# Patient Record
Sex: FEMALE | Race: WHITE | Employment: OTHER | URBAN - METROPOLITAN AREA
[De-identification: names, ages, dates, MRNs, and addresses within clinical notes are randomized per-mention and may not be internally consistent; named-entity substitution may affect disease eponyms.]

---

## 2017-11-07 PROBLEM — C56.9 OVARIAN CANCER (HCC): Status: ACTIVE | Noted: 2017-11-07

## 2017-11-07 PROBLEM — C54.1 ADENOCARCINOMA OF ENDOMETRIUM (HCC): Status: ACTIVE | Noted: 2017-11-07

## 2018-03-26 LAB
CHOLESTEROL, TOTAL: 197 MG/DL
CHOLESTEROL/HDL RATIO: 2.4
HDLC SERPL-MCNC: 83 MG/DL (ref 35–70)
LDL CHOLESTEROL CALCULATED: 93 MG/DL (ref 0–160)
TRIGL SERPL-MCNC: 117 MG/DL
VLDLC SERPL CALC-MCNC: ABNORMAL MG/DL

## 2018-07-09 ENCOUNTER — TELEPHONE (OUTPATIENT)
Dept: PULMONOLOGY | Age: 73
End: 2018-07-09

## 2018-07-09 ENCOUNTER — OFFICE VISIT (OUTPATIENT)
Dept: PULMONOLOGY | Age: 73
End: 2018-07-09
Payer: COMMERCIAL

## 2018-07-09 VITALS
WEIGHT: 163 LBS | RESPIRATION RATE: 12 BRPM | HEIGHT: 64 IN | DIASTOLIC BLOOD PRESSURE: 63 MMHG | SYSTOLIC BLOOD PRESSURE: 126 MMHG | OXYGEN SATURATION: 97 % | BODY MASS INDEX: 27.83 KG/M2 | HEART RATE: 81 BPM

## 2018-07-09 DIAGNOSIS — C80.1 CANCER (HCC): Primary | ICD-10-CM

## 2018-07-09 DIAGNOSIS — C50.412 MALIGNANT NEOPLASM OF UPPER-OUTER QUADRANT OF LEFT BREAST IN FEMALE, ESTROGEN RECEPTOR POSITIVE (HCC): Primary | ICD-10-CM

## 2018-07-09 DIAGNOSIS — C54.1 ADENOCARCINOMA OF ENDOMETRIUM (HCC): ICD-10-CM

## 2018-07-09 DIAGNOSIS — Z17.0 MALIGNANT NEOPLASM OF UPPER-OUTER QUADRANT OF LEFT BREAST IN FEMALE, ESTROGEN RECEPTOR POSITIVE (HCC): Primary | ICD-10-CM

## 2018-07-09 LAB
DLCO %PRED: NORMAL
DLCO/VA %PRED: NORMAL
DLCO: NORMAL ML/MMHG SEC
FEF 25-75% PRE PRED: 1.79
FEF 25-75%-PRE: 1.22
FEV1 %PRED-PRE: 88
FEV1/FVC PRED: 78
FEV1/FVC: 72 %
FEV1: 1.49 LITERS
FEV3 PRE: 2.37
FVC %PRED-PRE: 95
FVC: 2.62 LITERS
MEP: NORMAL
MIP: NORMAL
PEF %PRED-PRE: 5.44
PEF-PRE: 6.43 L/SEC
TLC %PRED: NORMAL
TLC: NORMAL LITERS

## 2018-07-09 PROCEDURE — 99205 OFFICE O/P NEW HI 60 MIN: CPT | Performed by: INTERNAL MEDICINE

## 2018-07-09 PROCEDURE — 94010 BREATHING CAPACITY TEST: CPT | Performed by: INTERNAL MEDICINE

## 2018-07-09 PROCEDURE — 99203 OFFICE O/P NEW LOW 30 MIN: CPT | Performed by: INTERNAL MEDICINE

## 2018-07-09 RX ORDER — GUAIFENESIN/EPHEDRINE HCL 200-12.5MG
200 TABLET ORAL 2 TIMES DAILY
COMMUNITY
End: 2018-11-29

## 2018-07-09 RX ORDER — MELATONIN 10 MG
20 CAPSULE ORAL NIGHTLY
COMMUNITY
End: 2019-09-04

## 2018-07-09 RX ORDER — FAMOTIDINE 20 MG/1
1 TABLET, FILM COATED ORAL 2 TIMES DAILY
COMMUNITY
Start: 2018-06-26 | End: 2018-11-29

## 2018-07-09 RX ORDER — ACETAMINOPHEN 325 MG/1
650 TABLET ORAL EVERY 4 HOURS PRN
COMMUNITY
End: 2019-07-09

## 2018-07-09 ASSESSMENT — PULMONARY FUNCTION TESTS
FEV1_PERCENT_PREDICTED_PRE: 88
FVC_PERCENT_PREDICTED_PRE: 95
FEV1: 1.49
FVC: 2.62
FEV1/FVC: 72
FEV1/FVC_PREDICTED: 78

## 2018-07-09 NOTE — PROGRESS NOTES
New pt to see Dr. Ita Cassidy for 2nd opinion on recent diagnosis with Lung mets? . Pt currently seen at [facility] for Endometrial Cancer/Hx Breast Cancer. Follow up in 4 weeks; appt given. Possible Bronch/Ebus with Biopsy in future. Pt to discuss. Given Dr. Walls Sole contact info #.

## 2018-07-09 NOTE — TELEPHONE ENCOUNTER
Pt called into office and request referral to Dr. Jyoti Payne as discussed with Dr. Paula Patel on today's visit. Referral sent.

## 2018-07-09 NOTE — PROGRESS NOTES
Department of Internal Medicine  Division of Pulmonary, Critical Care & Sleep Medicine  Pulmonary [facility]                                             Pulmonary Clinic Consult     I had the pleasure of seeing  Jase Gonzalez in the [facility] regarding their lung mets       Chief Complaint   Patient presents with    New Patient    Cough     dry    Cancer     hx Endometrial/ Breast Ca    Allergies     enviromental       HISTORY OF PRESENT ILLNESS:    Jase Gonzalez is a 71y.o. year old  Who start smoking at age  25 And increase gradually  1 pack for 10 years , quit smoking 15 years ago     The Patient comes in with SOB that has been going on for the alst 3-4 years   Associated with cough ,She  states that it get worse with exercise or walking long distance and he can walk 1 block  And go 1-2 flight of stairs before get short winded    She  has cough ,dry   She denies any hemoptysis       Has history of lung disease include    Patient has history of uterus cancer that was treated by hysterectomy and also followed by chemotherapy, she also had diagnoses of breast cancer that was treated by lumpectomy and followed by Dr. Che Bradley    The patient during follow-up CAT scan has couple CT scan of the chest that shows a lung nodules with possible metastases and she was treated with chemo, follow-up CAT scan shows slight increase in the size of the nodules and for that the patient was seeking medical advice as her oncologist in the [place] was requesting biopsy    The patient also followed with local oncologist that she has not seen 4 months and she is also following by Dr. Che Bradley as well for her breast cancer    The patient current CT scan shows bilateral lung nodules    As mentioned above patient has been having trouble breathing dry cough, she denies any hemoptysis, she denies any weight loss, she denies any night sweats    She has to stop her chemotherapy as it was not tolerated      ALLERGIES:    Allergies   Allergen Reactions    Asa [Aspirin] Hives    Prilosec [Omeprazole] Hives    Protonix [Pantoprazole Sodium]     Erythromycin Rash    Keflet [Cephalexin] Rash    Levaquin [Levofloxacin In D5w] Rash    Pcn [Penicillins] Rash    Tetracyclines & Related Rash       PAST MEDICAL HISTORY:       Diagnosis Date    Cancer [facility])     endometrial cancer    Diverticulitis     GERD (gastroesophageal reflux disease)     Gout     Macular degeneration     Osteoarthritis        MEDICATIONS:   Current Outpatient Prescriptions   Medication Sig Dispense Refill    loratadine (CLARITIN) 10 MG tablet Take 10 mg by mouth      cyclobenzaprine (FLEXERIL) 5 MG tablet Take 10 mg by mouth      docusate sodium (COLACE) 100 MG capsule Take 1 capsule by mouth daily as needed for Constipation 20 capsule 0    acetaminophen (TYLENOL) 325 MG tablet Take 650 mg by mouth      famotidine (PEPCID) 20 MG tablet Take 1 tablet by mouth daily      Coenzyme Q10 200 MG CAPS Take 1 capsule by mouth daily      Cyanocobalamin (VITAMIN B 12 PO) Take 5,000 mg by mouth daily sublingual      Omega-3 Fatty Acids (OMEGA-3 FISH OIL PO) Take 1,300 mg by mouth daily      Cholecalciferol (VITAMIN D3) 43790 units CAPS Take by mouth once a week      melatonin 3 MG TABS tablet Take 3 mg by mouth daily as needed      Multiple Vitamins-Minerals (PRESERVISION AREDS PO) Take 1 tablet by mouth daily       Garlic 4348 MG CAPS Take by mouth daily      Multiple Vitamins-Minerals (MULTIVITAMIN ADULTS 50+ PO) Take 2 tablets by mouth daily        No current facility-administered medications for this visit.         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 No elevated JVP. Trachea was midline. No carotid bruits were auscultated. Respiratory: Scattered rhonchi other than that the clear to auscultation bilaterally other than that clear to auscultation bilaterally   Cardiovascular: Regular without murmur, clicks, gallops or rubs. There is no left or right ventricular heave. Pulses:  Carotid, radial and femoral pulses were equally bilaterally. Abdomen: Slightly rounded and soft without organomegaly. No rebound, rigidity or guarding was appreciated. Lymphatic: No lymphadenopathy. Musculoskeletal: No joint deformity    Extremities:No edema ,no cyanosis   Skin:  Warm and dry. Good color, turgor and pigmentation. No lesions or scars. Neurological/Psychiatric: The patient's general behavior, level of consciousness, thought content and emotional status is normal.  Cranial nerves II-XII are intact. DATA: Spirometry done in the office today demonstrates an FVC of 2.6 to liters which is 95 % of predicted with an FEV1 of  1.88 liters which is 88 % of predicted. FEV1/FVC ratio is 72 %. Maximum voluntary ventilation is normal at 69 liters per minute or 82 % of predicted. Flow volume loop shows no signs of intrathoracic or extrathoracic obstruction.   Impressions:     IMPRESSION:    1-Uterus cancer/breast cancer with lung nodules  2-Remote history of smoking   3- Exposure to dust/mold    PLAN:      -Patient is 71 very pleasant unfortunate female with history of uterus/breast cancer, most likely recurrence of her uterus cancer as she had biopsy of a mass found in her pelvis and seems that her uterus cancer has spread despite previous treatment, she underwent chemotherapy for possible lung metastases which was decided in [facility]    The patient came for opinion about lung metastases and I explained to her in details that stop all the way to know exactly what its is biopsy which we can do for navigational bronchoscopy and also at the same time we can look into the lymph node which I can see in station for and station 7 and biopsy them if all the oncology feel that biopsy them will add to the diagnoses    The patient was provided with follow-up after she check with oncology as she is not sure which oncologist she will go to and then she will call me back and let us know if she is interested in biopsy  -Her PFTs shows no evidence of obstruction so I doubt that inhalers will add any benefit  -I will see the patient in 4 weeks after she see oncology and then decide if we need to do biopsy      Flu and Pneumovax as per primary     Thank you for allowing me to participate in [facility]. I will keep following with you ,should you have any concerns ,please contact me at [phone]     Sincerely,        Sandy Madrigal MD  Pulmonary & Critical Care Medicine     NOTE: This report was transcribed using voice recognition software. Every effort was made to ensure accuracy; however, inadvertent computerized transcription errors may be present.

## 2018-07-12 ASSESSMENT — ENCOUNTER SYMPTOMS
BLOOD IN STOOL: 0
BACK PAIN: 0
COUGH: 0
ABDOMINAL PAIN: 0
NAUSEA: 0
COLOR CHANGE: 0
ROS SKIN COMMENTS: DENIES BREAST SKIN CHANGES, ARM SWELLING, OR PALPABLE AXILLARY OR SUPRACLAVICULAR ADENOPATHY.
VOMITING: 0
SHORTNESS OF BREATH: 0
ABDOMINAL DISTENTION: 0

## 2018-07-12 NOTE — PROGRESS NOTES
pleomorphic lobular carcinoma. Estrogen Receptors (ER):  Positive (80%,), Progesterone Receptors (IL):  Positive (80%) Her-2/parvin (c-erb B-2):  Negative/1+.     Patient denies prior breast biopsy. She underwent a left needle localized breast lumpectomy, blue dye injection, left axillary sentinel lymph node excision on December 14, 2016. Pathological evaluation demonstrated:  A.  Left breast, 3:00, lumpectomy: Invasive pleomorphic lobular carcinoma. Lobular carcinoma in situ. Focal atypical ductal hyperplasia, apocrine metaplasia and sclerosing adenosis. Margins negative for invasive or in situ carcinoma. Tumor Size: 2.5cm. Margins: Negative; tumor extends to 0.2 cm from the closest anterior  B.  Left breast, anterior margin, excision: Focal atypical ductal hyperplasia. Margins negative for carcinoma. C.  Mahomet lymph nodes, left axilla, biopsy: Four lymph nodes, negative for metastatic carcinoma. Comment: Immunostains for pankeratin show no immunohistochemical evidence of metastatic carcinoma of all four lymph nodes.  Slide C5 also contains small fragments of benign lymphoid tissue. Pathologic Staging  Primary tumor- pT2  Regional lymph nodes-pN0  Distant metastases- pMx    Ancillary Studies (previous biopsy specimen [facility]-):   ER+ (80%); IL+ (80%); HER-2-negative     -Oncotype DX recurrence score: 16.  -Adjuvant radiation therapy completed March 28, 2017.  -Endocrine therapy: Arimidex started March 30, 2017. She did not tolerate due to severe depression;  -Arimidex discontinued.   -Started on Femara after approximately 2 weeks, but did not tolerate Femara. Chose observation.    -She has a history of stage IA papillary serous endometrial carcinoma, FIGO grade 3, and LVSI, tumor size 4.5 cm.   On 7/20/2016 she underwent diagnostic laparoscopic, total laparoscopic hysterectomy, bilateral salpingo-oophorectomy, pelvic and periaortic lymph adenectomy, omentectomy per Dr. Madeleine Gaines @ [facility].      -On

## 2018-07-23 ENCOUNTER — OFFICE VISIT (OUTPATIENT)
Dept: BREAST CENTER | Age: 73
End: 2018-07-23
Payer: COMMERCIAL

## 2018-07-23 VITALS
RESPIRATION RATE: 16 BRPM | OXYGEN SATURATION: 95 % | TEMPERATURE: 98 F | BODY MASS INDEX: 27.25 KG/M2 | HEIGHT: 64 IN | DIASTOLIC BLOOD PRESSURE: 70 MMHG | HEART RATE: 68 BPM | WEIGHT: 159.6 LBS | SYSTOLIC BLOOD PRESSURE: 130 MMHG

## 2018-07-23 DIAGNOSIS — Z12.39 SCREENING FOR BREAST CANCER: Primary | ICD-10-CM

## 2018-07-23 DIAGNOSIS — C50.412 MALIGNANT NEOPLASM OF UPPER-OUTER QUADRANT OF LEFT FEMALE BREAST, UNSPECIFIED ESTROGEN RECEPTOR STATUS (HCC): ICD-10-CM

## 2018-07-23 PROCEDURE — 99213 OFFICE O/P EST LOW 20 MIN: CPT | Performed by: SURGERY

## 2018-07-23 PROCEDURE — 99214 OFFICE O/P EST MOD 30 MIN: CPT | Performed by: NURSE PRACTITIONER

## 2018-07-24 DIAGNOSIS — Z12.31 VISIT FOR SCREENING MAMMOGRAM: Primary | ICD-10-CM

## 2018-07-31 NOTE — PROGRESS NOTES
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 Allergies: Allergies   Allergen Reactions    Asa [Aspirin] Hives    Prilosec [Omeprazole] Hives    Protonix [Pantoprazole Sodium] Other (See Comments)     Other reaction(s): Other: See Comments  DEPRESSION    Erythromycin Rash    Keflet [Cephalexin] Rash    Levaquin [Levofloxacin In D5w] Rash    Pcn [Penicillins] Rash    Polyethylene Glycol Rash     Bloating,gas    Tetracycline Rash    Tetracyclines & Related Rash     Physical Exam:  /69 (Site: Right Arm, Position: Sitting, Cuff Size: Medium Adult)   Pulse 69   Temp 96.8 °F (36 °C) (Temporal)   Resp 20   Ht 5' 3.5\" (1.613 m)   Wt 162 lb 6.4 oz (73.7 kg)   BMI 28.32 kg/m²   GENERAL: Alert, oriented x 3, not in acute distress. HEENT: PERRLA; EOMI. Oropharynx clear. NECK: Supple. Without lymphadenopathy. LUNGS: Good air entry bilaterally. No wheezing, crackles or ronchi. CARDIOVASCULAR: Regular rate. No murmurs, rubs or gallops. ABDOMEN: Soft. Non-tender, non-distended. Positive bowel sounds. EXTREMITIES: Without clubbing, cyanosis, or edema. NEUROLOGIC: No focal deficits. 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  -Adjuvant radiation therapy completed March 28, 2017.  -Endocrine therapy: Arimidex started March 30, 2017. She did not tolerate due to severe depression;  -Arimidex discontinued.   -Started on Femara after approximately 2 weeks, but did not tolerate Femara. Chose observation. Bilateral screening mammogram on 09/26/2017 negative for malignancy  Clinically, there is no evidence of recurrence.      IA papillary serous endometrial adenocarcinoma, FIGO grade 3, - LVSI, tumor size 4.5 cm; BRCA/Myrisk testing Negative  7/20/16-Exam under anesthesia, diagnostic laparoscopy, total laparoscopic hysterectomy, bilateral salpingo-oophorectomy, pelvic and periaortic lymphadenectomy, omentectomy. HDR VAGINAL BRACHYTHERAPY-9/29/16, 10/6/16, 10/13/16    On 9/21/2017 anterior right psoas muscle fine-needle aspirate: Positive for malignant cells, metastatic high-grade carcinoma compatible with patient's known endometrial serous carcinoma.     She completed 2 cycles of chemotherapy with Taxol Carboplatin and Avastin on 10/17 and 11/17. She had poor tolerance to chemotherapy, therefore she declined additional chemotherapy and opted for Natural remedies instead     on 06/12/2018: 15 (<39UmL)      Re-staging scans on 06/15/2018:  CT of the abdomen and pelvis: 2.2 x 1.3 cm soft tissue nodule along the superior aspect of the urinary bladder suspicious for metastases, decreased in size since 1/23/18; no evidence of new abnormalities since prior visit; diverticulosis without evidence of diverticulitis. CT Chest:  Multiple bilateral lung nodules suspicious for metastases, increased in size and number since 1/23/2018. She continues to decline systemic chemotherapy. Immunohistochemistry for PDL1 (17D0): Positive in approximately 5% of the neoplastic cells. Case discussed yesterday with Dr. Kobe Chavarria. We can try the immunotherapy option (Keytruda 200 mg [id number]) given her PD-L1 positivity and refusal of systemic chemotherapy. Authorization will be obtained. RT 08/08/2018 for Cycle # 1 Keytruda     Thank you for allowing us to participate in the care of Mrs. Maritza Rivera.     Soni Park MD   [id number]  Board Certified Medical Oncologist

## 2018-08-01 ENCOUNTER — TELEPHONE (OUTPATIENT)
Dept: INFUSION THERAPY | Age: 73
End: 2018-08-01

## 2018-08-01 ENCOUNTER — TELEPHONE (OUTPATIENT)
Dept: ONCOLOGY | Age: 73
End: 2018-08-01

## 2018-08-01 ENCOUNTER — OFFICE VISIT (OUTPATIENT)
Dept: ONCOLOGY | Age: 73
End: 2018-08-01
Payer: COMMERCIAL

## 2018-08-01 VITALS
TEMPERATURE: 96.8 F | DIASTOLIC BLOOD PRESSURE: 69 MMHG | SYSTOLIC BLOOD PRESSURE: 135 MMHG | BODY MASS INDEX: 27.72 KG/M2 | RESPIRATION RATE: 20 BRPM | HEIGHT: 64 IN | WEIGHT: 162.4 LBS | HEART RATE: 69 BPM

## 2018-08-01 DIAGNOSIS — Z85.3 PERSONAL HISTORY OF BREAST CANCER: Primary | ICD-10-CM

## 2018-08-01 PROCEDURE — 99214 OFFICE O/P EST MOD 30 MIN: CPT

## 2018-08-01 PROCEDURE — 99204 OFFICE O/P NEW MOD 45 MIN: CPT | Performed by: INTERNAL MEDICINE

## 2018-08-01 NOTE — PROGRESS NOTES
Spoke with patient about Immunotherapy Tiffanie Marion). We went over the protocol to be used, what to expect as far as side effects, and when to call with problems. This was intended to reinforce the education done by Dr. Curly Daley. Patient was provided medication handout to the patient to take home and told patient to call if there are any questions once they had a chance to absorb the information. Patient had the opportunity to ask questions with all questions being answered to their satisfaction. The patient expresses understanding and acceptance of instructions.     Electronically signed by Bakari Gleason, [address] [place] [address] on 8/1/2018 at 11:42 AM

## 2018-08-02 ENCOUNTER — TELEPHONE (OUTPATIENT)
Dept: RADIATION ONCOLOGY | Age: 73
End: 2018-08-02

## 2018-08-03 ENCOUNTER — TELEPHONE (OUTPATIENT)
Dept: ONCOLOGY | Age: 73
End: 2018-08-03

## 2018-08-03 DIAGNOSIS — Z85.3 PERSONAL HISTORY OF BREAST CANCER: Primary | ICD-10-CM

## 2018-08-08 ENCOUNTER — TELEPHONE (OUTPATIENT)
Dept: PHARMACY | Age: 73
End: 2018-08-08

## 2018-08-08 NOTE — TELEPHONE ENCOUNTER
Received referral from Indira Maza, patient navigator, for co-insurance assistance. Unfortunately at this time there are no open programs.

## 2018-08-09 ENCOUNTER — TELEPHONE (OUTPATIENT)
Dept: PULMONOLOGY | Age: 73
End: 2018-08-09

## 2018-08-09 ENCOUNTER — OFFICE VISIT (OUTPATIENT)
Dept: PULMONOLOGY | Age: 73
End: 2018-08-09
Payer: COMMERCIAL

## 2018-08-09 VITALS
SYSTOLIC BLOOD PRESSURE: 139 MMHG | WEIGHT: 163.9 LBS | TEMPERATURE: 97.8 F | RESPIRATION RATE: 18 BRPM | DIASTOLIC BLOOD PRESSURE: 64 MMHG | HEART RATE: 77 BPM | OXYGEN SATURATION: 98 % | BODY MASS INDEX: 27.98 KG/M2 | HEIGHT: 64 IN

## 2018-08-09 DIAGNOSIS — R05.9 COUGH: ICD-10-CM

## 2018-08-09 DIAGNOSIS — R91.1 LUNG NODULE: Primary | ICD-10-CM

## 2018-08-09 PROCEDURE — 99214 OFFICE O/P EST MOD 30 MIN: CPT | Performed by: INTERNAL MEDICINE

## 2018-08-09 PROCEDURE — 99213 OFFICE O/P EST LOW 20 MIN: CPT | Performed by: INTERNAL MEDICINE

## 2018-08-09 NOTE — PROGRESS NOTES
this visit. 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 There has been no weight changes. No fevers, fatigue or weakness. Head: Patient denies any history of trauma, convulsive disorder or syncope. Skin:  Patient denies history of changes in pigmentation, eruptions or pruritus. No easy bruising or bleeding. EENT: no nasal congestion   Cardiovascular ,No chest pain ,No edema ,  Respiration:SOB:+  ,ENRIQUEZ :++  Gastrointestinal:No GI bleed ,no melena  ,no hematemesis    Musculoskeletal: no joint pain ,no swelling  Neurological:no , syncope. Denies twitching, convulsions, loss of consciousness or memory. Endocrine:  . No history of goiter, exophthalmos or dryness of skin. The patient has no history of diabetes. Hematopoietic:  No history of bleeding disorders or easy bruising. Rheumatic:  No connective tissue disease history or polyarthritis/inflammatory joint disease.       PHYSICAL EXAMINATION:  Vitals:    18 1011   BP: 139/64   Pulse: 77   Resp: 18   Temp: 97.8 °F (36.6 °C)   SpO2: 98%     Constitutional: This is a well developed, well nourished 71y.o. year old female who is alert, oriented, cooperative and in no apparent distress. Head was normocephalic and atraumatic. EENT: Mallampati class :  Extraocular muscles intact. External canals are patent and no discharge was appreciated. Septum was midline,   mucosa was without erythema, exudates or cobblestoning. No thrush was noted. Neck: Supple without thyromegaly. No elevated JVP. Trachea was midline. No carotid bruits were auscultated. Respiratory: Scattered rhonchi other than that the clear to auscultation bilaterally other than that clear to auscultation bilaterally   Cardiovascular: Regular without murmur, clicks, gallops or rubs. There is no left or right ventricular heave. Pulses:  Carotid, radial and femoral pulses were equally bilaterally. Abdomen: Slightly rounded and soft without organomegaly. No rebound, rigidity or guarding was appreciated. Lymphatic: No lymphadenopathy. Musculoskeletal: No joint deformity    Extremities:No edema ,no cyanosis   Skin:  Warm and dry. Good color, turgor and pigmentation. No lesions or scars. Neurological/Psychiatric: The patient's general behavior, level of consciousness, thought content and emotional status is normal.  Cranial nerves II-XII are intact. DATA: Spirometry done in the office today demonstrates an FVC of 2.6 to liters which is 95 % of predicted with an FEV1 of  1.88 liters which is 88 % of predicted. FEV1/FVC ratio is 72 %. Maximum voluntary ventilation is normal at 69 liters per minute or 82 % of predicted. Flow volume loop shows no signs of intrathoracic or extrathoracic obstruction.   Impressions:     IMPRESSION:    1-Uterus cancer/breast cancer with lung nodules  2-Remote history of smoking   3- Exposure to dust/mold    PLAN:      -Patient is 71 very pleasant unfortunate female with history of uterus/breast cancer, most likely recurrence of her uterus cancer as she had biopsy of a mass found in her pelvis and seems that her

## 2018-08-10 ENCOUNTER — TELEPHONE (OUTPATIENT)
Dept: PULMONOLOGY | Age: 73
End: 2018-08-10

## 2018-08-10 NOTE — TELEPHONE ENCOUNTER
Call to pt to inform of Bronch scheduled for [email or web address]. Pt states she had just received call today from scheduling so she is aware of plan for procedure. Advised to call with any questions.

## 2018-08-14 NOTE — PROGRESS NOTES
may call the pre-op area if you have concerns about your blood sugar [phone]. [] Use your inhalers the morning of surgery. Bring your emergency inhaler with you day of surgery. [] Follow physician instructions regarding any blood thinners you may be taking. WHAT TO EXPECT:  [x] The day of surgery you will be greeted and  checked in by the [employer] [facility] [employer] .  A nurse will greet you in accordance to the time you are needed in the pre-op area to prepare you for surgery. Please do not be discouraged if you are not greeted in the order you arrive as there are many variables that are involved in patient preparation. Your patience is greatly appreciated as you wait for your nurse. Please bring in items such as: books, magazines, newspapers, electronics, or any other items  to occupy your time in the waiting area. [x]  Delays may occur with surgery and staff will make a sincere effort to keep you informed of delays. If any delays occur with your procedure, we apologize ahead of time for your inconvenience as we recognize the value of your time.

## 2018-08-15 ENCOUNTER — HOSPITAL ENCOUNTER (OUTPATIENT)
Dept: CT IMAGING | Age: 73
Discharge: HOME | End: 2018-08-17
Payer: COMMERCIAL

## 2018-08-15 ENCOUNTER — HOSPITAL ENCOUNTER (OUTPATIENT)
Dept: INFUSION THERAPY | Age: 73
Discharge: HOME | End: 2018-08-15

## 2018-08-15 DIAGNOSIS — R91.1 LUNG NODULE: ICD-10-CM

## 2018-08-15 PROCEDURE — 71250 CT THORAX DX C-: CPT

## 2018-08-17 ENCOUNTER — ANESTHESIA EVENT (OUTPATIENT)
Dept: ENDOSCOPY | Age: 73
End: 2018-08-17
Payer: COMMERCIAL

## 2018-08-17 ENCOUNTER — APPOINTMENT (OUTPATIENT)
Dept: GENERAL RADIOLOGY | Age: 73
End: 2018-08-17
Attending: INTERNAL MEDICINE
Payer: COMMERCIAL

## 2018-08-17 ENCOUNTER — ANESTHESIA (OUTPATIENT)
Dept: ENDOSCOPY | Age: 73
End: 2018-08-17
Payer: COMMERCIAL

## 2018-08-17 ENCOUNTER — HOSPITAL ENCOUNTER (OUTPATIENT)
Age: 73
Setting detail: OUTPATIENT SURGERY
Discharge: HOME | End: 2018-08-17
Attending: INTERNAL MEDICINE | Admitting: INTERNAL MEDICINE
Payer: COMMERCIAL

## 2018-08-17 VITALS
HEIGHT: 64 IN | RESPIRATION RATE: 17 BRPM | TEMPERATURE: 97 F | OXYGEN SATURATION: 94 % | WEIGHT: 163 LBS | SYSTOLIC BLOOD PRESSURE: 129 MMHG | HEART RATE: 63 BPM | BODY MASS INDEX: 27.83 KG/M2 | DIASTOLIC BLOOD PRESSURE: 83 MMHG

## 2018-08-17 VITALS — OXYGEN SATURATION: 88 % | DIASTOLIC BLOOD PRESSURE: 74 MMHG | SYSTOLIC BLOOD PRESSURE: 147 MMHG

## 2018-08-17 DIAGNOSIS — Z01.812 PRE-OPERATIVE LABORATORY EXAMINATION: Primary | ICD-10-CM

## 2018-08-17 LAB
HCT VFR BLD CALC: 39.1 % (ref 34–48)
HEMOGLOBIN: 13.1 G/DL (ref 11.5–15.5)
MCH RBC QN AUTO: 27.8 PG (ref 26–35)
MCHC RBC AUTO-ENTMCNC: 33.5 % (ref 32–34.5)
MCV RBC AUTO: 83 FL (ref 80–99.9)
PDW BLD-RTO: 16.3 FL (ref 11.5–15)
PLATELET # BLD: 187 E9/L (ref 130–450)
PMV BLD AUTO: 9.1 FL (ref 7–12)
RBC # BLD: 4.71 E12/L (ref 3.5–5.5)
WBC # BLD: 6.8 E9/L (ref 4.5–11.5)

## 2018-08-17 PROCEDURE — 7100000010 HC PHASE II RECOVERY - FIRST 15 MIN: Performed by: INTERNAL MEDICINE

## 2018-08-17 PROCEDURE — 2500000003 HC RX 250 WO HCPCS: Performed by: NURSE ANESTHETIST, CERTIFIED REGISTERED

## 2018-08-17 PROCEDURE — 87305 ASPERGILLUS AG IA: CPT

## 2018-08-17 PROCEDURE — 2720000010 HC SURG SUPPLY STERILE: Performed by: INTERNAL MEDICINE

## 2018-08-17 PROCEDURE — 3609011200 HC BRONCHOSCOPY W/TRANSBRONCL NDL ASPIR BX EA ADDL LOBE: Performed by: INTERNAL MEDICINE

## 2018-08-17 PROCEDURE — 3700000000 HC ANESTHESIA ATTENDED CARE: Performed by: INTERNAL MEDICINE

## 2018-08-17 PROCEDURE — 3609011100 HC BRONCHOSCOPY BRUSHINGS: Performed by: INTERNAL MEDICINE

## 2018-08-17 PROCEDURE — 89051 BODY FLUID CELL COUNT: CPT

## 2018-08-17 PROCEDURE — 31627 NAVIGATIONAL BRONCHOSCOPY: CPT | Performed by: INTERNAL MEDICINE

## 2018-08-17 PROCEDURE — 7100000011 HC PHASE II RECOVERY - ADDTL 15 MIN: Performed by: INTERNAL MEDICINE

## 2018-08-17 PROCEDURE — 87070 CULTURE OTHR SPECIMN AEROBIC: CPT

## 2018-08-17 PROCEDURE — 3209999900 FLUORO FOR SURGICAL PROCEDURES

## 2018-08-17 PROCEDURE — 87015 SPECIMEN INFECT AGNT CONCNTJ: CPT

## 2018-08-17 PROCEDURE — 3609010800 HC BRONCHOSCOPY ALVEOLAR LAVAGE: Performed by: INTERNAL MEDICINE

## 2018-08-17 PROCEDURE — 2500000003 HC RX 250 WO HCPCS: Performed by: INTERNAL MEDICINE

## 2018-08-17 PROCEDURE — 3603165200 HC BRNCHSC EBUS GUIDED SAMPL 1/2 NODE STATION/STRUX: Performed by: INTERNAL MEDICINE

## 2018-08-17 PROCEDURE — 31624 DX BRONCHOSCOPE/LAVAGE: CPT | Performed by: INTERNAL MEDICINE

## 2018-08-17 PROCEDURE — 6360000002 HC RX W HCPCS: Performed by: NURSE ANESTHETIST, CERTIFIED REGISTERED

## 2018-08-17 PROCEDURE — 2580000003 HC RX 258: Performed by: NURSE ANESTHETIST, CERTIFIED REGISTERED

## 2018-08-17 PROCEDURE — 87206 SMEAR FLUORESCENT/ACID STAI: CPT

## 2018-08-17 PROCEDURE — 87102 FUNGUS ISOLATION CULTURE: CPT

## 2018-08-17 PROCEDURE — 88112 CYTOPATH CELL ENHANCE TECH: CPT

## 2018-08-17 PROCEDURE — 2709999900 HC NON-CHARGEABLE SUPPLY: Performed by: INTERNAL MEDICINE

## 2018-08-17 PROCEDURE — 88173 CYTOPATH EVAL FNA REPORT: CPT

## 2018-08-17 PROCEDURE — 85027 COMPLETE CBC AUTOMATED: CPT

## 2018-08-17 PROCEDURE — 3609011900 HC BRONCHOSCOPY NEEDLE BX TRACHEA MAIN STEM&/BRON: Performed by: INTERNAL MEDICINE

## 2018-08-17 PROCEDURE — 88305 TISSUE EXAM BY PATHOLOGIST: CPT

## 2018-08-17 PROCEDURE — 36415 COLL VENOUS BLD VENIPUNCTURE: CPT

## 2018-08-17 PROCEDURE — 31652 BRONCH EBUS SAMPLNG 1/2 NODE: CPT | Performed by: INTERNAL MEDICINE

## 2018-08-17 PROCEDURE — 71045 X-RAY EXAM CHEST 1 VIEW: CPT

## 2018-08-17 PROCEDURE — 7100000001 HC PACU RECOVERY - ADDTL 15 MIN: Performed by: INTERNAL MEDICINE

## 2018-08-17 PROCEDURE — 87116 MYCOBACTERIA CULTURE: CPT

## 2018-08-17 PROCEDURE — 3700000001 HC ADD 15 MINUTES (ANESTHESIA): Performed by: INTERNAL MEDICINE

## 2018-08-17 PROCEDURE — 7100000000 HC PACU RECOVERY - FIRST 15 MIN: Performed by: INTERNAL MEDICINE

## 2018-08-17 PROCEDURE — 87205 SMEAR GRAM STAIN: CPT

## 2018-08-17 RX ORDER — FENTANYL CITRATE 50 UG/ML
INJECTION, SOLUTION INTRAMUSCULAR; INTRAVENOUS PRN
Status: DISCONTINUED | OUTPATIENT
Start: 2018-08-17 | End: 2018-08-17 | Stop reason: SDUPTHER

## 2018-08-17 RX ORDER — ROCURONIUM BROMIDE 10 MG/ML
INJECTION, SOLUTION INTRAVENOUS PRN
Status: DISCONTINUED | OUTPATIENT
Start: 2018-08-17 | End: 2018-08-17 | Stop reason: SDUPTHER

## 2018-08-17 RX ORDER — MORPHINE SULFATE 2 MG/ML
1 INJECTION, SOLUTION INTRAMUSCULAR; INTRAVENOUS EVERY 5 MIN PRN
Status: DISCONTINUED | OUTPATIENT
Start: 2018-08-17 | End: 2018-08-17 | Stop reason: HOSPADM

## 2018-08-17 RX ORDER — DEXAMETHASONE SODIUM PHOSPHATE 10 MG/ML
INJECTION INTRAMUSCULAR; INTRAVENOUS PRN
Status: DISCONTINUED | OUTPATIENT
Start: 2018-08-17 | End: 2018-08-17 | Stop reason: SDUPTHER

## 2018-08-17 RX ORDER — PROPOFOL 10 MG/ML
INJECTION, EMULSION INTRAVENOUS PRN
Status: DISCONTINUED | OUTPATIENT
Start: 2018-08-17 | End: 2018-08-17 | Stop reason: SDUPTHER

## 2018-08-17 RX ORDER — MIDAZOLAM HYDROCHLORIDE 1 MG/ML
INJECTION INTRAMUSCULAR; INTRAVENOUS PRN
Status: DISCONTINUED | OUTPATIENT
Start: 2018-08-17 | End: 2018-08-17 | Stop reason: SDUPTHER

## 2018-08-17 RX ORDER — PROMETHAZINE HYDROCHLORIDE 25 MG/ML
6.25 INJECTION, SOLUTION INTRAMUSCULAR; INTRAVENOUS EVERY 10 MIN PRN
Status: DISCONTINUED | OUTPATIENT
Start: 2018-08-17 | End: 2018-08-17 | Stop reason: HOSPADM

## 2018-08-17 RX ORDER — ONDANSETRON 2 MG/ML
INJECTION INTRAMUSCULAR; INTRAVENOUS PRN
Status: DISCONTINUED | OUTPATIENT
Start: 2018-08-17 | End: 2018-08-17 | Stop reason: SDUPTHER

## 2018-08-17 RX ORDER — MORPHINE SULFATE 2 MG/ML
2 INJECTION, SOLUTION INTRAMUSCULAR; INTRAVENOUS EVERY 5 MIN PRN
Status: DISCONTINUED | OUTPATIENT
Start: 2018-08-17 | End: 2018-08-17 | Stop reason: HOSPADM

## 2018-08-17 RX ORDER — GLYCOPYRROLATE 1 MG/5 ML
SYRINGE (ML) INTRAVENOUS PRN
Status: DISCONTINUED | OUTPATIENT
Start: 2018-08-17 | End: 2018-08-17 | Stop reason: SDUPTHER

## 2018-08-17 RX ORDER — HYDROCODONE BITARTRATE AND ACETAMINOPHEN 5; 325 MG/1; MG/1
1 TABLET ORAL PRN
Status: DISCONTINUED | OUTPATIENT
Start: 2018-08-17 | End: 2018-08-17 | Stop reason: HOSPADM

## 2018-08-17 RX ORDER — MEPERIDINE HYDROCHLORIDE 50 MG/ML
12.5 INJECTION INTRAMUSCULAR; INTRAVENOUS; SUBCUTANEOUS EVERY 5 MIN PRN
Status: DISCONTINUED | OUTPATIENT
Start: 2018-08-17 | End: 2018-08-17 | Stop reason: HOSPADM

## 2018-08-17 RX ORDER — LIDOCAINE HYDROCHLORIDE 20 MG/ML
INJECTION, SOLUTION INFILTRATION; PERINEURAL PRN
Status: DISCONTINUED | OUTPATIENT
Start: 2018-08-17 | End: 2018-08-17 | Stop reason: SDUPTHER

## 2018-08-17 RX ORDER — SODIUM CHLORIDE 9 MG/ML
INJECTION, SOLUTION INTRAVENOUS CONTINUOUS PRN
Status: DISCONTINUED | OUTPATIENT
Start: 2018-08-17 | End: 2018-08-17 | Stop reason: SDUPTHER

## 2018-08-17 RX ORDER — HYDROCODONE BITARTRATE AND ACETAMINOPHEN 5; 325 MG/1; MG/1
2 TABLET ORAL PRN
Status: DISCONTINUED | OUTPATIENT
Start: 2018-08-17 | End: 2018-08-17 | Stop reason: HOSPADM

## 2018-08-17 RX ADMIN — LIDOCAINE HYDROCHLORIDE 60 MG: 20 INJECTION, SOLUTION INFILTRATION; PERINEURAL at 12:23

## 2018-08-17 RX ADMIN — PROPOFOL 170 MG: 10 INJECTION, EMULSION INTRAVENOUS at 12:23

## 2018-08-17 RX ADMIN — SODIUM CHLORIDE: 9 INJECTION, SOLUTION INTRAVENOUS at 13:46

## 2018-08-17 RX ADMIN — PROPOFOL 30 MG: 10 INJECTION, EMULSION INTRAVENOUS at 13:10

## 2018-08-17 RX ADMIN — Medication 0.6 MG: at 14:22

## 2018-08-17 RX ADMIN — NEOSTIGMINE METHYLSULFATE 3 MG: 0.5 INJECTION, SOLUTION INTRAMUSCULAR; INTRAVENOUS; SUBCUTANEOUS at 14:22

## 2018-08-17 RX ADMIN — ONDANSETRON 4 MG: 2 INJECTION INTRAMUSCULAR; INTRAVENOUS at 12:45

## 2018-08-17 RX ADMIN — SODIUM CHLORIDE: 9 INJECTION, SOLUTION INTRAVENOUS at 12:18

## 2018-08-17 RX ADMIN — Medication 30 MG: at 12:23

## 2018-08-17 RX ADMIN — PROPOFOL 30 MG: 10 INJECTION, EMULSION INTRAVENOUS at 12:40

## 2018-08-17 RX ADMIN — FENTANYL CITRATE 50 MCG: 50 INJECTION, SOLUTION INTRAMUSCULAR; INTRAVENOUS at 12:19

## 2018-08-17 RX ADMIN — FENTANYL CITRATE 50 MCG: 50 INJECTION, SOLUTION INTRAMUSCULAR; INTRAVENOUS at 12:40

## 2018-08-17 RX ADMIN — Medication 10 MG: at 13:10

## 2018-08-17 RX ADMIN — MIDAZOLAM HYDROCHLORIDE 2 MG: 1 INJECTION, SOLUTION INTRAMUSCULAR; INTRAVENOUS at 12:18

## 2018-08-17 RX ADMIN — Medication 10 MG: at 13:30

## 2018-08-17 RX ADMIN — DEXAMETHASONE SODIUM PHOSPHATE 10 MG: 10 INJECTION INTRAMUSCULAR; INTRAVENOUS at 12:45

## 2018-08-17 ASSESSMENT — PULMONARY FUNCTION TESTS
PIF_VALUE: 42
PIF_VALUE: 34
PIF_VALUE: 34
PIF_VALUE: 39
PIF_VALUE: 17
PIF_VALUE: 15
PIF_VALUE: 40
PIF_VALUE: 34
PIF_VALUE: 37
PIF_VALUE: 34
PIF_VALUE: 36
PIF_VALUE: 1
PIF_VALUE: 34
PIF_VALUE: 33
PIF_VALUE: 0
PIF_VALUE: 30
PIF_VALUE: 45
PIF_VALUE: 23
PIF_VALUE: 41
PIF_VALUE: 36
PIF_VALUE: 36
PIF_VALUE: 34
PIF_VALUE: 35
PIF_VALUE: 38
PIF_VALUE: 34
PIF_VALUE: 34
PIF_VALUE: 18
PIF_VALUE: 40
PIF_VALUE: 44
PIF_VALUE: 34
PIF_VALUE: 3
PIF_VALUE: 18
PIF_VALUE: 6
PIF_VALUE: 34
PIF_VALUE: 17
PIF_VALUE: 2
PIF_VALUE: 40
PIF_VALUE: 34
PIF_VALUE: 31
PIF_VALUE: 1
PIF_VALUE: 2
PIF_VALUE: 30
PIF_VALUE: 32
PIF_VALUE: 41
PIF_VALUE: 0
PIF_VALUE: 17
PIF_VALUE: 45
PIF_VALUE: 30
PIF_VALUE: 18
PIF_VALUE: 30
PIF_VALUE: 35
PIF_VALUE: 0
PIF_VALUE: 35
PIF_VALUE: 38
PIF_VALUE: 40
PIF_VALUE: 34
PIF_VALUE: 20
PIF_VALUE: 41
PIF_VALUE: 24
PIF_VALUE: 34
PIF_VALUE: 35
PIF_VALUE: 41
PIF_VALUE: 34
PIF_VALUE: 31
PIF_VALUE: 19
PIF_VALUE: 32
PIF_VALUE: 41
PIF_VALUE: 40
PIF_VALUE: 34
PIF_VALUE: 18
PIF_VALUE: 34
PIF_VALUE: 36
PIF_VALUE: 23
PIF_VALUE: 2
PIF_VALUE: 24
PIF_VALUE: 34
PIF_VALUE: 30
PIF_VALUE: 45
PIF_VALUE: 3
PIF_VALUE: 34
PIF_VALUE: 20
PIF_VALUE: 40
PIF_VALUE: 1
PIF_VALUE: 41
PIF_VALUE: 19
PIF_VALUE: 41
PIF_VALUE: 19
PIF_VALUE: 23
PIF_VALUE: 2
PIF_VALUE: 19
PIF_VALUE: 34
PIF_VALUE: 30
PIF_VALUE: 18
PIF_VALUE: 19
PIF_VALUE: 17
PIF_VALUE: 30
PIF_VALUE: 36
PIF_VALUE: 23
PIF_VALUE: 2
PIF_VALUE: 32
PIF_VALUE: 23
PIF_VALUE: 36
PIF_VALUE: 11
PIF_VALUE: 34
PIF_VALUE: 32
PIF_VALUE: 19
PIF_VALUE: 45
PIF_VALUE: 32
PIF_VALUE: 34
PIF_VALUE: 2
PIF_VALUE: 36
PIF_VALUE: 5
PIF_VALUE: 35
PIF_VALUE: 2
PIF_VALUE: 36
PIF_VALUE: 35
PIF_VALUE: 34
PIF_VALUE: 17
PIF_VALUE: 36
PIF_VALUE: 1
PIF_VALUE: 34
PIF_VALUE: 21
PIF_VALUE: 27
PIF_VALUE: 30
PIF_VALUE: 34
PIF_VALUE: 1
PIF_VALUE: 34
PIF_VALUE: 1
PIF_VALUE: 34
PIF_VALUE: 30

## 2018-08-17 ASSESSMENT — PAIN SCALES - GENERAL: PAINLEVEL_OUTOF10: 0

## 2018-08-17 ASSESSMENT — PAIN - FUNCTIONAL ASSESSMENT: PAIN_FUNCTIONAL_ASSESSMENT: 0-10

## 2018-08-17 NOTE — ANESTHESIA PRE PROCEDURE
08/16/18    BMI:   Wt Readings from Last 3 Encounters:   08/17/18 163 lb (73.9 kg)   08/09/18 163 lb 14.4 oz (74.3 kg)   08/01/18 162 lb 6.4 oz (73.7 kg)     Body mass index is 28.42 kg/m². CBC:   Lab Results   Component Value Date    WBC 5.1 11/12/2017    RBC 4.91 11/12/2017    HGB 13.2 11/12/2017    HCT 40.1 11/12/2017    MCV 81.7 11/12/2017    RDW 15.4 11/12/2017     11/12/2017       CMP:   Lab Results   Component Value Date     11/12/2017    K 4.1 11/12/2017     11/12/2017    CO2 30 11/12/2017    BUN 11 11/12/2017    CREATININE 0.6 11/12/2017    GFRAA >60 11/12/2017    LABGLOM >60 11/12/2017    GLUCOSE 106 11/12/2017    PROT 6.3 11/12/2017    CALCIUM 9.3 11/12/2017    BILITOT 0.7 11/12/2017    ALKPHOS 66 11/12/2017    AST 17 11/12/2017    ALT 13 11/12/2017       POC Tests: No results for input(s): POCGLU, POCNA, POCK, POCCL, POCBUN, POCHEMO, POCHCT in the last 72 hours. Coags:   Lab Results   Component Value Date    PROTIME 11.3 08/01/2014    INR 1.1 08/01/2014    APTT 35.7 08/01/2014       HCG (If Applicable): No results found for: PREGTESTUR, PREGSERUM, HCG, HCGQUANT     ABGs: No results found for: PHART, PO2ART, WHX4MTL, CQB2PNM, BEART, V6KHVWCM     Type & Screen (If Applicable):  No results found for: LABABO, LABRH     CT Chest w/o contrast 8/15/2018  Impression       1. Multiple bilateral pulmonary nodules. Many of these nodules have   increased in size when compared to prior PET/CT from August 19, 2017   and are concerning for increasing size of lung metastases. Follow-up   PET/CT can be helpful for further evaluation.       2.  No evidence of pneumonia or pleural effusion.       4. Redemonstration of postsurgical changes associated with left   breast.         Anesthesia Evaluation  Patient summary reviewed and Nursing notes reviewed no history of anesthetic complications:   Airway: Mallampati: II  TM distance: <3 FB   Neck ROM: full  Mouth opening: > = 3 FB Dental:      Comment: Denies loose, capped teeth. Pulmonary: breath sounds clear to auscultation                            ROS comment: F/u chest ct s/p breast CA shows lung nodules with possible metastases. Pt denies SOB currently, occasional dry, nonproductive cough. Cardiovascular:Negative CV ROS  Exercise tolerance: good (>4 METS),           Rhythm: regular  Rate: normal                 ROS comment: EKG 11/2017: NSR     Neuro/Psych:   Negative Neuro/Psych ROS               ROS comment: Macular degeneration GI/Hepatic/Renal:   (+) GERD: well controlled,           Endo/Other:    (+) malignancy/cancer. ROS comment: 2016 Left breast CA with lumpectomy and radiation. Endometrial CA, s/p hysterectomy, radiation, 2 rounds of chemo, did not tolerate well and did not finish treatment course. Abdominal:       Abdomen: soft. Vascular: negative vascular ROS. Anesthesia Plan      general     ASA 3     (#20 right hand)  Induction: intravenous. Anesthetic plan and risks discussed with patient. Use of blood products discussed with patient whom. Plan discussed with CRNA and attending. Delmis Birch RN   8/17/2018        Pt seen, examined, chart reviewed, plan discussed.   Camille Ashley  8/17/2018  12:12 PM

## 2018-08-18 LAB
APPEARANCE FLUID: NORMAL
CELL COUNT FLUID TYPE: NORMAL
COLOR FLUID: COLORLESS
GRAM STAIN ORDERABLE: NORMAL
GRAM STAIN ORDERABLE: NORMAL
LYMPHOCYTES, BODY FLUID: 84 %
MONOCYTE, FLUID: 4 %
NEUTROPHIL, FLUID: 12 %
NUCLEATED CELLS FLUID: 10 /UL
RBC FLUID: <2000 /UL

## 2018-08-18 NOTE — OP NOTE
uptill 4th generation with no endobronchial lesion   RML and RLL no lesion     Left main bronchus:no lesion   Left upper lobe segmenters and Lingula  Normal Mucosa     Left lower lobe basilar and superior segments uptill 4th generation bronchi: normal              BAL : sent from [facility]      Navigation bronchoscopy:    Screening bronchoscopy, the LG catheter was inserted and using navigation bronchoscopy thyroidectomy to the right upper lobe lesion I was phoned 0.4 cm away from the lesion multiple biopsy was done about 5 along with a brush  Then the navigation bronchoscopy was then directed to the right middle lobe lesion, on 3 passes were taking from the right middle lobe lesion      EBUS  Then EBUS scope was used to addition to try station 7 multiple passes were taking please refer to images and also station are 10 was admitted identified and multiple passes were taken adequte sample confirm by pathology      COMPLICATIONS:  none    IMPRESSIONS:       RECOMMENDATIONS:   The Patient was taken to the recovery area in satisfactory condition. Await final test/sample results.         Rylee Zimmerman MD

## 2018-08-18 NOTE — H&P
visit    Patient came for follow-up visit to discuss about her wishes to proceed with biopsy after ,  Patient was seen by Dr. Francisco Reese on August 1 was to start [facility] since she was PD L1 positive and she refused systemic chemotherapy    Had long discussion with the patient that since both [facility] onc[facility] believe that she has metastases to the lungs        ALLERGIES:    Allergies   Allergen Reactions    Asa [Aspirin] Hives    Prilosec [Omeprazole] Hives    Protonix [Pantoprazole Sodium] Other (See Comments)     Other reaction(s): Other: See Comments  DEPRESSION    Erythromycin Rash    Keflet [Cephalexin] Rash    Levaquin [Levofloxacin In D5w] Rash    Pcn [Penicillins] Rash    Polyethylene Glycol Rash     Bloating,gas    Tetracycline Rash    Tetracyclines & Related Rash       PAST MEDICAL HISTORY:       Diagnosis Date    Cancer [facility])     endometrial cancer, breast    Diverticulitis     GERD (gastroesophageal reflux disease)     Gout     Macular degeneration     Osteoarthritis        MEDICATIONS:   No current facility-administered medications for this encounter.       Current Outpatient Prescriptions   Medication Sig Dispense Refill    NONFORMULARY Take 0.5 drops by mouth daily      Garlic 3765 MG CAPS Take 2,000 mg by mouth daily       acetaminophen (TYLENOL) 325 MG tablet Take 650 mg by mouth every 4 hours as needed for Pain       famotidine (PEPCID) 20 MG tablet Take 1 tablet by mouth 2 times daily       5-Hydroxytryptophan (5-HTP) 100 MG CAPS Take 200 mg by mouth 2 times daily      Melatonin 10 MG CAPS Take 20 mg by mouth nightly      Licorice-Glycine (RHIZINATE 3X) 400-50 MG CHEW Take 2 tablets by mouth      Cholecalciferol (VITAMIN D3) 24930 units CAPS Take by mouth every 14 days       loratadine (CLARITIN) 10 MG tablet Take 10 mg by mouth      cyclobenzaprine (FLEXERIL) 5 MG tablet Take 10 mg by mouth      docusate sodium (COLACE) 100 MG capsule Take 1 capsule by mouth daily as needed for Constipation 20 capsule 0       SOCIAL AND OCCUPATIONAL HEALTH:  History   Smoking Status    Former Smoker    Packs/day: 0.25    Years: 20.00   Smokeless Tobacco    Never Used     TB :No   Pets No  Industrial exposure: She states she has been to a lot of exposure in 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-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 There has been no weight changes. No fevers, fatigue or weakness. Head: Patient denies any history of trauma, convulsive disorder or syncope. Skin:  Patient denies history of changes in pigmentation, eruptions or pruritus. No easy bruising or bleeding. EENT: no nasal congestion   Cardiovascular ,No chest pain ,No edema ,  Respiration:SOB:+  ,ENRIQUEZ :++  Gastrointestinal:No GI bleed ,no melena  ,no hematemesis    Musculoskeletal: no joint pain ,no swelling  Neurological:no , syncope. Denies twitching, convulsions, loss of consciousness or memory. Endocrine:  . No history of goiter, exophthalmos or dryness of skin. The patient has no history of diabetes. Hematopoietic:  No history of bleeding disorders or easy bruising. Rheumatic:  No connective tissue disease history or polyarthritis/inflammatory joint disease.       PHYSICAL EXAMINATION:  Vitals:    18 1630   BP: 129/83   Pulse: 63   Resp: 17   Temp:    SpO2: 94%     Constitutional: This is a well developed, well nourished 72

## 2018-08-19 LAB
CULTURE, RESPIRATORY: NORMAL
CULTURE, RESPIRATORY: NORMAL
SMEAR, RESPIRATORY: NORMAL
SMEAR, RESPIRATORY: NORMAL

## 2018-08-22 ENCOUNTER — OFFICE VISIT (OUTPATIENT)
Dept: ONCOLOGY | Age: 73
End: 2018-08-22
Payer: COMMERCIAL

## 2018-08-22 ENCOUNTER — HOSPITAL ENCOUNTER (OUTPATIENT)
Dept: INFUSION THERAPY | Age: 73
Discharge: HOME | End: 2018-08-22
Payer: COMMERCIAL

## 2018-08-22 VITALS
SYSTOLIC BLOOD PRESSURE: 127 MMHG | OXYGEN SATURATION: 97 % | TEMPERATURE: 97.4 F | BODY MASS INDEX: 27.08 KG/M2 | HEART RATE: 63 BPM | DIASTOLIC BLOOD PRESSURE: 79 MMHG | RESPIRATION RATE: 12 BRPM | WEIGHT: 158.6 LBS | HEIGHT: 64 IN

## 2018-08-22 DIAGNOSIS — C50.412 MALIGNANT NEOPLASM OF UPPER-OUTER QUADRANT OF LEFT FEMALE BREAST, UNSPECIFIED ESTROGEN RECEPTOR STATUS (HCC): Primary | ICD-10-CM

## 2018-08-22 DIAGNOSIS — C50.412 MALIGNANT NEOPLASM OF UPPER-OUTER QUADRANT OF LEFT FEMALE BREAST, UNSPECIFIED ESTROGEN RECEPTOR STATUS (HCC): ICD-10-CM

## 2018-08-22 LAB
ALBUMIN SERPL-MCNC: 4.4 G/DL (ref 3.5–5.2)
ALP BLD-CCNC: 74 U/L (ref 35–104)
ALT SERPL-CCNC: 11 U/L (ref 0–32)
ANION GAP SERPL CALCULATED.3IONS-SCNC: 11 MMOL/L (ref 7–16)
AST SERPL-CCNC: 18 U/L (ref 0–31)
BASOPHILS ABSOLUTE: 0.04 E9/L (ref 0–0.2)
BASOPHILS RELATIVE PERCENT: 0.6 % (ref 0–2)
BILIRUB SERPL-MCNC: 1 MG/DL (ref 0–1.2)
BUN BLDV-MCNC: 14 MG/DL (ref 8–23)
CALCIUM SERPL-MCNC: 9.6 MG/DL (ref 8.6–10.2)
CHLORIDE BLD-SCNC: 101 MMOL/L (ref 98–107)
CO2: 27 MMOL/L (ref 22–29)
CREAT SERPL-MCNC: 0.7 MG/DL (ref 0.5–1)
EOSINOPHILS ABSOLUTE: 0.3 E9/L (ref 0.05–0.5)
EOSINOPHILS RELATIVE PERCENT: 4.3 % (ref 0–6)
GFR AFRICAN AMERICAN: >60
GFR NON-AFRICAN AMERICAN: >60 ML/MIN/1.73
GLUCOSE BLD-MCNC: 96 MG/DL (ref 74–109)
HCT VFR BLD CALC: 41.2 % (ref 34–48)
HEMOGLOBIN: 13.4 G/DL (ref 11.5–15.5)
IMMATURE GRANULOCYTES #: 0.03 E9/L
IMMATURE GRANULOCYTES %: 0.4 % (ref 0–5)
LYMPHOCYTES ABSOLUTE: 1.55 E9/L (ref 1.5–4)
LYMPHOCYTES RELATIVE PERCENT: 22.1 % (ref 20–42)
Lab: NORMAL
MCH RBC QN AUTO: 27.5 PG (ref 26–35)
MCHC RBC AUTO-ENTMCNC: 32.5 % (ref 32–34.5)
MCV RBC AUTO: 84.4 FL (ref 80–99.9)
MONOCYTES ABSOLUTE: 0.58 E9/L (ref 0.1–0.95)
MONOCYTES RELATIVE PERCENT: 8.3 % (ref 2–12)
NEUTROPHILS ABSOLUTE: 4.51 E9/L (ref 1.8–7.3)
NEUTROPHILS RELATIVE PERCENT: 64.3 % (ref 43–80)
PDW BLD-RTO: 16 FL (ref 11.5–15)
PLATELET # BLD: 184 E9/L (ref 130–450)
PMV BLD AUTO: 8.9 FL (ref 7–12)
POTASSIUM SERPL-SCNC: 4.2 MMOL/L (ref 3.5–5)
RBC # BLD: 4.88 E12/L (ref 3.5–5.5)
REPORT: NORMAL
SODIUM BLD-SCNC: 139 MMOL/L (ref 132–146)
THIS TEST SENT TO: NORMAL
TOTAL PROTEIN: 6.8 G/DL (ref 6.4–8.3)
WBC # BLD: 7 E9/L (ref 4.5–11.5)

## 2018-08-22 PROCEDURE — 99214 OFFICE O/P EST MOD 30 MIN: CPT | Performed by: INTERNAL MEDICINE

## 2018-08-22 PROCEDURE — 80053 COMPREHEN METABOLIC PANEL: CPT

## 2018-08-22 PROCEDURE — 99212 OFFICE O/P EST SF 10 MIN: CPT | Performed by: INTERNAL MEDICINE

## 2018-08-22 PROCEDURE — 36415 COLL VENOUS BLD VENIPUNCTURE: CPT | Performed by: INTERNAL MEDICINE

## 2018-08-22 PROCEDURE — 85025 COMPLETE CBC W/AUTO DIFF WBC: CPT

## 2018-08-22 NOTE — PROGRESS NOTES
metastases, increased in size and number since 1/23/2018. Review of Systems;  CONSTITUTIONAL: No fever, chills. Fair appetite and energy level. ENMT: Eyes: No diplopia; Nose: No epistaxis. Mouth: No sore throat. RESPIRATORY: No hemoptysis, shortness of breath, cough. CARDIOVASCULAR: No chest pain, palpitations. GASTROINTESTINAL: No nausea/vomiting, abdominal pain, diarrhea/constipation. GENITOURINARY: No dysuria, urinary frequency, hematuria. NEURO: No syncope, presyncope, headache. Remainder:  ROS NEGATIVE    Past Medical History:      Diagnosis Date    Cancer [facility])     endometrial cancer, breast    Diverticulitis     GERD (gastroesophageal reflux disease)     Gout     Macular degeneration     Osteoarthritis      Medications:  Reviewed and reconciled. Allergies: Allergies   Allergen Reactions    Asa [Aspirin] Hives    Prilosec [Omeprazole] Hives    Protonix [Pantoprazole Sodium] Other (See Comments)     Other reaction(s): Other: See Comments  DEPRESSION    Erythromycin Rash    Keflet [Cephalexin] Rash    Levaquin [Levofloxacin In D5w] Rash    Pcn [Penicillins] Rash    Polyethylene Glycol Rash     Bloating,gas    Tetracycline Rash    Tetracyclines & Related Rash     Physical Exam:  /79   Pulse 63   Temp 97.4 °F (36.3 °C)   Resp 12   Ht 5' 3.5\" (1.613 m)   Wt 158 lb 9.6 oz (71.9 kg)   SpO2 97%   BMI 27.65 kg/m²   GENERAL: Alert, oriented x 3, not in acute distress. HEENT: PERRLA; EOMI. Oropharynx clear. NECK: Supple. Without lymphadenopathy. LUNGS: Good air entry bilaterally. No wheezing, crackles or ronchi. CARDIOVASCULAR: Regular rate. No murmurs, rubs or gallops. ABDOMEN: Soft. Non-tender, non-distended. Positive bowel sounds. EXTREMITIES: Without clubbing, cyanosis, or edema. NEUROLOGIC: No focal deficits.    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

## 2018-08-24 NOTE — PROGRESS NOTES
Notified by Fallon Cohen, Lead RN, patient called office and will start Immunotherapy with Obi Bullock on 8-29-18.     Electronically signed by Boubacar Jang, [address] [place] [address] on 8/24/2018 at 2:37 PM

## 2018-08-29 ENCOUNTER — HOSPITAL ENCOUNTER (OUTPATIENT)
Dept: INFUSION THERAPY | Age: 73
Discharge: HOME | End: 2018-08-29
Payer: COMMERCIAL

## 2018-08-29 ENCOUNTER — OFFICE VISIT (OUTPATIENT)
Dept: ONCOLOGY | Age: 73
End: 2018-08-29
Payer: COMMERCIAL

## 2018-08-29 VITALS — RESPIRATION RATE: 18 BRPM | HEART RATE: 55 BPM | TEMPERATURE: 98.6 F

## 2018-08-29 VITALS
TEMPERATURE: 98.2 F | WEIGHT: 161.5 LBS | DIASTOLIC BLOOD PRESSURE: 72 MMHG | RESPIRATION RATE: 20 BRPM | SYSTOLIC BLOOD PRESSURE: 141 MMHG | HEART RATE: 62 BPM | BODY MASS INDEX: 27.57 KG/M2 | HEIGHT: 64 IN

## 2018-08-29 DIAGNOSIS — C56.9 MALIGNANT NEOPLASM OF OVARY, UNSPECIFIED LATERALITY (HCC): ICD-10-CM

## 2018-08-29 DIAGNOSIS — C50.412 MALIGNANT NEOPLASM OF UPPER-OUTER QUADRANT OF LEFT FEMALE BREAST, UNSPECIFIED ESTROGEN RECEPTOR STATUS (HCC): Primary | ICD-10-CM

## 2018-08-29 DIAGNOSIS — C54.1 ADENOCARCINOMA OF ENDOMETRIUM (HCC): ICD-10-CM

## 2018-08-29 DIAGNOSIS — C57.9 GYNECOLOGIC CANCER (HCC): Primary | ICD-10-CM

## 2018-08-29 PROCEDURE — 2580000003 HC RX 258

## 2018-08-29 PROCEDURE — 6360000002 HC RX W HCPCS: Performed by: INTERNAL MEDICINE

## 2018-08-29 PROCEDURE — 2580000003 HC RX 258: Performed by: INTERNAL MEDICINE

## 2018-08-29 PROCEDURE — 99214 OFFICE O/P EST MOD 30 MIN: CPT | Performed by: INTERNAL MEDICINE

## 2018-08-29 PROCEDURE — 96413 CHEMO IV INFUSION 1 HR: CPT

## 2018-08-29 RX ORDER — SODIUM CHLORIDE 9 MG/ML
INJECTION, SOLUTION INTRAVENOUS CONTINUOUS
Status: CANCELLED | OUTPATIENT
Start: 2018-08-29

## 2018-08-29 RX ORDER — MEPERIDINE HYDROCHLORIDE 25 MG/ML
12.5 INJECTION INTRAMUSCULAR; INTRAVENOUS; SUBCUTANEOUS ONCE
Status: CANCELLED | OUTPATIENT
Start: 2018-08-29 | End: 2018-08-29

## 2018-08-29 RX ORDER — SODIUM CHLORIDE 9 MG/ML
INJECTION, SOLUTION INTRAVENOUS ONCE
Status: CANCELLED | OUTPATIENT
Start: 2018-08-29 | End: 2018-08-29

## 2018-08-29 RX ORDER — HEPARIN SODIUM (PORCINE) LOCK FLUSH IV SOLN 100 UNIT/ML 100 UNIT/ML
500 SOLUTION INTRAVENOUS PRN
Status: CANCELLED | OUTPATIENT
Start: 2018-08-29

## 2018-08-29 RX ORDER — SODIUM CHLORIDE 0.9 % (FLUSH) 0.9 %
5 SYRINGE (ML) INJECTION PRN
Status: CANCELLED | OUTPATIENT
Start: 2018-08-29

## 2018-08-29 RX ORDER — SODIUM CHLORIDE 0.9 % (FLUSH) 0.9 %
10 SYRINGE (ML) INJECTION PRN
Status: CANCELLED | OUTPATIENT
Start: 2018-08-29

## 2018-08-29 RX ORDER — 0.9 % SODIUM CHLORIDE 0.9 %
10 VIAL (ML) INJECTION ONCE
Status: CANCELLED | OUTPATIENT
Start: 2018-08-29 | End: 2018-08-29

## 2018-08-29 RX ORDER — SODIUM CHLORIDE 0.9 % (FLUSH) 0.9 %
10 SYRINGE (ML) INJECTION PRN
Status: DISCONTINUED | OUTPATIENT
Start: 2018-08-29 | End: 2018-08-30 | Stop reason: HOSPADM

## 2018-08-29 RX ORDER — DIPHENHYDRAMINE HYDROCHLORIDE 50 MG/ML
50 INJECTION INTRAMUSCULAR; INTRAVENOUS ONCE
Status: CANCELLED | OUTPATIENT
Start: 2018-08-29 | End: 2018-08-29

## 2018-08-29 RX ORDER — SODIUM CHLORIDE 0.9 % (FLUSH) 0.9 %
SYRINGE (ML) INJECTION
Status: DISPENSED
Start: 2018-08-29 | End: 2018-08-29

## 2018-08-29 RX ORDER — METHYLPREDNISOLONE SODIUM SUCCINATE 125 MG/2ML
125 INJECTION, POWDER, LYOPHILIZED, FOR SOLUTION INTRAMUSCULAR; INTRAVENOUS ONCE
Status: CANCELLED | OUTPATIENT
Start: 2018-08-29 | End: 2018-08-29

## 2018-08-29 RX ORDER — SODIUM CHLORIDE 9 MG/ML
INJECTION, SOLUTION INTRAVENOUS
Status: DISCONTINUED
Start: 2018-08-29 | End: 2018-08-30 | Stop reason: HOSPADM

## 2018-08-29 RX ORDER — EPINEPHRINE 1 MG/ML
0.3 INJECTION, SOLUTION, CONCENTRATE INTRAVENOUS PRN
Status: CANCELLED | OUTPATIENT
Start: 2018-08-29

## 2018-08-29 RX ORDER — SODIUM CHLORIDE 9 MG/ML
250 INJECTION, SOLUTION INTRAVENOUS ONCE
Status: DISCONTINUED | OUTPATIENT
Start: 2018-08-29 | End: 2018-08-30 | Stop reason: HOSPADM

## 2018-08-29 RX ADMIN — SODIUM CHLORIDE 200 MG: 9 INJECTION, SOLUTION INTRAVENOUS at 12:33

## 2018-08-29 RX ADMIN — SODIUM CHLORIDE 250 ML: 9 INJECTION, SOLUTION INTRAVENOUS at 12:35

## 2018-08-29 NOTE — PROGRESS NOTES
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  -Adjuvant radiation therapy completed March 28, 2017.  -Endocrine therapy: Arimidex started March 30, 2017. She did not tolerate due to severe depression;  -Arimidex discontinued.   -Started on Femara after approximately 2 weeks, but did not tolerate Femara. Chose observation. Clinically, there is no evidence of recurrence. IA papillary serous endometrial adenocarcinoma, FIGO grade 3, - LVSI, tumor size 4.5 cm;    BRCA/Myrisk testing Negative  7/20/16-Exam under anesthesia, diagnostic laparoscopy, total laparoscopic hysterectomy, bilateral salpingo-oophorectomy, pelvic and periaortic lymphadenectomy, omentectomy. HDR VAGINAL BRACHYTHERAPY-9/29/16, 10/6/16, 10/13/16    On 9/21/2017 anterior right psoas muscle fine-needle aspirate: Positive for malignant cells, metastatic high-grade carcinoma compatible with patient's known endometrial serous carcinoma.     She completed 2 cycles of chemotherapy with Taxol Carbo and Avastin on 10/17 and 11/17. She had poor tolerance to chemotherapy, therefore she declined additional chemo and opted for Natural remedies instead     on 06/12/2018: 15 (<39UmL)      Re-staging scans on 06/15/2018:  CT of the abdomen and pelvis: 2.2 x 1.3 cm soft tissue nodule along the superior aspect of the urinary bladder suspicious for metastases, decreased in size since 1/23/18; no evidence of new abnormalities since prior visit; diverticulosis without evidence of diverticulitis.   CT Chest:  Multiple bilateral lung nodules suspicious for metastases, increased in size and number since 1/23/2018. Bronchoscopy/EBUS was performed on 08/17/2018  Respiratory Gram Stain/Cx: Negative for organisms. Aspergillus Galactomannan Antigen Negative  BAL RUL Negative for malignant cells. Bronchial smears shake RUL (predominance of blood); Negative for malignant cells. FNA Martínez TBNA RML (predominance of blood) Negative for malignant cells. FNA Martínez TBNA RUL (predominance of blood)  Negative for malignant cells. EBUS FNA R10 (scant lymph node sampling) Negative for malignant cells. EBUS FNA Station 7: Negative for malignant cells. Case discussed with [facility]. Doc Cobb at [facility]. She continued to decline systemic chemotherapy (Taxol/Carbo +/- Herceptin). She did not want hormonal therapy as she did not tolerate this well with her breast cancer. Patient declined palliative care/Hospice care and wanted to proceed with immunotherapy Fannie Buenrostro). Review of Systems;  CONSTITUTIONAL: No fever, chills. Fair appetite and energy level. ENMT: Eyes: No diplopia; Nose: No epistaxis. Mouth: No sore throat. RESPIRATORY: No hemoptysis, shortness of breath, cough. CARDIOVASCULAR: No chest pain, palpitations. GASTROINTESTINAL: No nausea/vomiting, abdominal pain, diarrhea/constipation. GENITOURINARY: No dysuria, urinary frequency, hematuria. NEURO: No syncope, presyncope, headache. Remainder:  ROS NEGATIVE    Past Medical History:      Diagnosis Date    Cancer [facility])     endometrial cancer, breast    Diverticulitis     GERD (gastroesophageal reflux disease)     Gout     Macular degeneration     Osteoarthritis      Medications:  Reviewed and reconciled. Allergies: Allergies   Allergen Reactions    Asa [Aspirin] Hives    Prilosec [Omeprazole] Hives    Protonix [Pantoprazole Sodium] Other (See Comments)     Other reaction(s):  Other: See Comments  DEPRESSION    Erythromycin Rash    Keflet [Cephalexin] Rash    Levaquin [Levofloxacin In D5w] Rash    Pcn 74/39/5429  Board Certified Medical Oncologist

## 2018-08-29 NOTE — PROGRESS NOTES
Patient tolerated infusion well without complaint. IV site removed and site looks good. Patient discharged ambulatory.

## 2018-09-07 ENCOUNTER — TELEPHONE (OUTPATIENT)
Dept: ONCOLOGY | Age: 73
End: 2018-09-07

## 2018-09-07 DIAGNOSIS — C50.412 MALIGNANT NEOPLASM OF UPPER-OUTER QUADRANT OF LEFT FEMALE BREAST, UNSPECIFIED ESTROGEN RECEPTOR STATUS (HCC): Primary | ICD-10-CM

## 2018-09-12 ENCOUNTER — TELEPHONE (OUTPATIENT)
Dept: ONCOLOGY | Age: 73
End: 2018-09-12

## 2018-09-19 ENCOUNTER — OFFICE VISIT (OUTPATIENT)
Dept: ONCOLOGY | Age: 73
End: 2018-09-19
Payer: COMMERCIAL

## 2018-09-19 ENCOUNTER — TELEPHONE (OUTPATIENT)
Dept: ONCOLOGY | Age: 73
End: 2018-09-19

## 2018-09-19 ENCOUNTER — HOSPITAL ENCOUNTER (OUTPATIENT)
Dept: INFUSION THERAPY | Age: 73
Discharge: HOME | End: 2018-09-19
Payer: COMMERCIAL

## 2018-09-19 VITALS
WEIGHT: 159.3 LBS | TEMPERATURE: 98.4 F | DIASTOLIC BLOOD PRESSURE: 58 MMHG | RESPIRATION RATE: 20 BRPM | SYSTOLIC BLOOD PRESSURE: 118 MMHG | HEIGHT: 64 IN | BODY MASS INDEX: 27.2 KG/M2 | HEART RATE: 64 BPM

## 2018-09-19 VITALS
TEMPERATURE: 98.4 F | OXYGEN SATURATION: 97 % | DIASTOLIC BLOOD PRESSURE: 69 MMHG | RESPIRATION RATE: 18 BRPM | HEART RATE: 55 BPM | SYSTOLIC BLOOD PRESSURE: 158 MMHG

## 2018-09-19 DIAGNOSIS — C56.9 MALIGNANT NEOPLASM OF OVARY, UNSPECIFIED LATERALITY (HCC): ICD-10-CM

## 2018-09-19 DIAGNOSIS — C50.412 MALIGNANT NEOPLASM OF UPPER-OUTER QUADRANT OF LEFT FEMALE BREAST, UNSPECIFIED ESTROGEN RECEPTOR STATUS (HCC): ICD-10-CM

## 2018-09-19 DIAGNOSIS — C50.412 MALIGNANT NEOPLASM OF UPPER-OUTER QUADRANT OF LEFT FEMALE BREAST, UNSPECIFIED ESTROGEN RECEPTOR STATUS (HCC): Primary | ICD-10-CM

## 2018-09-19 DIAGNOSIS — C54.1 ADENOCARCINOMA OF ENDOMETRIUM (HCC): ICD-10-CM

## 2018-09-19 DIAGNOSIS — C57.9 GYNECOLOGIC CANCER (HCC): Primary | ICD-10-CM

## 2018-09-19 PROCEDURE — 2580000003 HC RX 258: Performed by: INTERNAL MEDICINE

## 2018-09-19 PROCEDURE — 96413 CHEMO IV INFUSION 1 HR: CPT

## 2018-09-19 PROCEDURE — 99214 OFFICE O/P EST MOD 30 MIN: CPT | Performed by: INTERNAL MEDICINE

## 2018-09-19 PROCEDURE — 6360000002 HC RX W HCPCS: Performed by: INTERNAL MEDICINE

## 2018-09-19 RX ORDER — EPINEPHRINE 1 MG/ML
0.3 INJECTION, SOLUTION, CONCENTRATE INTRAVENOUS PRN
Status: CANCELLED | OUTPATIENT
Start: 2018-09-19

## 2018-09-19 RX ORDER — MEPERIDINE HYDROCHLORIDE 25 MG/ML
12.5 INJECTION INTRAMUSCULAR; INTRAVENOUS; SUBCUTANEOUS ONCE
Status: CANCELLED | OUTPATIENT
Start: 2018-09-19 | End: 2018-09-19

## 2018-09-19 RX ORDER — SODIUM CHLORIDE 0.9 % (FLUSH) 0.9 %
5 SYRINGE (ML) INJECTION PRN
Status: CANCELLED | OUTPATIENT
Start: 2018-09-19

## 2018-09-19 RX ORDER — SODIUM CHLORIDE 0.9 % (FLUSH) 0.9 %
10 SYRINGE (ML) INJECTION PRN
Status: DISCONTINUED | OUTPATIENT
Start: 2018-09-19 | End: 2018-09-20 | Stop reason: HOSPADM

## 2018-09-19 RX ORDER — SODIUM CHLORIDE 9 MG/ML
INJECTION, SOLUTION INTRAVENOUS CONTINUOUS
Status: CANCELLED | OUTPATIENT
Start: 2018-09-19

## 2018-09-19 RX ORDER — DIPHENHYDRAMINE HYDROCHLORIDE 50 MG/ML
50 INJECTION INTRAMUSCULAR; INTRAVENOUS ONCE
Status: CANCELLED | OUTPATIENT
Start: 2018-09-19 | End: 2018-09-19

## 2018-09-19 RX ORDER — 0.9 % SODIUM CHLORIDE 0.9 %
10 VIAL (ML) INJECTION ONCE
Status: CANCELLED | OUTPATIENT
Start: 2018-09-19 | End: 2018-09-19

## 2018-09-19 RX ORDER — SODIUM CHLORIDE 0.9 % (FLUSH) 0.9 %
10 SYRINGE (ML) INJECTION PRN
Status: CANCELLED | OUTPATIENT
Start: 2018-09-19

## 2018-09-19 RX ORDER — METHYLPREDNISOLONE SODIUM SUCCINATE 125 MG/2ML
125 INJECTION, POWDER, LYOPHILIZED, FOR SOLUTION INTRAMUSCULAR; INTRAVENOUS ONCE
Status: CANCELLED | OUTPATIENT
Start: 2018-09-19 | End: 2018-09-19

## 2018-09-19 RX ORDER — SODIUM CHLORIDE 9 MG/ML
INJECTION, SOLUTION INTRAVENOUS ONCE
Status: CANCELLED | OUTPATIENT
Start: 2018-09-19 | End: 2018-09-19

## 2018-09-19 RX ORDER — HEPARIN SODIUM (PORCINE) LOCK FLUSH IV SOLN 100 UNIT/ML 100 UNIT/ML
500 SOLUTION INTRAVENOUS PRN
Status: CANCELLED | OUTPATIENT
Start: 2018-09-19

## 2018-09-19 RX ORDER — SODIUM CHLORIDE 9 MG/ML
250 INJECTION, SOLUTION INTRAVENOUS ONCE
Status: DISCONTINUED | OUTPATIENT
Start: 2018-09-19 | End: 2018-09-20 | Stop reason: HOSPADM

## 2018-09-19 RX ADMIN — SODIUM CHLORIDE 200 MG: 9 INJECTION, SOLUTION INTRAVENOUS at 12:11

## 2018-09-19 RX ADMIN — SODIUM CHLORIDE 250 ML: 9 INJECTION, SOLUTION INTRAVENOUS at 12:10

## 2018-09-19 RX ADMIN — Medication 10 ML: at 12:10

## 2018-09-19 NOTE — PROGRESS NOTES
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  -Adjuvant radiation therapy completed March 28, 2017.  -Endocrine therapy: Arimidex started March 30, 2017. She did not tolerate due to severe depression;  -Arimidex discontinued.   -Started on Femara after approximately 2 weeks, but did not tolerate Femara. Chose observation. Clinically, there is no evidence of recurrence. IA papillary serous endometrial adenocarcinoma, FIGO grade 3, - LVSI, tumor size 4.5 cm;    BRCA/Myrisk testing Negative  7/20/16-Exam under anesthesia, diagnostic laparoscopy, total laparoscopic hysterectomy, bilateral salpingo-oophorectomy, pelvic and periaortic lymphadenectomy, omentectomy. HDR VAGINAL BRACHYTHERAPY-9/29/16, 10/6/16, 10/13/16    On 9/21/2017 anterior right psoas muscle fine-needle aspirate: Positive for malignant cells, metastatic high-grade carcinoma compatible with patient's known endometrial serous carcinoma.     She completed 2 cycles of chemotherapy with Taxol Carbo and Avastin on 10/17 and 11/17. She had poor tolerance to chemotherapy, therefore she declined additional chemo and opted for Natural remedies instead     on 06/12/2018: 15 (<39UmL)      Re-staging scans on 06/15/2018:  CT of the abdomen and pelvis: 2.2 x 1.3 cm soft tissue nodule along the superior aspect of the urinary bladder suspicious for metastases, decreased in size since 1/23/18; no evidence of new abnormalities since prior visit; diverticulosis without evidence of diverticulitis.   CT Chest:  Multiple bilateral lung nodules suspicious for 10/13/16    08/19/2017 PET/CT scan: Hypermetabolic bilateral lung nodules suspicious for metastasis. Hypermetabolic nodule in the right lower quadrant anterior to the psoas muscle suspicious for metastasis. 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     She completed 2 cycles of chemotherapy with Taxol Carboplatin and Avastin on 10/17 and 11/17. She had poor tolerance to chemotherapy, therefore she declined additional chemotherapy and opted for Natural remedies instead     on 06/12/2018: 15 (<39UmL)      Re-staging scans on 06/15/2018:  CT of the abdomen and pelvis: 2.2 x 1.3 cm soft tissue nodule along the superior aspect of the urinary bladder suspicious for metastases, decreased in size since 1/23/18; no evidence of new abnormalities since prior visit; diverticulosis without evidence of diverticulitis. CT Chest:  Multiple bilateral lung nodules suspicious for metastases, increased in size and number since 1/23/2018. Bronchoscopy/EBUS was performed on 08/17/2018  Respiratory Gram Stain/Cx: Negative for organisms. Aspergillus Galactomannan Antigen Negative  BAL RUL Negative for malignant cells. Bronchial smears shake RUL (predominance of blood); Negative for malignant cells. FNA Martínez TBNA RML (predominance of blood) Negative for malignant cells. FNA Martínez TBNA RUL (predominance of blood)  Negative for malignant cells. EBUS FNA R10 (scant lymph node sampling) Negative for malignant cells. EBUS FNA Station 7: Negative for malignant cells. Case discussed with Dr. Karen Vaca at [facility]. She continued to decline systemic chemotherapy (Taxol/Carbo +/- Herceptin). She did not want hormonal therapy as she did not tolerate this well with her breast cancer. Patient declined palliative care/Hospice care and wanted to proceed with immunotherapy Elconnierimarti Haq).  Side

## 2018-09-19 NOTE — TELEPHONE ENCOUNTER
Met with patient and family in the treatment room prior to her [address] treatment for follow up. Patient states that she is doing good especially since they haven't been over to poke her veins yet. She states she is staying hydrated and drank a couple glasses of water, some tea and a coffee. She is just hoping her veins cooperate today. Provided support and encouragement. Denies any current needs for assistance. Patient appreciative of visit. Will continue to follow.

## 2018-09-20 ENCOUNTER — TELEPHONE (OUTPATIENT)
Dept: INFUSION THERAPY | Age: 73
End: 2018-09-20

## 2018-09-24 LAB
FUNGUS (MYCOLOGY) CULTURE: NORMAL
FUNGUS STAIN: NORMAL

## 2018-10-03 ENCOUNTER — HOSPITAL ENCOUNTER (OUTPATIENT)
Dept: GENERAL RADIOLOGY | Age: 73
Discharge: HOME | End: 2018-10-05
Payer: COMMERCIAL

## 2018-10-03 DIAGNOSIS — Z12.31 VISIT FOR SCREENING MAMMOGRAM: ICD-10-CM

## 2018-10-03 PROCEDURE — 77063 BREAST TOMOSYNTHESIS BI: CPT

## 2018-10-09 LAB
AFB CULTURE (MYCOBACTERIA): NORMAL
AFB SMEAR: NORMAL

## 2018-10-10 ENCOUNTER — HOSPITAL ENCOUNTER (OUTPATIENT)
Dept: INFUSION THERAPY | Age: 73
Discharge: HOME | End: 2018-10-10
Payer: COMMERCIAL

## 2018-10-10 ENCOUNTER — OFFICE VISIT (OUTPATIENT)
Dept: ONCOLOGY | Age: 73
End: 2018-10-10
Payer: COMMERCIAL

## 2018-10-10 VITALS
BODY MASS INDEX: 26.98 KG/M2 | SYSTOLIC BLOOD PRESSURE: 135 MMHG | HEART RATE: 66 BPM | WEIGHT: 158 LBS | HEIGHT: 64 IN | DIASTOLIC BLOOD PRESSURE: 69 MMHG | TEMPERATURE: 98.7 F | RESPIRATION RATE: 20 BRPM

## 2018-10-10 VITALS
TEMPERATURE: 97.5 F | HEART RATE: 56 BPM | RESPIRATION RATE: 18 BRPM | SYSTOLIC BLOOD PRESSURE: 126 MMHG | DIASTOLIC BLOOD PRESSURE: 61 MMHG

## 2018-10-10 DIAGNOSIS — C56.9 MALIGNANT NEOPLASM OF OVARY, UNSPECIFIED LATERALITY (HCC): ICD-10-CM

## 2018-10-10 DIAGNOSIS — C54.1 ADENOCARCINOMA OF ENDOMETRIUM (HCC): Primary | ICD-10-CM

## 2018-10-10 DIAGNOSIS — C54.1 ADENOCARCINOMA OF ENDOMETRIUM (HCC): ICD-10-CM

## 2018-10-10 PROCEDURE — 99214 OFFICE O/P EST MOD 30 MIN: CPT | Performed by: INTERNAL MEDICINE

## 2018-10-10 PROCEDURE — 2580000003 HC RX 258: Performed by: INTERNAL MEDICINE

## 2018-10-10 PROCEDURE — 96413 CHEMO IV INFUSION 1 HR: CPT

## 2018-10-10 PROCEDURE — 6360000002 HC RX W HCPCS: Performed by: INTERNAL MEDICINE

## 2018-10-10 RX ORDER — EPINEPHRINE 1 MG/ML
0.3 INJECTION, SOLUTION, CONCENTRATE INTRAVENOUS PRN
Status: CANCELLED | OUTPATIENT
Start: 2018-10-10

## 2018-10-10 RX ORDER — SODIUM CHLORIDE 0.9 % (FLUSH) 0.9 %
10 SYRINGE (ML) INJECTION PRN
Status: DISCONTINUED | OUTPATIENT
Start: 2018-10-10 | End: 2018-10-11 | Stop reason: HOSPADM

## 2018-10-10 RX ORDER — HEPARIN SODIUM (PORCINE) LOCK FLUSH IV SOLN 100 UNIT/ML 100 UNIT/ML
500 SOLUTION INTRAVENOUS PRN
Status: CANCELLED | OUTPATIENT
Start: 2018-10-10

## 2018-10-10 RX ORDER — SODIUM CHLORIDE 9 MG/ML
INJECTION, SOLUTION INTRAVENOUS CONTINUOUS
Status: CANCELLED | OUTPATIENT
Start: 2018-10-10

## 2018-10-10 RX ORDER — SODIUM CHLORIDE 9 MG/ML
INJECTION, SOLUTION INTRAVENOUS ONCE
Status: CANCELLED | OUTPATIENT
Start: 2018-10-10 | End: 2018-10-10

## 2018-10-10 RX ORDER — SODIUM CHLORIDE 0.9 % (FLUSH) 0.9 %
5 SYRINGE (ML) INJECTION PRN
Status: CANCELLED | OUTPATIENT
Start: 2018-10-10

## 2018-10-10 RX ORDER — SODIUM CHLORIDE 0.9 % (FLUSH) 0.9 %
10 SYRINGE (ML) INJECTION PRN
Status: CANCELLED | OUTPATIENT
Start: 2018-10-10

## 2018-10-10 RX ORDER — 0.9 % SODIUM CHLORIDE 0.9 %
10 VIAL (ML) INJECTION ONCE
Status: CANCELLED | OUTPATIENT
Start: 2018-10-10 | End: 2018-10-10

## 2018-10-10 RX ORDER — MEPERIDINE HYDROCHLORIDE 25 MG/ML
12.5 INJECTION INTRAMUSCULAR; INTRAVENOUS; SUBCUTANEOUS ONCE
Status: CANCELLED | OUTPATIENT
Start: 2018-10-10 | End: 2018-10-10

## 2018-10-10 RX ORDER — DIPHENHYDRAMINE HYDROCHLORIDE 50 MG/ML
50 INJECTION INTRAMUSCULAR; INTRAVENOUS ONCE
Status: CANCELLED | OUTPATIENT
Start: 2018-10-10 | End: 2018-10-10

## 2018-10-10 RX ORDER — SODIUM CHLORIDE 9 MG/ML
250 INJECTION, SOLUTION INTRAVENOUS ONCE
Status: DISCONTINUED | OUTPATIENT
Start: 2018-10-10 | End: 2018-10-11 | Stop reason: HOSPADM

## 2018-10-10 RX ORDER — METHYLPREDNISOLONE SODIUM SUCCINATE 125 MG/2ML
125 INJECTION, POWDER, LYOPHILIZED, FOR SOLUTION INTRAMUSCULAR; INTRAVENOUS ONCE
Status: CANCELLED | OUTPATIENT
Start: 2018-10-10 | End: 2018-10-10

## 2018-10-10 RX ADMIN — SODIUM CHLORIDE 200 MG: 9 INJECTION, SOLUTION INTRAVENOUS at 12:02

## 2018-10-10 RX ADMIN — Medication 10 ML: at 11:47

## 2018-10-10 NOTE — PROGRESS NOTES
metastases, increased in size and number since 1/23/2018. Bronchoscopy/EBUS was performed on 08/17/2018  Respiratory Gram Stain/Cx: Negative for organisms. Aspergillus Galactomannan Antigen Negative  BAL RUL Negative for malignant cells. Bronchial smears shake RUL (predominance of blood); Negative for malignant cells. FNA Martínez TBNA RML (predominance of blood) Negative for malignant cells. FNA Martínez TBNA RUL (predominance of blood)  Negative for malignant cells. EBUS FNA R10 (scant lymph node sampling) Negative for malignant cells. EBUS FNA Station 7: Negative for malignant cells. Case discussed with Dr. Rm Jacobo at [facility]. She continued to decline systemic chemotherapy (Taxol/Carbo +/- Herceptin). She did not want hormonal therapy as she did not tolerate this well with her breast cancer. Patient declined palliative care/Hospice care and wanted to proceed with immunotherapy Minerva Atkins). Cycle # 1 Antoine Rosy was on 08/29/2018. Cycle # 2 Keytruda was on 09/19/2018. Cycle # 3 Keytruda is scheduled 10/10/2018. Review of Systems;  CONSTITUTIONAL: No fever, chills. Fair appetite. Fatigue. ENMT: Eyes: No diplopia; Nose: No epistaxis. Mouth: No sore throat. RESPIRATORY: No hemoptysis. Positive for shortness of breath  CARDIOVASCULAR: No chest pain, palpitations. GASTROINTESTINAL: No vomiting, abdominal pain, diarrhea/constipation. GENITOURINARY: No dysuria, urinary frequency, hematuria. NEURO: No syncope, presyncope. PSYCH: Depressed mood  Remainder:  ROS NEGATIVE    Past Medical History:      Diagnosis Date    Cancer [facility])     endometrial cancer, breast    Diverticulitis     GERD (gastroesophageal reflux disease)     Gout     Macular degeneration     Osteoarthritis      Medications:  Reviewed and reconciled. Allergies: Allergies   Allergen Reactions    Asa [Aspirin] Hives    Prilosec [Omeprazole] Hives    Protonix [Pantoprazole Sodium] Other (See Comments)     Other reaction(s):  Other: See Comments  DEPRESSION    Erythromycin Rash    Keflet [Cephalexin] Rash    Levaquin [Levofloxacin In D5w] Rash    Pcn [Penicillins] Rash    Polyethylene Glycol Rash     Bloating,gas    Tetracycline Rash    Tetracyclines & Related Rash     Physical Exam:  /69 (Site: Right Upper Arm, Position: Sitting, Cuff Size: Medium Adult)   Pulse 66   Temp 98.7 °F (37.1 °C) (Temporal)   Resp 20   Ht 5' 3.5\" (1.613 m)   Wt 158 lb (71.7 kg)   BMI 27.55 kg/m²   GENERAL: Alert, oriented x 3, not in acute distress. HEENT: PERRLA; EOMI. Oropharynx clear. NECK: Supple. Without lymphadenopathy. LUNGS: Good air entry bilaterally. No wheezing, crackles or ronchi. CARDIOVASCULAR: Regular rate. No murmurs, rubs or gallops. ABDOMEN: Soft. Non-tender, non-distended. +BS  EXTREMITIES: Without clubbing, cyanosis, or edema. NEUROLOGIC: No focal deficits. 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  -Adjuvant radiation therapy completed March 28, 2017.  -Endocrine therapy: Arimidex started March 30, 2017. She did not tolerate due to severe depression;  -Arimidex discontinued.   -Started on Femara after approximately 2 weeks, but did not tolerate Femara. Chose observation. Bilateral screening mammogram on 10/03/2018 negative for malignancy  Clinically, there is no evidence of recurrence. IA papillary serous endometrial adenocarcinoma, FIGO grade 3, - LVSI, tumor size 4.5 cm;    BRCA/Myrisk testing Negative  7/20/16-Exam under anesthesia, diagnostic laparoscopy, total laparoscopic hysterectomy, bilateral salpingo-oophorectomy, pelvic and periaortic lymphadenectomy, omentectomy.     HDR VAGINAL BRACHYTHERAPY-9/29/16, 10/6/16, 10/13/16    08/19/2017 PET/CT scan: Hypermetabolic bilateral lung

## 2018-10-12 ENCOUNTER — TELEPHONE (OUTPATIENT)
Dept: ONCOLOGY | Age: 73
End: 2018-10-12

## 2018-10-12 NOTE — TELEPHONE ENCOUNTER
Diane called in to let us know that she prefers to have her CT Chest/ CT abd at [address]. I called her back to verify. Called scheduling to let them know also. *Order was sent to scheduling earlier today.

## 2018-10-23 ENCOUNTER — HOSPITAL ENCOUNTER (OUTPATIENT)
Dept: CT IMAGING | Age: 73
Discharge: HOME | End: 2018-10-25
Payer: COMMERCIAL

## 2018-10-23 DIAGNOSIS — C54.1 ADENOCARCINOMA OF ENDOMETRIUM (HCC): ICD-10-CM

## 2018-10-23 PROCEDURE — 71260 CT THORAX DX C+: CPT

## 2018-10-23 PROCEDURE — 74177 CT ABD & PELVIS W/CONTRAST: CPT

## 2018-10-23 PROCEDURE — 6360000004 HC RX CONTRAST MEDICATION: Performed by: RADIOLOGY

## 2018-10-23 RX ADMIN — IOHEXOL 110 ML: 240 INJECTION, SOLUTION INTRATHECAL; INTRAVASCULAR; INTRAVENOUS; ORAL at 14:09

## 2018-10-31 ENCOUNTER — OFFICE VISIT (OUTPATIENT)
Dept: ONCOLOGY | Age: 73
End: 2018-10-31
Payer: COMMERCIAL

## 2018-10-31 ENCOUNTER — TELEPHONE (OUTPATIENT)
Dept: ONCOLOGY | Age: 73
End: 2018-10-31

## 2018-10-31 ENCOUNTER — HOSPITAL ENCOUNTER (OUTPATIENT)
Dept: INFUSION THERAPY | Age: 73
Discharge: HOME | End: 2018-10-31
Payer: COMMERCIAL

## 2018-10-31 VITALS
TEMPERATURE: 97.7 F | DIASTOLIC BLOOD PRESSURE: 60 MMHG | SYSTOLIC BLOOD PRESSURE: 122 MMHG | RESPIRATION RATE: 18 BRPM | HEART RATE: 55 BPM

## 2018-10-31 VITALS
BODY MASS INDEX: 27.13 KG/M2 | SYSTOLIC BLOOD PRESSURE: 143 MMHG | TEMPERATURE: 98.8 F | HEIGHT: 64 IN | WEIGHT: 158.9 LBS | HEART RATE: 66 BPM | DIASTOLIC BLOOD PRESSURE: 63 MMHG | RESPIRATION RATE: 20 BRPM

## 2018-10-31 DIAGNOSIS — C54.1 ADENOCARCINOMA OF ENDOMETRIUM (HCC): ICD-10-CM

## 2018-10-31 DIAGNOSIS — C56.9 MALIGNANT NEOPLASM OF OVARY, UNSPECIFIED LATERALITY (HCC): ICD-10-CM

## 2018-10-31 DIAGNOSIS — C54.1 ENDOMETRIAL CANCER (HCC): Primary | ICD-10-CM

## 2018-10-31 DIAGNOSIS — C54.1 ADENOCARCINOMA OF ENDOMETRIUM (HCC): Primary | ICD-10-CM

## 2018-10-31 PROCEDURE — 6360000002 HC RX W HCPCS: Performed by: INTERNAL MEDICINE

## 2018-10-31 PROCEDURE — 99215 OFFICE O/P EST HI 40 MIN: CPT | Performed by: INTERNAL MEDICINE

## 2018-10-31 PROCEDURE — 2580000003 HC RX 258: Performed by: INTERNAL MEDICINE

## 2018-10-31 PROCEDURE — 96413 CHEMO IV INFUSION 1 HR: CPT

## 2018-10-31 RX ORDER — DIPHENHYDRAMINE HYDROCHLORIDE 50 MG/ML
50 INJECTION INTRAMUSCULAR; INTRAVENOUS ONCE
Status: CANCELLED | OUTPATIENT
Start: 2018-10-31 | End: 2018-10-31

## 2018-10-31 RX ORDER — SODIUM CHLORIDE 0.9 % (FLUSH) 0.9 %
10 SYRINGE (ML) INJECTION PRN
Status: DISCONTINUED | OUTPATIENT
Start: 2018-10-31 | End: 2018-11-01 | Stop reason: HOSPADM

## 2018-10-31 RX ORDER — HEPARIN SODIUM (PORCINE) LOCK FLUSH IV SOLN 100 UNIT/ML 100 UNIT/ML
500 SOLUTION INTRAVENOUS PRN
Status: CANCELLED | OUTPATIENT
Start: 2018-10-31

## 2018-10-31 RX ORDER — EPINEPHRINE 1 MG/ML
0.3 INJECTION, SOLUTION, CONCENTRATE INTRAVENOUS PRN
Status: CANCELLED | OUTPATIENT
Start: 2018-10-31

## 2018-10-31 RX ORDER — 0.9 % SODIUM CHLORIDE 0.9 %
10 VIAL (ML) INJECTION ONCE
Status: CANCELLED | OUTPATIENT
Start: 2018-10-31 | End: 2018-10-31

## 2018-10-31 RX ORDER — METHYLPREDNISOLONE SODIUM SUCCINATE 125 MG/2ML
125 INJECTION, POWDER, LYOPHILIZED, FOR SOLUTION INTRAMUSCULAR; INTRAVENOUS ONCE
Status: CANCELLED | OUTPATIENT
Start: 2018-10-31 | End: 2018-10-31

## 2018-10-31 RX ORDER — SODIUM CHLORIDE 9 MG/ML
INJECTION, SOLUTION INTRAVENOUS CONTINUOUS
Status: CANCELLED | OUTPATIENT
Start: 2018-10-31

## 2018-10-31 RX ORDER — MEPERIDINE HYDROCHLORIDE 25 MG/ML
12.5 INJECTION INTRAMUSCULAR; INTRAVENOUS; SUBCUTANEOUS ONCE
Status: CANCELLED | OUTPATIENT
Start: 2018-10-31 | End: 2018-10-31

## 2018-10-31 RX ORDER — SODIUM CHLORIDE 0.9 % (FLUSH) 0.9 %
5 SYRINGE (ML) INJECTION PRN
Status: CANCELLED | OUTPATIENT
Start: 2018-10-31

## 2018-10-31 RX ORDER — SODIUM CHLORIDE 9 MG/ML
250 INJECTION, SOLUTION INTRAVENOUS ONCE
Status: COMPLETED | OUTPATIENT
Start: 2018-10-31 | End: 2018-10-31

## 2018-10-31 RX ADMIN — Medication 10 ML: at 15:23

## 2018-10-31 RX ADMIN — SODIUM CHLORIDE 200 MG: 9 INJECTION, SOLUTION INTRAVENOUS at 15:25

## 2018-10-31 RX ADMIN — SODIUM CHLORIDE 250 ML: 9 INJECTION, SOLUTION INTRAVENOUS at 15:20

## 2018-11-21 ENCOUNTER — OFFICE VISIT (OUTPATIENT)
Dept: ONCOLOGY | Age: 73
End: 2018-11-21
Payer: COMMERCIAL

## 2018-11-21 ENCOUNTER — HOSPITAL ENCOUNTER (OUTPATIENT)
Dept: INFUSION THERAPY | Age: 73
Discharge: HOME | End: 2018-11-21
Payer: COMMERCIAL

## 2018-11-21 ENCOUNTER — TELEPHONE (OUTPATIENT)
Dept: SURGERY | Age: 73
End: 2018-11-21

## 2018-11-21 VITALS
HEART RATE: 64 BPM | DIASTOLIC BLOOD PRESSURE: 62 MMHG | TEMPERATURE: 97.5 F | RESPIRATION RATE: 20 BRPM | SYSTOLIC BLOOD PRESSURE: 138 MMHG | BODY MASS INDEX: 28.21 KG/M2 | WEIGHT: 159.2 LBS | HEIGHT: 63 IN

## 2018-11-21 VITALS — SYSTOLIC BLOOD PRESSURE: 150 MMHG | RESPIRATION RATE: 18 BRPM | HEART RATE: 57 BPM | DIASTOLIC BLOOD PRESSURE: 66 MMHG

## 2018-11-21 DIAGNOSIS — C56.9 MALIGNANT NEOPLASM OF OVARY, UNSPECIFIED LATERALITY (HCC): ICD-10-CM

## 2018-11-21 DIAGNOSIS — E06.4 DRUG-INDUCED THYROIDITIS: ICD-10-CM

## 2018-11-21 DIAGNOSIS — C54.1 ADENOCARCINOMA OF ENDOMETRIUM (HCC): ICD-10-CM

## 2018-11-21 DIAGNOSIS — C50.412 MALIGNANT NEOPLASM OF UPPER-OUTER QUADRANT OF LEFT FEMALE BREAST, UNSPECIFIED ESTROGEN RECEPTOR STATUS (HCC): Primary | ICD-10-CM

## 2018-11-21 DIAGNOSIS — C54.1 ADENOCARCINOMA OF ENDOMETRIUM (HCC): Primary | ICD-10-CM

## 2018-11-21 PROCEDURE — 6360000002 HC RX W HCPCS: Performed by: INTERNAL MEDICINE

## 2018-11-21 PROCEDURE — 99214 OFFICE O/P EST MOD 30 MIN: CPT | Performed by: INTERNAL MEDICINE

## 2018-11-21 PROCEDURE — 36415 COLL VENOUS BLD VENIPUNCTURE: CPT

## 2018-11-21 PROCEDURE — 2580000003 HC RX 258: Performed by: INTERNAL MEDICINE

## 2018-11-21 PROCEDURE — 96413 CHEMO IV INFUSION 1 HR: CPT

## 2018-11-21 RX ORDER — SODIUM CHLORIDE 9 MG/ML
INJECTION, SOLUTION INTRAVENOUS CONTINUOUS
Status: CANCELLED | OUTPATIENT
Start: 2018-11-21

## 2018-11-21 RX ORDER — SODIUM CHLORIDE 0.9 % (FLUSH) 0.9 %
5 SYRINGE (ML) INJECTION PRN
Status: CANCELLED | OUTPATIENT
Start: 2018-11-21

## 2018-11-21 RX ORDER — 0.9 % SODIUM CHLORIDE 0.9 %
10 VIAL (ML) INJECTION ONCE
Status: CANCELLED | OUTPATIENT
Start: 2018-11-21 | End: 2018-11-21

## 2018-11-21 RX ORDER — METHYLPREDNISOLONE SODIUM SUCCINATE 125 MG/2ML
125 INJECTION, POWDER, LYOPHILIZED, FOR SOLUTION INTRAMUSCULAR; INTRAVENOUS ONCE
Status: CANCELLED | OUTPATIENT
Start: 2018-11-21 | End: 2018-11-21

## 2018-11-21 RX ORDER — SODIUM CHLORIDE 0.9 % (FLUSH) 0.9 %
10 SYRINGE (ML) INJECTION PRN
Status: CANCELLED | OUTPATIENT
Start: 2018-11-21

## 2018-11-21 RX ORDER — SODIUM CHLORIDE 9 MG/ML
250 INJECTION, SOLUTION INTRAVENOUS ONCE
Status: COMPLETED | OUTPATIENT
Start: 2018-11-21 | End: 2018-11-21

## 2018-11-21 RX ORDER — SODIUM CHLORIDE 0.9 % (FLUSH) 0.9 %
10 SYRINGE (ML) INJECTION PRN
Status: DISCONTINUED | OUTPATIENT
Start: 2018-11-21 | End: 2018-11-22 | Stop reason: HOSPADM

## 2018-11-21 RX ORDER — DIPHENHYDRAMINE HYDROCHLORIDE 50 MG/ML
50 INJECTION INTRAMUSCULAR; INTRAVENOUS ONCE
Status: CANCELLED | OUTPATIENT
Start: 2018-11-21 | End: 2018-11-21

## 2018-11-21 RX ORDER — HEPARIN SODIUM (PORCINE) LOCK FLUSH IV SOLN 100 UNIT/ML 100 UNIT/ML
500 SOLUTION INTRAVENOUS PRN
Status: CANCELLED | OUTPATIENT
Start: 2018-11-21

## 2018-11-21 RX ORDER — SODIUM CHLORIDE 9 MG/ML
INJECTION, SOLUTION INTRAVENOUS ONCE
Status: CANCELLED | OUTPATIENT
Start: 2018-11-21 | End: 2018-11-21

## 2018-11-21 RX ORDER — MEPERIDINE HYDROCHLORIDE 25 MG/ML
12.5 INJECTION INTRAMUSCULAR; INTRAVENOUS; SUBCUTANEOUS ONCE
Status: CANCELLED | OUTPATIENT
Start: 2018-11-21 | End: 2018-11-21

## 2018-11-21 RX ORDER — EPINEPHRINE 1 MG/ML
0.3 INJECTION, SOLUTION, CONCENTRATE INTRAVENOUS PRN
Status: CANCELLED | OUTPATIENT
Start: 2018-11-21

## 2018-11-21 RX ADMIN — SODIUM CHLORIDE 200 MG: 9 INJECTION, SOLUTION INTRAVENOUS at 14:20

## 2018-11-21 RX ADMIN — SODIUM CHLORIDE 250 ML: 9 INJECTION, SOLUTION INTRAVENOUS at 14:11

## 2018-11-21 NOTE — PROGRESS NOTES
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  -Adjuvant radiation therapy completed March 28, 2017.  -Endocrine therapy: Arimidex started March 30, 2017. She did not tolerate due to severe depression;  -Arimidex discontinued.   -Started on Femara after approximately 2 weeks, but did not tolerate Femara. Chose observation. Clinically, there is no evidence of recurrence. IA papillary serous endometrial adenocarcinoma, FIGO grade 3, - LVSI, tumor size 4.5 cm;    BRCA/Myrisk testing Negative  7/20/16-Exam under anesthesia, diagnostic laparoscopy, total laparoscopic hysterectomy, bilateral salpingo-oophorectomy, pelvic and periaortic lymphadenectomy, omentectomy. HDR VAGINAL BRACHYTHERAPY-9/29/16, 10/6/16, 10/13/16    On 9/21/2017 anterior right psoas muscle fine-needle aspirate: Positive for malignant cells, metastatic high-grade carcinoma compatible with patient's known endometrial serous carcinoma.     She completed 2 cycles of chemotherapy with Taxol Carbo and Avastin on 10/17 and 11/17. She had poor tolerance to chemotherapy, therefore she declined additional chemo and opted for Natural remedies instead     on 06/12/2018: 15 (<39UmL)      Re-staging scans on 06/15/2018:  CT of the abdomen and pelvis: 2.2 x 1.3 cm soft tissue nodule along the superior aspect of the urinary bladder suspicious for metastases, decreased in size since 1/23/18; no evidence of new abnormalities since prior visit; diverticulosis without evidence of diverticulitis.   CT Chest:  Multiple bilateral lung nodules suspicious for

## 2018-11-21 NOTE — PROGRESS NOTES
Pt presents to clinic for chemotherapy. Pt tolerated treatment well without complications.  Pt discharged ambulatory in stable condition

## 2018-11-29 NOTE — PROGRESS NOTES
[facility] PRE-ADMISSION TESTING INSTRUCTIONS    The Preadmission Testing patient is instructed accordingly using the following criteria (check applicable):    ARRIVAL INSTRUCTIONS:  [x] Parking the day of Surgery is located in the Main Entrance lot. Upon entering the door, make an immediate right to the surgery reception desk    [x] Complimentary [employer] Park[employer] is available Monday through Friday 6 am to 6 pm    [x] Bring photo ID and insurance card    [] Bring in a copy of Living will or Durable Power of  papers. [x] Please be sure to arrange for responsible adult to provide transportation to and from the hospital    [x] Please arrange for responsible adult to be with you for the 24 hour period post procedure due to having anesthesia      GENERAL INSTRUCTIONS:    [x] Nothing by mouth after midnight, including gum, candy, mints or water    [x] You may brush your teeth, but do not swallow any water    [] Take medications as instructed with 1-2 oz of water    [x] Stop herbal supplements and vitamins 5 days prior to procedure    [] Follow preop dosing of blood thinners per physician instructions    [] Take 1/2 dose of evening insulin, but no insulin after midnight    [] No oral diabetic medications after midnight    [] If diabetic and have low blood sugar or feel symptomatic, take 1-2oz apple juice only    [] Bring inhalers day of surgery    [] Bring C-PAP/ Bi-Pap day of surgery    [] Bring urine specimen day of surgery    [x] Shower or bath with soap, lather and rinse well, AM of Surgery, no lotion, powders or creams to surgical site    [] Follow bowel prep as instructed per surgeon    [x] No tobacco products within 24 hours of surgery     [x] No alcohol or illegal drug use within 24 hours of surgery.     [x] Jewelry, body piercing's, eyeglasses, contact lenses and dentures are not permitted into surgery (bring cases)      [x] Please do not wear any nail polish, make up or hair

## 2018-12-04 ENCOUNTER — HOSPITAL ENCOUNTER (OUTPATIENT)
Dept: GENERAL RADIOLOGY | Age: 73
Discharge: HOME | End: 2018-12-06
Attending: SURGERY
Payer: COMMERCIAL

## 2018-12-04 ENCOUNTER — ANESTHESIA (OUTPATIENT)
Dept: OPERATING ROOM | Age: 73
End: 2018-12-04
Payer: COMMERCIAL

## 2018-12-04 ENCOUNTER — HOSPITAL ENCOUNTER (OUTPATIENT)
Age: 73
Setting detail: OUTPATIENT SURGERY
Discharge: HOME | End: 2018-12-04
Attending: SURGERY | Admitting: SURGERY
Payer: COMMERCIAL

## 2018-12-04 ENCOUNTER — APPOINTMENT (OUTPATIENT)
Dept: GENERAL RADIOLOGY | Age: 73
End: 2018-12-04
Attending: SURGERY
Payer: COMMERCIAL

## 2018-12-04 ENCOUNTER — ANESTHESIA EVENT (OUTPATIENT)
Dept: OPERATING ROOM | Age: 73
End: 2018-12-04
Payer: COMMERCIAL

## 2018-12-04 VITALS — OXYGEN SATURATION: 100 % | TEMPERATURE: 66.7 F | SYSTOLIC BLOOD PRESSURE: 106 MMHG | DIASTOLIC BLOOD PRESSURE: 53 MMHG

## 2018-12-04 VITALS
HEART RATE: 61 BPM | HEIGHT: 64 IN | RESPIRATION RATE: 20 BRPM | WEIGHT: 160 LBS | SYSTOLIC BLOOD PRESSURE: 131 MMHG | TEMPERATURE: 97.9 F | OXYGEN SATURATION: 98 % | BODY MASS INDEX: 27.31 KG/M2 | DIASTOLIC BLOOD PRESSURE: 63 MMHG

## 2018-12-04 DIAGNOSIS — R52 PAIN: ICD-10-CM

## 2018-12-04 PROCEDURE — 77001 FLUOROGUIDE FOR VEIN DEVICE: CPT | Performed by: SURGERY

## 2018-12-04 PROCEDURE — 2500000003 HC RX 250 WO HCPCS: Performed by: SURGERY

## 2018-12-04 PROCEDURE — 6360000002 HC RX W HCPCS: Performed by: SURGERY

## 2018-12-04 PROCEDURE — C1788 PORT, INDWELLING, IMP: HCPCS | Performed by: SURGERY

## 2018-12-04 PROCEDURE — 3600000012 HC SURGERY LEVEL 2 ADDTL 15MIN: Performed by: SURGERY

## 2018-12-04 PROCEDURE — 36561 INSERT TUNNELED CV CATH: CPT | Performed by: SURGERY

## 2018-12-04 PROCEDURE — 7100000010 HC PHASE II RECOVERY - FIRST 15 MIN: Performed by: SURGERY

## 2018-12-04 PROCEDURE — 3700000000 HC ANESTHESIA ATTENDED CARE: Performed by: SURGERY

## 2018-12-04 PROCEDURE — 71045 X-RAY EXAM CHEST 1 VIEW: CPT

## 2018-12-04 PROCEDURE — 6360000002 HC RX W HCPCS: Performed by: NURSE ANESTHETIST, CERTIFIED REGISTERED

## 2018-12-04 PROCEDURE — 2709999900 HC NON-CHARGEABLE SUPPLY: Performed by: SURGERY

## 2018-12-04 PROCEDURE — 2500000003 HC RX 250 WO HCPCS: Performed by: NURSE ANESTHETIST, CERTIFIED REGISTERED

## 2018-12-04 PROCEDURE — 2580000003 HC RX 258: Performed by: NURSE ANESTHETIST, CERTIFIED REGISTERED

## 2018-12-04 PROCEDURE — 3700000001 HC ADD 15 MINUTES (ANESTHESIA): Performed by: SURGERY

## 2018-12-04 PROCEDURE — 3209999900 FLUORO FOR SURGICAL PROCEDURES

## 2018-12-04 PROCEDURE — 3600000002 HC SURGERY LEVEL 2 BASE: Performed by: SURGERY

## 2018-12-04 PROCEDURE — 7100000011 HC PHASE II RECOVERY - ADDTL 15 MIN: Performed by: SURGERY

## 2018-12-04 DEVICE — PORT SMARTPORT 6.6FR CT LP TI W/VLV SHTH: Type: IMPLANTABLE DEVICE | Site: CHEST | Status: FUNCTIONAL

## 2018-12-04 RX ORDER — ONDANSETRON 2 MG/ML
INJECTION INTRAMUSCULAR; INTRAVENOUS PRN
Status: DISCONTINUED | OUTPATIENT
Start: 2018-12-04 | End: 2018-12-04 | Stop reason: SDUPTHER

## 2018-12-04 RX ORDER — LIDOCAINE HYDROCHLORIDE 20 MG/ML
INJECTION, SOLUTION INFILTRATION; PERINEURAL PRN
Status: DISCONTINUED | OUTPATIENT
Start: 2018-12-04 | End: 2018-12-04 | Stop reason: SDUPTHER

## 2018-12-04 RX ORDER — SODIUM CHLORIDE 0.9 % (FLUSH) 0.9 %
10 SYRINGE (ML) INJECTION PRN
Status: DISCONTINUED | OUTPATIENT
Start: 2018-12-04 | End: 2018-12-04 | Stop reason: HOSPADM

## 2018-12-04 RX ORDER — DIPHENHYDRAMINE HYDROCHLORIDE 50 MG/ML
12.5 INJECTION INTRAMUSCULAR; INTRAVENOUS
Status: DISCONTINUED | OUTPATIENT
Start: 2018-12-04 | End: 2018-12-04 | Stop reason: HOSPADM

## 2018-12-04 RX ORDER — HYDROCODONE BITARTRATE AND ACETAMINOPHEN 5; 325 MG/1; MG/1
1 TABLET ORAL
Status: DISCONTINUED | OUTPATIENT
Start: 2018-12-04 | End: 2018-12-04 | Stop reason: HOSPADM

## 2018-12-04 RX ORDER — BUPIVACAINE HYDROCHLORIDE 2.5 MG/ML
INJECTION, SOLUTION EPIDURAL; INFILTRATION; INTRACAUDAL PRN
Status: DISCONTINUED | OUTPATIENT
Start: 2018-12-04 | End: 2018-12-04 | Stop reason: HOSPADM

## 2018-12-04 RX ORDER — SODIUM CHLORIDE 0.9 % (FLUSH) 0.9 %
10 SYRINGE (ML) INJECTION EVERY 12 HOURS SCHEDULED
Status: DISCONTINUED | OUTPATIENT
Start: 2018-12-04 | End: 2018-12-04 | Stop reason: HOSPADM

## 2018-12-04 RX ORDER — DEXAMETHASONE SODIUM PHOSPHATE 4 MG/ML
INJECTION, SOLUTION INTRA-ARTICULAR; INTRALESIONAL; INTRAMUSCULAR; INTRAVENOUS; SOFT TISSUE PRN
Status: DISCONTINUED | OUTPATIENT
Start: 2018-12-04 | End: 2018-12-04 | Stop reason: SDUPTHER

## 2018-12-04 RX ORDER — MEPERIDINE HYDROCHLORIDE 25 MG/ML
12.5 INJECTION INTRAMUSCULAR; INTRAVENOUS; SUBCUTANEOUS EVERY 10 MIN PRN
Status: DISCONTINUED | OUTPATIENT
Start: 2018-12-04 | End: 2018-12-04 | Stop reason: HOSPADM

## 2018-12-04 RX ORDER — SODIUM CHLORIDE 9 MG/ML
INJECTION, SOLUTION INTRAVENOUS CONTINUOUS PRN
Status: DISCONTINUED | OUTPATIENT
Start: 2018-12-04 | End: 2018-12-04 | Stop reason: SDUPTHER

## 2018-12-04 RX ORDER — CLINDAMYCIN PHOSPHATE 900 MG/50ML
900 INJECTION INTRAVENOUS
Status: COMPLETED | OUTPATIENT
Start: 2018-12-04 | End: 2018-12-04

## 2018-12-04 RX ORDER — PROPOFOL 10 MG/ML
INJECTION, EMULSION INTRAVENOUS CONTINUOUS PRN
Status: DISCONTINUED | OUTPATIENT
Start: 2018-12-04 | End: 2018-12-04 | Stop reason: SDUPTHER

## 2018-12-04 RX ORDER — FENTANYL CITRATE 50 UG/ML
50 INJECTION, SOLUTION INTRAMUSCULAR; INTRAVENOUS EVERY 5 MIN PRN
Status: DISCONTINUED | OUTPATIENT
Start: 2018-12-04 | End: 2018-12-04 | Stop reason: HOSPADM

## 2018-12-04 RX ORDER — PROMETHAZINE HYDROCHLORIDE 25 MG/ML
6.25 INJECTION, SOLUTION INTRAMUSCULAR; INTRAVENOUS
Status: DISCONTINUED | OUTPATIENT
Start: 2018-12-04 | End: 2018-12-04 | Stop reason: HOSPADM

## 2018-12-04 RX ORDER — FENTANYL CITRATE 50 UG/ML
INJECTION, SOLUTION INTRAMUSCULAR; INTRAVENOUS PRN
Status: DISCONTINUED | OUTPATIENT
Start: 2018-12-04 | End: 2018-12-04 | Stop reason: SDUPTHER

## 2018-12-04 RX ORDER — HEPARIN SODIUM 1000 [USP'U]/ML
INJECTION, SOLUTION INTRAVENOUS; SUBCUTANEOUS PRN
Status: DISCONTINUED | OUTPATIENT
Start: 2018-12-04 | End: 2018-12-04 | Stop reason: HOSPADM

## 2018-12-04 RX ORDER — MIDAZOLAM HYDROCHLORIDE 1 MG/ML
INJECTION INTRAMUSCULAR; INTRAVENOUS PRN
Status: DISCONTINUED | OUTPATIENT
Start: 2018-12-04 | End: 2018-12-04 | Stop reason: SDUPTHER

## 2018-12-04 RX ADMIN — LIDOCAINE HYDROCHLORIDE 100 MG: 20 INJECTION, SOLUTION INFILTRATION; PERINEURAL at 10:09

## 2018-12-04 RX ADMIN — ONDANSETRON HYDROCHLORIDE 4 MG: 2 INJECTION, SOLUTION INTRAMUSCULAR; INTRAVENOUS at 10:41

## 2018-12-04 RX ADMIN — DEXAMETHASONE SODIUM PHOSPHATE 10 MG: 4 INJECTION, SOLUTION INTRAMUSCULAR; INTRAVENOUS at 10:41

## 2018-12-04 RX ADMIN — CLINDAMYCIN IN 5 PERCENT DEXTROSE 900 MG: 18 INJECTION, SOLUTION INTRAVENOUS at 10:06

## 2018-12-04 RX ADMIN — PROPOFOL 50 MCG/KG/MIN: 10 INJECTION, EMULSION INTRAVENOUS at 10:09

## 2018-12-04 RX ADMIN — MIDAZOLAM HYDROCHLORIDE 2 MG: 1 INJECTION, SOLUTION INTRAMUSCULAR; INTRAVENOUS at 10:04

## 2018-12-04 RX ADMIN — FENTANYL CITRATE 50 MCG: 50 INJECTION, SOLUTION INTRAMUSCULAR; INTRAVENOUS at 10:20

## 2018-12-04 RX ADMIN — FENTANYL CITRATE 50 MCG: 50 INJECTION, SOLUTION INTRAMUSCULAR; INTRAVENOUS at 10:30

## 2018-12-04 RX ADMIN — SODIUM CHLORIDE: 9 INJECTION, SOLUTION INTRAVENOUS at 10:05

## 2018-12-04 ASSESSMENT — PAIN DESCRIPTION - ORIENTATION
ORIENTATION: LEFT

## 2018-12-04 ASSESSMENT — PAIN DESCRIPTION - LOCATION
LOCATION: CHEST

## 2018-12-04 ASSESSMENT — PAIN DESCRIPTION - PAIN TYPE
TYPE: SURGICAL PAIN

## 2018-12-04 ASSESSMENT — PAIN SCALES - GENERAL
PAINLEVEL_OUTOF10: 0
PAINLEVEL_OUTOF10: 2
PAINLEVEL_OUTOF10: 0

## 2018-12-04 ASSESSMENT — LIFESTYLE VARIABLES: SMOKING_STATUS: 0

## 2018-12-04 ASSESSMENT — PAIN - FUNCTIONAL ASSESSMENT: PAIN_FUNCTIONAL_ASSESSMENT: 0-10

## 2018-12-04 ASSESSMENT — PAIN DESCRIPTION - DESCRIPTORS: DESCRIPTORS: SORE

## 2018-12-10 ENCOUNTER — TELEPHONE (OUTPATIENT)
Dept: ONCOLOGY | Age: 73
End: 2018-12-10

## 2018-12-10 DIAGNOSIS — C50.412 MALIGNANT NEOPLASM OF UPPER-OUTER QUADRANT OF LEFT FEMALE BREAST, UNSPECIFIED ESTROGEN RECEPTOR STATUS (HCC): Primary | ICD-10-CM

## 2018-12-12 ENCOUNTER — HOSPITAL ENCOUNTER (OUTPATIENT)
Dept: INFUSION THERAPY | Age: 73
End: 2018-12-12

## 2018-12-19 ENCOUNTER — OFFICE VISIT (OUTPATIENT)
Dept: ONCOLOGY | Age: 73
End: 2018-12-19
Payer: COMMERCIAL

## 2018-12-19 ENCOUNTER — HOSPITAL ENCOUNTER (OUTPATIENT)
Dept: INFUSION THERAPY | Age: 73
Discharge: HOME | End: 2018-12-19
Payer: COMMERCIAL

## 2018-12-19 VITALS
RESPIRATION RATE: 20 BRPM | TEMPERATURE: 97.8 F | BODY MASS INDEX: 27.02 KG/M2 | HEIGHT: 64 IN | HEART RATE: 59 BPM | SYSTOLIC BLOOD PRESSURE: 133 MMHG | DIASTOLIC BLOOD PRESSURE: 65 MMHG | WEIGHT: 158.3 LBS

## 2018-12-19 VITALS
RESPIRATION RATE: 20 BRPM | TEMPERATURE: 97.8 F | HEART RATE: 57 BPM | DIASTOLIC BLOOD PRESSURE: 62 MMHG | SYSTOLIC BLOOD PRESSURE: 128 MMHG

## 2018-12-19 DIAGNOSIS — Z09 FOLLOW UP: Primary | ICD-10-CM

## 2018-12-19 DIAGNOSIS — C54.1 ADENOCARCINOMA OF ENDOMETRIUM (HCC): ICD-10-CM

## 2018-12-19 DIAGNOSIS — C56.9 MALIGNANT NEOPLASM OF OVARY, UNSPECIFIED LATERALITY (HCC): ICD-10-CM

## 2018-12-19 PROCEDURE — 99214 OFFICE O/P EST MOD 30 MIN: CPT | Performed by: INTERNAL MEDICINE

## 2018-12-19 PROCEDURE — 6360000002 HC RX W HCPCS: Performed by: INTERNAL MEDICINE

## 2018-12-19 PROCEDURE — 96413 CHEMO IV INFUSION 1 HR: CPT

## 2018-12-19 PROCEDURE — 2580000003 HC RX 258: Performed by: INTERNAL MEDICINE

## 2018-12-19 RX ORDER — SODIUM CHLORIDE 9 MG/ML
250 INJECTION, SOLUTION INTRAVENOUS ONCE
Status: DISCONTINUED | OUTPATIENT
Start: 2018-12-19 | End: 2018-12-20 | Stop reason: HOSPADM

## 2018-12-19 RX ORDER — SODIUM CHLORIDE 0.9 % (FLUSH) 0.9 %
10 SYRINGE (ML) INJECTION PRN
Status: DISCONTINUED | OUTPATIENT
Start: 2018-12-19 | End: 2018-12-20 | Stop reason: HOSPADM

## 2018-12-19 RX ORDER — SODIUM CHLORIDE 9 MG/ML
INJECTION, SOLUTION INTRAVENOUS ONCE
Status: CANCELLED | OUTPATIENT
Start: 2018-12-19 | End: 2018-12-19

## 2018-12-19 RX ORDER — HEPARIN SODIUM (PORCINE) LOCK FLUSH IV SOLN 100 UNIT/ML 100 UNIT/ML
500 SOLUTION INTRAVENOUS PRN
Status: DISCONTINUED | OUTPATIENT
Start: 2018-12-19 | End: 2018-12-20 | Stop reason: HOSPADM

## 2018-12-19 RX ORDER — SODIUM CHLORIDE 9 MG/ML
INJECTION, SOLUTION INTRAVENOUS CONTINUOUS
Status: CANCELLED | OUTPATIENT
Start: 2018-12-19

## 2018-12-19 RX ORDER — EPINEPHRINE 1 MG/ML
0.3 INJECTION, SOLUTION, CONCENTRATE INTRAVENOUS PRN
Status: CANCELLED | OUTPATIENT
Start: 2018-12-19

## 2018-12-19 RX ORDER — DIPHENHYDRAMINE HYDROCHLORIDE 50 MG/ML
50 INJECTION INTRAMUSCULAR; INTRAVENOUS ONCE
Status: CANCELLED | OUTPATIENT
Start: 2018-12-19 | End: 2018-12-19

## 2018-12-19 RX ORDER — 0.9 % SODIUM CHLORIDE 0.9 %
10 VIAL (ML) INJECTION ONCE
Status: CANCELLED | OUTPATIENT
Start: 2018-12-19 | End: 2018-12-19

## 2018-12-19 RX ORDER — MEPERIDINE HYDROCHLORIDE 25 MG/ML
12.5 INJECTION INTRAMUSCULAR; INTRAVENOUS; SUBCUTANEOUS ONCE
Status: CANCELLED | OUTPATIENT
Start: 2018-12-19 | End: 2018-12-19

## 2018-12-19 RX ORDER — METHYLPREDNISOLONE 4 MG/1
TABLET ORAL
Qty: 21 TABLET | Refills: 0 | Status: SHIPPED | OUTPATIENT
Start: 2018-12-19 | End: 2018-12-25

## 2018-12-19 RX ORDER — SODIUM CHLORIDE 0.9 % (FLUSH) 0.9 %
5 SYRINGE (ML) INJECTION PRN
Status: CANCELLED | OUTPATIENT
Start: 2018-12-19

## 2018-12-19 RX ORDER — METHYLPREDNISOLONE SODIUM SUCCINATE 125 MG/2ML
125 INJECTION, POWDER, LYOPHILIZED, FOR SOLUTION INTRAMUSCULAR; INTRAVENOUS ONCE
Status: CANCELLED | OUTPATIENT
Start: 2018-12-19 | End: 2018-12-19

## 2018-12-19 RX ORDER — HEPARIN SODIUM (PORCINE) LOCK FLUSH IV SOLN 100 UNIT/ML 100 UNIT/ML
500 SOLUTION INTRAVENOUS PRN
Status: CANCELLED | OUTPATIENT
Start: 2018-12-19

## 2018-12-19 RX ORDER — SODIUM CHLORIDE 0.9 % (FLUSH) 0.9 %
10 SYRINGE (ML) INJECTION PRN
Status: CANCELLED | OUTPATIENT
Start: 2018-12-19

## 2018-12-19 RX ADMIN — Medication 10 ML: at 14:39

## 2018-12-19 RX ADMIN — SODIUM CHLORIDE 250 ML: 9 INJECTION, SOLUTION INTRAVENOUS at 14:40

## 2018-12-19 RX ADMIN — Medication 10 ML: at 15:35

## 2018-12-19 RX ADMIN — HEPARIN 500 UNITS: 100 SYRINGE at 15:35

## 2018-12-19 RX ADMIN — SODIUM CHLORIDE 200 MG: 9 INJECTION, SOLUTION INTRAVENOUS at 14:43

## 2018-12-28 ENCOUNTER — HOSPITAL ENCOUNTER (EMERGENCY)
Age: 73
Discharge: HOME | End: 2018-12-28
Attending: EMERGENCY MEDICINE
Payer: COMMERCIAL

## 2018-12-28 ENCOUNTER — APPOINTMENT (OUTPATIENT)
Dept: ULTRASOUND IMAGING | Age: 73
End: 2018-12-28
Payer: COMMERCIAL

## 2018-12-28 ENCOUNTER — APPOINTMENT (OUTPATIENT)
Dept: CT IMAGING | Age: 73
End: 2018-12-28
Payer: COMMERCIAL

## 2018-12-28 ENCOUNTER — APPOINTMENT (OUTPATIENT)
Dept: GENERAL RADIOLOGY | Age: 73
End: 2018-12-28
Payer: COMMERCIAL

## 2018-12-28 VITALS
BODY MASS INDEX: 26.98 KG/M2 | TEMPERATURE: 98.1 F | OXYGEN SATURATION: 97 % | DIASTOLIC BLOOD PRESSURE: 57 MMHG | RESPIRATION RATE: 16 BRPM | HEIGHT: 64 IN | WEIGHT: 158 LBS | SYSTOLIC BLOOD PRESSURE: 117 MMHG | HEART RATE: 62 BPM

## 2018-12-28 DIAGNOSIS — K80.50 BILIARY COLIC: Primary | ICD-10-CM

## 2018-12-28 LAB
ALBUMIN SERPL-MCNC: 4.1 G/DL (ref 3.5–5.2)
ALP BLD-CCNC: 80 U/L (ref 35–104)
ALT SERPL-CCNC: 10 U/L (ref 0–32)
ANION GAP SERPL CALCULATED.3IONS-SCNC: 11 MMOL/L (ref 7–16)
AST SERPL-CCNC: 14 U/L (ref 0–31)
BASOPHILS ABSOLUTE: 0.05 E9/L (ref 0–0.2)
BASOPHILS RELATIVE PERCENT: 0.7 % (ref 0–2)
BILIRUB SERPL-MCNC: 0.5 MG/DL (ref 0–1.2)
BUN BLDV-MCNC: 16 MG/DL (ref 8–23)
CALCIUM SERPL-MCNC: 9.2 MG/DL (ref 8.6–10.2)
CHLORIDE BLD-SCNC: 100 MMOL/L (ref 98–107)
CO2: 29 MMOL/L (ref 22–29)
CREAT SERPL-MCNC: 0.7 MG/DL (ref 0.5–1)
EKG ATRIAL RATE: 62 BPM
EKG P AXIS: 32 DEGREES
EKG P-R INTERVAL: 130 MS
EKG Q-T INTERVAL: 416 MS
EKG QRS DURATION: 82 MS
EKG QTC CALCULATION (BAZETT): 422 MS
EKG R AXIS: -18 DEGREES
EKG T AXIS: 0 DEGREES
EKG VENTRICULAR RATE: 62 BPM
EOSINOPHILS ABSOLUTE: 0 E9/L (ref 0.05–0.5)
EOSINOPHILS RELATIVE PERCENT: 0 % (ref 0–6)
GFR AFRICAN AMERICAN: >60
GFR NON-AFRICAN AMERICAN: >60 ML/MIN/1.73
GLUCOSE BLD-MCNC: 112 MG/DL (ref 74–99)
HCT VFR BLD CALC: 39.9 % (ref 34–48)
HEMOGLOBIN: 12.9 G/DL (ref 11.5–15.5)
IMMATURE GRANULOCYTES #: 0.05 E9/L
IMMATURE GRANULOCYTES %: 0.7 % (ref 0–5)
LACTIC ACID: 1.2 MMOL/L (ref 0.5–2.2)
LYMPHOCYTES ABSOLUTE: 2.32 E9/L (ref 1.5–4)
LYMPHOCYTES RELATIVE PERCENT: 34.1 % (ref 20–42)
MCH RBC QN AUTO: 27.7 PG (ref 26–35)
MCHC RBC AUTO-ENTMCNC: 32.3 % (ref 32–34.5)
MCV RBC AUTO: 85.8 FL (ref 80–99.9)
MONOCYTES ABSOLUTE: 0.55 E9/L (ref 0.1–0.95)
MONOCYTES RELATIVE PERCENT: 8.1 % (ref 2–12)
NEUTROPHILS ABSOLUTE: 3.84 E9/L (ref 1.8–7.3)
NEUTROPHILS RELATIVE PERCENT: 56.4 % (ref 43–80)
PDW BLD-RTO: 15.3 FL (ref 11.5–15)
PLATELET # BLD: 167 E9/L (ref 130–450)
PMV BLD AUTO: 8.9 FL (ref 7–12)
POTASSIUM REFLEX MAGNESIUM: 3.9 MMOL/L (ref 3.5–5)
RBC # BLD: 4.65 E12/L (ref 3.5–5.5)
SODIUM BLD-SCNC: 140 MMOL/L (ref 132–146)
TOTAL PROTEIN: 6.2 G/DL (ref 6.4–8.3)
TROPONIN: <0.01 NG/ML (ref 0–0.03)
WBC # BLD: 6.8 E9/L (ref 4.5–11.5)

## 2018-12-28 PROCEDURE — 6360000004 HC RX CONTRAST MEDICATION: Performed by: RADIOLOGY

## 2018-12-28 PROCEDURE — 71045 X-RAY EXAM CHEST 1 VIEW: CPT

## 2018-12-28 PROCEDURE — 99285 EMERGENCY DEPT VISIT HI MDM: CPT

## 2018-12-28 PROCEDURE — 6370000000 HC RX 637 (ALT 250 FOR IP)

## 2018-12-28 PROCEDURE — 80053 COMPREHEN METABOLIC PANEL: CPT

## 2018-12-28 PROCEDURE — 71275 CT ANGIOGRAPHY CHEST: CPT

## 2018-12-28 PROCEDURE — 36415 COLL VENOUS BLD VENIPUNCTURE: CPT

## 2018-12-28 PROCEDURE — 85025 COMPLETE CBC W/AUTO DIFF WBC: CPT

## 2018-12-28 PROCEDURE — 84484 ASSAY OF TROPONIN QUANT: CPT

## 2018-12-28 PROCEDURE — 76705 ECHO EXAM OF ABDOMEN: CPT

## 2018-12-28 PROCEDURE — 83605 ASSAY OF LACTIC ACID: CPT

## 2018-12-28 RX ORDER — CYCLOBENZAPRINE HCL 10 MG
10 TABLET ORAL ONCE
Status: COMPLETED | OUTPATIENT
Start: 2018-12-28 | End: 2018-12-28

## 2018-12-28 RX ORDER — CYCLOBENZAPRINE HCL 10 MG
TABLET ORAL
Status: COMPLETED
Start: 2018-12-28 | End: 2018-12-28

## 2018-12-28 RX ADMIN — CYCLOBENZAPRINE HYDROCHLORIDE 10 MG: 10 TABLET, FILM COATED ORAL at 21:20

## 2018-12-28 RX ADMIN — Medication 10 MG: at 21:20

## 2018-12-28 RX ADMIN — IOPAMIDOL 90 ML: 755 INJECTION, SOLUTION INTRAVENOUS at 21:32

## 2018-12-28 ASSESSMENT — ENCOUNTER SYMPTOMS
SHORTNESS OF BREATH: 0
NAUSEA: 0
ABDOMINAL PAIN: 0
SORE THROAT: 0
COUGH: 0
DIARRHEA: 0
VOMITING: 0
COLOR CHANGE: 0
BACK PAIN: 0

## 2018-12-28 ASSESSMENT — PAIN SCALES - GENERAL: PAINLEVEL_OUTOF10: 2

## 2018-12-28 ASSESSMENT — PAIN DESCRIPTION - PAIN TYPE: TYPE: ACUTE PAIN

## 2019-01-09 ENCOUNTER — HOSPITAL ENCOUNTER (OUTPATIENT)
Dept: INFUSION THERAPY | Age: 74
Discharge: HOME | End: 2019-01-09
Payer: COMMERCIAL

## 2019-01-09 ENCOUNTER — OFFICE VISIT (OUTPATIENT)
Dept: ONCOLOGY | Age: 74
End: 2019-01-09
Payer: COMMERCIAL

## 2019-01-09 VITALS
DIASTOLIC BLOOD PRESSURE: 59 MMHG | RESPIRATION RATE: 20 BRPM | SYSTOLIC BLOOD PRESSURE: 118 MMHG | WEIGHT: 158.5 LBS | HEART RATE: 61 BPM | HEIGHT: 64 IN | TEMPERATURE: 97.9 F | BODY MASS INDEX: 27.06 KG/M2

## 2019-01-09 VITALS
TEMPERATURE: 97.3 F | DIASTOLIC BLOOD PRESSURE: 59 MMHG | SYSTOLIC BLOOD PRESSURE: 125 MMHG | RESPIRATION RATE: 20 BRPM | HEART RATE: 55 BPM

## 2019-01-09 DIAGNOSIS — Z09 FOLLOW UP: Primary | ICD-10-CM

## 2019-01-09 DIAGNOSIS — C54.1 ADENOCARCINOMA OF ENDOMETRIUM (HCC): ICD-10-CM

## 2019-01-09 DIAGNOSIS — C56.9 MALIGNANT NEOPLASM OF OVARY, UNSPECIFIED LATERALITY (HCC): ICD-10-CM

## 2019-01-09 PROCEDURE — 2580000003 HC RX 258: Performed by: INTERNAL MEDICINE

## 2019-01-09 PROCEDURE — 96413 CHEMO IV INFUSION 1 HR: CPT

## 2019-01-09 PROCEDURE — 99214 OFFICE O/P EST MOD 30 MIN: CPT | Performed by: INTERNAL MEDICINE

## 2019-01-09 PROCEDURE — 6360000002 HC RX W HCPCS: Performed by: INTERNAL MEDICINE

## 2019-01-09 RX ORDER — SODIUM CHLORIDE 9 MG/ML
INJECTION, SOLUTION INTRAVENOUS ONCE
Status: CANCELLED | OUTPATIENT
Start: 2019-01-09 | End: 2019-01-09

## 2019-01-09 RX ORDER — SODIUM CHLORIDE 9 MG/ML
100 INJECTION, SOLUTION INTRAVENOUS ONCE
Status: DISCONTINUED | OUTPATIENT
Start: 2019-01-09 | End: 2019-01-10 | Stop reason: HOSPADM

## 2019-01-09 RX ORDER — EPINEPHRINE 1 MG/ML
0.3 INJECTION, SOLUTION, CONCENTRATE INTRAVENOUS PRN
Status: CANCELLED | OUTPATIENT
Start: 2019-01-09

## 2019-01-09 RX ORDER — METHYLPREDNISOLONE SODIUM SUCCINATE 125 MG/2ML
125 INJECTION, POWDER, LYOPHILIZED, FOR SOLUTION INTRAMUSCULAR; INTRAVENOUS ONCE
Status: CANCELLED | OUTPATIENT
Start: 2019-01-09 | End: 2019-01-09

## 2019-01-09 RX ORDER — HEPARIN SODIUM (PORCINE) LOCK FLUSH IV SOLN 100 UNIT/ML 100 UNIT/ML
500 SOLUTION INTRAVENOUS PRN
Status: CANCELLED | OUTPATIENT
Start: 2019-01-09

## 2019-01-09 RX ORDER — MEPERIDINE HYDROCHLORIDE 25 MG/ML
12.5 INJECTION INTRAMUSCULAR; INTRAVENOUS; SUBCUTANEOUS ONCE
Status: CANCELLED | OUTPATIENT
Start: 2019-01-09 | End: 2019-01-09

## 2019-01-09 RX ORDER — SODIUM CHLORIDE 9 MG/ML
INJECTION, SOLUTION INTRAVENOUS CONTINUOUS
Status: CANCELLED | OUTPATIENT
Start: 2019-01-09

## 2019-01-09 RX ORDER — 0.9 % SODIUM CHLORIDE 0.9 %
10 VIAL (ML) INJECTION ONCE
Status: CANCELLED | OUTPATIENT
Start: 2019-01-09 | End: 2019-01-09

## 2019-01-09 RX ORDER — SODIUM CHLORIDE 0.9 % (FLUSH) 0.9 %
5 SYRINGE (ML) INJECTION PRN
Status: CANCELLED | OUTPATIENT
Start: 2019-01-09

## 2019-01-09 RX ORDER — HEPARIN SODIUM (PORCINE) LOCK FLUSH IV SOLN 100 UNIT/ML 100 UNIT/ML
500 SOLUTION INTRAVENOUS PRN
Status: DISCONTINUED | OUTPATIENT
Start: 2019-01-09 | End: 2019-01-10 | Stop reason: HOSPADM

## 2019-01-09 RX ORDER — SODIUM CHLORIDE 0.9 % (FLUSH) 0.9 %
10 SYRINGE (ML) INJECTION PRN
Status: CANCELLED | OUTPATIENT
Start: 2019-01-09

## 2019-01-09 RX ORDER — SODIUM CHLORIDE 0.9 % (FLUSH) 0.9 %
10 SYRINGE (ML) INJECTION PRN
Status: DISCONTINUED | OUTPATIENT
Start: 2019-01-09 | End: 2019-01-10 | Stop reason: HOSPADM

## 2019-01-09 RX ORDER — DIPHENHYDRAMINE HYDROCHLORIDE 50 MG/ML
50 INJECTION INTRAMUSCULAR; INTRAVENOUS ONCE
Status: CANCELLED | OUTPATIENT
Start: 2019-01-09 | End: 2019-01-09

## 2019-01-09 RX ADMIN — Medication 10 ML: at 15:32

## 2019-01-09 RX ADMIN — HEPARIN 500 UNITS: 100 SYRINGE at 15:32

## 2019-01-09 RX ADMIN — SODIUM CHLORIDE 200 MG: 9 INJECTION, SOLUTION INTRAVENOUS at 14:57

## 2019-01-09 RX ADMIN — Medication 10 ML: at 14:55

## 2019-01-09 RX ADMIN — SODIUM CHLORIDE 100 ML: 9 INJECTION, SOLUTION INTRAVENOUS at 14:56

## 2019-01-10 ENCOUNTER — CLINICAL DOCUMENTATION (OUTPATIENT)
Dept: ONCOLOGY | Age: 74
End: 2019-01-10

## 2019-01-30 ENCOUNTER — OFFICE VISIT (OUTPATIENT)
Dept: ONCOLOGY | Age: 74
End: 2019-01-30
Payer: COMMERCIAL

## 2019-01-30 ENCOUNTER — HOSPITAL ENCOUNTER (OUTPATIENT)
Dept: INFUSION THERAPY | Age: 74
Discharge: HOME | End: 2019-01-30
Payer: COMMERCIAL

## 2019-01-30 VITALS
SYSTOLIC BLOOD PRESSURE: 143 MMHG | TEMPERATURE: 97.2 F | HEIGHT: 64 IN | BODY MASS INDEX: 27.61 KG/M2 | HEART RATE: 75 BPM | RESPIRATION RATE: 20 BRPM | DIASTOLIC BLOOD PRESSURE: 62 MMHG | WEIGHT: 161.7 LBS

## 2019-01-30 VITALS — DIASTOLIC BLOOD PRESSURE: 64 MMHG | RESPIRATION RATE: 18 BRPM | HEART RATE: 68 BPM | SYSTOLIC BLOOD PRESSURE: 135 MMHG

## 2019-01-30 DIAGNOSIS — C54.1 ADENOCARCINOMA OF ENDOMETRIUM (HCC): Primary | ICD-10-CM

## 2019-01-30 DIAGNOSIS — C54.1 ADENOCARCINOMA OF ENDOMETRIUM (HCC): ICD-10-CM

## 2019-01-30 DIAGNOSIS — C56.9 MALIGNANT NEOPLASM OF OVARY, UNSPECIFIED LATERALITY (HCC): ICD-10-CM

## 2019-01-30 PROCEDURE — 99214 OFFICE O/P EST MOD 30 MIN: CPT | Performed by: INTERNAL MEDICINE

## 2019-01-30 PROCEDURE — 6360000002 HC RX W HCPCS: Performed by: INTERNAL MEDICINE

## 2019-01-30 PROCEDURE — 2580000003 HC RX 258

## 2019-01-30 PROCEDURE — 2580000003 HC RX 258: Performed by: INTERNAL MEDICINE

## 2019-01-30 PROCEDURE — 96413 CHEMO IV INFUSION 1 HR: CPT

## 2019-01-30 RX ORDER — EPINEPHRINE 1 MG/ML
0.3 INJECTION, SOLUTION, CONCENTRATE INTRAVENOUS PRN
Status: CANCELLED | OUTPATIENT
Start: 2019-01-30

## 2019-01-30 RX ORDER — DIPHENHYDRAMINE HYDROCHLORIDE 50 MG/ML
50 INJECTION INTRAMUSCULAR; INTRAVENOUS ONCE
Status: CANCELLED | OUTPATIENT
Start: 2019-01-30 | End: 2019-01-30

## 2019-01-30 RX ORDER — SODIUM CHLORIDE 9 MG/ML
250 INJECTION, SOLUTION INTRAVENOUS ONCE
Status: COMPLETED | OUTPATIENT
Start: 2019-01-30 | End: 2019-01-30

## 2019-01-30 RX ORDER — 0.9 % SODIUM CHLORIDE 0.9 %
10 VIAL (ML) INJECTION ONCE
Status: CANCELLED | OUTPATIENT
Start: 2019-01-30 | End: 2019-01-30

## 2019-01-30 RX ORDER — HEPARIN SODIUM (PORCINE) LOCK FLUSH IV SOLN 100 UNIT/ML 100 UNIT/ML
500 SOLUTION INTRAVENOUS PRN
Status: CANCELLED | OUTPATIENT
Start: 2019-01-30

## 2019-01-30 RX ORDER — SODIUM CHLORIDE 0.9 % (FLUSH) 0.9 %
10 SYRINGE (ML) INJECTION PRN
Status: DISCONTINUED | OUTPATIENT
Start: 2019-01-30 | End: 2019-01-31 | Stop reason: HOSPADM

## 2019-01-30 RX ORDER — MEPERIDINE HYDROCHLORIDE 25 MG/ML
12.5 INJECTION INTRAMUSCULAR; INTRAVENOUS; SUBCUTANEOUS ONCE
Status: CANCELLED | OUTPATIENT
Start: 2019-01-30 | End: 2019-01-30

## 2019-01-30 RX ORDER — METHYLPREDNISOLONE SODIUM SUCCINATE 125 MG/2ML
125 INJECTION, POWDER, LYOPHILIZED, FOR SOLUTION INTRAMUSCULAR; INTRAVENOUS ONCE
Status: CANCELLED | OUTPATIENT
Start: 2019-01-30 | End: 2019-01-30

## 2019-01-30 RX ORDER — SODIUM CHLORIDE 9 MG/ML
INJECTION, SOLUTION INTRAVENOUS
Status: COMPLETED
Start: 2019-01-30 | End: 2019-01-30

## 2019-01-30 RX ORDER — SODIUM CHLORIDE 9 MG/ML
INJECTION, SOLUTION INTRAVENOUS CONTINUOUS
Status: CANCELLED | OUTPATIENT
Start: 2019-01-30

## 2019-01-30 RX ORDER — SODIUM CHLORIDE 0.9 % (FLUSH) 0.9 %
5 SYRINGE (ML) INJECTION PRN
Status: CANCELLED | OUTPATIENT
Start: 2019-01-30

## 2019-01-30 RX ORDER — SODIUM CHLORIDE 0.9 % (FLUSH) 0.9 %
10 SYRINGE (ML) INJECTION PRN
Status: CANCELLED | OUTPATIENT
Start: 2019-01-30

## 2019-01-30 RX ORDER — SODIUM CHLORIDE 9 MG/ML
INJECTION, SOLUTION INTRAVENOUS ONCE
Status: CANCELLED | OUTPATIENT
Start: 2019-01-30 | End: 2019-01-30

## 2019-01-30 RX ORDER — HEPARIN SODIUM (PORCINE) LOCK FLUSH IV SOLN 100 UNIT/ML 100 UNIT/ML
500 SOLUTION INTRAVENOUS PRN
Status: DISCONTINUED | OUTPATIENT
Start: 2019-01-30 | End: 2019-01-31 | Stop reason: HOSPADM

## 2019-01-30 RX ADMIN — SODIUM CHLORIDE 250 ML: 9 INJECTION, SOLUTION INTRAVENOUS at 13:33

## 2019-01-30 RX ADMIN — SODIUM CHLORIDE 200 MG: 9 INJECTION, SOLUTION INTRAVENOUS at 13:42

## 2019-01-30 RX ADMIN — HEPARIN 500 UNITS: 100 SYRINGE at 14:28

## 2019-01-30 RX ADMIN — Medication 10 ML: at 14:28

## 2019-01-30 NOTE — PROGRESS NOTES
Pt presents to clinic for anti cancer therapy. Pt tolerated treatment well without complications. Pt discharged ambulatory in stable condition. Sophia Ramesh

## 2019-02-13 ENCOUNTER — HOSPITAL ENCOUNTER (OUTPATIENT)
Dept: CT IMAGING | Age: 74
Discharge: HOME | End: 2019-02-15
Payer: COMMERCIAL

## 2019-02-13 DIAGNOSIS — C54.1 ENDOMETRIAL SARCOMA (HCC): ICD-10-CM

## 2019-02-13 PROCEDURE — 74177 CT ABD & PELVIS W/CONTRAST: CPT

## 2019-02-13 PROCEDURE — 71260 CT THORAX DX C+: CPT

## 2019-02-13 PROCEDURE — 6360000004 HC RX CONTRAST MEDICATION: Performed by: RADIOLOGY

## 2019-02-13 RX ADMIN — IOHEXOL 50 ML: 240 INJECTION, SOLUTION INTRATHECAL; INTRAVASCULAR; INTRAVENOUS; ORAL at 17:50

## 2019-02-13 RX ADMIN — IOPAMIDOL 110 ML: 755 INJECTION, SOLUTION INTRAVENOUS at 17:59

## 2019-02-20 ENCOUNTER — HOSPITAL ENCOUNTER (OUTPATIENT)
Dept: INFUSION THERAPY | Age: 74
End: 2019-02-20

## 2019-02-28 ENCOUNTER — OFFICE VISIT (OUTPATIENT)
Dept: ONCOLOGY | Age: 74
End: 2019-02-28
Payer: COMMERCIAL

## 2019-02-28 ENCOUNTER — HOSPITAL ENCOUNTER (OUTPATIENT)
Dept: INFUSION THERAPY | Age: 74
Discharge: HOME | End: 2019-02-28
Payer: COMMERCIAL

## 2019-02-28 VITALS — SYSTOLIC BLOOD PRESSURE: 142 MMHG | DIASTOLIC BLOOD PRESSURE: 64 MMHG | HEART RATE: 59 BPM | RESPIRATION RATE: 18 BRPM

## 2019-02-28 VITALS
HEIGHT: 64 IN | RESPIRATION RATE: 20 BRPM | WEIGHT: 163.5 LBS | SYSTOLIC BLOOD PRESSURE: 117 MMHG | BODY MASS INDEX: 27.91 KG/M2 | HEART RATE: 83 BPM | DIASTOLIC BLOOD PRESSURE: 56 MMHG | TEMPERATURE: 97.8 F

## 2019-02-28 DIAGNOSIS — C54.1 ADENOCARCINOMA OF ENDOMETRIUM (HCC): ICD-10-CM

## 2019-02-28 DIAGNOSIS — C50.412 MALIGNANT NEOPLASM OF UPPER-OUTER QUADRANT OF LEFT FEMALE BREAST, UNSPECIFIED ESTROGEN RECEPTOR STATUS (HCC): ICD-10-CM

## 2019-02-28 DIAGNOSIS — C56.9 MALIGNANT NEOPLASM OF OVARY, UNSPECIFIED LATERALITY (HCC): ICD-10-CM

## 2019-02-28 DIAGNOSIS — C50.412 MALIGNANT NEOPLASM OF UPPER-OUTER QUADRANT OF LEFT FEMALE BREAST, UNSPECIFIED ESTROGEN RECEPTOR STATUS (HCC): Primary | ICD-10-CM

## 2019-02-28 PROCEDURE — 6360000002 HC RX W HCPCS: Performed by: INTERNAL MEDICINE

## 2019-02-28 PROCEDURE — 2580000003 HC RX 258: Performed by: INTERNAL MEDICINE

## 2019-02-28 PROCEDURE — 85025 COMPLETE CBC W/AUTO DIFF WBC: CPT

## 2019-02-28 PROCEDURE — 99214 OFFICE O/P EST MOD 30 MIN: CPT | Performed by: INTERNAL MEDICINE

## 2019-02-28 PROCEDURE — 80053 COMPREHEN METABOLIC PANEL: CPT

## 2019-02-28 PROCEDURE — 96413 CHEMO IV INFUSION 1 HR: CPT

## 2019-02-28 RX ORDER — SODIUM CHLORIDE 0.9 % (FLUSH) 0.9 %
10 SYRINGE (ML) INJECTION PRN
Status: DISCONTINUED | OUTPATIENT
Start: 2019-02-28 | End: 2019-03-01 | Stop reason: HOSPADM

## 2019-02-28 RX ORDER — DIPHENHYDRAMINE HYDROCHLORIDE 50 MG/ML
50 INJECTION INTRAMUSCULAR; INTRAVENOUS ONCE
Status: CANCELLED | OUTPATIENT
Start: 2019-02-28 | End: 2019-02-28

## 2019-02-28 RX ORDER — EPINEPHRINE 1 MG/ML
0.3 INJECTION, SOLUTION, CONCENTRATE INTRAVENOUS PRN
Status: CANCELLED | OUTPATIENT
Start: 2019-02-28

## 2019-02-28 RX ORDER — SODIUM CHLORIDE 9 MG/ML
250 INJECTION, SOLUTION INTRAVENOUS ONCE
Status: COMPLETED | OUTPATIENT
Start: 2019-02-28 | End: 2019-02-28

## 2019-02-28 RX ORDER — METHYLPREDNISOLONE SODIUM SUCCINATE 125 MG/2ML
125 INJECTION, POWDER, LYOPHILIZED, FOR SOLUTION INTRAMUSCULAR; INTRAVENOUS ONCE
Status: CANCELLED | OUTPATIENT
Start: 2019-02-28 | End: 2019-02-28

## 2019-02-28 RX ORDER — SODIUM CHLORIDE 0.9 % (FLUSH) 0.9 %
10 SYRINGE (ML) INJECTION PRN
Status: CANCELLED | OUTPATIENT
Start: 2019-02-28

## 2019-02-28 RX ORDER — 0.9 % SODIUM CHLORIDE 0.9 %
10 VIAL (ML) INJECTION ONCE
Status: CANCELLED | OUTPATIENT
Start: 2019-02-28 | End: 2019-02-28

## 2019-02-28 RX ORDER — SODIUM CHLORIDE 9 MG/ML
INJECTION, SOLUTION INTRAVENOUS CONTINUOUS
Status: CANCELLED | OUTPATIENT
Start: 2019-02-28

## 2019-02-28 RX ORDER — MEPERIDINE HYDROCHLORIDE 25 MG/ML
12.5 INJECTION INTRAMUSCULAR; INTRAVENOUS; SUBCUTANEOUS ONCE
Status: CANCELLED | OUTPATIENT
Start: 2019-02-28 | End: 2019-02-28

## 2019-02-28 RX ORDER — HEPARIN SODIUM (PORCINE) LOCK FLUSH IV SOLN 100 UNIT/ML 100 UNIT/ML
500 SOLUTION INTRAVENOUS PRN
Status: DISCONTINUED | OUTPATIENT
Start: 2019-02-28 | End: 2019-03-01 | Stop reason: HOSPADM

## 2019-02-28 RX ORDER — HEPARIN SODIUM (PORCINE) LOCK FLUSH IV SOLN 100 UNIT/ML 100 UNIT/ML
500 SOLUTION INTRAVENOUS PRN
Status: CANCELLED | OUTPATIENT
Start: 2019-02-28

## 2019-02-28 RX ORDER — SODIUM CHLORIDE 0.9 % (FLUSH) 0.9 %
5 SYRINGE (ML) INJECTION PRN
Status: CANCELLED | OUTPATIENT
Start: 2019-02-28

## 2019-02-28 RX ORDER — SODIUM CHLORIDE 9 MG/ML
INJECTION, SOLUTION INTRAVENOUS ONCE
Status: CANCELLED | OUTPATIENT
Start: 2019-02-28 | End: 2019-02-28

## 2019-02-28 RX ADMIN — SODIUM CHLORIDE 200 MG: 9 INJECTION, SOLUTION INTRAVENOUS at 11:26

## 2019-02-28 RX ADMIN — SODIUM CHLORIDE, PRESERVATIVE FREE 500 UNITS: 5 INJECTION INTRAVENOUS at 12:04

## 2019-02-28 RX ADMIN — SODIUM CHLORIDE 250 ML: 9 INJECTION, SOLUTION INTRAVENOUS at 11:25

## 2019-02-28 RX ADMIN — Medication 10 ML: at 12:03

## 2019-02-28 RX ADMIN — Medication 10 ML: at 11:25

## 2019-03-05 LAB
ALBUMIN SERPL-MCNC: ABNORMAL G/DL (ref 3.5–5.2)
ALP BLD-CCNC: ABNORMAL U/L (ref 35–104)
ALT SERPL-CCNC: ABNORMAL U/L (ref 0–32)
ANION GAP SERPL CALCULATED.3IONS-SCNC: ABNORMAL MMOL/L (ref 7–16)
AST SERPL-CCNC: ABNORMAL U/L (ref 0–31)
BASOPHILS ABSOLUTE: ABNORMAL E9/L (ref 0–0.2)
BASOPHILS RELATIVE PERCENT: ABNORMAL % (ref 0–2)
BILIRUB SERPL-MCNC: ABNORMAL MG/DL (ref 0–1.2)
BUN BLDV-MCNC: ABNORMAL MG/DL (ref 8–23)
CALCIUM SERPL-MCNC: ABNORMAL MG/DL (ref 8.6–10.2)
CHLORIDE BLD-SCNC: ABNORMAL MMOL/L (ref 98–107)
CO2: ABNORMAL MMOL/L (ref 22–29)
CREAT SERPL-MCNC: ABNORMAL MG/DL (ref 0.5–1)
EOSINOPHILS ABSOLUTE: ABNORMAL E9/L (ref 0.05–0.5)
EOSINOPHILS RELATIVE PERCENT: ABNORMAL % (ref 0–6)
GFR AFRICAN AMERICAN: ABNORMAL
GFR NON-AFRICAN AMERICAN: ABNORMAL ML/MIN/1.73
GLUCOSE BLD-MCNC: ABNORMAL MG/DL (ref 74–99)
HCT VFR BLD CALC: ABNORMAL % (ref 34–48)
HEMOGLOBIN: ABNORMAL G/DL (ref 11.5–15.5)
IMMATURE GRANULOCYTES #: ABNORMAL E9/L
IMMATURE GRANULOCYTES %: ABNORMAL % (ref 0–5)
LYMPHOCYTES ABSOLUTE: ABNORMAL E9/L (ref 1.5–4)
LYMPHOCYTES RELATIVE PERCENT: ABNORMAL % (ref 20–42)
MCH RBC QN AUTO: ABNORMAL PG (ref 26–35)
MCHC RBC AUTO-ENTMCNC: ABNORMAL % (ref 32–34.5)
MCV RBC AUTO: ABNORMAL FL (ref 80–99.9)
MONOCYTES ABSOLUTE: ABNORMAL E9/L (ref 0.1–0.95)
MONOCYTES RELATIVE PERCENT: ABNORMAL % (ref 2–12)
NEUTROPHILS ABSOLUTE: ABNORMAL E9/L (ref 1.8–7.3)
NEUTROPHILS RELATIVE PERCENT: ABNORMAL % (ref 43–80)
PDW BLD-RTO: ABNORMAL FL (ref 11.5–15)
PLATELET # BLD: ABNORMAL E9/L (ref 130–450)
PMV BLD AUTO: ABNORMAL FL (ref 7–12)
POTASSIUM SERPL-SCNC: ABNORMAL MMOL/L (ref 3.5–5)
RBC # BLD: ABNORMAL E12/L (ref 3.5–5.5)
SODIUM BLD-SCNC: ABNORMAL MMOL/L (ref 132–146)
TOTAL PROTEIN: ABNORMAL G/DL (ref 6.4–8.3)
WBC # BLD: ABNORMAL E9/L (ref 4.5–11.5)

## 2019-03-14 RX ORDER — HEPARIN SODIUM (PORCINE) LOCK FLUSH IV SOLN 100 UNIT/ML 100 UNIT/ML
500 SOLUTION INTRAVENOUS PRN
Status: CANCELLED | OUTPATIENT
Start: 2019-03-14

## 2019-03-14 RX ORDER — SODIUM CHLORIDE 0.9 % (FLUSH) 0.9 %
10 SYRINGE (ML) INJECTION PRN
Status: CANCELLED | OUTPATIENT
Start: 2019-03-14

## 2019-03-21 ENCOUNTER — HOSPITAL ENCOUNTER (OUTPATIENT)
Age: 74
Discharge: HOME | End: 2019-03-21
Payer: COMMERCIAL

## 2019-03-21 ENCOUNTER — HOSPITAL ENCOUNTER (OUTPATIENT)
Dept: INFUSION THERAPY | Age: 74
Discharge: HOME | End: 2019-03-21
Payer: COMMERCIAL

## 2019-03-21 ENCOUNTER — OFFICE VISIT (OUTPATIENT)
Dept: ONCOLOGY | Age: 74
End: 2019-03-21
Payer: COMMERCIAL

## 2019-03-21 VITALS
SYSTOLIC BLOOD PRESSURE: 121 MMHG | DIASTOLIC BLOOD PRESSURE: 58 MMHG | TEMPERATURE: 97.8 F | HEIGHT: 64 IN | HEART RATE: 75 BPM | BODY MASS INDEX: 27.81 KG/M2 | WEIGHT: 162.9 LBS

## 2019-03-21 VITALS
HEART RATE: 60 BPM | RESPIRATION RATE: 18 BRPM | SYSTOLIC BLOOD PRESSURE: 109 MMHG | DIASTOLIC BLOOD PRESSURE: 56 MMHG | TEMPERATURE: 98.4 F

## 2019-03-21 DIAGNOSIS — C50.412 MALIGNANT NEOPLASM OF UPPER-OUTER QUADRANT OF LEFT FEMALE BREAST, UNSPECIFIED ESTROGEN RECEPTOR STATUS (HCC): Primary | ICD-10-CM

## 2019-03-21 DIAGNOSIS — C56.9 MALIGNANT NEOPLASM OF OVARY, UNSPECIFIED LATERALITY (HCC): ICD-10-CM

## 2019-03-21 DIAGNOSIS — C54.1 ADENOCARCINOMA OF ENDOMETRIUM (HCC): ICD-10-CM

## 2019-03-21 LAB
ALBUMIN SERPL-MCNC: 4.1 G/DL (ref 3.5–5.2)
ALP BLD-CCNC: 82 U/L (ref 35–104)
ALT SERPL-CCNC: 11 U/L (ref 0–32)
ANION GAP SERPL CALCULATED.3IONS-SCNC: 15 MMOL/L (ref 7–16)
AST SERPL-CCNC: 21 U/L (ref 0–31)
BASOPHILS ABSOLUTE: 0.04 E9/L (ref 0–0.2)
BASOPHILS RELATIVE PERCENT: 0.8 % (ref 0–2)
BILIRUB SERPL-MCNC: 0.9 MG/DL (ref 0–1.2)
BUN BLDV-MCNC: 11 MG/DL (ref 8–23)
CALCIUM SERPL-MCNC: 9.8 MG/DL (ref 8.6–10.2)
CHLORIDE BLD-SCNC: 103 MMOL/L (ref 98–107)
CO2: 26 MMOL/L (ref 22–29)
CREAT SERPL-MCNC: 0.7 MG/DL (ref 0.5–1)
EOSINOPHILS ABSOLUTE: 0 E9/L (ref 0.05–0.5)
EOSINOPHILS RELATIVE PERCENT: 0 % (ref 0–6)
GFR AFRICAN AMERICAN: >60
GFR NON-AFRICAN AMERICAN: >60 ML/MIN/1.73
GLUCOSE BLD-MCNC: 128 MG/DL (ref 74–99)
HCT VFR BLD CALC: 40.9 % (ref 34–48)
HEMOGLOBIN: 13.4 G/DL (ref 11.5–15.5)
IMMATURE GRANULOCYTES #: 0.04 E9/L
IMMATURE GRANULOCYTES %: 0.8 % (ref 0–5)
LYMPHOCYTES ABSOLUTE: 1.37 E9/L (ref 1.5–4)
LYMPHOCYTES RELATIVE PERCENT: 26.4 % (ref 20–42)
MCH RBC QN AUTO: 27.4 PG (ref 26–35)
MCHC RBC AUTO-ENTMCNC: 32.8 % (ref 32–34.5)
MCV RBC AUTO: 83.6 FL (ref 80–99.9)
MONOCYTES ABSOLUTE: 0.41 E9/L (ref 0.1–0.95)
MONOCYTES RELATIVE PERCENT: 7.9 % (ref 2–12)
NEUTROPHILS ABSOLUTE: 3.33 E9/L (ref 1.8–7.3)
NEUTROPHILS RELATIVE PERCENT: 64.1 % (ref 43–80)
PDW BLD-RTO: 15.5 FL (ref 11.5–15)
PLATELET # BLD: 174 E9/L (ref 130–450)
PMV BLD AUTO: 9.2 FL (ref 7–12)
POTASSIUM SERPL-SCNC: 3.5 MMOL/L (ref 3.5–5)
RBC # BLD: 4.89 E12/L (ref 3.5–5.5)
SODIUM BLD-SCNC: 144 MMOL/L (ref 132–146)
TOTAL PROTEIN: 6.7 G/DL (ref 6.4–8.3)
WBC # BLD: 5.2 E9/L (ref 4.5–11.5)

## 2019-03-21 PROCEDURE — 80053 COMPREHEN METABOLIC PANEL: CPT

## 2019-03-21 PROCEDURE — 2580000003 HC RX 258

## 2019-03-21 PROCEDURE — 96413 CHEMO IV INFUSION 1 HR: CPT

## 2019-03-21 PROCEDURE — 6360000002 HC RX W HCPCS: Performed by: INTERNAL MEDICINE

## 2019-03-21 PROCEDURE — 85025 COMPLETE CBC W/AUTO DIFF WBC: CPT

## 2019-03-21 PROCEDURE — 99214 OFFICE O/P EST MOD 30 MIN: CPT | Performed by: INTERNAL MEDICINE

## 2019-03-21 PROCEDURE — 36415 COLL VENOUS BLD VENIPUNCTURE: CPT | Performed by: INTERNAL MEDICINE

## 2019-03-21 PROCEDURE — 2580000003 HC RX 258: Performed by: INTERNAL MEDICINE

## 2019-03-21 RX ORDER — METHYLPREDNISOLONE SODIUM SUCCINATE 125 MG/2ML
125 INJECTION, POWDER, LYOPHILIZED, FOR SOLUTION INTRAMUSCULAR; INTRAVENOUS ONCE
Status: CANCELLED | OUTPATIENT
Start: 2019-03-21 | End: 2019-03-21

## 2019-03-21 RX ORDER — SODIUM CHLORIDE 0.9 % (FLUSH) 0.9 %
5 SYRINGE (ML) INJECTION PRN
Status: CANCELLED | OUTPATIENT
Start: 2019-03-21

## 2019-03-21 RX ORDER — SODIUM CHLORIDE 0.9 % (FLUSH) 0.9 %
10 SYRINGE (ML) INJECTION PRN
Status: CANCELLED | OUTPATIENT
Start: 2019-03-21

## 2019-03-21 RX ORDER — MEPERIDINE HYDROCHLORIDE 25 MG/ML
12.5 INJECTION INTRAMUSCULAR; INTRAVENOUS; SUBCUTANEOUS ONCE
Status: CANCELLED | OUTPATIENT
Start: 2019-03-21 | End: 2019-03-21

## 2019-03-21 RX ORDER — SODIUM CHLORIDE 9 MG/ML
INJECTION, SOLUTION INTRAVENOUS ONCE
Status: CANCELLED | OUTPATIENT
Start: 2019-03-21 | End: 2019-03-21

## 2019-03-21 RX ORDER — SODIUM CHLORIDE 9 MG/ML
INJECTION, SOLUTION INTRAVENOUS
Status: DISCONTINUED
Start: 2019-03-21 | End: 2019-03-22 | Stop reason: HOSPADM

## 2019-03-21 RX ORDER — DIPHENHYDRAMINE HYDROCHLORIDE 50 MG/ML
50 INJECTION INTRAMUSCULAR; INTRAVENOUS ONCE
Status: CANCELLED | OUTPATIENT
Start: 2019-03-21 | End: 2019-03-21

## 2019-03-21 RX ORDER — SODIUM CHLORIDE 9 MG/ML
250 INJECTION, SOLUTION INTRAVENOUS ONCE
Status: DISCONTINUED | OUTPATIENT
Start: 2019-03-21 | End: 2019-03-22 | Stop reason: HOSPADM

## 2019-03-21 RX ORDER — SODIUM CHLORIDE 0.9 % (FLUSH) 0.9 %
10 SYRINGE (ML) INJECTION PRN
Status: DISCONTINUED | OUTPATIENT
Start: 2019-03-21 | End: 2019-03-22 | Stop reason: HOSPADM

## 2019-03-21 RX ORDER — EPINEPHRINE 1 MG/ML
0.3 INJECTION, SOLUTION, CONCENTRATE INTRAVENOUS PRN
Status: CANCELLED | OUTPATIENT
Start: 2019-03-21

## 2019-03-21 RX ORDER — HEPARIN SODIUM (PORCINE) LOCK FLUSH IV SOLN 100 UNIT/ML 100 UNIT/ML
500 SOLUTION INTRAVENOUS PRN
Status: DISCONTINUED | OUTPATIENT
Start: 2019-03-21 | End: 2019-03-22 | Stop reason: HOSPADM

## 2019-03-21 RX ORDER — HEPARIN SODIUM (PORCINE) LOCK FLUSH IV SOLN 100 UNIT/ML 100 UNIT/ML
500 SOLUTION INTRAVENOUS PRN
Status: CANCELLED | OUTPATIENT
Start: 2019-03-21

## 2019-03-21 RX ORDER — SODIUM CHLORIDE 9 MG/ML
INJECTION, SOLUTION INTRAVENOUS CONTINUOUS
Status: CANCELLED | OUTPATIENT
Start: 2019-03-21

## 2019-03-21 RX ORDER — 0.9 % SODIUM CHLORIDE 0.9 %
10 VIAL (ML) INJECTION ONCE
Status: CANCELLED | OUTPATIENT
Start: 2019-03-21 | End: 2019-03-21

## 2019-03-21 RX ADMIN — SODIUM CHLORIDE 200 MG: 9 INJECTION, SOLUTION INTRAVENOUS at 10:59

## 2019-03-21 RX ADMIN — Medication 10 ML: at 10:47

## 2019-03-21 RX ADMIN — SODIUM CHLORIDE 250 ML: 9 INJECTION, SOLUTION INTRAVENOUS at 10:48

## 2019-03-21 RX ADMIN — HEPARIN 500 UNITS: 100 SYRINGE at 11:39

## 2019-03-21 RX ADMIN — Medication 10 ML: at 11:39

## 2019-04-11 ENCOUNTER — OFFICE VISIT (OUTPATIENT)
Dept: ONCOLOGY | Age: 74
End: 2019-04-11
Payer: COMMERCIAL

## 2019-04-11 ENCOUNTER — HOSPITAL ENCOUNTER (OUTPATIENT)
Dept: INFUSION THERAPY | Age: 74
Discharge: HOME | End: 2019-04-11
Payer: COMMERCIAL

## 2019-04-11 VITALS
SYSTOLIC BLOOD PRESSURE: 145 MMHG | WEIGHT: 168.9 LBS | DIASTOLIC BLOOD PRESSURE: 76 MMHG | TEMPERATURE: 98.1 F | HEIGHT: 64 IN | HEART RATE: 67 BPM | BODY MASS INDEX: 28.83 KG/M2

## 2019-04-11 VITALS
TEMPERATURE: 97.9 F | RESPIRATION RATE: 18 BRPM | SYSTOLIC BLOOD PRESSURE: 134 MMHG | DIASTOLIC BLOOD PRESSURE: 61 MMHG | HEART RATE: 54 BPM

## 2019-04-11 DIAGNOSIS — Z09 FOLLOW UP: Primary | ICD-10-CM

## 2019-04-11 DIAGNOSIS — C54.1 ADENOCARCINOMA OF ENDOMETRIUM (HCC): ICD-10-CM

## 2019-04-11 DIAGNOSIS — C50.412 MALIGNANT NEOPLASM OF UPPER-OUTER QUADRANT OF LEFT FEMALE BREAST, UNSPECIFIED ESTROGEN RECEPTOR STATUS (HCC): Primary | ICD-10-CM

## 2019-04-11 DIAGNOSIS — C56.9 MALIGNANT NEOPLASM OF OVARY, UNSPECIFIED LATERALITY (HCC): ICD-10-CM

## 2019-04-11 PROCEDURE — 2580000003 HC RX 258: Performed by: INTERNAL MEDICINE

## 2019-04-11 PROCEDURE — 99214 OFFICE O/P EST MOD 30 MIN: CPT | Performed by: INTERNAL MEDICINE

## 2019-04-11 PROCEDURE — 96413 CHEMO IV INFUSION 1 HR: CPT

## 2019-04-11 PROCEDURE — 6360000002 HC RX W HCPCS: Performed by: INTERNAL MEDICINE

## 2019-04-11 RX ORDER — MEPERIDINE HYDROCHLORIDE 25 MG/ML
12.5 INJECTION INTRAMUSCULAR; INTRAVENOUS; SUBCUTANEOUS ONCE
Status: CANCELLED | OUTPATIENT
Start: 2019-04-11

## 2019-04-11 RX ORDER — HEPARIN SODIUM (PORCINE) LOCK FLUSH IV SOLN 100 UNIT/ML 100 UNIT/ML
500 SOLUTION INTRAVENOUS PRN
Status: DISCONTINUED | OUTPATIENT
Start: 2019-04-11 | End: 2019-04-12 | Stop reason: HOSPADM

## 2019-04-11 RX ORDER — DIPHENHYDRAMINE HYDROCHLORIDE 50 MG/ML
50 INJECTION INTRAMUSCULAR; INTRAVENOUS ONCE
Status: CANCELLED | OUTPATIENT
Start: 2019-04-11

## 2019-04-11 RX ORDER — METHYLPREDNISOLONE SODIUM SUCCINATE 125 MG/2ML
125 INJECTION, POWDER, LYOPHILIZED, FOR SOLUTION INTRAMUSCULAR; INTRAVENOUS ONCE
Status: CANCELLED | OUTPATIENT
Start: 2019-04-11

## 2019-04-11 RX ORDER — SODIUM CHLORIDE 0.9 % (FLUSH) 0.9 %
10 SYRINGE (ML) INJECTION PRN
Status: CANCELLED | OUTPATIENT
Start: 2019-04-11

## 2019-04-11 RX ORDER — SODIUM CHLORIDE 9 MG/ML
INJECTION, SOLUTION INTRAVENOUS CONTINUOUS
Status: CANCELLED | OUTPATIENT
Start: 2019-04-11

## 2019-04-11 RX ORDER — 0.9 % SODIUM CHLORIDE 0.9 %
10 VIAL (ML) INJECTION ONCE
Status: CANCELLED | OUTPATIENT
Start: 2019-04-11

## 2019-04-11 RX ORDER — SODIUM CHLORIDE 9 MG/ML
250 INJECTION, SOLUTION INTRAVENOUS ONCE
Status: DISCONTINUED | OUTPATIENT
Start: 2019-04-11 | End: 2019-04-12 | Stop reason: HOSPADM

## 2019-04-11 RX ORDER — SODIUM CHLORIDE 0.9 % (FLUSH) 0.9 %
10 SYRINGE (ML) INJECTION PRN
Status: DISCONTINUED | OUTPATIENT
Start: 2019-04-11 | End: 2019-04-12 | Stop reason: HOSPADM

## 2019-04-11 RX ORDER — EPINEPHRINE 1 MG/ML
0.3 INJECTION, SOLUTION, CONCENTRATE INTRAVENOUS PRN
Status: CANCELLED | OUTPATIENT
Start: 2019-04-11

## 2019-04-11 RX ORDER — SODIUM CHLORIDE 0.9 % (FLUSH) 0.9 %
5 SYRINGE (ML) INJECTION PRN
Status: CANCELLED | OUTPATIENT
Start: 2019-04-11

## 2019-04-11 RX ORDER — ESCITALOPRAM OXALATE 10 MG/1
TABLET ORAL
Refills: 1 | COMMUNITY
Start: 2019-04-03 | End: 2019-06-24

## 2019-04-11 RX ORDER — SODIUM CHLORIDE 9 MG/ML
250 INJECTION, SOLUTION INTRAVENOUS ONCE
Status: CANCELLED | OUTPATIENT
Start: 2019-04-11

## 2019-04-11 RX ORDER — HEPARIN SODIUM (PORCINE) LOCK FLUSH IV SOLN 100 UNIT/ML 100 UNIT/ML
500 SOLUTION INTRAVENOUS PRN
Status: CANCELLED | OUTPATIENT
Start: 2019-04-11

## 2019-04-11 RX ADMIN — SODIUM CHLORIDE 200 MG: 9 INJECTION, SOLUTION INTRAVENOUS at 12:50

## 2019-04-11 RX ADMIN — HEPARIN 500 UNITS: 100 SYRINGE at 13:35

## 2019-04-11 RX ADMIN — SODIUM CHLORIDE 250 ML: 9 INJECTION, SOLUTION INTRAVENOUS at 12:46

## 2019-04-11 RX ADMIN — Medication 10 ML: at 13:35

## 2019-04-11 RX ADMIN — Medication 10 ML: at 12:46

## 2019-04-11 NOTE — PROGRESS NOTES
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  -Adjuvant radiation therapy completed March 28, 2017.  -Endocrine therapy: Arimidex started March 30, 2017. She did not tolerate due to severe depression;  -Arimidex discontinued.   -Started on Femara after approximately 2 weeks, but did not tolerate Femara. Chose observation. Clinically, there is no evidence of recurrence. IA papillary serous endometrial adenocarcinoma, FIGO grade 3, - LVSI, tumor size 4.5 cm;    BRCA/Myrisk testing Negative  7/20/16-Exam under anesthesia, diagnostic laparoscopy, total laparoscopic hysterectomy, bilateral salpingo-oophorectomy, pelvic and periaortic lymphadenectomy, omentectomy. HDR VAGINAL BRACHYTHERAPY-9/29/16, 10/6/16, 10/13/16    On 9/21/2017 anterior right psoas muscle fine-needle aspirate: Positive for malignant cells, metastatic high-grade carcinoma compatible with patient's known endometrial serous carcinoma.     She completed 2 cycles of chemotherapy with Taxol Carbo and Avastin on 10/17 and 11/17. She had poor tolerance to chemotherapy, therefore she declined additional chemo and opted for Natural remedies instead     on 06/12/2018: 15 (<39UmL)      Re-staging scans on 06/15/2018:  CT of the abdomen and pelvis: 2.2 x 1.3 cm soft tissue nodule along the superior aspect of the urinary bladder suspicious for metastases, decreased in size since 1/23/18; no evidence of new abnormalities since prior visit; diverticulosis without evidence of diverticulitis.   CT Chest:  Multiple bilateral lung nodules suspicious for No hemoptysis, shortness of breath  CARDIOVASCULAR: No chest pain, palpitations. GASTROINTESTINAL: No vomiting, abdominal pain  GENITOURINARY: No dysuria, urinary frequency, hematuria. NEURO: No syncope, presyncope. Migraine headache  Remainder:  ROS NEGATIVE    Past Medical History:      Diagnosis Date    Cancer [facility])     endometrial cancer, breast    Diverticulitis     GERD (gastroesophageal reflux disease)     Gout     Hiatal hernia     Macular degeneration     Osteoarthritis      Medications:  Reviewed and reconciled. Allergies: Allergies   Allergen Reactions    Asa [Aspirin] Hives    Pepcid [Famotidine]     Prilosec [Omeprazole] Hives    Protonix [Pantoprazole Sodium] Other (See Comments)     Other reaction(s): Other: See Comments  DEPRESSION    Erythromycin Rash    Keflet [Cephalexin] Rash    Levaquin [Levofloxacin In D5w] Rash    Pcn [Penicillins] Rash    Polyethylene Glycol Rash     Bloating,gas    Tetracycline Rash    Tetracyclines & Related Rash     Physical Exam:  BP (!) 145/76 (Site: Right Upper Arm, Position: Sitting, Cuff Size: Medium Adult)   Pulse 67   Temp 98.1 °F (36.7 °C) (Temporal)   Ht 5' 3.5\" (1.613 m)   Wt 168 lb 14.4 oz (76.6 kg)   BMI 29.45 kg/m²   GENERAL: Alert, oriented x 3, not in acute distress. HEENT: PERRLA; EOMI. Oropharynx clear. NECK: Supple. Without lymphadenopathy. LUNGS: Good air entry bilaterally. No wheezing, crackles or ronchi. CARDIOVASCULAR: Regular rate. No murmurs, rubs or gallops. ABDOMEN: Soft. Non-tender, non-distended. +BS  EXTREMITIES: Without clubbing, cyanosis, or edema. NEUROLOGIC: No focal deficits.    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  -Adjuvant radiation therapy completed March 28, 2017.  -Endocrine therapy: Arimidex started March 30, 2017. She did not tolerate due to severe depression;  -Arimidex discontinued.   -Started on Femara after approximately 2 weeks, but did not tolerate Femara. Chose observation. Bilateral screening mammogram on 10/03/2018 negative for malignancy  Clinically, there is no evidence of recurrence. IA papillary serous endometrial adenocarcinoma, FIGO grade 3, - LVSI, tumor size 4.5 cm;    BRCA/Myrisk testing Negative  7/20/16-Exam under anesthesia, diagnostic laparoscopy, total laparoscopic hysterectomy, bilateral salpingo-oophorectomy, pelvic and periaortic lymphadenectomy, omentectomy. HDR VAGINAL BRACHYTHERAPY-9/29/16, 10/6/16, 10/13/16    08/19/2017 PET/CT scan: Hypermetabolic bilateral lung nodules suspicious for metastasis. Hypermetabolic nodule in the right lower quadrant anterior to the psoas muscle suspicious for metastasis. 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     She completed 2 cycles of chemotherapy with Taxol Carboplatin and Avastin on 10/17 and 11/17. She had poor tolerance to chemotherapy, therefore she declined additional chemotherapy and opted for Natural remedies instead     on 06/12/2018: 15 (<39UmL)      Re-staging scans on 06/15/2018:  CT of the abdomen and pelvis: 2.2 x 1.3 cm soft tissue nodule along the superior aspect of the urinary bladder suspicious for metastases, decreased in size since 1/23/18; no evidence of new abnormalities since prior visit; diverticulosis without evidence of diverticulitis. CT Chest:  Multiple bilateral lung nodules suspicious for metastases, increased in size and number since 1/23/2018. Bronchoscopy/EBUS was performed on 08/17/2018  Respiratory Gram Stain/Cx: Negative for organisms.   Aspergillus Galactomannan Antigen Negative  BAL RUL Negative for malignant cells. Bronchial smears shake RUL (predominance of blood); Negative for malignant cells. FNA Martínez TBNA RML (predominance of blood) Negative for malignant cells. FNA Martínez TBNA RUL (predominance of blood)  Negative for malignant cells. EBUS FNA R10 (scant lymph node sampling) Negative for malignant cells. EBUS FNA Station 7: Negative for malignant cells. Case discussed with Dr. Royce St at [facility]. She continued to decline systemic chemotherapy (Taxol/Carbo +/- Herceptin). She did not want hormonal therapy as she did not tolerate this well with her breast cancer. Patient declined palliative care/Hospice care and wanted to proceed with immunotherapy Jewel Ford). Side effects of Keytruda reviewed with patient. She agreed to proceed. Cycle # 1 Sravan Montero was on 08/29/2018. Cycle # 2 Keytruda was on 09/19/2018. Cycle # 3 Keytruda was on 10/10/2018. CT chest 10/23/2018 noted Significant improvement in the previously noted multiple bilateral pulmonary nodules concerning for  improving pulmonary metastasis. CT abdomen/pelvis 10/23/2018 stable examination. Continue Keytruda and repeat scans in 3 months. Cycle # 4 Keytruda was on 10/31/2018. Cycle # 5 Keytruda was on 11/21/2018. Cycle # 6 Keytruda was on 12/19/2018. Cycle # 7 Keytruda was on 01/09/2019. Cycle # 8 Keytruda was on 01/30/2019. CT chest 02/13/2019 stable LLL nodule measuring 5 mm. Stable RUL nodule measuring 2 mm. No evidence of mediastinal, hilar or axillary LN. CT Abdomen/pelvis 02/13/2019 stable exam.  Continue Keytruda and repeat scans in 3 months. Cycle # 9 Keytruda was on 02/28/2019. Cycle # 10 Keytruda was on 03/20/2019. Cycle # 11 Sravanmalorie Montero is today 04/11/2019.     RTC in 3 weeks for Cycle # 6245 Katya Ye MD   [id number]/[id number]  Board Certified Medical Oncologist

## 2019-05-02 ENCOUNTER — HOSPITAL ENCOUNTER (OUTPATIENT)
Dept: INFUSION THERAPY | Age: 74
Discharge: HOME | End: 2019-05-02
Payer: COMMERCIAL

## 2019-05-02 ENCOUNTER — OFFICE VISIT (OUTPATIENT)
Dept: ONCOLOGY | Age: 74
End: 2019-05-02
Payer: COMMERCIAL

## 2019-05-02 VITALS
RESPIRATION RATE: 18 BRPM | WEIGHT: 170.4 LBS | HEART RATE: 71 BPM | HEIGHT: 64 IN | TEMPERATURE: 97.8 F | DIASTOLIC BLOOD PRESSURE: 69 MMHG | BODY MASS INDEX: 29.09 KG/M2 | SYSTOLIC BLOOD PRESSURE: 120 MMHG

## 2019-05-02 VITALS
SYSTOLIC BLOOD PRESSURE: 103 MMHG | TEMPERATURE: 98.7 F | HEART RATE: 62 BPM | DIASTOLIC BLOOD PRESSURE: 54 MMHG | RESPIRATION RATE: 18 BRPM

## 2019-05-02 DIAGNOSIS — C54.1 ADENOCARCINOMA OF ENDOMETRIUM (HCC): ICD-10-CM

## 2019-05-02 DIAGNOSIS — C50.412 MALIGNANT NEOPLASM OF UPPER-OUTER QUADRANT OF LEFT FEMALE BREAST, UNSPECIFIED ESTROGEN RECEPTOR STATUS (HCC): Primary | ICD-10-CM

## 2019-05-02 DIAGNOSIS — C56.9 MALIGNANT NEOPLASM OF OVARY, UNSPECIFIED LATERALITY (HCC): ICD-10-CM

## 2019-05-02 DIAGNOSIS — C54.1 ADENOCARCINOMA OF ENDOMETRIUM (HCC): Primary | ICD-10-CM

## 2019-05-02 LAB
ALBUMIN SERPL-MCNC: 4 G/DL (ref 3.5–5.2)
ALP BLD-CCNC: 85 U/L (ref 35–104)
ALT SERPL-CCNC: 9 U/L (ref 0–32)
ANION GAP SERPL CALCULATED.3IONS-SCNC: 8 MMOL/L (ref 7–16)
AST SERPL-CCNC: 17 U/L (ref 0–31)
BASOPHILS ABSOLUTE: 0.06 E9/L (ref 0–0.2)
BASOPHILS RELATIVE PERCENT: 1.2 % (ref 0–2)
BILIRUB SERPL-MCNC: 0.6 MG/DL (ref 0–1.2)
BUN BLDV-MCNC: 17 MG/DL (ref 8–23)
CALCIUM SERPL-MCNC: 9.5 MG/DL (ref 8.6–10.2)
CHLORIDE BLD-SCNC: 103 MMOL/L (ref 98–107)
CO2: 32 MMOL/L (ref 22–29)
CREAT SERPL-MCNC: 0.7 MG/DL (ref 0.5–1)
EOSINOPHILS ABSOLUTE: 0 E9/L (ref 0.05–0.5)
EOSINOPHILS RELATIVE PERCENT: 0 % (ref 0–6)
GFR AFRICAN AMERICAN: >60
GFR NON-AFRICAN AMERICAN: >60 ML/MIN/1.73
GLUCOSE BLD-MCNC: 110 MG/DL (ref 74–99)
HCT VFR BLD CALC: 38.1 % (ref 34–48)
HEMOGLOBIN: 12.5 G/DL (ref 11.5–15.5)
IMMATURE GRANULOCYTES #: 0.01 E9/L
IMMATURE GRANULOCYTES %: 0.2 % (ref 0–5)
LYMPHOCYTES ABSOLUTE: 1.29 E9/L (ref 1.5–4)
LYMPHOCYTES RELATIVE PERCENT: 25.3 % (ref 20–42)
MCH RBC QN AUTO: 28 PG (ref 26–35)
MCHC RBC AUTO-ENTMCNC: 32.8 % (ref 32–34.5)
MCV RBC AUTO: 85.2 FL (ref 80–99.9)
MONOCYTES ABSOLUTE: 0.48 E9/L (ref 0.1–0.95)
MONOCYTES RELATIVE PERCENT: 9.4 % (ref 2–12)
NEUTROPHILS ABSOLUTE: 3.25 E9/L (ref 1.8–7.3)
NEUTROPHILS RELATIVE PERCENT: 63.9 % (ref 43–80)
PDW BLD-RTO: 14.8 FL (ref 11.5–15)
PLATELET # BLD: 184 E9/L (ref 130–450)
PMV BLD AUTO: 9.2 FL (ref 7–12)
POTASSIUM SERPL-SCNC: 4.3 MMOL/L (ref 3.5–5)
RBC # BLD: 4.47 E12/L (ref 3.5–5.5)
SODIUM BLD-SCNC: 143 MMOL/L (ref 132–146)
TOTAL PROTEIN: 6.6 G/DL (ref 6.4–8.3)
WBC # BLD: 5.1 E9/L (ref 4.5–11.5)

## 2019-05-02 PROCEDURE — 80053 COMPREHEN METABOLIC PANEL: CPT

## 2019-05-02 PROCEDURE — 2580000003 HC RX 258: Performed by: INTERNAL MEDICINE

## 2019-05-02 PROCEDURE — 99214 OFFICE O/P EST MOD 30 MIN: CPT | Performed by: INTERNAL MEDICINE

## 2019-05-02 PROCEDURE — 96413 CHEMO IV INFUSION 1 HR: CPT

## 2019-05-02 PROCEDURE — 6360000002 HC RX W HCPCS: Performed by: INTERNAL MEDICINE

## 2019-05-02 PROCEDURE — 85025 COMPLETE CBC W/AUTO DIFF WBC: CPT

## 2019-05-02 RX ORDER — SODIUM CHLORIDE 0.9 % (FLUSH) 0.9 %
5 SYRINGE (ML) INJECTION PRN
Status: CANCELLED | OUTPATIENT
Start: 2019-05-02

## 2019-05-02 RX ORDER — MEPERIDINE HYDROCHLORIDE 25 MG/ML
12.5 INJECTION INTRAMUSCULAR; INTRAVENOUS; SUBCUTANEOUS ONCE
Status: CANCELLED | OUTPATIENT
Start: 2019-05-02

## 2019-05-02 RX ORDER — HEPARIN SODIUM (PORCINE) LOCK FLUSH IV SOLN 100 UNIT/ML 100 UNIT/ML
500 SOLUTION INTRAVENOUS PRN
Status: CANCELLED | OUTPATIENT
Start: 2019-05-02

## 2019-05-02 RX ORDER — 0.9 % SODIUM CHLORIDE 0.9 %
10 VIAL (ML) INJECTION ONCE
Status: CANCELLED | OUTPATIENT
Start: 2019-05-02

## 2019-05-02 RX ORDER — SODIUM CHLORIDE 9 MG/ML
250 INJECTION, SOLUTION INTRAVENOUS ONCE
Status: CANCELLED | OUTPATIENT
Start: 2019-05-02

## 2019-05-02 RX ORDER — METHYLPREDNISOLONE SODIUM SUCCINATE 125 MG/2ML
125 INJECTION, POWDER, LYOPHILIZED, FOR SOLUTION INTRAMUSCULAR; INTRAVENOUS ONCE
Status: CANCELLED | OUTPATIENT
Start: 2019-05-02

## 2019-05-02 RX ORDER — SODIUM CHLORIDE 0.9 % (FLUSH) 0.9 %
10 SYRINGE (ML) INJECTION PRN
Status: CANCELLED | OUTPATIENT
Start: 2019-05-02

## 2019-05-02 RX ORDER — DIPHENHYDRAMINE HYDROCHLORIDE 50 MG/ML
50 INJECTION INTRAMUSCULAR; INTRAVENOUS ONCE
Status: CANCELLED | OUTPATIENT
Start: 2019-05-02

## 2019-05-02 RX ORDER — HEPARIN SODIUM (PORCINE) LOCK FLUSH IV SOLN 100 UNIT/ML 100 UNIT/ML
500 SOLUTION INTRAVENOUS PRN
Status: DISCONTINUED | OUTPATIENT
Start: 2019-05-02 | End: 2019-05-03 | Stop reason: HOSPADM

## 2019-05-02 RX ORDER — EPINEPHRINE 1 MG/ML
0.3 INJECTION, SOLUTION, CONCENTRATE INTRAVENOUS PRN
Status: CANCELLED | OUTPATIENT
Start: 2019-05-02

## 2019-05-02 RX ORDER — SODIUM CHLORIDE 9 MG/ML
INJECTION, SOLUTION INTRAVENOUS CONTINUOUS
Status: CANCELLED | OUTPATIENT
Start: 2019-05-02

## 2019-05-02 RX ORDER — SODIUM CHLORIDE 9 MG/ML
250 INJECTION, SOLUTION INTRAVENOUS ONCE
Status: DISCONTINUED | OUTPATIENT
Start: 2019-05-02 | End: 2019-05-03 | Stop reason: HOSPADM

## 2019-05-02 RX ORDER — SODIUM CHLORIDE 0.9 % (FLUSH) 0.9 %
10 SYRINGE (ML) INJECTION PRN
Status: DISCONTINUED | OUTPATIENT
Start: 2019-05-02 | End: 2019-05-03 | Stop reason: HOSPADM

## 2019-05-02 RX ADMIN — SODIUM CHLORIDE 250 ML: 9 INJECTION, SOLUTION INTRAVENOUS at 11:59

## 2019-05-02 RX ADMIN — Medication 10 ML: at 11:58

## 2019-05-02 RX ADMIN — SODIUM CHLORIDE 200 MG: 9 INJECTION, SOLUTION INTRAVENOUS at 12:12

## 2019-05-02 RX ADMIN — Medication 10 ML: at 12:50

## 2019-05-02 RX ADMIN — HEPARIN 500 UNITS: 100 SYRINGE at 12:50

## 2019-05-02 NOTE — PROGRESS NOTES
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  -Adjuvant radiation therapy completed March 28, 2017.  -Endocrine therapy: Arimidex started March 30, 2017. She did not tolerate due to severe depression;  -Arimidex discontinued.   -Started on Femara after approximately 2 weeks, but did not tolerate Femara. Chose observation. Clinically, there is no evidence of recurrence. IA papillary serous endometrial adenocarcinoma, FIGO grade 3, - LVSI, tumor size 4.5 cm;    BRCA/Myrisk testing Negative  7/20/16-Exam under anesthesia, diagnostic laparoscopy, total laparoscopic hysterectomy, bilateral salpingo-oophorectomy, pelvic and periaortic lymphadenectomy, omentectomy. HDR VAGINAL BRACHYTHERAPY-9/29/16, 10/6/16, 10/13/16    On 9/21/2017 anterior right psoas muscle fine-needle aspirate: Positive for malignant cells, metastatic high-grade carcinoma compatible with patient's known endometrial serous carcinoma.     She completed 2 cycles of chemotherapy with Taxol Carbo and Avastin on 10/17 and 11/17. She had poor tolerance to chemotherapy, therefore she declined additional chemo and opted for Natural remedies instead     on 06/12/2018: 15 (<39UmL)      Re-staging scans on 06/15/2018:  CT of the abdomen and pelvis: 2.2 x 1.3 cm soft tissue nodule along the superior aspect of the urinary bladder suspicious for metastases, decreased in size since 1/23/18; no evidence of new abnormalities since prior visit; diverticulosis without evidence of diverticulitis.   CT Chest:  Multiple bilateral lung nodules suspicious for metastases, increased in size and number since 1/23/2018. Bronchoscopy/EBUS was performed on 08/17/2018  Respiratory Gram Stain/Cx: Negative for organisms. Aspergillus Galactomannan Antigen Negative  BAL RUL Negative for malignant cells. Bronchial smears shake RUL (predominance of blood); Negative for malignant cells. FNA Martínez TBNA RML (predominance of blood) Negative for malignant cells. FNA Martínez TBNA RUL (predominance of blood)  Negative for malignant cells. EBUS FNA R10 (scant lymph node sampling) Negative for malignant cells. EBUS FNA Station 7: Negative for malignant cells. Case discussed with Dr. Royce St at [facility]. She continued to decline systemic chemotherapy (Taxol/Carbo +/- Herceptin). She did not want hormonal therapy as she did not tolerate this well with her breast cancer. Patient declined palliative care/Hospice care and wanted to proceed with immunotherapy Jewel Ford). Cycle # 1 Sravan Winston was on 08/29/2018. Cycle # 2 Keytruda was on 09/19/2018. Cycle # 3 Keytruda was on 10/10/2018. CT chest 10/23/2018 noted Significant improvement in the previously noted multiple bilateral pulmonary nodules concerning for  improving pulmonary metastasis. CT abdomen/pelvis 10/23/2018 stable examination. Continue Keytruda and repeat scans in 3 months. Cycle # 4 Keytruda was on 10/31/2018. Cycle # 5 Keytruda was on 11/21/2018. Cycle # 6 Keytruda was on 12/19/2018. Cycle # 7 Keytruda was on 01/09/2019. Cycle # 8 Keytruda was on 01/30/2019. CT chest 02/13/2019 stable LLL nodule measuring 5 mm. Stable RUL nodule measuring 2 mm. No evidence of mediastinal, hilar or axillary LN. CT Abdomen/pelvis 02/13/2019 stable exam.  Continue Keytruda and repeat scans in 3 months. Cycle # 9 Keytruda was on 02/28/2019. Cycle # 10 Keytruda was on 03/20/2019. Cycle # 11 Sravan Winston was on 04/11/2019. Review of Systems;  CONSTITUTIONAL: No fever, chills.  Fair appetite. + fatigue  ENMT: Eyes: No diplopia; Nose/Mouth: No sore throat. RESPIRATORY: No hemoptysis, shortness of breath  CARDIOVASCULAR: No chest pain, palpitations. GASTROINTESTINAL: No vomiting, abdominal pain  GENITOURINARY: No dysuria, urinary frequency, hematuria. NEURO: No syncope, presyncope. Migraine headache  Remainder:  ROS NEGATIVE    Past Medical History:      Diagnosis Date    Cancer [facility])     endometrial cancer, breast    Diverticulitis     GERD (gastroesophageal reflux disease)     Gout     Hiatal hernia     Macular degeneration     Osteoarthritis      Medications:  Reviewed and reconciled. Allergies: Allergies   Allergen Reactions    Asa [Aspirin] Hives    Pepcid [Famotidine]     Prilosec [Omeprazole] Hives    Protonix [Pantoprazole Sodium] Other (See Comments)     Other reaction(s): Other: See Comments  DEPRESSION    Erythromycin Rash    Keflet [Cephalexin] Rash    Levaquin [Levofloxacin In D5w] Rash    Pcn [Penicillins] Rash    Polyethylene Glycol Rash     Bloating,gas    Tetracycline Rash    Tetracyclines & Related Rash     Physical Exam:  /69 (Site: Right Upper Arm, Position: Sitting, Cuff Size: Medium Adult)   Pulse 71   Temp 97.8 °F (36.6 °C) (Temporal)   Resp 18   Ht 5' 3.5\" (1.613 m)   Wt 170 lb 6.4 oz (77.3 kg)   BMI 29.71 kg/m²   GENERAL: Alert, oriented x 3, not in acute distress. HEENT: PERRLA; EOMI. Oropharynx clear. NECK: Supple. Without lymphadenopathy. LUNGS: Good air entry bilaterally. No wheezing, crackles or ronchi. CARDIOVASCULAR: Regular rate. No murmurs, rubs or gallops. ABDOMEN: Soft. Non-tender, non-distended. +BS  EXTREMITIES: Without clubbing, cyanosis, or edema. NEUROLOGIC: No focal deficits.    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  -Adjuvant

## 2019-05-06 ENCOUNTER — TELEPHONE (OUTPATIENT)
Dept: FAMILY MEDICINE CLINIC | Age: 74
End: 2019-05-06

## 2019-05-16 ENCOUNTER — OFFICE VISIT (OUTPATIENT)
Dept: FAMILY MEDICINE CLINIC | Age: 74
End: 2019-05-16
Payer: COMMERCIAL

## 2019-05-16 ENCOUNTER — TELEPHONE (OUTPATIENT)
Dept: FAMILY MEDICINE CLINIC | Age: 74
End: 2019-05-16

## 2019-05-16 VITALS
BODY MASS INDEX: 30.65 KG/M2 | DIASTOLIC BLOOD PRESSURE: 78 MMHG | SYSTOLIC BLOOD PRESSURE: 114 MMHG | HEIGHT: 63 IN | WEIGHT: 173 LBS | HEART RATE: 88 BPM | TEMPERATURE: 97.9 F | OXYGEN SATURATION: 96 %

## 2019-05-16 DIAGNOSIS — Z00.00 ROUTINE GENERAL MEDICAL EXAMINATION AT A HEALTH CARE FACILITY: Primary | ICD-10-CM

## 2019-05-16 PROCEDURE — G0438 PPPS, INITIAL VISIT: HCPCS | Performed by: PHYSICIAN ASSISTANT

## 2019-05-16 ASSESSMENT — PATIENT HEALTH QUESTIONNAIRE - PHQ9
3. TROUBLE FALLING OR STAYING ASLEEP: 1
10. IF YOU CHECKED OFF ANY PROBLEMS, HOW DIFFICULT HAVE THESE PROBLEMS MADE IT FOR YOU TO DO YOUR WORK, TAKE CARE OF THINGS AT HOME, OR GET ALONG WITH OTHER PEOPLE: 1
SUM OF ALL RESPONSES TO PHQ QUESTIONS 1-9: 11
9. THOUGHTS THAT YOU WOULD BE BETTER OFF DEAD, OR OF HURTING YOURSELF: 0
2. FEELING DOWN, DEPRESSED OR HOPELESS: 2
4. FEELING TIRED OR HAVING LITTLE ENERGY: 2
SUM OF ALL RESPONSES TO PHQ QUESTIONS 1-9: 11
6. FEELING BAD ABOUT YOURSELF - OR THAT YOU ARE A FAILURE OR HAVE LET YOURSELF OR YOUR FAMILY DOWN: 1
8. MOVING OR SPEAKING SO SLOWLY THAT OTHER PEOPLE COULD HAVE NOTICED. OR THE OPPOSITE, BEING SO FIGETY OR RESTLESS THAT YOU HAVE BEEN MOVING AROUND A LOT MORE THAN USUAL: 1
5. POOR APPETITE OR OVEREATING: 1
7. TROUBLE CONCENTRATING ON THINGS, SUCH AS READING THE NEWSPAPER OR WATCHING TELEVISION: 1
1. LITTLE INTEREST OR PLEASURE IN DOING THINGS: 2
SUM OF ALL RESPONSES TO PHQ9 QUESTIONS 1 & 2: 4

## 2019-05-16 ASSESSMENT — LIFESTYLE VARIABLES
HOW OFTEN DO YOU HAVE SIX OR MORE DRINKS ON ONE OCCASION: 0
HOW OFTEN DO YOU HAVE A DRINK CONTAINING ALCOHOL: 1

## 2019-05-16 ASSESSMENT — ANXIETY QUESTIONNAIRES: GAD7 TOTAL SCORE: 2

## 2019-05-16 NOTE — PATIENT INSTRUCTIONS
Personalized Preventive Plan for Ulis Schaumann - 5/16/2019  Medicare offers a range of preventive health benefits. Some of the tests and screenings are paid in full while other may be subject to a deductible, co-insurance, and/or copay. Some of these benefits include a comprehensive review of your medical history including lifestyle, illnesses that may run in your family, and various assessments and screenings as appropriate. After reviewing your medical record and screening and assessments performed today your provider may have ordered immunizations, labs, imaging, and/or referrals for you. A list of these orders (if applicable) as well as your Preventive Care list are included within your After Visit Summary for your review. Other Preventive Recommendations:    · A preventive eye exam performed by an eye specialist is recommended every 1-2 years to screen for glaucoma; cataracts, macular degeneration, and other eye disorders. · A preventive dental visit is recommended every 6 months. · Try to get at least 150 minutes of exercise per week or 10,000 steps per day on a pedometer . · Order or download the FREE \"Exercise & Physical Activity: Your Everyday Guide\" from [employer]. Call 6-291.682.2321 or search [employer] online. · You need 4541-6682 mg of calcium and 5265-0956 IU of vitamin D per day. It is possible to meet your calcium requirement with diet alone, but a vitamin D supplement is usually necessary to meet this goal.  · When exposed to the sun, use a sunscreen that protects against both UVA and UVB radiation with an SPF of 30 or greater. Reapply every 2 to 3 hours or after sweating, drying off with a towel, or swimming. · Always wear a seat belt when traveling in a car. Always wear a helmet when riding a bicycle or motorcycle.   · Home safety; please get non slip mat for tub /shower  · If hearing becomes more problematic, may consider Audiology simón  · Please check with Oncologist regarding Prevnar and Shingrix

## 2019-05-16 NOTE — PROGRESS NOTES
Medication Sig Taking?  Authorizing Provider   escitalopram (LEXAPRO) 10 MG tablet take 1 tablet by mouth every day in the morning   up dose to 15 mg daily Yes Historical Provider, MD   LINOLEIC ACID-SUNFLOWER OIL PO Take 1 capsule by mouth 3 times daily Yes Historical Provider, MD   Nutritional Supplements (DETOX ENZYME FORMULA PO) Take 1 capsule by mouth 2 times daily Yes Historical Provider, MD   5-Hydroxytryptophan 200 MG CAPS Take 1 capsule by mouth 2 times daily Yes Historical Provider, MD   Multiple Vitamins-Minerals (CLINICAL NUTRIENTS FOR WOMEN PO) Take 1 capsule by mouth 2 times daily Yes Historical Provider, MD   Garlic 2905 MG CAPS Take 1,200 mg by mouth 2 times daily Instructed to hold 5 days pre-op Yes Historical Provider, MD   acetaminophen (TYLENOL) 325 MG tablet Take 650 mg by mouth every 4 hours as needed for Pain  Yes Historical Provider, MD   Melatonin 10 MG CAPS Take 20 mg by mouth nightly Instructed to hold 5 days pre-opInstructed to hold 5 days pre-op Yes Historical Provider, MD   Licorice-Glycine (RHIZINATE 3X) 400-50 MG CHEW Take 2 tablets by mouth Yes Historical Provider, MD   Cholecalciferol (VITAMIN D3) 00701 units CAPS Take by mouth every 14 days  Yes Historical Provider, MD   cyclobenzaprine (FLEXERIL) 5 MG tablet Take 10 mg by mouth Yes Historical Provider, MD     Past Medical History:   Diagnosis Date    Cancer ([facility] [place] [address]     endometrial cancer, breast    Diverticulitis     GERD (gastroesophageal reflux disease)     Gout     Hiatal hernia     Macular degeneration     Osteoarthritis      Past Surgical History:   Procedure Laterality Date    BREAST LUMPECTOMY Left 2016    left breast sentinelnode dissection, blue dye injection    BREAST SURGERY Left 10/2016    cancer     SECTION      x 3    CHG UNLISTED DX RADIOGRAPHIC PROCEDURE N/A 2018    BRONCHOSCOPY ELECTROMAGNETIC performed by Breanna Hoffman MD at [facility] ENDOSCOPY    COLONOSCOPY      CYST REMOVAL groin area    ENDOSCOPY, COLON, DIAGNOSTIC      EYE SURGERY Bilateral 2016    cataracts    HYSTERECTOMY  07/2016    total with BSO    ID [id number] BRUSHING/PROTECTED BRUSHINGS  8/17/2018    BRONCHOSCOPY BRUSHINGS performed by Janella Kussmaul, MD at [address] [facility] [address] [facility]  8/17/2018    BRONCHOSCOPY ALVEOLAR LAVAGE performed by Janella Kussmaul, MD at [address] [facility] [address] EBUS DX/TX INTERVENTION [address] LES N/A 8/17/2018    BRONCHOSCOPY ULTRASOUND performed by Janella Kussmaul, MD at [address] [place] [address] W/TRANSBRONCHIAL LUNG BX 1 LOBE  8/17/2018    BRONCHOSCOPY/TRANSBRONCHIAL NEEDLE BIOPSY performed by Janella Kussmaul, MD at [address] [place] [address] W/TRANSBRONCHIAL LUNG BX EACH LOBE  8/17/2018    BRONCHOSCOPY/TRANSBRONCHIAL NEEDLE BIOPSY ADDL LOBE performed by Janella Kussmaul, MD at [address] TUNNELED CTR VAD W/SUBQ PORT AGE 5 YR/> N/A 12/4/2018    MEDIPORT INSERTION performed by Lexie White MD at [facility] TONSILLECTOMY       Family History   Problem Relation Age of Onset    High Blood Pressure Mother     Heart Disease Mother     Diabetes Mother     Arthritis Father         rheumatoid    Heart Disease Father     Cancer Maternal Aunt         ovarian carcinoma       CareTeam (Including outside providers/suppliers regularly involved in providing care):   Patient Care Team:  Alethea Troy DO as PCP - General  Alethea Troy DO as PCP - [facility] Attributed Provider  Janella Kussmaul, MD as Consulting Physician (Pulmonology)  Angie Qureshi MD as Consulting Physician (Obstetrics & Gynecology)    [facility] Readings from Last 3 Encounters:   05/16/19 173 lb (78.5 kg)   05/02/19 170 lb 6.4 oz (77.3 kg)   04/11/19 168 lb 14.4 oz (76.6 kg)     Vitals:    05/16/19 1103   BP: 114/78   Pulse: 88   Temp: 97.9 °F (36.6 °C)   TempSrc: Temporal   SpO2: 96%   Weight: 173 lb (78.5 kg)   Height: 5' 3\" (1.6 m)     Body mass index is 30.65 kg/m². Based upon direct observation of the patient, evaluation of cognition reveals recent and remote memory intact. Resp:  Respirations are regular and unlabored. Respirations rate is normal Lungs are clear bilaterally  Cv: Rate is regular. Rhythm is regular. S1 is normal. S2 is normal. Soft systolic murmur noted at RSB. Extremities: No edema of the BLE  Musculoskeletal: Walks with an age appropriate gait. Timed UP & Go test < 10 secs. Neuro: Displays comfort and cooperation during the encounter. Affect is normal.  Alert and Oriented x3. Able to name current President. Was able to recall 3 objects with accuracy. Able to spell WORLD backwards. Clock drawing =2        Patient's complete Health Risk Assessment and screening values have been reviewed and are found in Flowsheets. The following problems were reviewed today and where indicated follow up appointments were made and/or referrals ordered. Positive Risk Factor Screenings with Interventions:     General Health:  General  In general, how would you say your health is?: Fair  In the past 7 days, have you experienced any of the following? New or Increased Pain, New or Increased Fatigue, Loneliness, Social Isolation, Stress or Anger?: (!) New or Increased Fatigue  Do you get the social and emotional support that you need?: Yes  Do you have a Living Will?: Yes  General Health Risk Interventions:  · Fatigue: patient declines any further evaluation/treatment for this issue, feels it is related to the stress of her cancer diagnoses     Health Habits/Nutrition:  Health Habits/Nutrition  Do you exercise for at least 20 minutes 2-3 times per week?: (!) No  Have you lost any weight without trying in the past 3 months?: No  Do you eat fewer than 2 meals per day?: No  Have you seen a dentist within the past year?: (!) No  Body mass index is 30.65 kg/m².   Health Habits/Nutrition Interventions:  · Inadequate physical activity:  She doesnt participate in a formal program; she is fairly active in the home and outside. She will try to increase her activity as tolerated    Hearing/Vision:  Hearing/Vision  Do you or your family notice any trouble with your hearing?: (!) Yes  Do you have difficulty driving, watching TV, or doing any of your daily activities because of your eyesight?: No  Have you had an eye exam within the past year?: Yes  Hearing/Vision Interventions:  · Hearing concerns:  patient declines any further evaluation/treatment for hearing issues; does not feel it is bad enough to warrant evaluation at this time    Safety:  Safety  Do you have working smoke detectors?: Yes  Have all throw rugs been removed or fastened?: Yes  Do you have non-slip mats in all bathtubs?: (!) No  Do all of your stairways have a railing or banister?: Yes  Are your doorways, halls and stairs free of clutter?: Yes  Do you always fasten your seatbelt when you are in a car?: Yes  Safety Interventions:  · Home safety tips provided    Personalized Preventive Plan   Current Health Maintenance Status  Immunization History   Administered Date(s) Administered    Influenza Virus Vaccine 01/01/2008    Pneumococcal 13-valent Conjugate ([id number]) 05/23/2017    Pneumococcal Polysaccharide ([id number]) 05/23/2017    Td Vaccine >6yo [facility] 05/23/2017    Tdap (Boostrix, Adacel) 05/23/2017        Health Maintenance   Topic Date Due    Hepatitis C screen  1945    Lipid screen  11/10/1986    Shingles Vaccine (1 of 2) 11/10/1996    Colon cancer screen colonoscopy  11/10/1996    Flu vaccine (Season Ended) 09/01/2019    Breast cancer screen  10/03/2019    Pneumococcal 65+ years Vaccine (2 of 2 - PPSV23) 05/23/2022    DTaP/Tdap/Td vaccine (2 - Td) 05/23/2027    DEXA (modify frequency per FRAX score)  Completed     Recommendations for Preventive Services Due: see orders and patient instructions/AVS.  .   Recommended screening schedule for the next 5-10 years is provided to the

## 2019-05-16 NOTE — TELEPHONE ENCOUNTER
Can you please get the Lipid Profile drawn 3/26/2018 from [facility] everywhere and enter the results into her Epic chart?   THX

## 2019-05-17 ENCOUNTER — HOSPITAL ENCOUNTER (OUTPATIENT)
Dept: CT IMAGING | Age: 74
Discharge: HOME | End: 2019-05-19
Payer: COMMERCIAL

## 2019-05-17 DIAGNOSIS — C54.1 ADENOCARCINOMA OF ENDOMETRIUM (HCC): ICD-10-CM

## 2019-05-17 PROCEDURE — 6360000004 HC RX CONTRAST MEDICATION: Performed by: RADIOLOGY

## 2019-05-17 PROCEDURE — 2580000003 HC RX 258: Performed by: RADIOLOGY

## 2019-05-17 PROCEDURE — 71260 CT THORAX DX C+: CPT

## 2019-05-17 PROCEDURE — 74177 CT ABD & PELVIS W/CONTRAST: CPT

## 2019-05-17 RX ORDER — 0.9 % SODIUM CHLORIDE 0.9 %
10 VIAL (ML) INJECTION ONCE
Status: COMPLETED | OUTPATIENT
Start: 2019-05-17 | End: 2019-05-17

## 2019-05-17 RX ADMIN — IOPAMIDOL 110 ML: 755 INJECTION, SOLUTION INTRAVENOUS at 13:56

## 2019-05-17 RX ADMIN — IOHEXOL 50 ML: 240 INJECTION, SOLUTION INTRATHECAL; INTRAVASCULAR; INTRAVENOUS; ORAL at 13:56

## 2019-05-17 RX ADMIN — Medication 10 ML: at 13:56

## 2019-05-22 ENCOUNTER — HOSPITAL ENCOUNTER (OUTPATIENT)
Age: 74
Discharge: HOME | End: 2019-05-24
Payer: COMMERCIAL

## 2019-05-22 DIAGNOSIS — C50.412 MALIGNANT NEOPLASM OF UPPER-OUTER QUADRANT OF LEFT FEMALE BREAST, UNSPECIFIED ESTROGEN RECEPTOR STATUS (HCC): ICD-10-CM

## 2019-05-22 LAB
ALBUMIN SERPL-MCNC: 4.3 G/DL (ref 3.5–5.2)
ALP BLD-CCNC: 77 U/L (ref 35–104)
ALT SERPL-CCNC: 11 U/L (ref 0–32)
ANION GAP SERPL CALCULATED.3IONS-SCNC: 15 MMOL/L (ref 7–16)
AST SERPL-CCNC: 20 U/L (ref 0–31)
BASOPHILS ABSOLUTE: 0.04 E9/L (ref 0–0.2)
BASOPHILS RELATIVE PERCENT: 0.8 % (ref 0–2)
BILIRUB SERPL-MCNC: 0.5 MG/DL (ref 0–1.2)
BUN BLDV-MCNC: 10 MG/DL (ref 8–23)
CALCIUM SERPL-MCNC: 9.3 MG/DL (ref 8.6–10.2)
CHLORIDE BLD-SCNC: 97 MMOL/L (ref 98–107)
CO2: 25 MMOL/L (ref 22–29)
CREAT SERPL-MCNC: 0.6 MG/DL (ref 0.5–1)
EOSINOPHILS ABSOLUTE: 0 E9/L (ref 0.05–0.5)
EOSINOPHILS RELATIVE PERCENT: 0 % (ref 0–6)
GFR AFRICAN AMERICAN: >60
GFR NON-AFRICAN AMERICAN: >60 ML/MIN/1.73
GLUCOSE BLD-MCNC: 157 MG/DL (ref 74–99)
HCT VFR BLD CALC: 39.8 % (ref 34–48)
HEMOGLOBIN: 13 G/DL (ref 11.5–15.5)
IMMATURE GRANULOCYTES #: 0.02 E9/L
IMMATURE GRANULOCYTES %: 0.4 % (ref 0–5)
LYMPHOCYTES ABSOLUTE: 1.22 E9/L (ref 1.5–4)
LYMPHOCYTES RELATIVE PERCENT: 25.8 % (ref 20–42)
MCH RBC QN AUTO: 28 PG (ref 26–35)
MCHC RBC AUTO-ENTMCNC: 32.7 % (ref 32–34.5)
MCV RBC AUTO: 85.6 FL (ref 80–99.9)
MONOCYTES ABSOLUTE: 0.38 E9/L (ref 0.1–0.95)
MONOCYTES RELATIVE PERCENT: 8.1 % (ref 2–12)
NEUTROPHILS ABSOLUTE: 3.06 E9/L (ref 1.8–7.3)
NEUTROPHILS RELATIVE PERCENT: 64.9 % (ref 43–80)
PDW BLD-RTO: 15 FL (ref 11.5–15)
PLATELET # BLD: 176 E9/L (ref 130–450)
PMV BLD AUTO: 9.6 FL (ref 7–12)
POTASSIUM SERPL-SCNC: 3.9 MMOL/L (ref 3.5–5)
RBC # BLD: 4.65 E12/L (ref 3.5–5.5)
SODIUM BLD-SCNC: 137 MMOL/L (ref 132–146)
TOTAL PROTEIN: 7 G/DL (ref 6.4–8.3)
WBC # BLD: 4.7 E9/L (ref 4.5–11.5)

## 2019-05-22 PROCEDURE — 85025 COMPLETE CBC W/AUTO DIFF WBC: CPT

## 2019-05-22 PROCEDURE — 80053 COMPREHEN METABOLIC PANEL: CPT

## 2019-05-22 PROCEDURE — 36415 COLL VENOUS BLD VENIPUNCTURE: CPT

## 2019-05-23 ENCOUNTER — OFFICE VISIT (OUTPATIENT)
Dept: ONCOLOGY | Age: 74
End: 2019-05-23
Payer: COMMERCIAL

## 2019-05-23 ENCOUNTER — HOSPITAL ENCOUNTER (OUTPATIENT)
Dept: INFUSION THERAPY | Age: 74
Discharge: HOME | End: 2019-05-23
Payer: COMMERCIAL

## 2019-05-23 VITALS
HEART RATE: 73 BPM | BODY MASS INDEX: 28.95 KG/M2 | WEIGHT: 169.6 LBS | SYSTOLIC BLOOD PRESSURE: 116 MMHG | TEMPERATURE: 97.8 F | DIASTOLIC BLOOD PRESSURE: 59 MMHG | HEIGHT: 64 IN

## 2019-05-23 VITALS — SYSTOLIC BLOOD PRESSURE: 120 MMHG | RESPIRATION RATE: 18 BRPM | HEART RATE: 55 BPM | DIASTOLIC BLOOD PRESSURE: 56 MMHG

## 2019-05-23 DIAGNOSIS — Z09 FOLLOW UP: Primary | ICD-10-CM

## 2019-05-23 DIAGNOSIS — C54.1 ADENOCARCINOMA OF ENDOMETRIUM (HCC): ICD-10-CM

## 2019-05-23 DIAGNOSIS — C56.9 MALIGNANT NEOPLASM OF OVARY, UNSPECIFIED LATERALITY (HCC): Primary | ICD-10-CM

## 2019-05-23 DIAGNOSIS — C54.1 ADENOCARCINOMA OF ENDOMETRIUM (HCC): Primary | ICD-10-CM

## 2019-05-23 PROCEDURE — 6360000002 HC RX W HCPCS: Performed by: INTERNAL MEDICINE

## 2019-05-23 PROCEDURE — 96413 CHEMO IV INFUSION 1 HR: CPT

## 2019-05-23 PROCEDURE — 99214 OFFICE O/P EST MOD 30 MIN: CPT | Performed by: INTERNAL MEDICINE

## 2019-05-23 PROCEDURE — 2580000003 HC RX 258

## 2019-05-23 PROCEDURE — 2580000003 HC RX 258: Performed by: INTERNAL MEDICINE

## 2019-05-23 RX ORDER — METHYLPREDNISOLONE SODIUM SUCCINATE 125 MG/2ML
125 INJECTION, POWDER, LYOPHILIZED, FOR SOLUTION INTRAMUSCULAR; INTRAVENOUS ONCE
Status: CANCELLED | OUTPATIENT
Start: 2019-05-23

## 2019-05-23 RX ORDER — MEPERIDINE HYDROCHLORIDE 25 MG/ML
12.5 INJECTION INTRAMUSCULAR; INTRAVENOUS; SUBCUTANEOUS ONCE
Status: CANCELLED | OUTPATIENT
Start: 2019-05-23

## 2019-05-23 RX ORDER — HEPARIN SODIUM (PORCINE) LOCK FLUSH IV SOLN 100 UNIT/ML 100 UNIT/ML
500 SOLUTION INTRAVENOUS PRN
Status: CANCELLED | OUTPATIENT
Start: 2019-05-23

## 2019-05-23 RX ORDER — SODIUM CHLORIDE 9 MG/ML
INJECTION, SOLUTION INTRAVENOUS
Status: DISCONTINUED
Start: 2019-05-23 | End: 2019-05-24 | Stop reason: HOSPADM

## 2019-05-23 RX ORDER — SODIUM CHLORIDE 9 MG/ML
250 INJECTION, SOLUTION INTRAVENOUS ONCE
Status: CANCELLED | OUTPATIENT
Start: 2019-05-23

## 2019-05-23 RX ORDER — SODIUM CHLORIDE 0.9 % (FLUSH) 0.9 %
5 SYRINGE (ML) INJECTION PRN
Status: CANCELLED | OUTPATIENT
Start: 2019-05-23

## 2019-05-23 RX ORDER — DIPHENHYDRAMINE HYDROCHLORIDE 50 MG/ML
50 INJECTION INTRAMUSCULAR; INTRAVENOUS ONCE
Status: CANCELLED | OUTPATIENT
Start: 2019-05-23

## 2019-05-23 RX ORDER — 0.9 % SODIUM CHLORIDE 0.9 %
10 VIAL (ML) INJECTION ONCE
Status: CANCELLED | OUTPATIENT
Start: 2019-05-23

## 2019-05-23 RX ORDER — SODIUM CHLORIDE 9 MG/ML
INJECTION, SOLUTION INTRAVENOUS CONTINUOUS
Status: CANCELLED | OUTPATIENT
Start: 2019-05-23

## 2019-05-23 RX ORDER — SODIUM CHLORIDE 0.9 % (FLUSH) 0.9 %
10 SYRINGE (ML) INJECTION PRN
Status: CANCELLED | OUTPATIENT
Start: 2019-05-23

## 2019-05-23 RX ORDER — SODIUM CHLORIDE 0.9 % (FLUSH) 0.9 %
10 SYRINGE (ML) INJECTION PRN
Status: DISCONTINUED | OUTPATIENT
Start: 2019-05-23 | End: 2019-05-24 | Stop reason: HOSPADM

## 2019-05-23 RX ORDER — HEPARIN SODIUM (PORCINE) LOCK FLUSH IV SOLN 100 UNIT/ML 100 UNIT/ML
500 SOLUTION INTRAVENOUS PRN
Status: DISCONTINUED | OUTPATIENT
Start: 2019-05-23 | End: 2019-05-24 | Stop reason: HOSPADM

## 2019-05-23 RX ORDER — EPINEPHRINE 1 MG/ML
0.3 INJECTION, SOLUTION, CONCENTRATE INTRAVENOUS PRN
Status: CANCELLED | OUTPATIENT
Start: 2019-05-23

## 2019-05-23 RX ORDER — SODIUM CHLORIDE 9 MG/ML
250 INJECTION, SOLUTION INTRAVENOUS ONCE
Status: DISCONTINUED | OUTPATIENT
Start: 2019-05-23 | End: 2019-05-24 | Stop reason: HOSPADM

## 2019-05-23 RX ADMIN — HEPARIN 500 UNITS: 100 SYRINGE at 13:00

## 2019-05-23 RX ADMIN — SODIUM CHLORIDE 250 ML: 9 INJECTION, SOLUTION INTRAVENOUS at 11:34

## 2019-05-23 RX ADMIN — Medication 10 ML: at 13:00

## 2019-05-23 RX ADMIN — Medication 10 ML: at 11:33

## 2019-05-23 RX ADMIN — SODIUM CHLORIDE 200 MG: 9 INJECTION, SOLUTION INTRAVENOUS at 12:14

## 2019-05-23 NOTE — PROGRESS NOTES
metastases, increased in size and number since 1/23/2018. Bronchoscopy/EBUS was performed on 08/17/2018  Respiratory Gram Stain/Cx: Negative for organisms. Aspergillus Galactomannan Antigen Negative  BAL RUL Negative for malignant cells. Bronchial smears shake RUL (predominance of blood); Negative for malignant cells. FNA Martínez TBNA RML (predominance of blood) Negative for malignant cells. FNA Martínez TBNA RUL (predominance of blood)  Negative for malignant cells. EBUS FNA R10 (scant lymph node sampling) Negative for malignant cells. EBUS FNA Station 7: Negative for malignant cells. Case discussed with Dr. Gabriela Meehan at [facility]. She continued to decline systemic chemotherapy (Taxol/Carbo +/- Herceptin). She did not want hormonal therapy as she did not tolerate this well with her breast cancer. Patient declined palliative care/Hospice care and wanted to proceed with immunotherapy Demetra Dobbs). Cycle # 1 Gwenette Prost was on 08/29/2018. Cycle # 2 Keytruda was on 09/19/2018. Cycle # 3 Keytruda was on 10/10/2018. CT chest 10/23/2018 noted Significant improvement in the previously noted multiple bilateral pulmonary nodules concerning for  improving pulmonary metastasis. CT abdomen/pelvis 10/23/2018 stable examination. Continue Keytruda and repeat scans in 3 months. Cycle # 4 Keytruda was on 10/31/2018. Cycle # 5 Keytruda was on 11/21/2018. Cycle # 6 Keytruda was on 12/19/2018. Cycle # 7 Keytruda was on 01/09/2019. Cycle # 8 Keytruda was on 01/30/2019. CT chest 02/13/2019 stable LLL nodule measuring 5 mm. Stable RUL nodule measuring 2 mm. No evidence of mediastinal, hilar or axillary LN. CT Abdomen/pelvis 02/13/2019 stable exam.  Continue Keytruda and repeat scans in 3 months. Cycle # 9 Keytruda was on 02/28/2019. Cycle # 10 Keytruda was on 03/20/2019. Cycle # 11 Gwenette Prost was on 04/11/2019. Cycle # 12 Gwenette Prost was on 05/02/2019. Review of Systems;  CONSTITUTIONAL: No fever, chills.  [address] [employer] [facility] appetite. + fatigue  ENMT: Eyes: No diplopia; Nose/Mouth: No sore throat. RESPIRATORY: No hemoptysis, shortness of breath  CARDIOVASCULAR: No chest pain, palpitations. GASTROINTESTINAL: No vomiting, abdominal pain  GENITOURINARY: No dysuria, urinary frequency, hematuria. NEURO: No syncope, presyncope. Migraine headache  Remainder:  ROS NEGATIVE    Past Medical History:      Diagnosis Date    Cancer [facility])     endometrial cancer, breast    Diverticulitis     GERD (gastroesophageal reflux disease)     Gout     Hiatal hernia     Macular degeneration     Osteoarthritis      Medications:  Reviewed and reconciled. Allergies: Allergies   Allergen Reactions    Asa [Aspirin] Hives    Pepcid [Famotidine]     Prilosec [Omeprazole] Hives    Protonix [Pantoprazole Sodium] Other (See Comments)     Other reaction(s): Other: See Comments  DEPRESSION    Erythromycin Rash    Keflet [Cephalexin] Rash    Levaquin [Levofloxacin In D5w] Rash    Pcn [Penicillins] Rash    Polyethylene Glycol Rash     Bloating,gas    Tetracycline Rash    Tetracyclines & Related Rash     Physical Exam:  BP (!) 116/59 (Site: Right Upper Arm, Position: Sitting, Cuff Size: Medium Adult)   Pulse 73   Temp 97.8 °F (36.6 °C) (Temporal)   Ht 5' 3.5\" (1.613 m)   Wt 169 lb 9.6 oz (76.9 kg)   BMI 29.57 kg/m²   GENERAL: Alert, oriented x 3, not in acute distress. HEENT: PERRLA; EOMI. Oropharynx clear. NECK: Supple. Without lymphadenopathy. LUNGS: Good air entry bilaterally. No wheezing, crackles or ronchi. CARDIOVASCULAR: Regular rate. No murmurs, rubs or gallops. ABDOMEN: Soft. Non-tender, non-distended. +BS  EXTREMITIES: Without clubbing, cyanosis, or edema. NEUROLOGIC: No focal deficits.    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  -Adjuvant radiation therapy completed March 28, 2017.  -Endocrine therapy: Arimidex started March 30, 2017. She did not tolerate due to severe depression;  -Arimidex discontinued.   -Started on Femara after approximately 2 weeks, but did not tolerate Femara. Chose observation. Bilateral screening mammogram on 10/03/2018 negative for malignancy  Clinically, there is no evidence of recurrence. IA papillary serous endometrial adenocarcinoma, FIGO grade 3, - LVSI, tumor size 4.5 cm;    BRCA/Myrisk testing Negative  7/20/16-Exam under anesthesia, diagnostic laparoscopy, total laparoscopic hysterectomy, bilateral salpingo-oophorectomy, pelvic and periaortic lymphadenectomy, omentectomy. HDR VAGINAL BRACHYTHERAPY-9/29/16, 10/6/16, 10/13/16    08/19/2017 PET/CT scan: Hypermetabolic bilateral lung nodules suspicious for metastasis. Hypermetabolic nodule in the right lower quadrant anterior to the psoas muscle suspicious for metastasis. 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     She completed 2 cycles of chemotherapy with Taxol Carboplatin and Avastin on 10/17 and 11/17. She had poor tolerance to chemotherapy, therefore she declined additional chemotherapy and opted for Natural remedies instead     on 06/12/2018: 15 (<39UmL)      Re-staging scans on 06/15/2018:  CT of the abdomen and pelvis: 2.2 x 1.3 cm soft tissue nodule along the superior aspect of the urinary bladder suspicious for metastases, decreased in size since 1/23/18; no evidence of new abnormalities since prior visit; diverticulosis without evidence of diverticulitis. CT Chest:  Multiple bilateral lung nodules suspicious for metastases, increased in size and number since 1/23/2018.     Bronchoscopy/EBUS was performed on 08/17/2018  Respiratory Gram Stain/Cx: Negative for organisms. Aspergillus Galactomannan Antigen Negative  BAL RUL Negative for malignant cells. Bronchial smears shake RUL (predominance of blood); Negative for malignant cells. FNA Mauricio TBNA RML (predominance of blood) Negative for malignant cells. FNA Martínez TBNA RUL (predominance of blood)  Negative for malignant cells. EBUS FNA R10 (scant lymph node sampling) Negative for malignant cells. EBUS FNA Station 7: Negative for malignant cells. Case discussed with Dr. Marija Ram at [facility]. She continued to decline systemic chemotherapy (Taxol/Carbo +/- Herceptin). She did not want hormonal therapy as she did not tolerate this well with her breast cancer. Patient declined palliative care/Hospice care and wanted to proceed with immunotherapy Electa Salts). Side effects of Keytruda reviewed with patient. She agreed to proceed. Cycle # 1 Shellie Vinny was on 08/29/2018. Cycle # 2 Keytruda was on 09/19/2018. Cycle # 3 Keytruda was on 10/10/2018. CT chest 10/23/2018 noted Significant improvement in the previously noted multiple bilateral pulmonary nodules concerning for  improving pulmonary metastasis. CT abdomen/pelvis 10/23/2018 stable examination. Continue Keytruda and repeat scans in 3 months. Cycle # 4 Keytruda was on 10/31/2018. Cycle # 5 Keytruda was on 11/21/2018. Cycle # 6 Keytruda was on 12/19/2018. Cycle # 7 Keytruda was on 01/09/2019. Cycle # 8 Keytruda was on 01/30/2019. CT chest 02/13/2019 stable LLL nodule measuring 5 mm. Stable RUL nodule measuring 2 mm. No evidence of mediastinal, hilar or axillary LN. CT Abdomen/pelvis 02/13/2019 stable exam.  Continue Keytruda and repeat scans in 3 months. Cycle # 9 Keytruda was on 02/28/2019. Cycle # 10 Keytruda was on 03/20/2019. Cycle # 11 Shellie Vinny was on 04/11/2019. Cycle # 12 Shellie Vinny was on 05/02/2019. CT chest 05/17/2019 noted no metastatic disease.   CT abdomen/pelvis 05/17/2019 stable exam.  Continue Keytruda and repeat scans in 3 months. Cycle # 13 Zoya Bass is today 05/23/2019. Labs reviewed, ok to proceed. RTC 3 weeks for Cycle # 14 Johnny.     Severiano Keene MD   [id number]  Board Certified Medical Oncologist

## 2019-06-12 ENCOUNTER — HOSPITAL ENCOUNTER (OUTPATIENT)
Age: 74
Discharge: HOME | End: 2019-06-14
Payer: COMMERCIAL

## 2019-06-12 DIAGNOSIS — C54.1 ADENOCARCINOMA OF ENDOMETRIUM (HCC): ICD-10-CM

## 2019-06-12 LAB
ALBUMIN SERPL-MCNC: 4.3 G/DL (ref 3.5–5.2)
ALP BLD-CCNC: 82 U/L (ref 35–104)
ALT SERPL-CCNC: 14 U/L (ref 0–32)
ANION GAP SERPL CALCULATED.3IONS-SCNC: 9 MMOL/L (ref 7–16)
AST SERPL-CCNC: 26 U/L (ref 0–31)
BASOPHILS ABSOLUTE: 0.05 E9/L (ref 0–0.2)
BASOPHILS RELATIVE PERCENT: 1.1 % (ref 0–2)
BILIRUB SERPL-MCNC: 0.4 MG/DL (ref 0–1.2)
BUN BLDV-MCNC: 12 MG/DL (ref 8–23)
CALCIUM SERPL-MCNC: 9.7 MG/DL (ref 8.6–10.2)
CHLORIDE BLD-SCNC: 102 MMOL/L (ref 98–107)
CO2: 32 MMOL/L (ref 22–29)
CREAT SERPL-MCNC: 0.8 MG/DL (ref 0.5–1)
EOSINOPHILS ABSOLUTE: 0 E9/L (ref 0.05–0.5)
EOSINOPHILS RELATIVE PERCENT: 0 % (ref 0–6)
GFR AFRICAN AMERICAN: >60
GFR NON-AFRICAN AMERICAN: >60 ML/MIN/1.73
GLUCOSE BLD-MCNC: 93 MG/DL (ref 74–99)
HCT VFR BLD CALC: 42.7 % (ref 34–48)
HEMOGLOBIN: 13.2 G/DL (ref 11.5–15.5)
IMMATURE GRANULOCYTES #: 0.01 E9/L
IMMATURE GRANULOCYTES %: 0.2 % (ref 0–5)
LYMPHOCYTES ABSOLUTE: 1.35 E9/L (ref 1.5–4)
LYMPHOCYTES RELATIVE PERCENT: 29.6 % (ref 20–42)
MCH RBC QN AUTO: 27.6 PG (ref 26–35)
MCHC RBC AUTO-ENTMCNC: 30.9 % (ref 32–34.5)
MCV RBC AUTO: 89.1 FL (ref 80–99.9)
MONOCYTES ABSOLUTE: 0.44 E9/L (ref 0.1–0.95)
MONOCYTES RELATIVE PERCENT: 9.6 % (ref 2–12)
NEUTROPHILS ABSOLUTE: 2.71 E9/L (ref 1.8–7.3)
NEUTROPHILS RELATIVE PERCENT: 59.5 % (ref 43–80)
PDW BLD-RTO: 15.4 FL (ref 11.5–15)
PLATELET # BLD: 192 E9/L (ref 130–450)
PMV BLD AUTO: 9.4 FL (ref 7–12)
POTASSIUM SERPL-SCNC: 4.6 MMOL/L (ref 3.5–5)
RBC # BLD: 4.79 E12/L (ref 3.5–5.5)
SODIUM BLD-SCNC: 143 MMOL/L (ref 132–146)
TOTAL PROTEIN: 6.7 G/DL (ref 6.4–8.3)
WBC # BLD: 4.6 E9/L (ref 4.5–11.5)

## 2019-06-12 PROCEDURE — 85025 COMPLETE CBC W/AUTO DIFF WBC: CPT

## 2019-06-12 PROCEDURE — 36415 COLL VENOUS BLD VENIPUNCTURE: CPT

## 2019-06-12 PROCEDURE — 80053 COMPREHEN METABOLIC PANEL: CPT

## 2019-06-13 ENCOUNTER — HOSPITAL ENCOUNTER (OUTPATIENT)
Dept: INFUSION THERAPY | Age: 74
Discharge: HOME | End: 2019-06-13
Payer: COMMERCIAL

## 2019-06-13 ENCOUNTER — OFFICE VISIT (OUTPATIENT)
Dept: ONCOLOGY | Age: 74
End: 2019-06-13
Payer: COMMERCIAL

## 2019-06-13 VITALS
BODY MASS INDEX: 30.74 KG/M2 | SYSTOLIC BLOOD PRESSURE: 113 MMHG | HEIGHT: 63 IN | TEMPERATURE: 98.2 F | WEIGHT: 173.5 LBS | DIASTOLIC BLOOD PRESSURE: 58 MMHG | HEART RATE: 63 BPM

## 2019-06-13 VITALS
RESPIRATION RATE: 18 BRPM | DIASTOLIC BLOOD PRESSURE: 56 MMHG | HEART RATE: 56 BPM | TEMPERATURE: 98.3 F | SYSTOLIC BLOOD PRESSURE: 117 MMHG

## 2019-06-13 DIAGNOSIS — C56.9 MALIGNANT NEOPLASM OF OVARY, UNSPECIFIED LATERALITY (HCC): Primary | ICD-10-CM

## 2019-06-13 DIAGNOSIS — Z09 FOLLOW UP: Primary | ICD-10-CM

## 2019-06-13 DIAGNOSIS — C54.1 ADENOCARCINOMA OF ENDOMETRIUM (HCC): ICD-10-CM

## 2019-06-13 PROCEDURE — 6360000002 HC RX W HCPCS: Performed by: INTERNAL MEDICINE

## 2019-06-13 PROCEDURE — 96413 CHEMO IV INFUSION 1 HR: CPT

## 2019-06-13 PROCEDURE — 2580000003 HC RX 258: Performed by: INTERNAL MEDICINE

## 2019-06-13 PROCEDURE — 99214 OFFICE O/P EST MOD 30 MIN: CPT | Performed by: INTERNAL MEDICINE

## 2019-06-13 PROCEDURE — 2580000003 HC RX 258

## 2019-06-13 RX ORDER — DIPHENHYDRAMINE HYDROCHLORIDE 50 MG/ML
50 INJECTION INTRAMUSCULAR; INTRAVENOUS ONCE
Status: CANCELLED | OUTPATIENT
Start: 2019-06-13

## 2019-06-13 RX ORDER — MEPERIDINE HYDROCHLORIDE 25 MG/ML
12.5 INJECTION INTRAMUSCULAR; INTRAVENOUS; SUBCUTANEOUS ONCE
Status: CANCELLED | OUTPATIENT
Start: 2019-06-13

## 2019-06-13 RX ORDER — HEPARIN SODIUM (PORCINE) LOCK FLUSH IV SOLN 100 UNIT/ML 100 UNIT/ML
500 SOLUTION INTRAVENOUS PRN
Status: DISCONTINUED | OUTPATIENT
Start: 2019-06-13 | End: 2019-06-14 | Stop reason: HOSPADM

## 2019-06-13 RX ORDER — SODIUM CHLORIDE 0.9 % (FLUSH) 0.9 %
10 SYRINGE (ML) INJECTION PRN
Status: CANCELLED | OUTPATIENT
Start: 2019-06-13

## 2019-06-13 RX ORDER — SODIUM CHLORIDE 9 MG/ML
250 INJECTION, SOLUTION INTRAVENOUS ONCE
Status: COMPLETED | OUTPATIENT
Start: 2019-06-13 | End: 2019-06-13

## 2019-06-13 RX ORDER — SODIUM CHLORIDE 9 MG/ML
250 INJECTION, SOLUTION INTRAVENOUS ONCE
Status: CANCELLED | OUTPATIENT
Start: 2019-06-13

## 2019-06-13 RX ORDER — METHYLPREDNISOLONE SODIUM SUCCINATE 125 MG/2ML
125 INJECTION, POWDER, LYOPHILIZED, FOR SOLUTION INTRAMUSCULAR; INTRAVENOUS ONCE
Status: CANCELLED | OUTPATIENT
Start: 2019-06-13

## 2019-06-13 RX ORDER — SODIUM CHLORIDE 0.9 % (FLUSH) 0.9 %
10 SYRINGE (ML) INJECTION PRN
Status: DISCONTINUED | OUTPATIENT
Start: 2019-06-13 | End: 2019-06-14 | Stop reason: HOSPADM

## 2019-06-13 RX ORDER — SODIUM CHLORIDE 9 MG/ML
INJECTION, SOLUTION INTRAVENOUS
Status: COMPLETED
Start: 2019-06-13 | End: 2019-06-13

## 2019-06-13 RX ORDER — SODIUM CHLORIDE 0.9 % (FLUSH) 0.9 %
5 SYRINGE (ML) INJECTION PRN
Status: CANCELLED | OUTPATIENT
Start: 2019-06-13

## 2019-06-13 RX ORDER — SODIUM CHLORIDE 9 MG/ML
INJECTION, SOLUTION INTRAVENOUS CONTINUOUS
Status: CANCELLED | OUTPATIENT
Start: 2019-06-13

## 2019-06-13 RX ORDER — 0.9 % SODIUM CHLORIDE 0.9 %
10 VIAL (ML) INJECTION ONCE
Status: CANCELLED | OUTPATIENT
Start: 2019-06-13

## 2019-06-13 RX ORDER — EPINEPHRINE 1 MG/ML
0.3 INJECTION, SOLUTION, CONCENTRATE INTRAVENOUS PRN
Status: CANCELLED | OUTPATIENT
Start: 2019-06-13

## 2019-06-13 RX ORDER — HEPARIN SODIUM (PORCINE) LOCK FLUSH IV SOLN 100 UNIT/ML 100 UNIT/ML
500 SOLUTION INTRAVENOUS PRN
Status: CANCELLED | OUTPATIENT
Start: 2019-06-13

## 2019-06-13 RX ADMIN — SODIUM CHLORIDE, PRESERVATIVE FREE 500 UNITS: 5 INJECTION INTRAVENOUS at 12:32

## 2019-06-13 RX ADMIN — SODIUM CHLORIDE 200 MG: 9 INJECTION, SOLUTION INTRAVENOUS at 11:52

## 2019-06-13 RX ADMIN — Medication 10 ML: at 11:35

## 2019-06-13 RX ADMIN — Medication 10 ML: at 11:30

## 2019-06-13 RX ADMIN — SODIUM CHLORIDE 250 ML: 9 INJECTION, SOLUTION INTRAVENOUS at 11:40

## 2019-06-13 RX ADMIN — Medication 10 ML: at 11:39

## 2019-06-13 RX ADMIN — Medication 10 ML: at 13:32

## 2019-06-13 NOTE — PROGRESS NOTES
Department of 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  -Adjuvant radiation therapy completed March 28, 2017.  -Endocrine therapy: Arimidex started March 30, 2017. She did not tolerate due to severe depression;  -Arimidex discontinued.   -Started on Femara after approximately 2 weeks, but did not tolerate Femara. Chose observation. Clinically, there is no evidence of recurrence. IA papillary serous endometrial adenocarcinoma, FIGO grade 3, - LVSI, tumor size 4.5 cm;    BRCA/Myrisk testing Negative  7/20/16-Exam under anesthesia, diagnostic laparoscopy, total laparoscopic hysterectomy, bilateral salpingo-oophorectomy, pelvic and periaortic lymphadenectomy, omentectomy. HDR VAGINAL BRACHYTHERAPY-9/29/16, 10/6/16, 10/13/16    On 9/21/2017 anterior right psoas muscle fine-needle aspirate: Positive for malignant cells, metastatic high-grade carcinoma compatible with patient's known endometrial serous carcinoma.     She completed 2 cycles of chemotherapy with Taxol Carbo and Avastin on 10/17 and 11/17. She had poor tolerance to chemotherapy, therefore she declined additional chemo and opted for Natural remedies instead     on 06/12/2018: 15 (<39UmL)      Re-staging scans on 06/15/2018:  CT of the abdomen and pelvis: 2.2 x 1.3 cm soft tissue nodule along the superior aspect of the urinary bladder suspicious for metastases, decreased in size since 1/23/18; no evidence of new abnormalities since prior visit; diverticulosis without evidence of diverticulitis.   CT Chest:  Multiple bilateral lung nodules suspicious for metastases, increased in size and number since 1/23/2018. Bronchoscopy/EBUS was performed on 08/17/2018  Respiratory Gram Stain/Cx: Negative for organisms. Aspergillus Galactomannan Antigen Negative  BAL RUL Negative for malignant cells. Bronchial smears shake RUL (predominance of blood); Negative for malignant cells. FNA Martínez TBNA RML (predominance of blood) Negative for malignant cells. FNA Martínez TBNA RUL (predominance of blood)  Negative for malignant cells. EBUS FNA R10 (scant lymph node sampling) Negative for malignant cells. EBUS FNA Station 7: Negative for malignant cells. Case discussed with Dr. Mickie Allison at [facility]. She continued to decline systemic chemotherapy (Taxol/Carbo +/- Herceptin). She did not want hormonal therapy as she did not tolerate this well with her breast cancer. Patient declined palliative care/Hospice care and wanted to proceed with immunotherapy Omari Tate. Cycle # 1 Alamo Ruddle was on 08/29/2018. Cycle # 2 Keytruda was on 09/19/2018. Cycle # 3 Keytruda was on 10/10/2018. CT chest 10/23/2018 noted Significant improvement in the previously noted multiple bilateral pulmonary nodules concerning for  improving pulmonary metastasis. CT abdomen/pelvis 10/23/2018 stable examination. Continue Keytruda and repeat scans in 3 months. Cycle # 4 Keytruda was on 10/31/2018. Cycle # 5 Keytruda was on 11/21/2018. Cycle # 6 Keytruda was on 12/19/2018. Cycle # 7 Keytruda was on 01/09/2019. Cycle # 8 Keytruda was on 01/30/2019. CT chest 02/13/2019 stable LLL nodule measuring 5 mm. Stable RUL nodule measuring 2 mm. No evidence of mediastinal, hilar or axillary LN. CT Abdomen/pelvis 02/13/2019 stable exam.  Continue Keytruda and repeat scans in 3 months. Cycle # 9 Keytruda was on 02/28/2019. Cycle # 10 Keytruda was on 03/20/2019. Cycle # 11 Alamo Ruddle was on 04/11/2019. Cycle # 12 Alamo Ruddle was on 05/02/2019. CT chest 05/17/2019 noted no metastatic disease.   CT abdomen/pelvis

## 2019-06-24 ENCOUNTER — OFFICE VISIT (OUTPATIENT)
Dept: FAMILY MEDICINE CLINIC | Age: 74
End: 2019-06-24
Payer: COMMERCIAL

## 2019-06-24 VITALS
SYSTOLIC BLOOD PRESSURE: 118 MMHG | OXYGEN SATURATION: 97 % | TEMPERATURE: 97.8 F | HEIGHT: 64 IN | HEART RATE: 80 BPM | DIASTOLIC BLOOD PRESSURE: 62 MMHG | WEIGHT: 173.38 LBS | BODY MASS INDEX: 29.6 KG/M2

## 2019-06-24 DIAGNOSIS — F32.1 CURRENT MODERATE EPISODE OF MAJOR DEPRESSIVE DISORDER WITHOUT PRIOR EPISODE (HCC): Primary | ICD-10-CM

## 2019-06-24 DIAGNOSIS — J30.1 SEASONAL ALLERGIC RHINITIS DUE TO POLLEN: ICD-10-CM

## 2019-06-24 DIAGNOSIS — K21.9 GASTROESOPHAGEAL REFLUX DISEASE WITHOUT ESOPHAGITIS: ICD-10-CM

## 2019-06-24 PROBLEM — K63.9: Status: ACTIVE | Noted: 2017-09-21

## 2019-06-24 PROBLEM — C54.1 ENDOMETRIAL CANCER (HCC): Status: ACTIVE | Noted: 2019-06-24

## 2019-06-24 PROCEDURE — 99214 OFFICE O/P EST MOD 30 MIN: CPT | Performed by: FAMILY MEDICINE

## 2019-06-24 RX ORDER — MONTELUKAST SODIUM 10 MG/1
10 TABLET ORAL NIGHTLY
Qty: 30 TABLET | Refills: 2 | Status: SHIPPED | OUTPATIENT
Start: 2019-06-24 | End: 2019-09-04

## 2019-06-24 RX ORDER — LORATADINE 10 MG/1
CAPSULE, LIQUID FILLED ORAL
COMMUNITY
End: 2019-07-09

## 2019-06-24 RX ORDER — PREDNISONE 1 MG/1
5 TABLET ORAL DAILY
Qty: 7 TABLET | Refills: 0 | Status: SHIPPED | OUTPATIENT
Start: 2019-06-24 | End: 2019-07-01

## 2019-06-24 RX ORDER — ESCITALOPRAM OXALATE 20 MG/1
TABLET ORAL
Refills: 2 | COMMUNITY
Start: 2019-05-26 | End: 2019-08-01

## 2019-06-24 ASSESSMENT — ENCOUNTER SYMPTOMS
VOMITING: 0
TROUBLE SWALLOWING: 0
ABDOMINAL PAIN: 0
SORE THROAT: 1
EYE PAIN: 0
CONSTIPATION: 0
NAUSEA: 0
SINUS PAIN: 0
SHORTNESS OF BREATH: 0
CHEST TIGHTNESS: 0
COUGH: 1
BACK PAIN: 0
DIARRHEA: 0
WHEEZING: 0

## 2019-06-24 NOTE — PROGRESS NOTES
19    Name: Miles Schwartz  :1945   Sex:female   Age:73 y.o. Chief Complaint   Patient presents with   Fink Other     believes she is having side effects of lexapro    Cough    Congestion    Allergic Rhinitis     Gastroesophageal Reflux     Patient presents with complaints of drainage and cough  Feels like she is wheezing  Tried claritin once or twice    Also feels the lexapro is not working  Still sleeping a lot and very tired  Just not helping with her depression either, she still feels pretty depressed most of the time  But she also has a lot on her plate and with her cancer taking care of her significant other right now has proven very difficult        Review of Systems   Constitutional: Negative for appetite change, fatigue and fever. HENT: Positive for postnasal drip and sore throat. Negative for congestion, ear pain, sinus pain and trouble swallowing. Eyes: Negative for pain. Respiratory: Positive for cough. Negative for chest tightness, shortness of breath and wheezing. Cardiovascular: Negative for chest pain, palpitations and leg swelling. Gastrointestinal: Negative for abdominal pain, constipation, diarrhea, nausea and vomiting. Endocrine: Negative for cold intolerance and heat intolerance. Genitourinary: Negative for difficulty urinating, hematuria and pelvic pain. Musculoskeletal: Negative for back pain, gait problem and joint swelling. Skin: Negative for rash and wound. Neurological: Negative for dizziness, syncope and headaches. Hematological: Negative for adenopathy. Psychiatric/Behavioral: Negative for confusion, sleep disturbance and suicidal ideas. The patient is nervous/anxious.             Current Outpatient Medications:     montelukast (SINGULAIR) 10 MG tablet, Take 1 tablet by mouth nightly, Disp: 30 tablet, Rfl: 2    predniSONE (DELTASONE) 5 MG tablet, Take 1 tablet by mouth daily for 7 days, Disp: 7 tablet, Rfl: 0    escitalopram (LEXAPRO) 20 MG tablet, TAKE ONE TABLET BY MOUTH EVERY DAY, Disp: , Rfl: 2    loratadine (CLARITIN) 10 MG capsule, Take by mouth, Disp: , Rfl:     5-HTP CAPS, Take by mouth, Disp: , Rfl:     LINOLEIC ACID-SUNFLOWER OIL PO, Take 1 capsule by mouth 3 times daily, Disp: , Rfl:     Nutritional Supplements (DETOX ENZYME FORMULA PO), Take 1 capsule by mouth 2 times daily, Disp: , Rfl:     Multiple Vitamins-Minerals (CLINICAL NUTRIENTS FOR WOMEN PO), Take 1 capsule by mouth 2 times daily, Disp: , Rfl:     Garlic 6645 MG CAPS, Take 1,200 mg by mouth 2 times daily Instructed to hold 5 days pre-op, Disp: , Rfl:     acetaminophen (TYLENOL) 325 MG tablet, Take 650 mg by mouth every 4 hours as needed for Pain , Disp: , Rfl:     Melatonin 10 MG CAPS, Take 20 mg by mouth nightly Instructed to hold 5 days pre-opInstructed to hold 5 days pre-op, Disp: , Rfl:     Licorice-Glycine (RHIZINATE 3X) 400-50 MG CHEW, Take 2 tablets by mouth, Disp: , Rfl:     Cholecalciferol (VITAMIN D3) 18937 units CAPS, Take by mouth every 14 days , Disp: , Rfl:     cyclobenzaprine (FLEXERIL) 5 MG tablet, Take 10 mg by mouth, Disp: , Rfl:   Allergies   Allergen Reactions    Asa [Aspirin] Hives    Levothyroxine      Other reaction(s): Free Text    Pepcid [Famotidine]     Prilosec [Omeprazole] Hives    Protonix [Pantoprazole Sodium] Other (See Comments)     Other reaction(s):  Other: See Comments  DEPRESSION    Erythromycin Rash    Keflet [Cephalexin] Rash    Levaquin [Levofloxacin In D5w] Rash    Pcn [Penicillins] Rash    Polyethylene Glycol Rash     Bloating,gas    Tetracycline Rash    Tetracyclines & Related Rash      Past Medical History:   Diagnosis Date    Cancer [facility])     endometrial cancer, breast    Diverticulitis     GERD (gastroesophageal reflux disease)     Gout     Hiatal hernia     Macular degeneration     Osteoarthritis      Patient Active Problem List    Diagnosis Date Noted    Endometrial cancer ([facility] [address] 06/24/2019    GERD (gastroesophageal reflux disease) 2019    Ovarian cancer ([facility] [address] 2017    Adenocarcinoma of endometrium ([facility] [address] 2017    Intestinal nodule 2017    Malignant neoplasm of upper-outer quadrant of left 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 ([facility] [address] 10/31/2016      Past Surgical History:   Procedure Laterality Date    BREAST LUMPECTOMY Left 2016    left breast sentinelnode dissection, blue dye injection    BREAST SURGERY Left 10/2016    cancer     SECTION      x 3    CHG UNLISTED DX RADIOGRAPHIC PROCEDURE N/A 2018    BRON[facility]    ENDOSCOPY, COLON, DIAGNOSTIC      EYE SURGERY Bilateral 2016    cataracts    HYSTERECTOMY  2016    total with BSO    [place] BRNCC BRUSHING/PROTECTED BRUSHINGS  2018    BRONCHOSCOPY BRUSHINGS performed by Natanael Aragon MD at [address] [place] [address]  2018    BRONCHOSCOPY ALVEOLAR LAVAGE performed by Natanael Aragon MD at [address] [place] [address] [address] EBUS DX/TX INTERVENTION [address] LES N/A 2018    BRONCHOSCOPY ULTRASOUND performed by Natanael Aragon MD at [address] [facility] W/TRANSBRONCHIAL LUNG BX 1 LOBE  2018    BRONCHOSCOPY/TRANSBRONCHIAL NEEDLE BIOPSY performed by Natanael Aragon MD at [address] [place] [address] W/TRANSBRONCHIAL LUNG BX EACH LOBE  2018    BRONCHOSCOPY/TRANSBRONCHIAL NEEDLE BIOPSY ADDL LOBE performed by Natanael Aragon MD at [address] T[address]EL[address] VAD W/SUBQ PORT AGE 5 YR/> N/A 2018    MEDIPORT INSERTION performed by Garrett Maciel MD at [facility] History     Tobacco History     Smoking Status  Former Smoker Smoking Frequency  0.25 packs/day for 20 years (5 pk yrs) Smoking Tobacco Type  Cigarettes    Smokeless Tobacco Use  Never Used          Alcohol History     Alcohol Use Status  Yes Comment  rare Drug Use     Drug Use Status  No          Sexual Activity     Sexually Active  Not Asked                  /62   Pulse 80   Temp 97.8 °F (36.6 °C)   Ht 5' 3.5\" (1.613 m)   Wt 173 lb 6 oz (78.6 kg)   SpO2 97%   BMI 30.23 kg/m²     EXAM:   Physical Exam   Constitutional: She is oriented to person, place, and time. She appears well-developed and well-nourished. HENT:   Head: Normocephalic and atraumatic. Eyes: Pupils are equal, round, and reactive to light. EOM are normal.   Neck: Normal range of motion. Cardiovascular: Normal rate and regular rhythm. Pulmonary/Chest: Effort normal and breath sounds normal.   Abdominal: Soft. Bowel sounds are normal.   Neurological: She is alert and oriented to person, place, and time. Skin: Skin is warm and dry. Nursing note and vitals reviewed. Napoleon Mena was seen today for other, cough, congestion, allergic rhinitis  and gastroesophageal reflux. Diagnoses and all orders for this visit:    Current moderate episode of major depressive disorder without prior episode (HCC)    Seasonal allergic rhinitis due to pollen  -     montelukast (SINGULAIR) 10 MG tablet; Take 1 tablet by mouth nightly  -     predniSONE (DELTASONE) 5 MG tablet; Take 1 tablet by mouth daily for 7 days    Gastroesophageal reflux disease without esophagitis    wean off the lexapro, she bhandari snot want to be on anything right now  Add singular daily for allergies      Seen by:   Wilber Maguire DO

## 2019-07-09 ENCOUNTER — OFFICE VISIT (OUTPATIENT)
Dept: FAMILY MEDICINE CLINIC | Age: 74
End: 2019-07-09
Payer: COMMERCIAL

## 2019-07-09 VITALS
HEIGHT: 64 IN | OXYGEN SATURATION: 99 % | WEIGHT: 177.5 LBS | BODY MASS INDEX: 30.3 KG/M2 | TEMPERATURE: 98.2 F | DIASTOLIC BLOOD PRESSURE: 82 MMHG | SYSTOLIC BLOOD PRESSURE: 132 MMHG | HEART RATE: 74 BPM

## 2019-07-09 DIAGNOSIS — J30.1 SEASONAL ALLERGIC RHINITIS DUE TO POLLEN: ICD-10-CM

## 2019-07-09 DIAGNOSIS — R05.9 COUGH: ICD-10-CM

## 2019-07-09 DIAGNOSIS — F33.0 MILD EPISODE OF RECURRENT MAJOR DEPRESSIVE DISORDER (HCC): Primary | ICD-10-CM

## 2019-07-09 PROCEDURE — 99214 OFFICE O/P EST MOD 30 MIN: CPT | Performed by: FAMILY MEDICINE

## 2019-07-09 ASSESSMENT — ENCOUNTER SYMPTOMS
SHORTNESS OF BREATH: 0
NAUSEA: 0
EYE PAIN: 0
SINUS PAIN: 0
SORE THROAT: 0
ABDOMINAL PAIN: 0
CHEST TIGHTNESS: 0
DIARRHEA: 0
COUGH: 0
WHEEZING: 0
VOMITING: 0
TROUBLE SWALLOWING: 0
CONSTIPATION: 0
BACK PAIN: 0

## 2019-07-09 NOTE — PROGRESS NOTES
RADIOGRAPHIC PROCEDURE N/A 8/17/2018    BRONCHOSCOPY ELECTROMAGNETIC performed by Cathy Matthews MD at [facility]    ENDOSCOPY, COLON, DIAGNOSTIC      EYE SURGERY Bilateral 2016    cataracts    HYSTERECTOMY  07/2016    total with BSO    [place] [address] BRUSHING/PROTECTED BRUSHINGS  8/17/2018    BRONCHOSCOPY BRUSHINGS performed by Cathy Matthews MD at [address] [place] [address] [address] [facility]  8/17/2018    BRONCHOSCOPY ALVEOLAR LAVAGE performed by Cathy Matthews MD at [address] [place] [address] [address] EBUS DX/TX INTERVENTION [address] LES N/A 8/17/2018    BRONCHOSCOPY ULTRASOUND performed by Cathy Matthews MD at [address] [facility] W/TRANSBRONCHIAL LUNG BX 1 LOBE  8/17/2018    BRONCHOSCOPY/TRANSBRONCHIAL NEEDLE BIOPSY performed by Cathy Matthews MD at [address] [facility] W/TRANSBRONCHIAL LUNG BX EACH LOBE  8/17/2018    BRONCHOSCOPY/TRANSBRONCHIAL NEEDLE BIOPSY ADDL LOBE performed by Cathy Matthews MD at [address] TUN[address]ELED [address] VAD W/SUBQ PORT AGE 5 YR/> N/A 12/4/2018    MEDIPORT INSERTION performed by Anisha Calvo MD at [address] [facility] History     Tobacco History     Smoking Status  Former Smoker Smoking Frequency  0.25 packs/day for 20 years (5 pk yrs) Smoking Tobacco Type  Cigarettes    Smokeless Tobacco Use  Never Used          Alcohol History     Alcohol Use Status  Yes Comment  rare          Drug Use     Drug Use Status  No          Sexual Activity     Sexually Active  Not Asked                  /82   Pulse 74   Temp 98.2 °F (36.8 °C)   Ht 5' 4\" (1.626 m)   Wt 177 lb 8 oz (80.5 kg)   SpO2 99%   BMI 30.47 kg/m²     EXAM:   Physical Exam   Constitutional: She is oriented to person, place, and time. She appears well-developed and well-nourished. HENT:   Head: Normocephalic and atraumatic. Eyes: Pupils are equal, round, and reactive to light.  EOM are normal. Neck: Normal range of motion. Cardiovascular: Normal rate and regular rhythm. Pulmonary/Chest: Effort normal and breath sounds normal.   Abdominal: Soft. Musculoskeletal: Normal range of motion. Neurological: She is alert and oriented to person, place, and time. Skin: Skin is warm and dry. Nursing note and vitals reviewed. Elida German was seen today for depression. Diagnoses and all orders for this visit:    Mild episode of recurrent major depressive disorder ([address]    Seasonal allergic rhinitis due to pollen    Cough    wean off lexapro-she is refusing anything in its place  Add claritin  Continue singular  Needs prevnar at her next visit      Seen by:   Sonja Brown DO

## 2019-07-11 ENCOUNTER — HOSPITAL ENCOUNTER (OUTPATIENT)
Dept: INFUSION THERAPY | Age: 74
Discharge: HOME | End: 2019-07-11
Payer: COMMERCIAL

## 2019-07-11 ENCOUNTER — OFFICE VISIT (OUTPATIENT)
Dept: ONCOLOGY | Age: 74
End: 2019-07-11
Payer: COMMERCIAL

## 2019-07-11 VITALS
TEMPERATURE: 97.8 F | SYSTOLIC BLOOD PRESSURE: 122 MMHG | DIASTOLIC BLOOD PRESSURE: 59 MMHG | RESPIRATION RATE: 16 BRPM | WEIGHT: 176.8 LBS | HEART RATE: 87 BPM | BODY MASS INDEX: 30.19 KG/M2 | HEIGHT: 64 IN

## 2019-07-11 VITALS — RESPIRATION RATE: 18 BRPM | SYSTOLIC BLOOD PRESSURE: 121 MMHG | DIASTOLIC BLOOD PRESSURE: 59 MMHG | HEART RATE: 66 BPM

## 2019-07-11 DIAGNOSIS — Z09 FOLLOW UP: Primary | ICD-10-CM

## 2019-07-11 DIAGNOSIS — C54.1 ADENOCARCINOMA OF ENDOMETRIUM (HCC): Primary | ICD-10-CM

## 2019-07-11 DIAGNOSIS — C56.9 MALIGNANT NEOPLASM OF OVARY, UNSPECIFIED LATERALITY (HCC): ICD-10-CM

## 2019-07-11 DIAGNOSIS — C50.412 MALIGNANT NEOPLASM OF UPPER-OUTER QUADRANT OF LEFT FEMALE BREAST, UNSPECIFIED ESTROGEN RECEPTOR STATUS (HCC): ICD-10-CM

## 2019-07-11 LAB
ALBUMIN SERPL-MCNC: 4.3 G/DL (ref 3.5–5.2)
ALP BLD-CCNC: 80 U/L (ref 35–104)
ALT SERPL-CCNC: 16 U/L (ref 0–32)
ANION GAP SERPL CALCULATED.3IONS-SCNC: 11 MMOL/L (ref 7–16)
AST SERPL-CCNC: 24 U/L (ref 0–31)
BASOPHILS ABSOLUTE: 0.05 E9/L (ref 0–0.2)
BASOPHILS RELATIVE PERCENT: 0.9 % (ref 0–2)
BILIRUB SERPL-MCNC: 0.7 MG/DL (ref 0–1.2)
BUN BLDV-MCNC: 11 MG/DL (ref 8–23)
CALCIUM SERPL-MCNC: 9.6 MG/DL (ref 8.6–10.2)
CHLORIDE BLD-SCNC: 99 MMOL/L (ref 98–107)
CO2: 31 MMOL/L (ref 22–29)
CREAT SERPL-MCNC: 0.8 MG/DL (ref 0.5–1)
EOSINOPHILS ABSOLUTE: 0 E9/L (ref 0.05–0.5)
EOSINOPHILS RELATIVE PERCENT: 0 % (ref 0–6)
GFR AFRICAN AMERICAN: >60
GFR NON-AFRICAN AMERICAN: >60 ML/MIN/1.73
GLUCOSE BLD-MCNC: 114 MG/DL (ref 74–99)
HCT VFR BLD CALC: 42.4 % (ref 34–48)
HEMOGLOBIN: 13.8 G/DL (ref 11.5–15.5)
IMMATURE GRANULOCYTES #: 0.04 E9/L
IMMATURE GRANULOCYTES %: 0.7 % (ref 0–5)
LYMPHOCYTES ABSOLUTE: 1.32 E9/L (ref 1.5–4)
LYMPHOCYTES RELATIVE PERCENT: 23.2 % (ref 20–42)
MCH RBC QN AUTO: 27.7 PG (ref 26–35)
MCHC RBC AUTO-ENTMCNC: 32.5 % (ref 32–34.5)
MCV RBC AUTO: 85 FL (ref 80–99.9)
MONOCYTES ABSOLUTE: 0.44 E9/L (ref 0.1–0.95)
MONOCYTES RELATIVE PERCENT: 7.7 % (ref 2–12)
NEUTROPHILS ABSOLUTE: 3.85 E9/L (ref 1.8–7.3)
NEUTROPHILS RELATIVE PERCENT: 67.5 % (ref 43–80)
PDW BLD-RTO: 16.3 FL (ref 11.5–15)
PLATELET # BLD: 182 E9/L (ref 130–450)
PMV BLD AUTO: 8.6 FL (ref 7–12)
POTASSIUM SERPL-SCNC: 3.9 MMOL/L (ref 3.5–5)
RBC # BLD: 4.99 E12/L (ref 3.5–5.5)
SODIUM BLD-SCNC: 141 MMOL/L (ref 132–146)
TOTAL PROTEIN: 6.9 G/DL (ref 6.4–8.3)
TSH SERPL DL<=0.05 MIU/L-ACNC: 82.19 UIU/ML (ref 0.27–4.2)
WBC # BLD: 5.7 E9/L (ref 4.5–11.5)

## 2019-07-11 PROCEDURE — 99214 OFFICE O/P EST MOD 30 MIN: CPT

## 2019-07-11 PROCEDURE — 80053 COMPREHEN METABOLIC PANEL: CPT

## 2019-07-11 PROCEDURE — 84443 ASSAY THYROID STIM HORMONE: CPT

## 2019-07-11 PROCEDURE — 99214 OFFICE O/P EST MOD 30 MIN: CPT | Performed by: INTERNAL MEDICINE

## 2019-07-11 PROCEDURE — 6360000002 HC RX W HCPCS: Performed by: INTERNAL MEDICINE

## 2019-07-11 PROCEDURE — 85025 COMPLETE CBC W/AUTO DIFF WBC: CPT

## 2019-07-11 PROCEDURE — 96413 CHEMO IV INFUSION 1 HR: CPT

## 2019-07-11 PROCEDURE — 2580000003 HC RX 258: Performed by: INTERNAL MEDICINE

## 2019-07-11 RX ORDER — SODIUM CHLORIDE 9 MG/ML
INJECTION, SOLUTION INTRAVENOUS CONTINUOUS
Status: CANCELLED | OUTPATIENT
Start: 2019-07-11

## 2019-07-11 RX ORDER — EPINEPHRINE 1 MG/ML
0.3 INJECTION, SOLUTION, CONCENTRATE INTRAVENOUS PRN
Status: CANCELLED | OUTPATIENT
Start: 2019-07-11

## 2019-07-11 RX ORDER — MEPERIDINE HYDROCHLORIDE 25 MG/ML
12.5 INJECTION INTRAMUSCULAR; INTRAVENOUS; SUBCUTANEOUS ONCE
Status: CANCELLED | OUTPATIENT
Start: 2019-07-11

## 2019-07-11 RX ORDER — SODIUM CHLORIDE 9 MG/ML
250 INJECTION, SOLUTION INTRAVENOUS ONCE
Status: COMPLETED | OUTPATIENT
Start: 2019-07-11 | End: 2019-07-11

## 2019-07-11 RX ORDER — SODIUM CHLORIDE 0.9 % (FLUSH) 0.9 %
10 SYRINGE (ML) INJECTION PRN
Status: DISCONTINUED | OUTPATIENT
Start: 2019-07-11 | End: 2019-07-12 | Stop reason: HOSPADM

## 2019-07-11 RX ORDER — HEPARIN SODIUM (PORCINE) LOCK FLUSH IV SOLN 100 UNIT/ML 100 UNIT/ML
500 SOLUTION INTRAVENOUS PRN
Status: DISCONTINUED | OUTPATIENT
Start: 2019-07-11 | End: 2019-07-12 | Stop reason: HOSPADM

## 2019-07-11 RX ORDER — HEPARIN SODIUM (PORCINE) LOCK FLUSH IV SOLN 100 UNIT/ML 100 UNIT/ML
500 SOLUTION INTRAVENOUS PRN
Status: CANCELLED | OUTPATIENT
Start: 2019-07-11

## 2019-07-11 RX ORDER — 0.9 % SODIUM CHLORIDE 0.9 %
10 VIAL (ML) INJECTION ONCE
Status: CANCELLED | OUTPATIENT
Start: 2019-07-11

## 2019-07-11 RX ORDER — SODIUM CHLORIDE 0.9 % (FLUSH) 0.9 %
10 SYRINGE (ML) INJECTION PRN
Status: CANCELLED | OUTPATIENT
Start: 2019-07-11

## 2019-07-11 RX ORDER — DIPHENHYDRAMINE HYDROCHLORIDE 50 MG/ML
50 INJECTION INTRAMUSCULAR; INTRAVENOUS ONCE
Status: CANCELLED | OUTPATIENT
Start: 2019-07-11

## 2019-07-11 RX ORDER — SODIUM CHLORIDE 9 MG/ML
250 INJECTION, SOLUTION INTRAVENOUS ONCE
Status: CANCELLED | OUTPATIENT
Start: 2019-07-11

## 2019-07-11 RX ORDER — SODIUM CHLORIDE 0.9 % (FLUSH) 0.9 %
5 SYRINGE (ML) INJECTION PRN
Status: CANCELLED | OUTPATIENT
Start: 2019-07-11

## 2019-07-11 RX ORDER — METHYLPREDNISOLONE SODIUM SUCCINATE 125 MG/2ML
125 INJECTION, POWDER, LYOPHILIZED, FOR SOLUTION INTRAMUSCULAR; INTRAVENOUS ONCE
Status: CANCELLED | OUTPATIENT
Start: 2019-07-11

## 2019-07-11 RX ADMIN — SODIUM CHLORIDE 250 ML: 9 INJECTION, SOLUTION INTRAVENOUS at 12:45

## 2019-07-11 RX ADMIN — SODIUM CHLORIDE 200 MG: 9 INJECTION, SOLUTION INTRAVENOUS at 12:45

## 2019-07-11 RX ADMIN — HEPARIN 500 UNITS: 100 SYRINGE at 13:32

## 2019-07-11 RX ADMIN — Medication 10 ML: at 11:22

## 2019-07-11 RX ADMIN — Medication 10 ML: at 11:20

## 2019-07-11 RX ADMIN — Medication 10 ML: at 13:33

## 2019-07-11 NOTE — PROGRESS NOTES
NEUROLOGIC: No focal deficits. 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  -Adjuvant radiation therapy completed March 28, 2017.  -Endocrine therapy: Arimidex started March 30, 2017. She did not tolerate due to severe depression;  -Arimidex discontinued.   -Started on Femara after approximately 2 weeks, but did not tolerate Femara. Chose observation. Bilateral screening mammogram on 10/03/2018 negative for malignancy  Clinically, there is no evidence of recurrence. IA papillary serous endometrial adenocarcinoma, FIGO grade 3, - LVSI, tumor size 4.5 cm;    BRCA/Myrisk testing Negative  7/20/16-Exam under anesthesia, diagnostic laparoscopy, total laparoscopic hysterectomy, bilateral salpingo-oophorectomy, pelvic and periaortic lymphadenectomy, omentectomy. HDR VAGINAL BRACHYTHERAPY-9/29/16, 10/6/16, 10/13/16    08/19/2017 PET/CT scan: Hypermetabolic bilateral lung nodules suspicious for metastasis. Hypermetabolic nodule in the right lower quadrant anterior to the psoas muscle suspicious for metastasis. 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     She completed 2 cycles of chemotherapy with Taxol Carboplatin and Avastin on 10/17 and 11/17.   She had poor tolerance to chemotherapy, therefore she declined additional chemotherapy and opted for Natural remedies instead     on 06/12/2018: 15 (<39UmL)      Re-staging scans on 06/15/2018:  CT of the abdomen and pelvis: 2.2 x 1.3 cm soft tissue nodule along the superior aspect of the urinary bladder suspicious for metastases, decreased in size since 1/23/18; no evidence of new abnormalities since

## 2019-07-12 ENCOUNTER — TELEPHONE (OUTPATIENT)
Dept: PHARMACY | Age: 74
End: 2019-07-12

## 2019-07-12 DIAGNOSIS — E03.9 ACQUIRED HYPOTHYROIDISM: Primary | ICD-10-CM

## 2019-07-12 RX ORDER — LEVOTHYROXINE SODIUM 88 MCG
88 TABLET ORAL DAILY
Qty: 30 TABLET | Refills: 2 | Status: SHIPPED | OUTPATIENT
Start: 2019-07-12 | End: 2019-08-23 | Stop reason: DRUGHIGH

## 2019-07-12 NOTE — TELEPHONE ENCOUNTER
----- Message from Jeanine Thompson MD sent at 7/12/2019  8:18 AM EDT -----  [facility] team,   Can we contact her PCP for TSH ?   Thanks  ----- Message -----  From: Deonte Wesley Incoming Results From [facility]  Sent: 7/11/2019  11:55 AM  To: Jeanine Thompson MD

## 2019-07-22 ENCOUNTER — TELEPHONE (OUTPATIENT)
Dept: FAMILY MEDICINE CLINIC | Age: 74
End: 2019-07-22

## 2019-07-22 NOTE — TELEPHONE ENCOUNTER
Diane calling to tell you that she had a bad weekend with aches and pains including chest pains and joint pain. She thinks this is from the thyroid med and her  Gallstones. Please advise.

## 2019-07-23 ENCOUNTER — OFFICE VISIT (OUTPATIENT)
Dept: FAMILY MEDICINE CLINIC | Age: 74
End: 2019-07-23
Payer: COMMERCIAL

## 2019-07-23 VITALS
HEIGHT: 64 IN | HEART RATE: 88 BPM | TEMPERATURE: 97.9 F | DIASTOLIC BLOOD PRESSURE: 70 MMHG | OXYGEN SATURATION: 99 % | WEIGHT: 173.13 LBS | SYSTOLIC BLOOD PRESSURE: 112 MMHG | BODY MASS INDEX: 29.56 KG/M2

## 2019-07-23 DIAGNOSIS — E03.9 ACQUIRED HYPOTHYROIDISM: Primary | ICD-10-CM

## 2019-07-23 DIAGNOSIS — Z13.31 POSITIVE DEPRESSION SCREENING: ICD-10-CM

## 2019-07-23 DIAGNOSIS — R07.9 CHEST PAIN, UNSPECIFIED TYPE: ICD-10-CM

## 2019-07-23 DIAGNOSIS — F43.29 STRESS AND ADJUSTMENT REACTION: ICD-10-CM

## 2019-07-23 PROCEDURE — G8431 POS CLIN DEPRES SCRN F/U DOC: HCPCS | Performed by: FAMILY MEDICINE

## 2019-07-23 PROCEDURE — 99214 OFFICE O/P EST MOD 30 MIN: CPT | Performed by: FAMILY MEDICINE

## 2019-07-23 PROCEDURE — 93000 ELECTROCARDIOGRAM COMPLETE: CPT | Performed by: FAMILY MEDICINE

## 2019-07-23 ASSESSMENT — ENCOUNTER SYMPTOMS: GASTROINTESTINAL NEGATIVE: 1

## 2019-07-23 NOTE — PROGRESS NOTES
19    Name: Catalino Ralph  :1945   Sex:female   Age:73 y.o. Chief Complaint   Patient presents with    Chest Pain    Fatigue     Paitent here for followu p on some issues that occurred over the weekend  About 12 days ago she was restarted on thyroid medication b/c her current chemotherapy is making her very hypothyroid  And she has been experiencing a lot os s/s from that    But also stress had still been elevated and was having chest pain but did not wanst to go to the ER over the weekend  No shortness of breath, no nausea    When she first started the thyroid med she did feel better for 4 to 5 days then the fatigue started to come back  Likely will need to increase her synthroid dose    EKG done today  We discussed her s/s and everything else she is going thru and she really admits she is stress, depressed and not feeling well but refuses to take antidepressant, she wants to continue to wean off the lexapro        Review of Systems   Constitutional: Positive for fatigue. Negative for appetite change, chills and fever. HENT: Negative. Cardiovascular: Positive for chest pain. Gastrointestinal: Negative. Genitourinary: Negative. Musculoskeletal: Positive for arthralgias and myalgias. Negative for gait problem and joint swelling. Neurological: Positive for numbness and headaches. Negative for dizziness, tremors, syncope, facial asymmetry, weakness and light-headedness. Psychiatric/Behavioral: Negative for confusion, dysphoric mood and hallucinations. The patient is nervous/anxious. The patient is not hyperactive.             Current Outpatient Medications:     SYNTHROID 88 MCG tablet, Take 1 tablet by mouth Daily Has allergy to levothyroxine, Disp: 30 tablet, Rfl: 2    escitalopram (LEXAPRO) 20 MG tablet, 1/2 tab every other day, Disp: , Rfl: 2    montelukast (SINGULAIR) 10 MG tablet, Take 1 tablet by mouth nightly, Disp: 30 tablet, Rfl: 2    LINOLEIC ACID-SUNFLOWER OIL PO, Take

## 2019-08-01 ENCOUNTER — OFFICE VISIT (OUTPATIENT)
Dept: ONCOLOGY | Age: 74
End: 2019-08-01
Payer: COMMERCIAL

## 2019-08-01 ENCOUNTER — HOSPITAL ENCOUNTER (OUTPATIENT)
Dept: INFUSION THERAPY | Age: 74
Discharge: HOME | End: 2019-08-01
Payer: COMMERCIAL

## 2019-08-01 VITALS
RESPIRATION RATE: 18 BRPM | TEMPERATURE: 97.9 F | HEART RATE: 58 BPM | DIASTOLIC BLOOD PRESSURE: 61 MMHG | SYSTOLIC BLOOD PRESSURE: 132 MMHG

## 2019-08-01 VITALS
DIASTOLIC BLOOD PRESSURE: 70 MMHG | SYSTOLIC BLOOD PRESSURE: 148 MMHG | TEMPERATURE: 98.8 F | HEIGHT: 64 IN | WEIGHT: 174.1 LBS | RESPIRATION RATE: 18 BRPM | BODY MASS INDEX: 29.72 KG/M2 | HEART RATE: 84 BPM

## 2019-08-01 DIAGNOSIS — C56.9 MALIGNANT NEOPLASM OF OVARY, UNSPECIFIED LATERALITY (HCC): Primary | ICD-10-CM

## 2019-08-01 DIAGNOSIS — C54.1 ADENOCARCINOMA OF ENDOMETRIUM (HCC): ICD-10-CM

## 2019-08-01 DIAGNOSIS — C50.412 MALIGNANT NEOPLASM OF UPPER-OUTER QUADRANT OF LEFT FEMALE BREAST, UNSPECIFIED ESTROGEN RECEPTOR STATUS (HCC): ICD-10-CM

## 2019-08-01 LAB
ALBUMIN SERPL-MCNC: 4.1 G/DL (ref 3.5–5.2)
ALP BLD-CCNC: 92 U/L (ref 35–104)
ALT SERPL-CCNC: 15 U/L (ref 0–32)
ANION GAP SERPL CALCULATED.3IONS-SCNC: 14 MMOL/L (ref 7–16)
AST SERPL-CCNC: 22 U/L (ref 0–31)
BASOPHILS ABSOLUTE: 0.04 E9/L (ref 0–0.2)
BASOPHILS RELATIVE PERCENT: 0.8 % (ref 0–2)
BILIRUB SERPL-MCNC: 0.5 MG/DL (ref 0–1.2)
BUN BLDV-MCNC: 11 MG/DL (ref 8–23)
CALCIUM SERPL-MCNC: 9.5 MG/DL (ref 8.6–10.2)
CHLORIDE BLD-SCNC: 101 MMOL/L (ref 98–107)
CO2: 27 MMOL/L (ref 22–29)
CREAT SERPL-MCNC: 0.6 MG/DL (ref 0.5–1)
EOSINOPHILS ABSOLUTE: 0 E9/L (ref 0.05–0.5)
EOSINOPHILS RELATIVE PERCENT: 0 % (ref 0–6)
GFR AFRICAN AMERICAN: >60
GFR NON-AFRICAN AMERICAN: >60 ML/MIN/1.73
GLUCOSE BLD-MCNC: 93 MG/DL (ref 74–99)
HCT VFR BLD CALC: 37.9 % (ref 34–48)
HEMOGLOBIN: 12.4 G/DL (ref 11.5–15.5)
IMMATURE GRANULOCYTES #: 0.02 E9/L
IMMATURE GRANULOCYTES %: 0.4 % (ref 0–5)
LYMPHOCYTES ABSOLUTE: 1.15 E9/L (ref 1.5–4)
LYMPHOCYTES RELATIVE PERCENT: 22.4 % (ref 20–42)
MCH RBC QN AUTO: 28.1 PG (ref 26–35)
MCHC RBC AUTO-ENTMCNC: 32.7 % (ref 32–34.5)
MCV RBC AUTO: 85.9 FL (ref 80–99.9)
MONOCYTES ABSOLUTE: 0.5 E9/L (ref 0.1–0.95)
MONOCYTES RELATIVE PERCENT: 9.7 % (ref 2–12)
NEUTROPHILS ABSOLUTE: 3.43 E9/L (ref 1.8–7.3)
NEUTROPHILS RELATIVE PERCENT: 66.7 % (ref 43–80)
PDW BLD-RTO: 16.4 FL (ref 11.5–15)
PLATELET # BLD: 186 E9/L (ref 130–450)
PMV BLD AUTO: 8.6 FL (ref 7–12)
POTASSIUM SERPL-SCNC: 4.3 MMOL/L (ref 3.5–5)
RBC # BLD: 4.41 E12/L (ref 3.5–5.5)
SODIUM BLD-SCNC: 142 MMOL/L (ref 132–146)
TOTAL PROTEIN: 6.8 G/DL (ref 6.4–8.3)
WBC # BLD: 5.1 E9/L (ref 4.5–11.5)

## 2019-08-01 PROCEDURE — 99214 OFFICE O/P EST MOD 30 MIN: CPT | Performed by: INTERNAL MEDICINE

## 2019-08-01 PROCEDURE — 2580000003 HC RX 258

## 2019-08-01 PROCEDURE — 6360000002 HC RX W HCPCS: Performed by: INTERNAL MEDICINE

## 2019-08-01 PROCEDURE — 2580000003 HC RX 258: Performed by: INTERNAL MEDICINE

## 2019-08-01 PROCEDURE — 36415 COLL VENOUS BLD VENIPUNCTURE: CPT | Performed by: INTERNAL MEDICINE

## 2019-08-01 PROCEDURE — 96413 CHEMO IV INFUSION 1 HR: CPT

## 2019-08-01 PROCEDURE — 80053 COMPREHEN METABOLIC PANEL: CPT

## 2019-08-01 PROCEDURE — 85025 COMPLETE CBC W/AUTO DIFF WBC: CPT

## 2019-08-01 RX ORDER — EPINEPHRINE 1 MG/ML
0.3 INJECTION, SOLUTION, CONCENTRATE INTRAVENOUS PRN
Status: CANCELLED | OUTPATIENT
Start: 2019-08-01

## 2019-08-01 RX ORDER — DIPHENHYDRAMINE HYDROCHLORIDE 50 MG/ML
50 INJECTION INTRAMUSCULAR; INTRAVENOUS ONCE
Status: CANCELLED | OUTPATIENT
Start: 2019-08-01

## 2019-08-01 RX ORDER — METHYLPREDNISOLONE SODIUM SUCCINATE 125 MG/2ML
125 INJECTION, POWDER, LYOPHILIZED, FOR SOLUTION INTRAMUSCULAR; INTRAVENOUS ONCE
Status: CANCELLED | OUTPATIENT
Start: 2019-08-01

## 2019-08-01 RX ORDER — SODIUM CHLORIDE 9 MG/ML
INJECTION, SOLUTION INTRAVENOUS
Status: DISCONTINUED
Start: 2019-08-01 | End: 2019-08-02 | Stop reason: HOSPADM

## 2019-08-01 RX ORDER — SODIUM CHLORIDE 0.9 % (FLUSH) 0.9 %
5 SYRINGE (ML) INJECTION PRN
Status: CANCELLED | OUTPATIENT
Start: 2019-08-01

## 2019-08-01 RX ORDER — SODIUM CHLORIDE 0.9 % (FLUSH) 0.9 %
10 SYRINGE (ML) INJECTION PRN
Status: DISCONTINUED | OUTPATIENT
Start: 2019-08-01 | End: 2019-08-02 | Stop reason: HOSPADM

## 2019-08-01 RX ORDER — SODIUM CHLORIDE 0.9 % (FLUSH) 0.9 %
10 SYRINGE (ML) INJECTION PRN
Status: CANCELLED | OUTPATIENT
Start: 2019-08-01

## 2019-08-01 RX ORDER — MEPERIDINE HYDROCHLORIDE 25 MG/ML
12.5 INJECTION INTRAMUSCULAR; INTRAVENOUS; SUBCUTANEOUS ONCE
Status: CANCELLED | OUTPATIENT
Start: 2019-08-01

## 2019-08-01 RX ORDER — SODIUM CHLORIDE 9 MG/ML
250 INJECTION, SOLUTION INTRAVENOUS ONCE
Status: CANCELLED | OUTPATIENT
Start: 2019-08-01

## 2019-08-01 RX ORDER — HEPARIN SODIUM (PORCINE) LOCK FLUSH IV SOLN 100 UNIT/ML 100 UNIT/ML
500 SOLUTION INTRAVENOUS PRN
Status: CANCELLED | OUTPATIENT
Start: 2019-08-01

## 2019-08-01 RX ORDER — 0.9 % SODIUM CHLORIDE 0.9 %
10 VIAL (ML) INJECTION ONCE
Status: CANCELLED | OUTPATIENT
Start: 2019-08-01

## 2019-08-01 RX ORDER — SODIUM CHLORIDE 9 MG/ML
250 INJECTION, SOLUTION INTRAVENOUS ONCE
Status: DISCONTINUED | OUTPATIENT
Start: 2019-08-01 | End: 2019-08-02 | Stop reason: HOSPADM

## 2019-08-01 RX ORDER — HEPARIN SODIUM (PORCINE) LOCK FLUSH IV SOLN 100 UNIT/ML 100 UNIT/ML
500 SOLUTION INTRAVENOUS PRN
Status: DISCONTINUED | OUTPATIENT
Start: 2019-08-01 | End: 2019-08-02 | Stop reason: HOSPADM

## 2019-08-01 RX ORDER — SODIUM CHLORIDE 9 MG/ML
INJECTION, SOLUTION INTRAVENOUS CONTINUOUS
Status: CANCELLED | OUTPATIENT
Start: 2019-08-01

## 2019-08-01 RX ADMIN — SODIUM CHLORIDE 250 ML: 9 INJECTION, SOLUTION INTRAVENOUS at 12:42

## 2019-08-01 RX ADMIN — SODIUM CHLORIDE, PRESERVATIVE FREE 500 UNITS: 5 INJECTION INTRAVENOUS at 13:28

## 2019-08-01 RX ADMIN — Medication 10 ML: at 12:41

## 2019-08-01 RX ADMIN — SODIUM CHLORIDE 200 MG: 9 INJECTION, SOLUTION INTRAVENOUS at 12:42

## 2019-08-01 RX ADMIN — Medication 10 ML: at 13:28

## 2019-08-01 NOTE — PROGRESS NOTES
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  -Adjuvant radiation therapy completed March 28, 2017.  -Endocrine therapy: Arimidex started March 30, 2017. She did not tolerate due to severe depression;  -Arimidex discontinued.   -Started on Femara after approximately 2 weeks, but did not tolerate Femara. Chose observation. Clinically, there is no evidence of recurrence. IA papillary serous endometrial adenocarcinoma, FIGO grade 3, - LVSI, tumor size 4.5 cm;    BRCA/Myrisk testing Negative  7/20/16-Exam under anesthesia, diagnostic laparoscopy, total laparoscopic hysterectomy, bilateral salpingo-oophorectomy, pelvic and periaortic lymphadenectomy, omentectomy. HDR VAGINAL BRACHYTHERAPY-9/29/16, 10/6/16, 10/13/16    On 9/21/2017 anterior right psoas muscle fine-needle aspirate: Positive for malignant cells, metastatic high-grade carcinoma compatible with patient's known endometrial serous carcinoma.     She completed 2 cycles of chemotherapy with Taxol Carbo and Avastin on 10/17 and 11/17. She had poor tolerance to chemotherapy, therefore she declined additional chemo and opted for Natural remedies instead     on 06/12/2018: 15 (<39UmL)      Re-staging scans on 06/15/2018:  CT of the abdomen and pelvis: 2.2 x 1.3 cm soft tissue nodule along the superior aspect of the urinary bladder suspicious for metastases, decreased in size since 1/23/18; no evidence of new abnormalities since prior visit; diverticulosis without evidence of diverticulitis.   CT Chest:  Multiple bilateral lung nodules suspicious for +BS  EXTREMITIES: Without clubbing, cyanosis, or edema. NEUROLOGIC: No focal deficits. 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  -Adjuvant radiation therapy completed March 28, 2017.  -Endocrine therapy: Arimidex started March 30, 2017. She did not tolerate due to severe depression;  -Arimidex discontinued.   -Started on Femara after approximately 2 weeks, but did not tolerate Femara. Chose observation. Bilateral screening mammogram on 10/03/2018 negative for malignancy  Clinically, there is no evidence of recurrence. IA papillary serous endometrial adenocarcinoma, FIGO grade 3, - LVSI, tumor size 4.5 cm;    BRCA/Myrisk testing Negative  7/20/16-Exam under anesthesia, diagnostic laparoscopy, total laparoscopic hysterectomy, bilateral salpingo-oophorectomy, pelvic and periaortic lymphadenectomy, omentectomy. HDR VAGINAL BRACHYTHERAPY-9/29/16, 10/6/16, 10/13/16    08/19/2017 PET/CT scan: Hypermetabolic bilateral lung nodules suspicious for metastasis. Hypermetabolic nodule in the right lower quadrant anterior to the psoas muscle suspicious for metastasis. 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     She completed 2 cycles of chemotherapy with Taxol Carboplatin and Avastin on 10/17 and 11/17.   She had poor tolerance to chemotherapy, therefore she declined additional chemotherapy and opted for Natural remedies instead     on 06/12/2018: 15 (<39UmL)      Re-staging scans on 06/15/2018:  CT of the abdomen and pelvis: 2.2 x 1.3 cm soft tissue nodule along the superior aspect of the urinary bladder suspicious for metastases, decreased in

## 2019-08-18 ENCOUNTER — HOSPITAL ENCOUNTER (OUTPATIENT)
Dept: CT IMAGING | Age: 74
Discharge: HOME | End: 2019-08-20
Payer: COMMERCIAL

## 2019-08-18 DIAGNOSIS — C54.1 ADENOCARCINOMA OF ENDOMETRIUM (HCC): ICD-10-CM

## 2019-08-18 PROCEDURE — 74177 CT ABD & PELVIS W/CONTRAST: CPT

## 2019-08-18 PROCEDURE — 6360000004 HC RX CONTRAST MEDICATION: Performed by: RADIOLOGY

## 2019-08-18 PROCEDURE — 71260 CT THORAX DX C+: CPT

## 2019-08-18 RX ADMIN — IOHEXOL 50 ML: 240 INJECTION, SOLUTION INTRATHECAL; INTRAVASCULAR; INTRAVENOUS; ORAL at 11:03

## 2019-08-18 RX ADMIN — IOPAMIDOL 110 ML: 755 INJECTION, SOLUTION INTRAVENOUS at 11:03

## 2019-08-21 ENCOUNTER — HOSPITAL ENCOUNTER (OUTPATIENT)
Age: 74
Discharge: HOME | End: 2019-08-23
Payer: COMMERCIAL

## 2019-08-21 DIAGNOSIS — E03.9 ACQUIRED HYPOTHYROIDISM: ICD-10-CM

## 2019-08-21 DIAGNOSIS — C54.1 ADENOCARCINOMA OF ENDOMETRIUM (HCC): ICD-10-CM

## 2019-08-21 LAB
ALBUMIN SERPL-MCNC: 4.1 G/DL (ref 3.5–5.2)
ALP BLD-CCNC: 78 U/L (ref 35–104)
ALT SERPL-CCNC: 9 U/L (ref 0–32)
ANION GAP SERPL CALCULATED.3IONS-SCNC: 14 MMOL/L (ref 7–16)
AST SERPL-CCNC: 22 U/L (ref 0–31)
BASOPHILS ABSOLUTE: 0.03 E9/L (ref 0–0.2)
BASOPHILS RELATIVE PERCENT: 0.6 % (ref 0–2)
BILIRUB SERPL-MCNC: 0.6 MG/DL (ref 0–1.2)
BUN BLDV-MCNC: 11 MG/DL (ref 8–23)
CALCIUM SERPL-MCNC: 9.4 MG/DL (ref 8.6–10.2)
CHLORIDE BLD-SCNC: 105 MMOL/L (ref 98–107)
CO2: 22 MMOL/L (ref 22–29)
CREAT SERPL-MCNC: 0.7 MG/DL (ref 0.5–1)
EOSINOPHILS ABSOLUTE: 0 E9/L (ref 0.05–0.5)
EOSINOPHILS RELATIVE PERCENT: 0 % (ref 0–6)
GFR AFRICAN AMERICAN: >60
GFR NON-AFRICAN AMERICAN: >60 ML/MIN/1.73
GLUCOSE BLD-MCNC: 111 MG/DL (ref 74–99)
HCT VFR BLD CALC: 40 % (ref 34–48)
HEMOGLOBIN: 12.7 G/DL (ref 11.5–15.5)
IMMATURE GRANULOCYTES #: 0.01 E9/L
IMMATURE GRANULOCYTES %: 0.2 % (ref 0–5)
LYMPHOCYTES ABSOLUTE: 1.06 E9/L (ref 1.5–4)
LYMPHOCYTES RELATIVE PERCENT: 21.1 % (ref 20–42)
MCH RBC QN AUTO: 27.9 PG (ref 26–35)
MCHC RBC AUTO-ENTMCNC: 31.8 % (ref 32–34.5)
MCV RBC AUTO: 87.9 FL (ref 80–99.9)
MONOCYTES ABSOLUTE: 0.51 E9/L (ref 0.1–0.95)
MONOCYTES RELATIVE PERCENT: 10.1 % (ref 2–12)
NEUTROPHILS ABSOLUTE: 3.42 E9/L (ref 1.8–7.3)
NEUTROPHILS RELATIVE PERCENT: 68 % (ref 43–80)
PDW BLD-RTO: 17 FL (ref 11.5–15)
PLATELET # BLD: 169 E9/L (ref 130–450)
PMV BLD AUTO: 9.9 FL (ref 7–12)
POTASSIUM SERPL-SCNC: 4.6 MMOL/L (ref 3.5–5)
RBC # BLD: 4.55 E12/L (ref 3.5–5.5)
SODIUM BLD-SCNC: 141 MMOL/L (ref 132–146)
T4 FREE: 1.58 NG/DL (ref 0.93–1.7)
TOTAL PROTEIN: 7 G/DL (ref 6.4–8.3)
TSH SERPL DL<=0.05 MIU/L-ACNC: 6.92 UIU/ML (ref 0.27–4.2)
WBC # BLD: 5 E9/L (ref 4.5–11.5)

## 2019-08-21 PROCEDURE — 80053 COMPREHEN METABOLIC PANEL: CPT

## 2019-08-21 PROCEDURE — 84443 ASSAY THYROID STIM HORMONE: CPT

## 2019-08-21 PROCEDURE — 84439 ASSAY OF FREE THYROXINE: CPT

## 2019-08-21 PROCEDURE — 85025 COMPLETE CBC W/AUTO DIFF WBC: CPT

## 2019-08-21 PROCEDURE — 36415 COLL VENOUS BLD VENIPUNCTURE: CPT

## 2019-08-21 NOTE — PROGRESS NOTES
05/17/2019 stable exam.  Continue Keytruda and repeat scans in 3 months. Cycle # 13 Keytruda was on 05/23/2019. Cycle # 14 Keytruda was on 06/13/2019. Cycle # 15 Keytruda was on 07/11/2019. Cycle # 16 [place] was on 08/01/2019. Cycle # 17 [place] is today 08/22/2019. Review of Systems;  CONSTITUTIONAL: No fever, chills. Fair appetite. Fatigue improved on synthroid. ENMT: Eyes: No diplopia; Nose/Mouth: No sore throat. RESPIRATORY: No hemoptysis. Shortness of breath on exertion  CARDIOVASCULAR: No chest pain, palpitations. GASTROINTESTINAL: No vomiting, abdominal pain  GENITOURINARY: No dysuria, urinary frequency, hematuria. MSK: Right hip pain  NEURO: No syncope, presyncope. Remainder:  ROS NEGATIVE    Past Medical History:      Diagnosis Date    Cancer [facility])     endometrial cancer, breast    Diverticulitis     GERD (gastroesophageal reflux disease)     Gout     Hiatal hernia     Macular degeneration     Osteoarthritis      Medications:  Reviewed and reconciled. Allergies: Allergies   Allergen Reactions    Asa [Aspirin] Hives    Levothyroxine      Other reaction(s): Free Text    Pepcid [Famotidine]     Prilosec [Omeprazole] Hives    Protonix [Pantoprazole Sodium] Other (See Comments)     Other reaction(s): Other: See Comments  DEPRESSION    Erythromycin Rash    Keflet [Cephalexin] Rash    Levaquin [Levofloxacin In D5w] Rash    Pcn [Penicillins] Rash    Polyethylene Glycol Rash     Bloating,gas    Tetracycline Rash    Tetracyclines & Related Rash     Physical Exam:  /61 (Site: Right Upper Arm, Position: Sitting, Cuff Size: Medium Adult)   Pulse 87   Temp 97.8 °F (36.6 °C) (Temporal)   Ht 5' 4\" (1.626 m)   Wt 170 lb 12.8 oz (77.5 kg)   BMI 29.32 kg/m²   GENERAL: Alert, oriented x 3, not in acute distress. HEENT: PERRLA; EOMI. Oropharynx clear. NECK: Supple. Without lymphadenopathy. LUNGS: Good air entry bilaterally. No wheezing, crackles or ronchi. CARDIOVASCULAR: Regular rate. No murmurs, rubs or gallops. ABDOMEN: Soft. Non-tender, non-distended. +BS  EXTREMITIES: Without clubbing, cyanosis, or edema. NEUROLOGIC: No focal deficits. 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  -Adjuvant radiation therapy completed March 28, 2017.  -Endocrine therapy: Arimidex started March 30, 2017. She did not tolerate due to severe depression;  -Arimidex discontinued.   -Started on Femara after approximately 2 weeks, but did not tolerate Femara. Chose observation. Bilateral screening mammogram on 10/03/2018 negative for malignancy  Clinically, there is no evidence of recurrence. IA papillary serous endometrial adenocarcinoma, FIGO grade 3, - LVSI, tumor size 4.5 cm;    BRCA/Myrisk testing Negative  7/20/16-Exam under anesthesia, diagnostic laparoscopy, total laparoscopic hysterectomy, bilateral salpingo-oophorectomy, pelvic and periaortic lymphadenectomy, omentectomy. HDR VAGINAL BRACHYTHERAPY-9/29/16, 10/6/16, 10/13/16    08/19/2017 PET/CT scan: Hypermetabolic bilateral lung nodules suspicious for metastasis. Hypermetabolic nodule in the right lower quadrant anterior to the psoas muscle suspicious for metastasis. 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     She completed 2 cycles of chemotherapy with Taxol Carboplatin and Avastin on 10/17 and 11/17.   She had poor tolerance to chemotherapy, therefore she declined additional chemotherapy and opted for Natural remedies instead     on 06/12/2018: 15 (<39UmL)      Re-staging scans on 06/15/2018:  CT of the abdomen and pelvis: 2.2 x 1.3 cm soft

## 2019-08-22 ENCOUNTER — OFFICE VISIT (OUTPATIENT)
Dept: ONCOLOGY | Age: 74
End: 2019-08-22
Payer: COMMERCIAL

## 2019-08-22 ENCOUNTER — HOSPITAL ENCOUNTER (OUTPATIENT)
Dept: INFUSION THERAPY | Age: 74
Discharge: HOME | End: 2019-08-22
Payer: COMMERCIAL

## 2019-08-22 VITALS
TEMPERATURE: 97.8 F | HEART RATE: 87 BPM | WEIGHT: 170.8 LBS | SYSTOLIC BLOOD PRESSURE: 131 MMHG | DIASTOLIC BLOOD PRESSURE: 61 MMHG | BODY MASS INDEX: 29.16 KG/M2 | HEIGHT: 64 IN

## 2019-08-22 VITALS — DIASTOLIC BLOOD PRESSURE: 55 MMHG | RESPIRATION RATE: 18 BRPM | SYSTOLIC BLOOD PRESSURE: 119 MMHG | HEART RATE: 70 BPM

## 2019-08-22 DIAGNOSIS — C54.1 ADENOCARCINOMA OF ENDOMETRIUM (HCC): ICD-10-CM

## 2019-08-22 DIAGNOSIS — C56.9 MALIGNANT NEOPLASM OF OVARY, UNSPECIFIED LATERALITY (HCC): Primary | ICD-10-CM

## 2019-08-22 DIAGNOSIS — N13.30 HYDRONEPHROSIS, UNSPECIFIED HYDRONEPHROSIS TYPE: Primary | ICD-10-CM

## 2019-08-22 DIAGNOSIS — M25.551 PAIN OF RIGHT HIP JOINT: ICD-10-CM

## 2019-08-22 PROCEDURE — 6360000002 HC RX W HCPCS: Performed by: INTERNAL MEDICINE

## 2019-08-22 PROCEDURE — 2580000003 HC RX 258

## 2019-08-22 PROCEDURE — 2580000003 HC RX 258: Performed by: INTERNAL MEDICINE

## 2019-08-22 PROCEDURE — 96413 CHEMO IV INFUSION 1 HR: CPT

## 2019-08-22 PROCEDURE — 99214 OFFICE O/P EST MOD 30 MIN: CPT | Performed by: INTERNAL MEDICINE

## 2019-08-22 RX ORDER — MEPERIDINE HYDROCHLORIDE 25 MG/ML
12.5 INJECTION INTRAMUSCULAR; INTRAVENOUS; SUBCUTANEOUS ONCE
Status: CANCELLED | OUTPATIENT
Start: 2019-08-22

## 2019-08-22 RX ORDER — SODIUM CHLORIDE 9 MG/ML
250 INJECTION, SOLUTION INTRAVENOUS ONCE
Status: CANCELLED | OUTPATIENT
Start: 2019-08-22

## 2019-08-22 RX ORDER — SODIUM CHLORIDE 9 MG/ML
INJECTION, SOLUTION INTRAVENOUS CONTINUOUS
Status: CANCELLED | OUTPATIENT
Start: 2019-08-22

## 2019-08-22 RX ORDER — SODIUM CHLORIDE 9 MG/ML
250 INJECTION, SOLUTION INTRAVENOUS ONCE
Status: COMPLETED | OUTPATIENT
Start: 2019-08-22 | End: 2019-08-22

## 2019-08-22 RX ORDER — SODIUM CHLORIDE 0.9 % (FLUSH) 0.9 %
10 SYRINGE (ML) INJECTION PRN
Status: CANCELLED | OUTPATIENT
Start: 2019-08-22

## 2019-08-22 RX ORDER — HEPARIN SODIUM (PORCINE) LOCK FLUSH IV SOLN 100 UNIT/ML 100 UNIT/ML
500 SOLUTION INTRAVENOUS PRN
Status: DISCONTINUED | OUTPATIENT
Start: 2019-08-22 | End: 2019-08-23 | Stop reason: HOSPADM

## 2019-08-22 RX ORDER — METHYLPREDNISOLONE SODIUM SUCCINATE 125 MG/2ML
125 INJECTION, POWDER, LYOPHILIZED, FOR SOLUTION INTRAMUSCULAR; INTRAVENOUS ONCE
Status: CANCELLED | OUTPATIENT
Start: 2019-08-22

## 2019-08-22 RX ORDER — HEPARIN SODIUM (PORCINE) LOCK FLUSH IV SOLN 100 UNIT/ML 100 UNIT/ML
500 SOLUTION INTRAVENOUS PRN
Status: CANCELLED | OUTPATIENT
Start: 2019-08-22

## 2019-08-22 RX ORDER — SODIUM CHLORIDE 0.9 % (FLUSH) 0.9 %
5 SYRINGE (ML) INJECTION PRN
Status: CANCELLED | OUTPATIENT
Start: 2019-08-22

## 2019-08-22 RX ORDER — 0.9 % SODIUM CHLORIDE 0.9 %
10 VIAL (ML) INJECTION ONCE
Status: CANCELLED | OUTPATIENT
Start: 2019-08-22

## 2019-08-22 RX ORDER — DIPHENHYDRAMINE HYDROCHLORIDE 50 MG/ML
50 INJECTION INTRAMUSCULAR; INTRAVENOUS ONCE
Status: CANCELLED | OUTPATIENT
Start: 2019-08-22

## 2019-08-22 RX ORDER — EPINEPHRINE 1 MG/ML
0.3 INJECTION, SOLUTION, CONCENTRATE INTRAVENOUS PRN
Status: CANCELLED | OUTPATIENT
Start: 2019-08-22

## 2019-08-22 RX ORDER — SODIUM CHLORIDE 9 MG/ML
INJECTION, SOLUTION INTRAVENOUS
Status: COMPLETED
Start: 2019-08-22 | End: 2019-08-22

## 2019-08-22 RX ORDER — SODIUM CHLORIDE 0.9 % (FLUSH) 0.9 %
10 SYRINGE (ML) INJECTION PRN
Status: DISCONTINUED | OUTPATIENT
Start: 2019-08-22 | End: 2019-08-23 | Stop reason: HOSPADM

## 2019-08-22 RX ADMIN — Medication 10 ML: at 12:03

## 2019-08-22 RX ADMIN — HEPARIN 500 UNITS: 100 SYRINGE at 12:03

## 2019-08-22 RX ADMIN — SODIUM CHLORIDE 250 ML: 9 INJECTION, SOLUTION INTRAVENOUS at 11:20

## 2019-08-22 RX ADMIN — SODIUM CHLORIDE 200 MG: 9 INJECTION, SOLUTION INTRAVENOUS at 11:26

## 2019-08-23 ENCOUNTER — OFFICE VISIT (OUTPATIENT)
Dept: FAMILY MEDICINE CLINIC | Age: 74
End: 2019-08-23
Payer: COMMERCIAL

## 2019-08-23 VITALS
HEIGHT: 64 IN | SYSTOLIC BLOOD PRESSURE: 118 MMHG | DIASTOLIC BLOOD PRESSURE: 68 MMHG | HEART RATE: 76 BPM | TEMPERATURE: 98.6 F | BODY MASS INDEX: 29.41 KG/M2 | WEIGHT: 172.25 LBS | OXYGEN SATURATION: 99 %

## 2019-08-23 DIAGNOSIS — C56.9 MALIGNANT NEOPLASM OF OVARY, UNSPECIFIED LATERALITY (HCC): ICD-10-CM

## 2019-08-23 DIAGNOSIS — C54.1 ADENOCARCINOMA OF ENDOMETRIUM (HCC): ICD-10-CM

## 2019-08-23 DIAGNOSIS — M25.551 RIGHT HIP PAIN: Primary | ICD-10-CM

## 2019-08-23 DIAGNOSIS — E03.9 ACQUIRED HYPOTHYROIDISM: ICD-10-CM

## 2019-08-23 PROCEDURE — 99214 OFFICE O/P EST MOD 30 MIN: CPT | Performed by: FAMILY MEDICINE

## 2019-08-23 RX ORDER — OXYCODONE HYDROCHLORIDE AND ACETAMINOPHEN 5; 325 MG/1; MG/1
1 TABLET ORAL EVERY 6 HOURS PRN
Qty: 75 TABLET | Refills: 0 | Status: SHIPPED | OUTPATIENT
Start: 2019-08-23 | End: 2019-09-04 | Stop reason: SDUPTHER

## 2019-08-23 RX ORDER — LEVOTHYROXINE SODIUM 100 MCG
100 TABLET ORAL DAILY
Qty: 30 TABLET | Refills: 2 | Status: SHIPPED | OUTPATIENT
Start: 2019-08-23 | End: 2019-10-10 | Stop reason: DRUGHIGH

## 2019-08-23 ASSESSMENT — ENCOUNTER SYMPTOMS
CONSTIPATION: 0
SHORTNESS OF BREATH: 0
ABDOMINAL PAIN: 0
EYE PAIN: 0
DIARRHEA: 0
SINUS PAIN: 0
CHEST TIGHTNESS: 0
VOMITING: 0
SORE THROAT: 0
WHEEZING: 0
TROUBLE SWALLOWING: 0
BACK PAIN: 0
NAUSEA: 0
COUGH: 0

## 2019-08-23 NOTE — PROGRESS NOTES
INTERVENTION PERPH LES N/A 8/17/2018    BRONCHOSCOPY ULTRASOUND performed by Alma Abarca MD at [address] W/TRANSBRONCHIAL LUNG BX 1 LOBE  8/17/2018    BRONCHOSCOPY/TRANSBRONCHIAL NEEDLE BIOPSY performed by Alma Abarca MD at [address] W/TRANSBRONCHIAL LUNG BX EACH LOBE  8/17/2018    BRONCHOSCOPY/TRANSBRONCHIAL NEEDLE BIOPSY ADDL LOBE performed by Alma Abarca MD at [address] TUNNELED CTR VAD W/SUBQ PORT AGE 5 YR/> N/A 12/4/2018    MEDIPORT INSERTION performed by Luis Beyer MD at [address] [facility] History     Tobacco History     Smoking Status  Former Smoker Smoking Frequency  0.25 packs/day for 20 years (5 pk yrs) Smoking Tobacco Type  Cigarettes    Smokeless Tobacco Use  Never Used          Alcohol History     Alcohol Use Status  Yes Comment  rare          Drug Use     Drug Use Status  No          Sexual Activity     Sexually Active  Not Asked            /68   Pulse 76   Temp 98.6 °F (37 °C)   Ht 5' 4\" (1.626 m)   Wt 172 lb 4 oz (78.1 kg)   SpO2 99%   BMI 29.57 kg/m²     EXAM:   Physical Exam   Constitutional: She is oriented to person, place, and time. She appears well-developed and well-nourished. HENT:   Head: Normocephalic and atraumatic. Eyes: Pupils are equal, round, and reactive to light. EOM are normal.   Neck: Normal range of motion. Cardiovascular: Normal rate and regular rhythm. Pulmonary/Chest: Effort normal and breath sounds normal.   Musculoskeletal:   Right hip pain in right groin, not tender over the greater trochanter   Neurological: She is alert and oriented to person, place, and time. Skin: Skin is warm and dry. Nursing note and vitals reviewed. Trevor Ng was seen today for hip pain and medication refill. Diagnoses and all orders for this visit:    Right hip pain  -     XR HIP RIGHT (2-3 VIEWS);  Future  -     oxyCODONE-acetaminophen (PERCOCET) 5-325 MG per tablet;

## 2019-09-03 ENCOUNTER — TELEPHONE (OUTPATIENT)
Dept: ONCOLOGY | Age: 74
End: 2019-09-03

## 2019-09-03 ENCOUNTER — HOSPITAL ENCOUNTER (OUTPATIENT)
Dept: MRI IMAGING | Age: 74
Discharge: HOME | End: 2019-09-05
Payer: COMMERCIAL

## 2019-09-03 DIAGNOSIS — M25.551 PAIN OF RIGHT HIP JOINT: ICD-10-CM

## 2019-09-03 PROCEDURE — 6360000004 HC RX CONTRAST MEDICATION: Performed by: RADIOLOGY

## 2019-09-03 PROCEDURE — 73722 MRI JOINT OF LWR EXTR W/DYE: CPT

## 2019-09-03 PROCEDURE — A9579 GAD-BASE MR CONTRAST NOS,1ML: HCPCS | Performed by: RADIOLOGY

## 2019-09-03 RX ADMIN — GADOTERIDOL 15 ML: 279.3 INJECTION, SOLUTION INTRAVENOUS at 14:26

## 2019-09-04 ENCOUNTER — OFFICE VISIT (OUTPATIENT)
Dept: FAMILY MEDICINE CLINIC | Age: 74
End: 2019-09-04
Payer: COMMERCIAL

## 2019-09-04 VITALS
HEIGHT: 64 IN | BODY MASS INDEX: 29.06 KG/M2 | DIASTOLIC BLOOD PRESSURE: 80 MMHG | SYSTOLIC BLOOD PRESSURE: 130 MMHG | HEART RATE: 74 BPM | WEIGHT: 170.25 LBS | OXYGEN SATURATION: 98 % | TEMPERATURE: 97.9 F

## 2019-09-04 DIAGNOSIS — M70.61 TROCHANTERIC BURSITIS OF RIGHT HIP: Primary | ICD-10-CM

## 2019-09-04 DIAGNOSIS — C56.9 MALIGNANT NEOPLASM OF OVARY, UNSPECIFIED LATERALITY (HCC): ICD-10-CM

## 2019-09-04 DIAGNOSIS — C54.1 ADENOCARCINOMA OF ENDOMETRIUM (HCC): ICD-10-CM

## 2019-09-04 DIAGNOSIS — E03.9 ACQUIRED HYPOTHYROIDISM: ICD-10-CM

## 2019-09-04 DIAGNOSIS — M25.451 EFFUSION, RIGHT HIP: ICD-10-CM

## 2019-09-04 DIAGNOSIS — M25.551 RIGHT HIP PAIN: ICD-10-CM

## 2019-09-04 PROCEDURE — 99214 OFFICE O/P EST MOD 30 MIN: CPT | Performed by: FAMILY MEDICINE

## 2019-09-04 RX ORDER — OXYCODONE HYDROCHLORIDE AND ACETAMINOPHEN 5; 325 MG/1; MG/1
1 TABLET ORAL EVERY 8 HOURS PRN
Qty: 90 TABLET | Refills: 0 | Status: SHIPPED | OUTPATIENT
Start: 2019-09-04 | End: 2019-09-05 | Stop reason: SDUPTHER

## 2019-09-04 RX ORDER — PREDNISONE 10 MG/1
10 TABLET ORAL 2 TIMES DAILY
Qty: 14 TABLET | Refills: 1 | Status: SHIPPED | OUTPATIENT
Start: 2019-09-04 | End: 2019-09-11

## 2019-09-04 ASSESSMENT — ENCOUNTER SYMPTOMS
SHORTNESS OF BREATH: 0
CONSTIPATION: 0
ABDOMINAL PAIN: 1
CHEST TIGHTNESS: 0
EYE PAIN: 0
SORE THROAT: 0
COUGH: 0
BACK PAIN: 0
VOMITING: 0
SINUS PAIN: 0
WHEEZING: 0
TROUBLE SWALLOWING: 0
DIARRHEA: 0
NAUSEA: 0

## 2019-09-05 DIAGNOSIS — M25.551 RIGHT HIP PAIN: ICD-10-CM

## 2019-09-05 DIAGNOSIS — C56.9 MALIGNANT NEOPLASM OF OVARY, UNSPECIFIED LATERALITY (HCC): ICD-10-CM

## 2019-09-05 DIAGNOSIS — C54.1 ADENOCARCINOMA OF ENDOMETRIUM (HCC): ICD-10-CM

## 2019-09-05 RX ORDER — OXYCODONE HYDROCHLORIDE AND ACETAMINOPHEN 5; 325 MG/1; MG/1
1 TABLET ORAL EVERY 4 HOURS PRN
Qty: 120 TABLET | Refills: 0 | Status: SHIPPED | OUTPATIENT
Start: 2019-09-05 | End: 2019-10-05

## 2019-09-06 ENCOUNTER — TELEPHONE (OUTPATIENT)
Dept: ONCOLOGY | Age: 74
End: 2019-09-06

## 2019-09-09 ENCOUNTER — TELEPHONE (OUTPATIENT)
Dept: FAMILY MEDICINE CLINIC | Age: 74
End: 2019-09-09

## 2019-09-13 ENCOUNTER — TELEPHONE (OUTPATIENT)
Dept: ONCOLOGY | Age: 74
End: 2019-09-13

## 2019-09-13 NOTE — TELEPHONE ENCOUNTER
Returned call and requested pt to set up her appt with  and if she has difficulty scheduling, to please notify and we surya assist her. Requested pt to notify us of appt date and we will reschedule her appt with .

## 2019-09-17 ENCOUNTER — TELEPHONE (OUTPATIENT)
Dept: ONCOLOGY | Age: 74
End: 2019-09-17

## 2019-09-19 ENCOUNTER — HOSPITAL ENCOUNTER (OUTPATIENT)
Dept: INFUSION THERAPY | Age: 74
End: 2019-09-19

## 2019-09-24 ENCOUNTER — OFFICE VISIT (OUTPATIENT)
Dept: ORTHOPEDIC SURGERY | Age: 74
End: 2019-09-24
Payer: COMMERCIAL

## 2019-09-24 VITALS
HEART RATE: 83 BPM | HEIGHT: 64 IN | SYSTOLIC BLOOD PRESSURE: 125 MMHG | WEIGHT: 171 LBS | DIASTOLIC BLOOD PRESSURE: 76 MMHG | BODY MASS INDEX: 29.19 KG/M2

## 2019-09-24 DIAGNOSIS — M25.551 RIGHT HIP PAIN: Primary | ICD-10-CM

## 2019-09-24 PROCEDURE — 20610 DRAIN/INJ JOINT/BURSA W/O US: CPT | Performed by: ORTHOPAEDIC SURGERY

## 2019-09-24 PROCEDURE — 99213 OFFICE O/P EST LOW 20 MIN: CPT | Performed by: ORTHOPAEDIC SURGERY

## 2019-09-24 RX ORDER — TRIAMCINOLONE ACETONIDE 40 MG/ML
40 INJECTION, SUSPENSION INTRA-ARTICULAR; INTRAMUSCULAR ONCE
Status: COMPLETED | OUTPATIENT
Start: 2019-09-24 | End: 2019-09-24

## 2019-09-24 RX ORDER — LIDOCAINE HYDROCHLORIDE 10 MG/ML
4 INJECTION, SOLUTION INFILTRATION; PERINEURAL ONCE
Status: COMPLETED | OUTPATIENT
Start: 2019-09-24 | End: 2019-09-24

## 2019-09-24 RX ORDER — MONTELUKAST SODIUM 10 MG/1
TABLET ORAL NIGHTLY PRN
COMMUNITY
Start: 2019-06-24

## 2019-09-24 RX ADMIN — TRIAMCINOLONE ACETONIDE 40 MG: 40 INJECTION, SUSPENSION INTRA-ARTICULAR; INTRAMUSCULAR at 10:05

## 2019-09-24 RX ADMIN — LIDOCAINE HYDROCHLORIDE 4 ML: 10 INJECTION, SOLUTION INFILTRATION; PERINEURAL at 10:04

## 2019-09-24 SDOH — HEALTH STABILITY: MENTAL HEALTH: HOW OFTEN DO YOU HAVE A DRINK CONTAINING ALCOHOL?: MONTHLY OR LESS

## 2019-09-26 ENCOUNTER — OFFICE VISIT (OUTPATIENT)
Dept: ONCOLOGY | Age: 74
End: 2019-09-26
Payer: COMMERCIAL

## 2019-09-26 ENCOUNTER — HOSPITAL ENCOUNTER (OUTPATIENT)
Dept: INFUSION THERAPY | Age: 74
Discharge: HOME | End: 2019-09-26
Payer: COMMERCIAL

## 2019-09-26 VITALS
TEMPERATURE: 97.7 F | SYSTOLIC BLOOD PRESSURE: 147 MMHG | DIASTOLIC BLOOD PRESSURE: 70 MMHG | WEIGHT: 173.4 LBS | HEIGHT: 64 IN | BODY MASS INDEX: 29.6 KG/M2 | HEART RATE: 69 BPM

## 2019-09-26 VITALS
TEMPERATURE: 98.3 F | SYSTOLIC BLOOD PRESSURE: 119 MMHG | RESPIRATION RATE: 18 BRPM | HEART RATE: 77 BPM | DIASTOLIC BLOOD PRESSURE: 57 MMHG

## 2019-09-26 DIAGNOSIS — C50.412 MALIGNANT NEOPLASM OF UPPER-OUTER QUADRANT OF LEFT FEMALE BREAST, UNSPECIFIED ESTROGEN RECEPTOR STATUS (HCC): ICD-10-CM

## 2019-09-26 DIAGNOSIS — C54.1 ADENOCARCINOMA OF ENDOMETRIUM (HCC): ICD-10-CM

## 2019-09-26 DIAGNOSIS — C57.9 GYNECOLOGIC CANCER (HCC): Primary | ICD-10-CM

## 2019-09-26 DIAGNOSIS — Z12.31 ENCOUNTER FOR SCREENING MAMMOGRAM FOR MALIGNANT NEOPLASM OF BREAST: ICD-10-CM

## 2019-09-26 DIAGNOSIS — C56.9 MALIGNANT NEOPLASM OF OVARY, UNSPECIFIED LATERALITY (HCC): ICD-10-CM

## 2019-09-26 DIAGNOSIS — C54.1 ENDOMETRIAL CANCER (HCC): ICD-10-CM

## 2019-09-26 LAB
ALBUMIN SERPL-MCNC: 3.8 G/DL (ref 3.5–5.2)
ALP BLD-CCNC: 87 U/L (ref 35–104)
ALT SERPL-CCNC: 18 U/L (ref 0–32)
ANION GAP SERPL CALCULATED.3IONS-SCNC: 12 MMOL/L (ref 7–16)
AST SERPL-CCNC: 13 U/L (ref 0–31)
BASOPHILS ABSOLUTE: 0.02 E9/L (ref 0–0.2)
BASOPHILS RELATIVE PERCENT: 0.2 % (ref 0–2)
BILIRUB SERPL-MCNC: 0.3 MG/DL (ref 0–1.2)
BUN BLDV-MCNC: 13 MG/DL (ref 8–23)
CALCIUM SERPL-MCNC: 9.5 MG/DL (ref 8.6–10.2)
CHLORIDE BLD-SCNC: 103 MMOL/L (ref 98–107)
CO2: 25 MMOL/L (ref 22–29)
CREAT SERPL-MCNC: 0.5 MG/DL (ref 0.5–1)
EOSINOPHILS ABSOLUTE: 0 E9/L (ref 0.05–0.5)
EOSINOPHILS RELATIVE PERCENT: 0 % (ref 0–6)
GFR AFRICAN AMERICAN: >60
GFR NON-AFRICAN AMERICAN: >60 ML/MIN/1.73
GLUCOSE BLD-MCNC: 121 MG/DL (ref 74–99)
HCT VFR BLD CALC: 36.4 % (ref 34–48)
HEMOGLOBIN: 11.7 G/DL (ref 11.5–15.5)
IMMATURE GRANULOCYTES #: 0.07 E9/L
IMMATURE GRANULOCYTES %: 0.8 % (ref 0–5)
LYMPHOCYTES ABSOLUTE: 1.12 E9/L (ref 1.5–4)
LYMPHOCYTES RELATIVE PERCENT: 12.9 % (ref 20–42)
MCH RBC QN AUTO: 27.7 PG (ref 26–35)
MCHC RBC AUTO-ENTMCNC: 32.1 % (ref 32–34.5)
MCV RBC AUTO: 86.3 FL (ref 80–99.9)
MONOCYTES ABSOLUTE: 0.56 E9/L (ref 0.1–0.95)
MONOCYTES RELATIVE PERCENT: 6.5 % (ref 2–12)
NEUTROPHILS ABSOLUTE: 6.9 E9/L (ref 1.8–7.3)
NEUTROPHILS RELATIVE PERCENT: 79.6 % (ref 43–80)
PDW BLD-RTO: 14.7 FL (ref 11.5–15)
PLATELET # BLD: 269 E9/L (ref 130–450)
PMV BLD AUTO: 8.6 FL (ref 7–12)
POTASSIUM SERPL-SCNC: 4.2 MMOL/L (ref 3.5–5)
RBC # BLD: 4.22 E12/L (ref 3.5–5.5)
SODIUM BLD-SCNC: 140 MMOL/L (ref 132–146)
TOTAL PROTEIN: 6.9 G/DL (ref 6.4–8.3)
WBC # BLD: 8.7 E9/L (ref 4.5–11.5)

## 2019-09-26 PROCEDURE — 99214 OFFICE O/P EST MOD 30 MIN: CPT | Performed by: INTERNAL MEDICINE

## 2019-09-26 PROCEDURE — 96413 CHEMO IV INFUSION 1 HR: CPT

## 2019-09-26 PROCEDURE — 2580000003 HC RX 258: Performed by: INTERNAL MEDICINE

## 2019-09-26 PROCEDURE — 80053 COMPREHEN METABOLIC PANEL: CPT

## 2019-09-26 PROCEDURE — 6360000002 HC RX W HCPCS: Performed by: INTERNAL MEDICINE

## 2019-09-26 PROCEDURE — 85025 COMPLETE CBC W/AUTO DIFF WBC: CPT

## 2019-09-26 RX ORDER — METHYLPREDNISOLONE SODIUM SUCCINATE 125 MG/2ML
125 INJECTION, POWDER, LYOPHILIZED, FOR SOLUTION INTRAMUSCULAR; INTRAVENOUS ONCE
Status: CANCELLED | OUTPATIENT
Start: 2019-09-26

## 2019-09-26 RX ORDER — HEPARIN SODIUM (PORCINE) LOCK FLUSH IV SOLN 100 UNIT/ML 100 UNIT/ML
500 SOLUTION INTRAVENOUS PRN
Status: CANCELLED | OUTPATIENT
Start: 2019-09-26

## 2019-09-26 RX ORDER — 0.9 % SODIUM CHLORIDE 0.9 %
10 VIAL (ML) INJECTION ONCE
Status: CANCELLED | OUTPATIENT
Start: 2019-09-26

## 2019-09-26 RX ORDER — EPINEPHRINE 1 MG/ML
0.3 INJECTION, SOLUTION, CONCENTRATE INTRAVENOUS PRN
Status: CANCELLED | OUTPATIENT
Start: 2019-09-26

## 2019-09-26 RX ORDER — MEPERIDINE HYDROCHLORIDE 25 MG/ML
12.5 INJECTION INTRAMUSCULAR; INTRAVENOUS; SUBCUTANEOUS ONCE
Status: CANCELLED | OUTPATIENT
Start: 2019-09-26

## 2019-09-26 RX ORDER — SODIUM CHLORIDE 9 MG/ML
250 INJECTION, SOLUTION INTRAVENOUS ONCE
Status: CANCELLED | OUTPATIENT
Start: 2019-09-26

## 2019-09-26 RX ORDER — SODIUM CHLORIDE 9 MG/ML
250 INJECTION, SOLUTION INTRAVENOUS ONCE
Status: DISCONTINUED | OUTPATIENT
Start: 2019-09-26 | End: 2019-09-27 | Stop reason: HOSPADM

## 2019-09-26 RX ORDER — SODIUM CHLORIDE 9 MG/ML
INJECTION, SOLUTION INTRAVENOUS CONTINUOUS
Status: CANCELLED | OUTPATIENT
Start: 2019-09-26

## 2019-09-26 RX ORDER — SODIUM CHLORIDE 0.9 % (FLUSH) 0.9 %
5 SYRINGE (ML) INJECTION PRN
Status: CANCELLED | OUTPATIENT
Start: 2019-09-26

## 2019-09-26 RX ORDER — HEPARIN SODIUM (PORCINE) LOCK FLUSH IV SOLN 100 UNIT/ML 100 UNIT/ML
500 SOLUTION INTRAVENOUS PRN
Status: DISCONTINUED | OUTPATIENT
Start: 2019-09-26 | End: 2019-09-27 | Stop reason: HOSPADM

## 2019-09-26 RX ORDER — SODIUM CHLORIDE 0.9 % (FLUSH) 0.9 %
10 SYRINGE (ML) INJECTION PRN
Status: CANCELLED | OUTPATIENT
Start: 2019-09-26

## 2019-09-26 RX ORDER — SODIUM CHLORIDE 0.9 % (FLUSH) 0.9 %
10 SYRINGE (ML) INJECTION PRN
Status: DISCONTINUED | OUTPATIENT
Start: 2019-09-26 | End: 2019-09-27 | Stop reason: HOSPADM

## 2019-09-26 RX ORDER — DIPHENHYDRAMINE HYDROCHLORIDE 50 MG/ML
50 INJECTION INTRAMUSCULAR; INTRAVENOUS ONCE
Status: CANCELLED | OUTPATIENT
Start: 2019-09-26

## 2019-09-26 RX ADMIN — Medication 10 ML: at 11:44

## 2019-09-26 RX ADMIN — Medication 500 UNITS: at 13:18

## 2019-09-26 RX ADMIN — SODIUM CHLORIDE 200 MG: 9 INJECTION, SOLUTION INTRAVENOUS at 12:28

## 2019-09-26 RX ADMIN — Medication 10 ML: at 11:45

## 2019-09-26 RX ADMIN — SODIUM CHLORIDE 250 ML: 9 INJECTION, SOLUTION INTRAVENOUS at 12:28

## 2019-10-09 ENCOUNTER — HOSPITAL ENCOUNTER (OUTPATIENT)
Age: 74
Discharge: HOME | End: 2019-10-11
Payer: COMMERCIAL

## 2019-10-09 ENCOUNTER — OFFICE VISIT (OUTPATIENT)
Dept: FAMILY MEDICINE CLINIC | Age: 74
End: 2019-10-09
Payer: COMMERCIAL

## 2019-10-09 VITALS
SYSTOLIC BLOOD PRESSURE: 132 MMHG | DIASTOLIC BLOOD PRESSURE: 78 MMHG | OXYGEN SATURATION: 97 % | BODY MASS INDEX: 28.88 KG/M2 | HEART RATE: 64 BPM | HEIGHT: 64 IN | WEIGHT: 169.13 LBS | TEMPERATURE: 98.3 F

## 2019-10-09 DIAGNOSIS — E03.9 ACQUIRED HYPOTHYROIDISM: ICD-10-CM

## 2019-10-09 DIAGNOSIS — C54.1 ENDOMETRIAL CANCER (HCC): Primary | ICD-10-CM

## 2019-10-09 DIAGNOSIS — G89.3 CHRONIC PAIN DUE TO NEOPLASM: ICD-10-CM

## 2019-10-09 LAB
T4 FREE: 1.51 NG/DL (ref 0.93–1.7)
TSH SERPL DL<=0.05 MIU/L-ACNC: 7.96 UIU/ML (ref 0.27–4.2)

## 2019-10-09 PROCEDURE — 84443 ASSAY THYROID STIM HORMONE: CPT

## 2019-10-09 PROCEDURE — 36415 COLL VENOUS BLD VENIPUNCTURE: CPT

## 2019-10-09 PROCEDURE — 84439 ASSAY OF FREE THYROXINE: CPT

## 2019-10-09 PROCEDURE — 99214 OFFICE O/P EST MOD 30 MIN: CPT | Performed by: FAMILY MEDICINE

## 2019-10-09 RX ORDER — LORATADINE 10 MG/1
10 TABLET ORAL DAILY PRN
COMMUNITY

## 2019-10-09 RX ORDER — OXYCODONE HYDROCHLORIDE AND ACETAMINOPHEN 5; 325 MG/1; MG/1
1 TABLET ORAL EVERY 4 HOURS PRN
COMMUNITY
End: 2019-11-04 | Stop reason: ALTCHOICE

## 2019-10-09 ASSESSMENT — ENCOUNTER SYMPTOMS
CHEST TIGHTNESS: 0
DIARRHEA: 0
SHORTNESS OF BREATH: 0
WHEEZING: 0
VOMITING: 0
ABDOMINAL PAIN: 0
TROUBLE SWALLOWING: 0
BACK PAIN: 0
SINUS PAIN: 0
SORE THROAT: 0
CONSTIPATION: 0
NAUSEA: 0
EYE PAIN: 0
COUGH: 0

## 2019-10-10 RX ORDER — LEVOTHYROXINE SODIUM 112 MCG
112 TABLET ORAL DAILY
Qty: 30 TABLET | Refills: 2
Start: 2019-10-10 | End: 2019-10-11 | Stop reason: SDUPTHER

## 2019-10-11 RX ORDER — LEVOTHYROXINE SODIUM 112 MCG
112 TABLET ORAL DAILY
Qty: 30 TABLET | Refills: 2 | Status: SHIPPED | OUTPATIENT
Start: 2019-10-11 | End: 2019-12-30 | Stop reason: SDUPTHER

## 2019-10-23 ENCOUNTER — HOSPITAL ENCOUNTER (OUTPATIENT)
Age: 74
Discharge: HOME | End: 2019-10-25
Payer: COMMERCIAL

## 2019-10-23 LAB
ALBUMIN SERPL-MCNC: 3.8 G/DL (ref 3.5–5.2)
ALP BLD-CCNC: 93 U/L (ref 35–104)
ALT SERPL-CCNC: 14 U/L (ref 0–32)
ANION GAP SERPL CALCULATED.3IONS-SCNC: 14 MMOL/L (ref 7–16)
AST SERPL-CCNC: 16 U/L (ref 0–31)
BASOPHILS ABSOLUTE: 0.04 E9/L (ref 0–0.2)
BASOPHILS RELATIVE PERCENT: 0.8 % (ref 0–2)
BILIRUB SERPL-MCNC: 0.6 MG/DL (ref 0–1.2)
BUN BLDV-MCNC: 9 MG/DL (ref 8–23)
CALCIUM SERPL-MCNC: 9 MG/DL (ref 8.6–10.2)
CHLORIDE BLD-SCNC: 96 MMOL/L (ref 98–107)
CO2: 23 MMOL/L (ref 22–29)
CREAT SERPL-MCNC: 0.6 MG/DL (ref 0.5–1)
EOSINOPHILS ABSOLUTE: 0 E9/L (ref 0.05–0.5)
EOSINOPHILS RELATIVE PERCENT: 0 % (ref 0–6)
GFR AFRICAN AMERICAN: >60
GFR NON-AFRICAN AMERICAN: >60 ML/MIN/1.73
GLUCOSE BLD-MCNC: 221 MG/DL (ref 74–99)
HCT VFR BLD CALC: 39.6 % (ref 34–48)
HEMOGLOBIN: 12.7 G/DL (ref 11.5–15.5)
IMMATURE GRANULOCYTES #: 0.02 E9/L
IMMATURE GRANULOCYTES %: 0.4 % (ref 0–5)
LYMPHOCYTES ABSOLUTE: 0.92 E9/L (ref 1.5–4)
LYMPHOCYTES RELATIVE PERCENT: 17.5 % (ref 20–42)
MCH RBC QN AUTO: 28.4 PG (ref 26–35)
MCHC RBC AUTO-ENTMCNC: 32.1 % (ref 32–34.5)
MCV RBC AUTO: 88.6 FL (ref 80–99.9)
MONOCYTES ABSOLUTE: 0.33 E9/L (ref 0.1–0.95)
MONOCYTES RELATIVE PERCENT: 6.3 % (ref 2–12)
NEUTROPHILS ABSOLUTE: 3.95 E9/L (ref 1.8–7.3)
NEUTROPHILS RELATIVE PERCENT: 75 % (ref 43–80)
PDW BLD-RTO: 14.6 FL (ref 11.5–15)
PLATELET # BLD: 209 E9/L (ref 130–450)
PMV BLD AUTO: 8.9 FL (ref 7–12)
POTASSIUM SERPL-SCNC: 4.3 MMOL/L (ref 3.5–5)
RBC # BLD: 4.47 E12/L (ref 3.5–5.5)
SODIUM BLD-SCNC: 133 MMOL/L (ref 132–146)
TOTAL PROTEIN: 6.6 G/DL (ref 6.4–8.3)
WBC # BLD: 5.3 E9/L (ref 4.5–11.5)

## 2019-10-23 PROCEDURE — 36415 COLL VENOUS BLD VENIPUNCTURE: CPT

## 2019-10-23 PROCEDURE — 85025 COMPLETE CBC W/AUTO DIFF WBC: CPT

## 2019-10-23 PROCEDURE — 80053 COMPREHEN METABOLIC PANEL: CPT

## 2019-10-24 ENCOUNTER — HOSPITAL ENCOUNTER (OUTPATIENT)
Dept: INFUSION THERAPY | Age: 74
Discharge: HOME | End: 2019-10-24
Payer: COMMERCIAL

## 2019-10-24 ENCOUNTER — OFFICE VISIT (OUTPATIENT)
Dept: ONCOLOGY | Age: 74
End: 2019-10-24
Payer: COMMERCIAL

## 2019-10-24 ENCOUNTER — HOSPITAL ENCOUNTER (OUTPATIENT)
Dept: GENERAL RADIOLOGY | Age: 74
Discharge: HOME | End: 2019-10-26
Payer: COMMERCIAL

## 2019-10-24 VITALS
HEIGHT: 64 IN | BODY MASS INDEX: 28.63 KG/M2 | HEART RATE: 63 BPM | WEIGHT: 167.7 LBS | TEMPERATURE: 98.2 F | DIASTOLIC BLOOD PRESSURE: 54 MMHG | SYSTOLIC BLOOD PRESSURE: 116 MMHG

## 2019-10-24 VITALS
SYSTOLIC BLOOD PRESSURE: 111 MMHG | HEART RATE: 66 BPM | TEMPERATURE: 96.6 F | RESPIRATION RATE: 18 BRPM | DIASTOLIC BLOOD PRESSURE: 53 MMHG

## 2019-10-24 DIAGNOSIS — C54.1 ADENOCARCINOMA OF ENDOMETRIUM (HCC): ICD-10-CM

## 2019-10-24 DIAGNOSIS — Z12.31 ENCOUNTER FOR SCREENING MAMMOGRAM FOR MALIGNANT NEOPLASM OF BREAST: ICD-10-CM

## 2019-10-24 DIAGNOSIS — C54.1 ENDOMETRIAL CANCER (HCC): ICD-10-CM

## 2019-10-24 DIAGNOSIS — C56.9 MALIGNANT NEOPLASM OF OVARY, UNSPECIFIED LATERALITY (HCC): Primary | ICD-10-CM

## 2019-10-24 DIAGNOSIS — C57.9 GYNECOLOGIC CANCER (HCC): Primary | ICD-10-CM

## 2019-10-24 PROCEDURE — 77063 BREAST TOMOSYNTHESIS BI: CPT

## 2019-10-24 PROCEDURE — 96413 CHEMO IV INFUSION 1 HR: CPT

## 2019-10-24 PROCEDURE — 99214 OFFICE O/P EST MOD 30 MIN: CPT | Performed by: INTERNAL MEDICINE

## 2019-10-24 PROCEDURE — 6360000002 HC RX W HCPCS: Performed by: INTERNAL MEDICINE

## 2019-10-24 PROCEDURE — 2580000003 HC RX 258: Performed by: INTERNAL MEDICINE

## 2019-10-24 RX ORDER — SODIUM CHLORIDE 9 MG/ML
INJECTION, SOLUTION INTRAVENOUS CONTINUOUS
Status: CANCELLED | OUTPATIENT
Start: 2019-10-24

## 2019-10-24 RX ORDER — HEPARIN SODIUM (PORCINE) LOCK FLUSH IV SOLN 100 UNIT/ML 100 UNIT/ML
500 SOLUTION INTRAVENOUS PRN
Status: DISCONTINUED | OUTPATIENT
Start: 2019-10-24 | End: 2019-10-25 | Stop reason: HOSPADM

## 2019-10-24 RX ORDER — 0.9 % SODIUM CHLORIDE 0.9 %
10 VIAL (ML) INJECTION ONCE
Status: CANCELLED | OUTPATIENT
Start: 2019-10-24

## 2019-10-24 RX ORDER — DIPHENHYDRAMINE HYDROCHLORIDE 50 MG/ML
50 INJECTION INTRAMUSCULAR; INTRAVENOUS ONCE
Status: CANCELLED | OUTPATIENT
Start: 2019-10-24

## 2019-10-24 RX ORDER — HEPARIN SODIUM (PORCINE) LOCK FLUSH IV SOLN 100 UNIT/ML 100 UNIT/ML
500 SOLUTION INTRAVENOUS PRN
Status: CANCELLED | OUTPATIENT
Start: 2019-10-24

## 2019-10-24 RX ORDER — SODIUM CHLORIDE 0.9 % (FLUSH) 0.9 %
5 SYRINGE (ML) INJECTION PRN
Status: CANCELLED | OUTPATIENT
Start: 2019-10-24

## 2019-10-24 RX ORDER — EPINEPHRINE 1 MG/ML
0.3 INJECTION, SOLUTION, CONCENTRATE INTRAVENOUS PRN
Status: CANCELLED | OUTPATIENT
Start: 2019-10-24

## 2019-10-24 RX ORDER — SODIUM CHLORIDE 0.9 % (FLUSH) 0.9 %
10 SYRINGE (ML) INJECTION PRN
Status: CANCELLED | OUTPATIENT
Start: 2019-10-24

## 2019-10-24 RX ORDER — SODIUM CHLORIDE 9 MG/ML
250 INJECTION, SOLUTION INTRAVENOUS ONCE
Status: CANCELLED | OUTPATIENT
Start: 2019-10-24

## 2019-10-24 RX ORDER — MEPERIDINE HYDROCHLORIDE 25 MG/ML
12.5 INJECTION INTRAMUSCULAR; INTRAVENOUS; SUBCUTANEOUS ONCE
Status: CANCELLED | OUTPATIENT
Start: 2019-10-24

## 2019-10-24 RX ORDER — SODIUM CHLORIDE 0.9 % (FLUSH) 0.9 %
10 SYRINGE (ML) INJECTION PRN
Status: DISCONTINUED | OUTPATIENT
Start: 2019-10-24 | End: 2019-10-25 | Stop reason: HOSPADM

## 2019-10-24 RX ORDER — METHYLPREDNISOLONE SODIUM SUCCINATE 125 MG/2ML
125 INJECTION, POWDER, LYOPHILIZED, FOR SOLUTION INTRAMUSCULAR; INTRAVENOUS ONCE
Status: CANCELLED | OUTPATIENT
Start: 2019-10-24

## 2019-10-24 RX ORDER — SODIUM CHLORIDE 9 MG/ML
250 INJECTION, SOLUTION INTRAVENOUS ONCE
Status: DISCONTINUED | OUTPATIENT
Start: 2019-10-24 | End: 2019-10-25 | Stop reason: HOSPADM

## 2019-10-24 RX ADMIN — SODIUM CHLORIDE 250 ML: 9 INJECTION, SOLUTION INTRAVENOUS at 13:59

## 2019-10-24 RX ADMIN — HEPARIN 500 UNITS: 100 SYRINGE at 15:00

## 2019-10-24 RX ADMIN — SODIUM CHLORIDE 200 MG: 9 INJECTION, SOLUTION INTRAVENOUS at 14:17

## 2019-10-24 RX ADMIN — Medication 10 ML: at 13:59

## 2019-10-24 RX ADMIN — Medication 10 ML: at 15:00

## 2019-10-25 ENCOUNTER — TELEPHONE (OUTPATIENT)
Dept: FAMILY MEDICINE CLINIC | Age: 74
End: 2019-10-25

## 2019-10-25 DIAGNOSIS — R73.9 HYPERGLYCEMIA: Primary | ICD-10-CM

## 2019-10-25 DIAGNOSIS — C57.9 GYNECOLOGIC CANCER (HCC): ICD-10-CM

## 2019-10-25 DIAGNOSIS — C54.1 ENDOMETRIAL CANCER (HCC): ICD-10-CM

## 2019-10-25 RX ORDER — GLIMEPIRIDE 2 MG/1
2 TABLET ORAL 2 TIMES DAILY WITH MEALS
Qty: 60 TABLET | Refills: 1 | Status: SHIPPED | OUTPATIENT
Start: 2019-10-25 | End: 2019-12-26

## 2019-10-26 ENCOUNTER — HOSPITAL ENCOUNTER (OUTPATIENT)
Age: 74
Discharge: HOME | End: 2019-10-26
Payer: COMMERCIAL

## 2019-10-26 DIAGNOSIS — C57.9 GYNECOLOGIC CANCER (HCC): ICD-10-CM

## 2019-10-26 DIAGNOSIS — R73.9 HYPERGLYCEMIA: ICD-10-CM

## 2019-10-26 DIAGNOSIS — C54.1 ENDOMETRIAL CANCER (HCC): ICD-10-CM

## 2019-10-26 LAB
ANION GAP SERPL CALCULATED.3IONS-SCNC: 12 MMOL/L (ref 7–16)
BUN BLDV-MCNC: 9 MG/DL (ref 8–23)
C-REACTIVE PROTEIN: 10.9 MG/DL (ref 0–0.4)
CALCIUM SERPL-MCNC: 10 MG/DL (ref 8.6–10.2)
CHLORIDE BLD-SCNC: 98 MMOL/L (ref 98–107)
CO2: 28 MMOL/L (ref 22–29)
CREAT SERPL-MCNC: 0.7 MG/DL (ref 0.5–1)
GFR AFRICAN AMERICAN: >60
GFR NON-AFRICAN AMERICAN: >60 ML/MIN/1.73
GLUCOSE BLD-MCNC: 130 MG/DL (ref 74–99)
HBA1C MFR BLD: 6.4 % (ref 4–5.6)
POTASSIUM SERPL-SCNC: 4.4 MMOL/L (ref 3.5–5)
SODIUM BLD-SCNC: 138 MMOL/L (ref 132–146)

## 2019-10-26 PROCEDURE — 36415 COLL VENOUS BLD VENIPUNCTURE: CPT

## 2019-10-26 PROCEDURE — 86140 C-REACTIVE PROTEIN: CPT

## 2019-10-26 PROCEDURE — 83036 HEMOGLOBIN GLYCOSYLATED A1C: CPT

## 2019-10-26 PROCEDURE — 80048 BASIC METABOLIC PNL TOTAL CA: CPT

## 2019-10-29 ENCOUNTER — TELEPHONE (OUTPATIENT)
Dept: ONCOLOGY | Age: 74
End: 2019-10-29

## 2019-10-31 ENCOUNTER — TELEPHONE (OUTPATIENT)
Dept: FAMILY MEDICINE CLINIC | Age: 74
End: 2019-10-31

## 2019-10-31 DIAGNOSIS — E11.9 TYPE 2 DIABETES MELLITUS WITHOUT COMPLICATION, WITHOUT LONG-TERM CURRENT USE OF INSULIN (HCC): Primary | ICD-10-CM

## 2019-10-31 RX ORDER — DIPHENHYDRAMINE HYDROCHLORIDE 25 MG/1
CAPSULE, LIQUID FILLED ORAL
COMMUNITY
End: 2019-10-31 | Stop reason: SDUPTHER

## 2019-10-31 RX ORDER — DIPHENHYDRAMINE HYDROCHLORIDE 25 MG/1
1 CAPSULE, LIQUID FILLED ORAL DAILY
Qty: 1 KIT | Refills: 0 | Status: SHIPPED | OUTPATIENT
Start: 2019-10-31 | End: 2019-11-04

## 2019-11-04 ENCOUNTER — OFFICE VISIT (OUTPATIENT)
Dept: FAMILY MEDICINE CLINIC | Age: 74
End: 2019-11-04
Payer: COMMERCIAL

## 2019-11-04 VITALS
SYSTOLIC BLOOD PRESSURE: 120 MMHG | HEIGHT: 64 IN | WEIGHT: 167.5 LBS | HEART RATE: 84 BPM | DIASTOLIC BLOOD PRESSURE: 70 MMHG | TEMPERATURE: 98.4 F | OXYGEN SATURATION: 98 % | BODY MASS INDEX: 28.6 KG/M2

## 2019-11-04 DIAGNOSIS — C54.1 ENDOMETRIAL CANCER (HCC): Primary | ICD-10-CM

## 2019-11-04 DIAGNOSIS — G89.3 CHRONIC PAIN DUE TO NEOPLASM: ICD-10-CM

## 2019-11-04 DIAGNOSIS — R73.9 HYPERGLYCEMIA: ICD-10-CM

## 2019-11-04 DIAGNOSIS — C56.9 MALIGNANT NEOPLASM OF OVARY, UNSPECIFIED LATERALITY (HCC): ICD-10-CM

## 2019-11-04 PROCEDURE — 99213 OFFICE O/P EST LOW 20 MIN: CPT | Performed by: FAMILY MEDICINE

## 2019-11-04 RX ORDER — GLUCOSAMINE HCL/CHONDROITIN SU 500-400 MG
1 CAPSULE ORAL DAILY
COMMUNITY
End: 2019-11-04 | Stop reason: SDUPTHER

## 2019-11-04 RX ORDER — GLUCOSAMINE HCL/CHONDROITIN SU 500-400 MG
1 CAPSULE ORAL DAILY
Qty: 100 STRIP | Refills: 5 | Status: SHIPPED | OUTPATIENT
Start: 2019-11-04 | End: 2019-11-05 | Stop reason: SDUPTHER

## 2019-11-04 RX ORDER — BLOOD-GLUCOSE METER
1 KIT MISCELLANEOUS DAILY
COMMUNITY
End: 2019-11-04 | Stop reason: SDUPTHER

## 2019-11-04 RX ORDER — CYCLOBENZAPRINE HCL 10 MG
10 TABLET ORAL 3 TIMES DAILY PRN
Qty: 90 TABLET | Refills: 0 | Status: SHIPPED | OUTPATIENT
Start: 2019-11-04 | End: 2019-11-14

## 2019-11-04 RX ORDER — OXYCODONE AND ACETAMINOPHEN 10; 325 MG/1; MG/1
1 TABLET ORAL EVERY 4 HOURS PRN
Qty: 150 TABLET | Refills: 0 | Status: SHIPPED | OUTPATIENT
Start: 2019-11-04 | End: 2019-12-04

## 2019-11-04 RX ORDER — BLOOD-GLUCOSE METER
1 KIT MISCELLANEOUS DAILY
Qty: 100 EACH | Refills: 5 | Status: SHIPPED | OUTPATIENT
Start: 2019-11-04 | End: 2019-11-05 | Stop reason: SDUPTHER

## 2019-11-04 ASSESSMENT — ENCOUNTER SYMPTOMS
SINUS PAIN: 0
COUGH: 0
BACK PAIN: 0
VOMITING: 0
TROUBLE SWALLOWING: 0
DIARRHEA: 0
NAUSEA: 0
CONSTIPATION: 0
WHEEZING: 0
EYE PAIN: 0
SHORTNESS OF BREATH: 0
SORE THROAT: 0
CHEST TIGHTNESS: 0
ABDOMINAL PAIN: 1

## 2019-11-05 ENCOUNTER — TELEPHONE (OUTPATIENT)
Dept: FAMILY MEDICINE CLINIC | Age: 74
End: 2019-11-05

## 2019-11-05 RX ORDER — GLUCOSAMINE HCL/CHONDROITIN SU 500-400 MG
1 CAPSULE ORAL DAILY
Qty: 100 STRIP | Refills: 5 | Status: SHIPPED
Start: 2019-11-05 | End: 2020-05-15

## 2019-11-05 RX ORDER — BLOOD-GLUCOSE METER
1 KIT MISCELLANEOUS DAILY
Qty: 100 EACH | Refills: 5 | Status: SHIPPED | OUTPATIENT
Start: 2019-11-05 | End: 2020-01-24 | Stop reason: SDUPTHER

## 2019-11-13 DIAGNOSIS — C54.1 ADENOCARCINOMA OF ENDOMETRIUM (HCC): Primary | ICD-10-CM

## 2019-11-14 ENCOUNTER — HOSPITAL ENCOUNTER (OUTPATIENT)
Dept: INFUSION THERAPY | Age: 74
Discharge: HOME | End: 2019-11-14
Payer: COMMERCIAL

## 2019-11-14 ENCOUNTER — OFFICE VISIT (OUTPATIENT)
Dept: ONCOLOGY | Age: 74
End: 2019-11-14
Payer: COMMERCIAL

## 2019-11-14 VITALS
TEMPERATURE: 98.5 F | RESPIRATION RATE: 18 BRPM | SYSTOLIC BLOOD PRESSURE: 128 MMHG | DIASTOLIC BLOOD PRESSURE: 61 MMHG | HEART RATE: 79 BPM

## 2019-11-14 VITALS
BODY MASS INDEX: 28.48 KG/M2 | TEMPERATURE: 97.4 F | DIASTOLIC BLOOD PRESSURE: 61 MMHG | HEIGHT: 64 IN | WEIGHT: 166.8 LBS | SYSTOLIC BLOOD PRESSURE: 129 MMHG | HEART RATE: 92 BPM

## 2019-11-14 DIAGNOSIS — C56.9 MALIGNANT NEOPLASM OF OVARY, UNSPECIFIED LATERALITY (HCC): ICD-10-CM

## 2019-11-14 DIAGNOSIS — Z09 FOLLOW UP: Primary | ICD-10-CM

## 2019-11-14 DIAGNOSIS — C54.1 ADENOCARCINOMA OF ENDOMETRIUM (HCC): Primary | ICD-10-CM

## 2019-11-14 LAB
ALBUMIN SERPL-MCNC: 3.7 G/DL (ref 3.5–5.2)
ALP BLD-CCNC: 92 U/L (ref 35–104)
ALT SERPL-CCNC: 10 U/L (ref 0–32)
ANION GAP SERPL CALCULATED.3IONS-SCNC: 9 MMOL/L (ref 7–16)
AST SERPL-CCNC: 16 U/L (ref 0–31)
BASOPHILS ABSOLUTE: 0.05 E9/L (ref 0–0.2)
BASOPHILS RELATIVE PERCENT: 0.8 % (ref 0–2)
BILIRUB SERPL-MCNC: 0.4 MG/DL (ref 0–1.2)
BUN BLDV-MCNC: 6 MG/DL (ref 8–23)
CALCIUM SERPL-MCNC: 9.9 MG/DL (ref 8.6–10.2)
CHLORIDE BLD-SCNC: 97 MMOL/L (ref 98–107)
CO2: 29 MMOL/L (ref 22–29)
CREAT SERPL-MCNC: 0.6 MG/DL (ref 0.5–1)
EOSINOPHILS ABSOLUTE: 0.01 E9/L (ref 0.05–0.5)
EOSINOPHILS RELATIVE PERCENT: 0.2 % (ref 0–6)
GFR AFRICAN AMERICAN: >60
GFR NON-AFRICAN AMERICAN: >60 ML/MIN/1.73
GLUCOSE BLD-MCNC: 218 MG/DL (ref 74–99)
HCT VFR BLD CALC: 37.1 % (ref 34–48)
HEMOGLOBIN: 11.5 G/DL (ref 11.5–15.5)
IMMATURE GRANULOCYTES #: 0.02 E9/L
IMMATURE GRANULOCYTES %: 0.3 % (ref 0–5)
LYMPHOCYTES ABSOLUTE: 0.84 E9/L (ref 1.5–4)
LYMPHOCYTES RELATIVE PERCENT: 13.5 % (ref 20–42)
MCH RBC QN AUTO: 26.6 PG (ref 26–35)
MCHC RBC AUTO-ENTMCNC: 31 % (ref 32–34.5)
MCV RBC AUTO: 85.7 FL (ref 80–99.9)
MONOCYTES ABSOLUTE: 0.5 E9/L (ref 0.1–0.95)
MONOCYTES RELATIVE PERCENT: 8 % (ref 2–12)
NEUTROPHILS ABSOLUTE: 4.81 E9/L (ref 1.8–7.3)
NEUTROPHILS RELATIVE PERCENT: 77.2 % (ref 43–80)
PDW BLD-RTO: 13.9 FL (ref 11.5–15)
PLATELET # BLD: 255 E9/L (ref 130–450)
PMV BLD AUTO: 8.3 FL (ref 7–12)
POTASSIUM SERPL-SCNC: 4 MMOL/L (ref 3.5–5)
RBC # BLD: 4.33 E12/L (ref 3.5–5.5)
SODIUM BLD-SCNC: 135 MMOL/L (ref 132–146)
TOTAL PROTEIN: 7.4 G/DL (ref 6.4–8.3)
TSH SERPL DL<=0.05 MIU/L-ACNC: 1 UIU/ML (ref 0.27–4.2)
WBC # BLD: 6.2 E9/L (ref 4.5–11.5)

## 2019-11-14 PROCEDURE — 99215 OFFICE O/P EST HI 40 MIN: CPT | Performed by: INTERNAL MEDICINE

## 2019-11-14 PROCEDURE — 80053 COMPREHEN METABOLIC PANEL: CPT

## 2019-11-14 PROCEDURE — 96413 CHEMO IV INFUSION 1 HR: CPT

## 2019-11-14 PROCEDURE — 85025 COMPLETE CBC W/AUTO DIFF WBC: CPT

## 2019-11-14 PROCEDURE — 6360000002 HC RX W HCPCS: Performed by: INTERNAL MEDICINE

## 2019-11-14 PROCEDURE — 84443 ASSAY THYROID STIM HORMONE: CPT

## 2019-11-14 PROCEDURE — 2580000003 HC RX 258: Performed by: INTERNAL MEDICINE

## 2019-11-14 RX ORDER — 0.9 % SODIUM CHLORIDE 0.9 %
10 VIAL (ML) INJECTION ONCE
Status: CANCELLED | OUTPATIENT
Start: 2019-11-14

## 2019-11-14 RX ORDER — SODIUM CHLORIDE 0.9 % (FLUSH) 0.9 %
10 SYRINGE (ML) INJECTION PRN
Status: DISCONTINUED | OUTPATIENT
Start: 2019-11-14 | End: 2019-11-15 | Stop reason: HOSPADM

## 2019-11-14 RX ORDER — SODIUM CHLORIDE 0.9 % (FLUSH) 0.9 %
10 SYRINGE (ML) INJECTION PRN
Status: CANCELLED | OUTPATIENT
Start: 2019-11-14

## 2019-11-14 RX ORDER — SODIUM CHLORIDE 0.9 % (FLUSH) 0.9 %
20 SYRINGE (ML) INJECTION PRN
Status: CANCELLED | OUTPATIENT
Start: 2019-11-14

## 2019-11-14 RX ORDER — FUROSEMIDE 20 MG/1
20 TABLET ORAL DAILY
Qty: 7 TABLET | Refills: 0 | Status: SHIPPED | OUTPATIENT
Start: 2019-11-14 | End: 2020-01-21 | Stop reason: ALTCHOICE

## 2019-11-14 RX ORDER — MEPERIDINE HYDROCHLORIDE 25 MG/ML
12.5 INJECTION INTRAMUSCULAR; INTRAVENOUS; SUBCUTANEOUS ONCE
Status: CANCELLED | OUTPATIENT
Start: 2019-11-14

## 2019-11-14 RX ORDER — SODIUM CHLORIDE 9 MG/ML
250 INJECTION, SOLUTION INTRAVENOUS ONCE
Status: DISCONTINUED | OUTPATIENT
Start: 2019-11-14 | End: 2019-11-15 | Stop reason: HOSPADM

## 2019-11-14 RX ORDER — METHYLPREDNISOLONE SODIUM SUCCINATE 125 MG/2ML
125 INJECTION, POWDER, LYOPHILIZED, FOR SOLUTION INTRAMUSCULAR; INTRAVENOUS ONCE
Status: CANCELLED | OUTPATIENT
Start: 2019-11-14

## 2019-11-14 RX ORDER — SODIUM CHLORIDE 9 MG/ML
INJECTION, SOLUTION INTRAVENOUS CONTINUOUS
Status: CANCELLED | OUTPATIENT
Start: 2019-11-14

## 2019-11-14 RX ORDER — HEPARIN SODIUM (PORCINE) LOCK FLUSH IV SOLN 100 UNIT/ML 100 UNIT/ML
500 SOLUTION INTRAVENOUS PRN
Status: CANCELLED | OUTPATIENT
Start: 2019-11-14

## 2019-11-14 RX ORDER — EPINEPHRINE 1 MG/ML
0.3 INJECTION, SOLUTION, CONCENTRATE INTRAVENOUS PRN
Status: CANCELLED | OUTPATIENT
Start: 2019-11-14

## 2019-11-14 RX ORDER — DIPHENHYDRAMINE HYDROCHLORIDE 50 MG/ML
50 INJECTION INTRAMUSCULAR; INTRAVENOUS ONCE
Status: CANCELLED | OUTPATIENT
Start: 2019-11-14

## 2019-11-14 RX ORDER — PREDNISONE 10 MG/1
40 TABLET ORAL DAILY
Qty: 73 TABLET | Refills: 0 | Status: SHIPPED | OUTPATIENT
Start: 2019-11-14 | End: 2020-01-21 | Stop reason: ALTCHOICE

## 2019-11-14 RX ORDER — SODIUM CHLORIDE 0.9 % (FLUSH) 0.9 %
5 SYRINGE (ML) INJECTION PRN
Status: CANCELLED | OUTPATIENT
Start: 2019-11-14

## 2019-11-14 RX ORDER — HEPARIN SODIUM (PORCINE) LOCK FLUSH IV SOLN 100 UNIT/ML 100 UNIT/ML
500 SOLUTION INTRAVENOUS PRN
Status: DISCONTINUED | OUTPATIENT
Start: 2019-11-14 | End: 2019-11-15 | Stop reason: HOSPADM

## 2019-11-14 RX ORDER — SODIUM CHLORIDE 9 MG/ML
250 INJECTION, SOLUTION INTRAVENOUS ONCE
Status: CANCELLED | OUTPATIENT
Start: 2019-11-14

## 2019-11-14 RX ADMIN — Medication 10 ML: at 12:55

## 2019-11-14 RX ADMIN — SODIUM CHLORIDE 250 ML: 9 INJECTION, SOLUTION INTRAVENOUS at 11:55

## 2019-11-14 RX ADMIN — ALTEPLASE 2 MG: 2.2 INJECTION, POWDER, LYOPHILIZED, FOR SOLUTION INTRAVENOUS at 11:09

## 2019-11-14 RX ADMIN — SODIUM CHLORIDE 200 MG: 9 INJECTION, SOLUTION INTRAVENOUS at 12:09

## 2019-11-14 RX ADMIN — HEPARIN 500 UNITS: 100 SYRINGE at 12:55

## 2019-11-14 RX ADMIN — Medication 10 ML: at 11:15

## 2019-11-14 NOTE — PROGRESS NOTES
Patient tolerated infusion well without complaint. Port flushed per protocol and de-accessed. Patient discharged via wheelchair.

## 2019-11-19 ENCOUNTER — OFFICE VISIT (OUTPATIENT)
Dept: PALLATIVE CARE | Age: 74
End: 2019-11-19
Payer: COMMERCIAL

## 2019-11-19 VITALS
SYSTOLIC BLOOD PRESSURE: 140 MMHG | OXYGEN SATURATION: 96 % | BODY MASS INDEX: 28.27 KG/M2 | DIASTOLIC BLOOD PRESSURE: 50 MMHG | HEART RATE: 72 BPM | WEIGHT: 164.7 LBS

## 2019-11-19 DIAGNOSIS — C54.1 ADENOCARCINOMA OF ENDOMETRIUM (HCC): ICD-10-CM

## 2019-11-19 PROCEDURE — 99205 OFFICE O/P NEW HI 60 MIN: CPT | Performed by: NURSE PRACTITIONER

## 2019-11-19 PROCEDURE — 99204 OFFICE O/P NEW MOD 45 MIN: CPT | Performed by: NURSE PRACTITIONER

## 2019-11-20 ENCOUNTER — PATIENT MESSAGE (OUTPATIENT)
Dept: FAMILY MEDICINE CLINIC | Age: 74
End: 2019-11-20

## 2019-11-20 ENCOUNTER — TELEPHONE (OUTPATIENT)
Dept: FAMILY MEDICINE CLINIC | Age: 74
End: 2019-11-20

## 2019-11-27 ENCOUNTER — TELEPHONE (OUTPATIENT)
Dept: ONCOLOGY | Age: 74
End: 2019-11-27

## 2019-12-05 ENCOUNTER — HOSPITAL ENCOUNTER (OUTPATIENT)
Dept: INFUSION THERAPY | Age: 74
End: 2019-12-05

## 2019-12-17 ENCOUNTER — OFFICE VISIT (OUTPATIENT)
Dept: ORTHOPEDIC SURGERY | Age: 74
End: 2019-12-17
Payer: COMMERCIAL

## 2019-12-17 VITALS
DIASTOLIC BLOOD PRESSURE: 80 MMHG | WEIGHT: 160 LBS | HEART RATE: 75 BPM | HEIGHT: 64 IN | BODY MASS INDEX: 27.31 KG/M2 | SYSTOLIC BLOOD PRESSURE: 125 MMHG

## 2019-12-17 DIAGNOSIS — M25.551 RIGHT HIP PAIN: ICD-10-CM

## 2019-12-17 DIAGNOSIS — S76.011A TEAR OF RIGHT GLUTEUS MEDIUS TENDON: Primary | ICD-10-CM

## 2019-12-17 PROCEDURE — 99213 OFFICE O/P EST LOW 20 MIN: CPT | Performed by: ORTHOPAEDIC SURGERY

## 2019-12-17 RX ORDER — OXYCODONE AND ACETAMINOPHEN 10; 325 MG/1; MG/1
1 TABLET ORAL EVERY 4 HOURS PRN
COMMUNITY
End: 2019-12-21

## 2019-12-21 ENCOUNTER — HOSPITAL ENCOUNTER (EMERGENCY)
Age: 74
Discharge: HOME | End: 2019-12-21
Attending: EMERGENCY MEDICINE
Payer: COMMERCIAL

## 2019-12-21 ENCOUNTER — APPOINTMENT (OUTPATIENT)
Dept: CT IMAGING | Age: 74
End: 2019-12-21
Payer: COMMERCIAL

## 2019-12-21 VITALS
OXYGEN SATURATION: 100 % | HEIGHT: 63 IN | DIASTOLIC BLOOD PRESSURE: 58 MMHG | TEMPERATURE: 98.2 F | WEIGHT: 160 LBS | RESPIRATION RATE: 14 BRPM | SYSTOLIC BLOOD PRESSURE: 124 MMHG | BODY MASS INDEX: 28.35 KG/M2 | HEART RATE: 68 BPM

## 2019-12-21 DIAGNOSIS — G89.3 CHRONIC PAIN DUE TO NEOPLASM: ICD-10-CM

## 2019-12-21 DIAGNOSIS — R10.84 GENERALIZED ABDOMINAL PAIN: Primary | ICD-10-CM

## 2019-12-21 LAB
ALBUMIN SERPL-MCNC: 3.4 G/DL (ref 3.5–5.2)
ALP BLD-CCNC: 55 U/L (ref 35–104)
ALT SERPL-CCNC: 11 U/L (ref 0–32)
ANION GAP SERPL CALCULATED.3IONS-SCNC: 10 MMOL/L (ref 7–16)
AST SERPL-CCNC: 14 U/L (ref 0–31)
BACTERIA: ABNORMAL /HPF
BASOPHILS ABSOLUTE: 0.02 E9/L (ref 0–0.2)
BASOPHILS RELATIVE PERCENT: 0.6 % (ref 0–2)
BILIRUB SERPL-MCNC: 0.6 MG/DL (ref 0–1.2)
BILIRUBIN URINE: NEGATIVE
BLOOD, URINE: NEGATIVE
BUN BLDV-MCNC: 7 MG/DL (ref 8–23)
CALCIUM SERPL-MCNC: 8.8 MG/DL (ref 8.6–10.2)
CHLORIDE BLD-SCNC: 105 MMOL/L (ref 98–107)
CLARITY: CLEAR
CO2: 27 MMOL/L (ref 22–29)
COLOR: YELLOW
CREAT SERPL-MCNC: 0.7 MG/DL (ref 0.5–1)
EKG ATRIAL RATE: 72 BPM
EKG P AXIS: 55 DEGREES
EKG P-R INTERVAL: 134 MS
EKG Q-T INTERVAL: 406 MS
EKG QRS DURATION: 90 MS
EKG QTC CALCULATION (BAZETT): 444 MS
EKG R AXIS: 0 DEGREES
EKG T AXIS: 32 DEGREES
EKG VENTRICULAR RATE: 72 BPM
EOSINOPHILS ABSOLUTE: 0.01 E9/L (ref 0.05–0.5)
EOSINOPHILS RELATIVE PERCENT: 0.3 % (ref 0–6)
EPITHELIAL CELLS, UA: ABNORMAL /HPF
GFR AFRICAN AMERICAN: >60
GFR NON-AFRICAN AMERICAN: >60 ML/MIN/1.73
GLUCOSE BLD-MCNC: 109 MG/DL (ref 74–99)
GLUCOSE URINE: NEGATIVE MG/DL
HCT VFR BLD CALC: 36.9 % (ref 34–48)
HEMOGLOBIN: 11.5 G/DL (ref 11.5–15.5)
IMMATURE GRANULOCYTES #: 0.01 E9/L
IMMATURE GRANULOCYTES %: 0.3 % (ref 0–5)
KETONES, URINE: NEGATIVE MG/DL
LACTIC ACID: 0.9 MMOL/L (ref 0.5–2.2)
LEUKOCYTE ESTERASE, URINE: ABNORMAL
LIPASE: 22 U/L (ref 13–60)
LYMPHOCYTES ABSOLUTE: 0.95 E9/L (ref 1.5–4)
LYMPHOCYTES RELATIVE PERCENT: 26.2 % (ref 20–42)
MCH RBC QN AUTO: 26.4 PG (ref 26–35)
MCHC RBC AUTO-ENTMCNC: 31.2 % (ref 32–34.5)
MCV RBC AUTO: 84.8 FL (ref 80–99.9)
MONOCYTES ABSOLUTE: 0.39 E9/L (ref 0.1–0.95)
MONOCYTES RELATIVE PERCENT: 10.7 % (ref 2–12)
NEUTROPHILS ABSOLUTE: 2.25 E9/L (ref 1.8–7.3)
NEUTROPHILS RELATIVE PERCENT: 61.9 % (ref 43–80)
NITRITE, URINE: NEGATIVE
PDW BLD-RTO: 15.4 FL (ref 11.5–15)
PH UA: 5.5 (ref 5–9)
PLATELET # BLD: 191 E9/L (ref 130–450)
PMV BLD AUTO: 8.7 FL (ref 7–12)
POTASSIUM SERPL-SCNC: 3.5 MMOL/L (ref 3.5–5)
PROTEIN UA: NEGATIVE MG/DL
RBC # BLD: 4.35 E12/L (ref 3.5–5.5)
RBC UA: ABNORMAL /HPF (ref 0–2)
SODIUM BLD-SCNC: 142 MMOL/L (ref 132–146)
SPECIFIC GRAVITY UA: 1.01 (ref 1–1.03)
TOTAL PROTEIN: 5.6 G/DL (ref 6.4–8.3)
TROPONIN: <0.01 NG/ML (ref 0–0.03)
UROBILINOGEN, URINE: 0.2 E.U./DL
WBC # BLD: 3.6 E9/L (ref 4.5–11.5)
WBC UA: ABNORMAL /HPF (ref 0–5)

## 2019-12-21 PROCEDURE — 6370000000 HC RX 637 (ALT 250 FOR IP): Performed by: EMERGENCY MEDICINE

## 2019-12-21 PROCEDURE — 93005 ELECTROCARDIOGRAM TRACING: CPT | Performed by: EMERGENCY MEDICINE

## 2019-12-21 PROCEDURE — 6360000004 HC RX CONTRAST MEDICATION: Performed by: RADIOLOGY

## 2019-12-21 PROCEDURE — 81001 URINALYSIS AUTO W/SCOPE: CPT

## 2019-12-21 PROCEDURE — 6360000002 HC RX W HCPCS: Performed by: EMERGENCY MEDICINE

## 2019-12-21 PROCEDURE — 36415 COLL VENOUS BLD VENIPUNCTURE: CPT

## 2019-12-21 PROCEDURE — 83605 ASSAY OF LACTIC ACID: CPT

## 2019-12-21 PROCEDURE — 85025 COMPLETE CBC W/AUTO DIFF WBC: CPT

## 2019-12-21 PROCEDURE — 84484 ASSAY OF TROPONIN QUANT: CPT

## 2019-12-21 PROCEDURE — 80053 COMPREHEN METABOLIC PANEL: CPT

## 2019-12-21 PROCEDURE — 96375 TX/PRO/DX INJ NEW DRUG ADDON: CPT

## 2019-12-21 PROCEDURE — 99284 EMERGENCY DEPT VISIT MOD MDM: CPT

## 2019-12-21 PROCEDURE — 96374 THER/PROPH/DIAG INJ IV PUSH: CPT

## 2019-12-21 PROCEDURE — 2580000003 HC RX 258: Performed by: EMERGENCY MEDICINE

## 2019-12-21 PROCEDURE — 74177 CT ABD & PELVIS W/CONTRAST: CPT

## 2019-12-21 PROCEDURE — 83690 ASSAY OF LIPASE: CPT

## 2019-12-21 RX ORDER — 0.9 % SODIUM CHLORIDE 0.9 %
2000 INTRAVENOUS SOLUTION INTRAVENOUS ONCE
Status: COMPLETED | OUTPATIENT
Start: 2019-12-21 | End: 2019-12-21

## 2019-12-21 RX ORDER — MORPHINE SULFATE 4 MG/ML
4 INJECTION, SOLUTION INTRAMUSCULAR; INTRAVENOUS ONCE
Status: COMPLETED | OUTPATIENT
Start: 2019-12-21 | End: 2019-12-21

## 2019-12-21 RX ORDER — ONDANSETRON 2 MG/ML
4 INJECTION INTRAMUSCULAR; INTRAVENOUS ONCE
Status: COMPLETED | OUTPATIENT
Start: 2019-12-21 | End: 2019-12-21

## 2019-12-21 RX ORDER — OXYCODONE AND ACETAMINOPHEN 10; 325 MG/1; MG/1
1 TABLET ORAL EVERY 6 HOURS PRN
Qty: 20 TABLET | Refills: 0 | Status: SHIPPED | OUTPATIENT
Start: 2019-12-21 | End: 2019-12-26

## 2019-12-21 RX ORDER — 0.9 % SODIUM CHLORIDE 0.9 %
1000 INTRAVENOUS SOLUTION INTRAVENOUS ONCE
Status: DISCONTINUED | OUTPATIENT
Start: 2019-12-21 | End: 2019-12-21

## 2019-12-21 RX ADMIN — IOPAMIDOL 110 ML: 755 INJECTION, SOLUTION INTRAVENOUS at 06:24

## 2019-12-21 RX ADMIN — ONDANSETRON 4 MG: 2 INJECTION INTRAMUSCULAR; INTRAVENOUS at 04:45

## 2019-12-21 RX ADMIN — SODIUM CHLORIDE 1000 ML: 9 INJECTION, SOLUTION INTRAVENOUS at 04:45

## 2019-12-21 RX ADMIN — LIDOCAINE HYDROCHLORIDE: 20 SOLUTION ORAL; TOPICAL at 04:46

## 2019-12-21 RX ADMIN — MORPHINE SULFATE 4 MG: 4 INJECTION, SOLUTION INTRAMUSCULAR; INTRAVENOUS at 04:46

## 2019-12-21 ASSESSMENT — PAIN DESCRIPTION - ORIENTATION: ORIENTATION: MID;LOWER

## 2019-12-21 ASSESSMENT — PAIN SCALES - GENERAL
PAINLEVEL_OUTOF10: 5
PAINLEVEL_OUTOF10: 6
PAINLEVEL_OUTOF10: 7

## 2019-12-21 ASSESSMENT — PAIN DESCRIPTION - PAIN TYPE
TYPE: ACUTE PAIN
TYPE: ACUTE PAIN

## 2019-12-21 ASSESSMENT — PAIN DESCRIPTION - DESCRIPTORS: DESCRIPTORS: STABBING

## 2019-12-21 ASSESSMENT — PAIN DESCRIPTION - FREQUENCY: FREQUENCY: CONTINUOUS

## 2019-12-21 ASSESSMENT — PAIN DESCRIPTION - LOCATION: LOCATION: ABDOMEN

## 2019-12-27 RX ORDER — GLIMEPIRIDE 2 MG/1
TABLET ORAL
Qty: 60 TABLET | Refills: 5 | Status: SHIPPED | OUTPATIENT
Start: 2019-12-27 | End: 2019-12-30 | Stop reason: SDUPTHER

## 2019-12-30 RX ORDER — GLIMEPIRIDE 2 MG/1
TABLET ORAL
Qty: 60 TABLET | Refills: 5 | Status: SHIPPED | OUTPATIENT
Start: 2019-12-30 | End: 2020-01-24 | Stop reason: SDUPTHER

## 2019-12-30 RX ORDER — LEVOTHYROXINE SODIUM 112 MCG
112 TABLET ORAL DAILY
Qty: 30 TABLET | Refills: 2 | Status: SHIPPED | OUTPATIENT
Start: 2019-12-30 | End: 2020-01-24 | Stop reason: SDUPTHER

## 2020-01-09 ENCOUNTER — HOSPITAL ENCOUNTER (OUTPATIENT)
Dept: INFUSION THERAPY | Age: 75
End: 2020-01-09

## 2020-01-16 ENCOUNTER — TELEPHONE (OUTPATIENT)
Dept: ONCOLOGY | Age: 75
End: 2020-01-16

## 2020-01-21 ENCOUNTER — OFFICE VISIT (OUTPATIENT)
Dept: ORTHOPEDIC SURGERY | Age: 75
End: 2020-01-21
Payer: MEDICARE

## 2020-01-21 VITALS
WEIGHT: 146.8 LBS | SYSTOLIC BLOOD PRESSURE: 125 MMHG | HEART RATE: 85 BPM | DIASTOLIC BLOOD PRESSURE: 80 MMHG | HEIGHT: 64 IN | BODY MASS INDEX: 25.06 KG/M2

## 2020-01-21 PROCEDURE — 20610 DRAIN/INJ JOINT/BURSA W/O US: CPT | Performed by: ORTHOPAEDIC SURGERY

## 2020-01-21 RX ORDER — LIDOCAINE HYDROCHLORIDE 10 MG/ML
4 INJECTION, SOLUTION INFILTRATION; PERINEURAL ONCE
Status: COMPLETED | OUTPATIENT
Start: 2020-01-21 | End: 2020-01-21

## 2020-01-21 RX ORDER — TRIAMCINOLONE ACETONIDE 40 MG/ML
40 INJECTION, SUSPENSION INTRA-ARTICULAR; INTRAMUSCULAR ONCE
Status: COMPLETED | OUTPATIENT
Start: 2020-01-21 | End: 2020-01-21

## 2020-01-21 RX ADMIN — TRIAMCINOLONE ACETONIDE 40 MG: 40 INJECTION, SUSPENSION INTRA-ARTICULAR; INTRAMUSCULAR at 10:44

## 2020-01-21 RX ADMIN — LIDOCAINE HYDROCHLORIDE 4 ML: 10 INJECTION, SOLUTION INFILTRATION; PERINEURAL at 10:43

## 2020-01-21 NOTE — PROGRESS NOTES
Established Patient: New Knee Patient        CHIEF COMPLAINT:   Chief Complaint   Patient presents with    Knee Pain     [place] knee pain since 2019, worse the last month; she states that she was on prednisone, unable to walk or get up from seated postion, WB is very bothersome, she states that Dr Shubham Mayer states that is a side effect of medication she is on for cancer    X-ray      [place] knee XR obtained today    Other     Right >> Left         HPI:    Eric Farley is a 69y.o. year old female well-known to us for previous treatment of her right hip suspected gluteus medius tear, now presenting for bilateral knee pain. She has metastatic cancer and has been treated with chemotherapy, immunotherapy, and radiation. She states most recent treatment triggered significant knee pain bilaterally. Her hip has felt somewhat better after conservative treatment. Her knees have gotten severe to the point where she is having trouble ambulating. Her pain did improve with a Medrol Dosepak but came back once she completed steroids. She denies any injury to her knees.   It is felt that her knee pain is secondary to medication per her oncologist    PAST MEDICAL HISTORY  Past Medical History:   Diagnosis Date    Cancer [facility])     endometrial cancer, breast    Diabetes mellitus ([facility] [address]     diet controlled    Diverticulitis     GERD (gastroesophageal reflux disease)     Gout     Hiatal hernia     Macular degeneration     Osteoarthritis     Osteopenia        PAST SURGICAL HISTORY  Past Surgical History:   Procedure Laterality Date    BREAST LUMPECTOMY Left 2016    left breast sentinelnode dissection, blue dye injection    BREAST SURGERY Left 10/2016    cancer     SECTION      x 3    CHG UNLISTED DX RADIOGRAPHIC PROCEDURE N/A 2018    BRONCHOSCOPY ELECTROMAGNETIC performed by Joshua [facility] MD at [facility]    ENDOSCOPY, COLON, DIAGNOSTIC      EYE SURGERY Bilateral 2016    cataracts    HYSTERECTOMY  07/2016    total with BSO    HYSTERECTOMY, TOTAL ABDOMINAL  07/2016    [place] [address] BRUSHING/PROTECTED BRUSHINGS  8/17/2018    BRONCHOSCOPY BRUSHINGS performed by Rajendra Haskins MD at [address] [place] [address] [address] [facility]  8/17/2018    BRONCHOSCOPY ALVEOLAR LAVAGE performed by Rajendra Haskins MD at [address] [place] [address] [address] EBUS DX/TX INTERVENTION [address] LES N/A 8/17/2018    BRONCHOSCOPY ULTRASOUND performed by Rajendra Haskins MD at [address] [place] [address] W/TRANSBRONCHIAL LUNG BX 1 LOBE  8/17/2018    BRONCHOSCOPY/TRANSBRONCHIAL NEEDLE BIOPSY performed by Rajendra Haskins MD at [address] [place] [address] W/TRANSBRONCHIAL LUNG BX EACH LOBE  8/17/2018    BRONCHOSCOPY/TRANSBRONCHIAL NEEDLE BIOPSY ADDL LOBE performed by Rajendra Haskins MD at [address] TUNNELED CTR VAD W/SUBQ PORT AGE 5 YR/> N/A 12/4/2018    MEDIPORT INSERTION performed by Denny Engle MD at [address] [place] [address]   Family History   Problem Relation Age of Onset    High Blood Pressure Mother     Heart Disease Mother     Diabetes Mother     Arthritis Father         rheumatoid    Heart Disease Father     Cancer Maternal Aunt         ovarian carcinoma       SOCIAL HISTORY  Social History     Socioeconomic History    Marital status:      Spouse name: Not on file    Number of children: Not on file    Years of education: Not on file    Highest education level: Not on file   Occupational History    Not on file   Social Needs    Financial resource strain: Not on file    Food insecurity:     Worry: Not on file     Inability: Not on file    Transportation needs:     Medical: Not on file     Non-medical: Not on file   Tobacco Use    Smoking status: Former Smoker     Packs/day: 0.25     Years: 20.00     Pack years: 5.00     Types: Cigarettes    Smokeless tobacco: Never Used   Substance and Sexual (SINGULAIR) 10 MG tablet, Take by mouth, Disp: , Rfl:     CANNABIDIOL PO, , Disp: , Rfl:     ALLERGIES  Allergies   Allergen Reactions    Percocet [Oxycodone-Acetaminophen] Other (See Comments)     Prolong use, GI issues    Asa [Aspirin] Hives    Levothyroxine      Other reaction(s): Free Text    Pepcid [Famotidine]     Prilosec [Omeprazole] Hives    Protonix [Pantoprazole Sodium] Other (See Comments)     Other reaction(s): Other: See Comments  DEPRESSION    Erythromycin Rash    Keflet [Cephalexin] Rash    Levaquin [Levofloxacin In D5w] Rash    Pcn [Penicillins] Rash    Polyethylene Glycol Rash     Bloating,gas    Tetracycline Rash    Tetracyclines & Related Rash       Controlled Substances Monitoring:          REVIEW OF SYSTEMS:     Constitutional:  Negative for weight loss, fevers, chills, fatigue  Cardiovascular: Negative for chest pain, palpitations  Pulmonary: Negative for shortness of breath, labored breathing, cough  GI: negative for abdominal pain, nausea, vomitting   MSK: per HPI  Skin: negative for rash, open wounds    All other systems reviewed and are negative         PHYSICAL EXAM     Vitals:    01/21/20 0950   BP: 125/80   Site: Left Upper Arm   Position: Sitting   Cuff Size: Medium Adult   Pulse: 85   Weight: 146 lb 12.8 oz (66.6 kg)   Height: 5' 3.5\" (1.613 m)       Height: 5' 3.5\" (1.613 m)  Weight: 146.8 lb actual wt 1/21/2020  BMI:  Body mass index is 25.6 kg/m². General: The patient is alert and oriented x 3, appears to be stated age and in no distress. HEENT: head is normocephalic, atraumatic. EOMI. Neck: supple, trachea midline, no thyromegaly   Cardiovascular: peripheral pulses palpable.   Normal Capillary refill   Respiratory: breathing unlabored, chest expansion symmetric   Skin: no rash, no open wounds, no erythema  Psych: normal affect; mood stable  Neurologic: gait normal, sensation grossly intact in extremities  MSK:        Lower Extremity:   Ipsilateral hip exam 1/21/20  5:57 PM

## 2020-01-21 NOTE — PROGRESS NOTES
Assisted Dr. Sophia Ly with injection of 4 cc lidocaine/1 cc kenalog in boaz knee. Patient tolerated procedure well. Verbal instructions were given.

## 2020-01-23 ENCOUNTER — HOSPITAL ENCOUNTER (OUTPATIENT)
Dept: INFUSION THERAPY | Age: 75
Discharge: HOME | End: 2020-01-23
Payer: MEDICARE

## 2020-01-23 ENCOUNTER — OFFICE VISIT (OUTPATIENT)
Dept: ONCOLOGY | Age: 75
End: 2020-01-23
Payer: MEDICARE

## 2020-01-23 VITALS
SYSTOLIC BLOOD PRESSURE: 141 MMHG | BODY MASS INDEX: 24.84 KG/M2 | DIASTOLIC BLOOD PRESSURE: 60 MMHG | TEMPERATURE: 98.5 F | OXYGEN SATURATION: 99 % | HEART RATE: 63 BPM | WEIGHT: 145.5 LBS | HEIGHT: 64 IN

## 2020-01-23 LAB
ALBUMIN SERPL-MCNC: 3.4 G/DL (ref 3.5–5.2)
ALP BLD-CCNC: 99 U/L (ref 35–104)
ALT SERPL-CCNC: 15 U/L (ref 0–32)
ANION GAP SERPL CALCULATED.3IONS-SCNC: 16 MMOL/L (ref 7–16)
AST SERPL-CCNC: 15 U/L (ref 0–31)
BASOPHILS ABSOLUTE: 0.01 E9/L (ref 0–0.2)
BASOPHILS RELATIVE PERCENT: 0.1 % (ref 0–2)
BILIRUB SERPL-MCNC: 0.4 MG/DL (ref 0–1.2)
BUN BLDV-MCNC: 13 MG/DL (ref 8–23)
C-REACTIVE PROTEIN: 4.6 MG/DL (ref 0–0.4)
CALCIUM SERPL-MCNC: 9.8 MG/DL (ref 8.6–10.2)
CHLORIDE BLD-SCNC: 97 MMOL/L (ref 98–107)
CO2: 23 MMOL/L (ref 22–29)
CREAT SERPL-MCNC: 0.6 MG/DL (ref 0.5–1)
EOSINOPHILS ABSOLUTE: 0 E9/L (ref 0.05–0.5)
EOSINOPHILS RELATIVE PERCENT: 0 % (ref 0–6)
GFR AFRICAN AMERICAN: >60
GFR NON-AFRICAN AMERICAN: >60 ML/MIN/1.73
GLUCOSE BLD-MCNC: 231 MG/DL (ref 74–99)
HCT VFR BLD CALC: 35.3 % (ref 34–48)
HEMOGLOBIN: 10.7 G/DL (ref 11.5–15.5)
IMMATURE GRANULOCYTES #: 0.12 E9/L
IMMATURE GRANULOCYTES %: 1.2 % (ref 0–5)
LYMPHOCYTES ABSOLUTE: 0.68 E9/L (ref 1.5–4)
LYMPHOCYTES RELATIVE PERCENT: 7 % (ref 20–42)
MCH RBC QN AUTO: 24.5 PG (ref 26–35)
MCHC RBC AUTO-ENTMCNC: 30.3 % (ref 32–34.5)
MCV RBC AUTO: 81 FL (ref 80–99.9)
MONOCYTES ABSOLUTE: 0.38 E9/L (ref 0.1–0.95)
MONOCYTES RELATIVE PERCENT: 3.9 % (ref 2–12)
NEUTROPHILS ABSOLUTE: 8.48 E9/L (ref 1.8–7.3)
NEUTROPHILS RELATIVE PERCENT: 87.8 % (ref 43–80)
PDW BLD-RTO: 15.4 FL (ref 11.5–15)
PLATELET # BLD: 377 E9/L (ref 130–450)
PMV BLD AUTO: 8.6 FL (ref 7–12)
POTASSIUM SERPL-SCNC: 3.6 MMOL/L (ref 3.5–5)
RBC # BLD: 4.36 E12/L (ref 3.5–5.5)
SODIUM BLD-SCNC: 136 MMOL/L (ref 132–146)
TOTAL PROTEIN: 6.7 G/DL (ref 6.4–8.3)
TSH SERPL DL<=0.05 MIU/L-ACNC: 3.29 UIU/ML (ref 0.27–4.2)
WBC # BLD: 9.7 E9/L (ref 4.5–11.5)

## 2020-01-23 PROCEDURE — 86140 C-REACTIVE PROTEIN: CPT

## 2020-01-23 PROCEDURE — 85025 COMPLETE CBC W/AUTO DIFF WBC: CPT

## 2020-01-23 PROCEDURE — 36591 DRAW BLOOD OFF VENOUS DEVICE: CPT

## 2020-01-23 PROCEDURE — 2580000003 HC RX 258: Performed by: INTERNAL MEDICINE

## 2020-01-23 PROCEDURE — 99214 OFFICE O/P EST MOD 30 MIN: CPT

## 2020-01-23 PROCEDURE — 99215 OFFICE O/P EST HI 40 MIN: CPT | Performed by: INTERNAL MEDICINE

## 2020-01-23 PROCEDURE — 84443 ASSAY THYROID STIM HORMONE: CPT

## 2020-01-23 PROCEDURE — 80053 COMPREHEN METABOLIC PANEL: CPT

## 2020-01-23 RX ORDER — HEPARIN SODIUM (PORCINE) LOCK FLUSH IV SOLN 100 UNIT/ML 100 UNIT/ML
500 SOLUTION INTRAVENOUS PRN
Status: DISCONTINUED | OUTPATIENT
Start: 2020-01-23 | End: 2020-01-24 | Stop reason: HOSPADM

## 2020-01-23 RX ORDER — LANOLIN ALCOHOL/MO/W.PET/CERES
3 CREAM (GRAM) TOPICAL NIGHTLY PRN
COMMUNITY

## 2020-01-23 RX ORDER — SODIUM CHLORIDE 0.9 % (FLUSH) 0.9 %
10 SYRINGE (ML) INJECTION PRN
Status: DISCONTINUED | OUTPATIENT
Start: 2020-01-23 | End: 2020-01-24 | Stop reason: HOSPADM

## 2020-01-23 RX ADMIN — SODIUM CHLORIDE, PRESERVATIVE FREE 10 ML: 5 INJECTION INTRAVENOUS at 11:13

## 2020-01-23 NOTE — PROGRESS NOTES
Department of [facility] Oncology  Attending Clinic Note    Reason for Visit:  Follow-up on a patient with Breast cancer and Endometrial cancer    PCP:  Rachel Jimenez DO    History of Present Illness:  69 y.o. 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  -Adjuvant radiation therapy completed March 28, 2017.  -Endocrine therapy: Arimidex started March 30, 2017. She did not tolerate due to severe depression;  -Arimidex discontinued.   -Started on Femara after approximately 2 weeks, but did not tolerate Femara. Chose observation. Clinically, there is no evidence of recurrence. IA papillary serous endometrial adenocarcinoma, FIGO grade 3, - LVSI, tumor size 4.5 cm;    BRCA/Myrisk testing Negative  7/20/16-Exam under anesthesia, diagnostic laparoscopy, total laparoscopic hysterectomy, bilateral salpingo-oophorectomy, pelvic and periaortic lymphadenectomy, omentectomy. HDR VAGINAL BRACHYTHERAPY-9/29/16, 10/6/16, 10/13/16    On 9/21/2017 anterior right psoas muscle fine-needle aspirate: Positive for malignant cells, metastatic high-grade carcinoma compatible with patient's known endometrial serous carcinoma.     She completed 2 cycles of chemotherapy with Taxol Carbo and Avastin on 10/17 and 11/17. She had poor tolerance to chemotherapy, therefore she declined additional chemo and opted for Natural remedies instead     on 06/12/2018: 15 (<39UmL)      Re-staging scans on 06/15/2018:  CT of the abdomen and pelvis: 2.2 x 1.3 cm soft tissue nodule along the superior aspect of the urinary bladder suspicious for metastases, decreased in size since 1/23/18; no evidence of new abnormalities since prior visit; diverticulosis without evidence of diverticulitis.   CT Chest:  Multiple bilateral lung nodules suspicious for 05/17/2019 stable exam.  Continue Keytruda and repeat scans in 3 months. Cycle # 13 Keytruda was on 05/23/2019. Cycle # 14 Keytruda was on 06/13/2019. Cycle # 15 Keytruda was on 07/11/2019. Cycle # 16 Lavinia Garcia was on 08/01/2019. CT chest 08/18/2019 stable to slightly more prominent LLL nodule 4.2 mm  CT abdomen/pelvis 08/18/2019 enhancing right psoas mass increased partially compressing the distal right ureter causing hydronephrosis in the right kidney. Urology team on board. Cycle # 17 Keytruda was on 08/22/2019. MRI right hip 09/03/2019:Trochanteric bursitis. Heterogenous joint effusion may be infected or hemorrhagic. Increased T2 signal superior acetabulum is most likely degenerative such as subchondral cyst formation. Osteoarthritis. No evidence for avascular necrosis or transient osteoporosis. Patient received injection to right hip of 4cc lidocaine/1 cc kenalog on 09/24/2019 with orthopedics. Cycle # 17 Keytruda was on 08/22/2019. Evaluated by Dr. Rolo Juares at [facility] on 09/19/2019 who recommended to continue Santo Garcia and referred her to Dr. Yakov Villalta for consideration of SBRT to treat the psoas lesion. Recommended revisiting ureteral catheter with urology after assessment with Rad Onc  Cycle # 18 Lavinia Garcia was on 09/26/2019. Cycle # 19 Lavinia Garcia was on 10/24/2019. Appointment with Dr. Yakov Villalta on 10/28/2019 who recommended SBRT to the oligoprogressive lesion adjacent to/investing the right psoas muscle with the goal of allowing her to continue her systemic therapy with pembrolizumab. Cycle # 20 Lavinia Garcia was on 11/14/2019. SBRT was completed 12/12/2019. The R Psoas metastasis PTV received a total dose of 3750 cGy in 6 fractions at 625 cGy/fraction prescribed to Max Dose at 78.0% IDL using 10 MV photons with VMAT Coplanar technique. Review of Systems;  CONSTITUTIONAL: No fever, chills. Poor appetite and energy level. ENMT: Eyes: No diplopia; Nose/Mouth: No sore throat.   RESPIRATORY: No hemoptysis. Shortness of breath   CARDIOVASCULAR: No chest pain, palpitations. GASTROINTESTINAL: No vomiting, abdominal pain  GENITOURINARY: No dysuria, urinary frequency, hematuria. MSK: Significant arthralgias in boaz knees. NEURO: No syncope, presyncope. Remainder:  ROS NEGATIVE    Past Medical History:      Diagnosis Date    Cancer [facility])     endometrial cancer, breast    Diabetes mellitus ([address]     diet controlled    Diverticulitis     GERD (gastroesophageal reflux disease)     Gout     Hiatal hernia     Macular degeneration     Osteoarthritis     Osteopenia      Medications:  Reviewed and reconciled. Allergies: Allergies   Allergen Reactions    Percocet [Oxycodone-Acetaminophen] Other (See Comments)     Prolong use, GI issues    Asa [Aspirin] Hives    Levothyroxine      Other reaction(s): Free Text    Pepcid [Famotidine]     Prilosec [Omeprazole] Hives    Protonix [Pantoprazole Sodium] Other (See Comments)     Other reaction(s): Other: See Comments  DEPRESSION    Erythromycin Rash    Keflet [Cephalexin] Rash    Levaquin [Levofloxacin In D5w] Rash    Pcn [Penicillins] Rash    Polyethylene Glycol Rash     Bloating,gas    Tetracycline Rash    Tetracyclines & Related Rash     Physical Exam:  BP (!) 141/60 (Site: Right Upper Arm, Position: Sitting, Cuff Size: Small Adult)   Pulse 63   Temp 98.5 °F (36.9 °C) (Temporal)   Ht 5' 3.5\" (1.613 m)   Wt 145 lb 8 oz (66 kg)   SpO2 99%   BMI 25.37 kg/m²   GENERAL: Alert, oriented x 3, tired  HEENT: PERRLA; EOMI. Oropharynx dry  NECK: Supple. Without lymphadenopathy. LUNGS: [address] air entry bilaterally. No wheezing, crackles or ronchi. CARDIOVASCULAR: Regular rate. No murmurs, rubs or gallops. ABDOMEN: Soft. Non-tender, non-distended. +BS  EXTREMITIES: Without clubbing, cyanosis. NEUROLOGIC: No focal deficits.    ECOG PS 2    Impression/Plan:  69 y.o. 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 CT abdomen/pelvis 05/17/2019 stable exam.  Continue Keytruda and repeat scans in 3 months. Cycle # 13 Keytruda was on 05/23/2019. Cycle # 14 Keytruda was on 06/13/2019. Cycle # 15 Keytruda was on 07/11/2019. Cycle # 16 Prudy Speed was on 08/01/2019. CT chest 08/18/2019 stable to slightly more prominent LLL nodule 4.2 mm  CT abdomen/pelvis 08/18/2019 enhancing right psoas mass increased partially compressing the distal right ureter causing hydronephrosis in the right kidney. Urology team on board. Cycle # 17 Keytruda was on 08/22/2019. MRI right hip 09/03/2019:Trochanteric bursitis. Heterogenous joint effusion may be infected or hemorrhagic. Increased T2 signal superior acetabulum is most likely degenerative such as subchondral cyst formation. Osteoarthritis. No evidence for avascular necrosis or transient osteoporosis. Patient received injection to right hip of 4cc lidocaine/1 cc kenalog on 09/24/2019 with orthopedics. Evaluated by Dr. Zach Lopez at [facility] on 09/19/2019 who recommended to continue Prudy Speed and referred her to Dr. Yuriy Enciso for consideration of SBRT to treat the psoas lesion. Recommended revisiting ureteral catheter with urology after assessment with Rad Onc   Cycle # 18 Prudy Speed was on 09/26/2019. Cycle # 19 Prudy Speed was on 10/24/2019. Appointment with Dr. Yuriy Enciso on 10/28/2019 who recommended SBRT to the oligoprogressive lesion adjacent to/investing the right psoas muscle with the goal of allowing her to continue her systemic therapy with pembrolizumab. Cycle # 20 Prudy Speed was on 11/14/2019. SBRT was started on 11/21/2019 and completed 12/12/2019. The R Psoas metastasis PTV received a total dose of 3750 cGy in 6 fractions at 625 cGy/fraction prescribed to Max Dose at 78.0% IDL using 10 MV photons with VMAT Coplanar technique. Patient is complaining of weakness, significant fatigue poor appetite and severe arthralgias in bilateral knees.   She wants to hold on Keytruda at this time and is contemplating proceeding with hospice care. She will inform us of her final decision by phone.     Dejon Aranda MD   [id number]  Board Certified Medical Oncologist

## 2020-01-24 ENCOUNTER — OFFICE VISIT (OUTPATIENT)
Dept: FAMILY MEDICINE CLINIC | Age: 75
End: 2020-01-24
Payer: MEDICARE

## 2020-01-24 VITALS
OXYGEN SATURATION: 98 % | WEIGHT: 147 LBS | TEMPERATURE: 98.3 F | HEIGHT: 64 IN | HEART RATE: 70 BPM | DIASTOLIC BLOOD PRESSURE: 76 MMHG | SYSTOLIC BLOOD PRESSURE: 130 MMHG | BODY MASS INDEX: 25.1 KG/M2

## 2020-01-24 PROBLEM — E11.9 DIABETES MELLITUS (HCC): Status: ACTIVE | Noted: 2020-01-24

## 2020-01-24 PROCEDURE — 99214 OFFICE O/P EST MOD 30 MIN: CPT | Performed by: FAMILY MEDICINE

## 2020-01-24 RX ORDER — LEVOTHYROXINE SODIUM 112 MCG
112 TABLET ORAL DAILY
Qty: 30 TABLET | Refills: 2 | Status: SHIPPED
Start: 2020-01-24 | End: 2020-05-04 | Stop reason: SDUPTHER

## 2020-01-24 RX ORDER — BLOOD-GLUCOSE METER
1 KIT MISCELLANEOUS DAILY
Qty: 100 EACH | Refills: 5 | Status: SHIPPED
Start: 2020-01-24 | End: 2020-05-04 | Stop reason: SDUPTHER

## 2020-01-24 RX ORDER — GLIMEPIRIDE 2 MG/1
4 TABLET ORAL 2 TIMES DAILY
Qty: 120 TABLET | Refills: 5 | Status: SHIPPED
Start: 2020-01-24 | End: 2020-03-17

## 2020-01-24 RX ORDER — OXYCODONE AND ACETAMINOPHEN 10; 325 MG/1; MG/1
1 TABLET ORAL EVERY 4 HOURS PRN
Qty: 150 TABLET | Refills: 0 | Status: CANCELLED | OUTPATIENT
Start: 2020-01-24 | End: 2020-02-23

## 2020-01-24 ASSESSMENT — ENCOUNTER SYMPTOMS
COUGH: 0
DIARRHEA: 0
WHEEZING: 0
CONSTIPATION: 0
CHEST TIGHTNESS: 0
NAUSEA: 0
SHORTNESS OF BREATH: 0
EYE PAIN: 0
BACK PAIN: 0
TROUBLE SWALLOWING: 0
ABDOMINAL PAIN: 0
SINUS PAIN: 0
SORE THROAT: 0
VOMITING: 0

## 2020-01-24 NOTE — PROGRESS NOTES
Positive for wound (right armpit). Negative for rash. Neurological: Positive for weakness and headaches. Negative for dizziness, syncope and light-headedness. Hematological: Negative for adenopathy. Psychiatric/Behavioral: Negative for confusion, dysphoric mood, self-injury, sleep disturbance and suicidal ideas. The patient is not nervous/anxious. Current Outpatient Medications:     Ez Smart Blood Glucose Lancets MISC, 1 each by Does not apply route daily, Disp: 100 each, Rfl: 5    glimepiride (AMARYL) 2 MG tablet, Take 2 tablets by mouth 2 times daily, Disp: 120 tablet, Rfl: 5    SYNTHROID 112 MCG tablet, Take 1 tablet by mouth Daily, Disp: 30 tablet, Rfl: 2    melatonin 3 MG TABS tablet, Take 3 mg by mouth daily, Disp: , Rfl:     blood glucose monitor strips, 1 strip by Other route daily, Disp: 100 strip, Rfl: 5    Licorice-Glycine (RHIZINATE 3X) 400-50 MG CHEW, Take by mouth, Disp: , Rfl:     loratadine (CLARITIN) 10 MG tablet, Take 10 mg by mouth daily, Disp: , Rfl:     montelukast (SINGULAIR) 10 MG tablet, Take by mouth, Disp: , Rfl:     CANNABIDIOL PO, , Disp: , Rfl:   Allergies   Allergen Reactions    Percocet [Oxycodone-Acetaminophen] Other (See Comments)     Prolong use, GI issues    Asa [Aspirin] Hives    Levothyroxine      Other reaction(s): Free Text    Pepcid [Famotidine]     Prilosec [Omeprazole] Hives    Protonix [Pantoprazole Sodium] Other (See Comments)     Other reaction(s):  Other: See Comments  DEPRESSION    Erythromycin Rash    Keflet [Cephalexin] Rash    Levaquin [Levofloxacin In D5w] Rash    Pcn [Penicillins] Rash    Polyethylene Glycol Rash     Bloating,gas    Tetracycline Rash    Tetracyclines & Related Rash      Past Medical History:   Diagnosis Date    Cancer [facility])     endometrial cancer, breast    Diabetes mellitus ([facility] [address]     diet controlled    Diverticulitis     GERD (gastroesophageal reflux disease)     Gout     Hiatal hernia     Macular at [address] TUNNELED CTR VAD W/SUBQ PORT AGE 5 YR/> N/A 12/4/2018    MEDIPORT INSERTION performed by Thierry Robb MD at [facility] History     Tobacco History     Smoking Status  Former Smoker Smoking Frequency  0.25 packs/day for 20 years (5 pk yrs) Smoking Tobacco Type  Cigarettes    Smokeless Tobacco Use  Never Used          Alcohol History     Alcohol Use Status  Yes Comment  rare          Drug Use     Drug Use Status  Yes Types  Marijuana Comment  medical          Sexual Activity     Sexually Active  Not Asked            /76   Pulse 70   Temp 98.3 °F (36.8 °C)   Ht 5' 4\" (1.626 m)   Wt 147 lb (66.7 kg)   SpO2 98%   BMI 25.23 kg/m²     EXAM:   Physical Exam  Vitals signs and nursing note reviewed. Constitutional:       Appearance: She is well-developed. She is ill-appearing. HENT:      Head: Normocephalic and atraumatic. Right Ear: Tympanic membrane normal.      Left Ear: Tympanic membrane normal.      Nose: Nose normal.      Mouth/Throat:      Mouth: Mucous membranes are moist.   Eyes:      Pupils: Pupils are equal, round, and reactive to light. Neck:      Musculoskeletal: Normal range of motion. Cardiovascular:      Rate and Rhythm: Normal rate and regular rhythm. Pulmonary:      Effort: Pulmonary effort is normal.      Breath sounds: Normal breath sounds. Abdominal:      Palpations: Abdomen is soft. Skin:     General: Skin is warm and dry. Neurological:      Mental Status: She is alert. Niels Councilman was seen today for ovarian cancer, hypothyroidism and diabetes.     Diagnoses and all orders for this visit:    Type 2 diabetes mellitus without complication, without long-term current use of insulin (HCC)  Comments:  on amaryl  watching her sugars  stable  no changes    Endometrial cancer (HCC)  Comments:  possible hospice in a few weeks    Malignant neoplasm of ovary, unspecified laterality (HCC)    Gastroesophageal reflux

## 2020-02-05 ENCOUNTER — TELEPHONE (OUTPATIENT)
Dept: ONCOLOGY | Age: 75
End: 2020-02-05

## 2020-02-06 ENCOUNTER — TELEPHONE (OUTPATIENT)
Dept: ONCOLOGY | Age: 75
End: 2020-02-06

## 2020-02-06 ENCOUNTER — HOSPITAL ENCOUNTER (OUTPATIENT)
Dept: INFUSION THERAPY | Age: 75
End: 2020-02-06

## 2020-02-07 ENCOUNTER — TELEPHONE (OUTPATIENT)
Dept: FAMILY MEDICINE CLINIC | Age: 75
End: 2020-02-07

## 2020-02-11 ENCOUNTER — OFFICE VISIT (OUTPATIENT)
Dept: ORTHOPEDIC SURGERY | Age: 75
End: 2020-02-11
Payer: MEDICARE

## 2020-02-11 VITALS
SYSTOLIC BLOOD PRESSURE: 148 MMHG | WEIGHT: 141 LBS | BODY MASS INDEX: 24.07 KG/M2 | HEART RATE: 105 BPM | HEIGHT: 64 IN | DIASTOLIC BLOOD PRESSURE: 81 MMHG

## 2020-02-11 PROCEDURE — 99213 OFFICE O/P EST LOW 20 MIN: CPT | Performed by: ORTHOPAEDIC SURGERY

## 2020-02-25 ENCOUNTER — TELEPHONE (OUTPATIENT)
Dept: FAMILY MEDICINE CLINIC | Age: 75
End: 2020-02-25

## 2020-02-25 NOTE — TELEPHONE ENCOUNTER
Alda Corbett from [facility] left message on voicemail. Patient is having pain in arm and neck and they believe Volatren Gel to these areas would be beneficial. Ok to give verbal order?   [phone]

## 2020-03-10 ENCOUNTER — CARE COORDINATION (OUTPATIENT)
Dept: CARE COORDINATION | Age: 75
End: 2020-03-10

## 2020-03-12 ENCOUNTER — TELEPHONE (OUTPATIENT)
Dept: FAMILY MEDICINE CLINIC | Age: 75
End: 2020-03-12

## 2020-03-17 ENCOUNTER — TELEPHONE (OUTPATIENT)
Dept: FAMILY MEDICINE CLINIC | Age: 75
End: 2020-03-17

## 2020-03-17 ENCOUNTER — OFFICE VISIT (OUTPATIENT)
Dept: FAMILY MEDICINE CLINIC | Age: 75
End: 2020-03-17
Payer: MEDICARE

## 2020-03-17 ENCOUNTER — OFFICE VISIT (OUTPATIENT)
Dept: ORTHOPEDIC SURGERY | Age: 75
End: 2020-03-17
Payer: MEDICARE

## 2020-03-17 VITALS
HEIGHT: 64 IN | OXYGEN SATURATION: 98 % | BODY MASS INDEX: 22.47 KG/M2 | TEMPERATURE: 98.2 F | HEART RATE: 118 BPM | WEIGHT: 131.6 LBS

## 2020-03-17 VITALS — BODY MASS INDEX: 22.47 KG/M2 | TEMPERATURE: 97.8 F | WEIGHT: 131.6 LBS | HEIGHT: 64 IN

## 2020-03-17 PROBLEM — C79.89 METASTATIC ADENOCARCINOMA TO INTRA-ABDOMINAL SITE (HCC): Status: ACTIVE | Noted: 2020-03-17

## 2020-03-17 PROCEDURE — 99213 OFFICE O/P EST LOW 20 MIN: CPT | Performed by: ORTHOPAEDIC SURGERY

## 2020-03-17 PROCEDURE — 99214 OFFICE O/P EST MOD 30 MIN: CPT | Performed by: FAMILY MEDICINE

## 2020-03-17 RX ORDER — TRIAMCINOLONE ACETONIDE 40 MG/ML
40 INJECTION, SUSPENSION INTRA-ARTICULAR; INTRAMUSCULAR ONCE
Status: DISCONTINUED | OUTPATIENT
Start: 2020-03-17 | End: 2020-03-17

## 2020-03-17 RX ORDER — GLIMEPIRIDE 2 MG/1
2 TABLET ORAL 2 TIMES DAILY
COMMUNITY
End: 2020-07-01 | Stop reason: DRUGHIGH

## 2020-03-17 RX ORDER — PREDNISONE 10 MG/1
10 TABLET ORAL 2 TIMES DAILY
Qty: 60 TABLET | Refills: 5 | Status: SHIPPED | OUTPATIENT
Start: 2020-03-17 | End: 2020-04-16

## 2020-03-17 RX ORDER — DICLOFENAC SODIUM 75 MG/1
75 TABLET, DELAYED RELEASE ORAL 2 TIMES DAILY
Qty: 60 TABLET | Refills: 3 | Status: SHIPPED
Start: 2020-03-17 | End: 2020-05-15

## 2020-03-17 RX ORDER — PREDNISONE 10 MG/1
10 TABLET ORAL 2 TIMES DAILY
Qty: 60 TABLET | Refills: 0 | Status: SHIPPED
Start: 2020-03-17 | End: 2020-03-17

## 2020-03-17 RX ORDER — LIDOCAINE HYDROCHLORIDE 10 MG/ML
4 INJECTION, SOLUTION INFILTRATION; PERINEURAL ONCE
Status: DISCONTINUED | OUTPATIENT
Start: 2020-03-17 | End: 2020-03-17

## 2020-03-17 ASSESSMENT — ENCOUNTER SYMPTOMS
SHORTNESS OF BREATH: 0
SINUS PAIN: 0
SORE THROAT: 0
WHEEZING: 0
CHEST TIGHTNESS: 0
DIARRHEA: 0
COUGH: 0
BACK PAIN: 1
VOMITING: 0
ABDOMINAL PAIN: 0
EYE PAIN: 0
TROUBLE SWALLOWING: 0
NAUSEA: 0
CONSTIPATION: 0

## 2020-03-17 NOTE — TELEPHONE ENCOUNTER
[facility] received 2 prednisone rxs. One with 0 refills and one with 5 refills. Ok to give verbal to dispense predisone 10mg take 1 tab by mouth 2 times daily #60 with 5 refills?

## 2020-03-17 NOTE — PROGRESS NOTES
3/17/20    Name: Kevin Merida  :1945   Sex:female   Age:74 y.o. Chief Complaint   Patient presents with    Back Pain     Patient is here because she is having back pain and joint pain as a result of the [place] Garcia. Ortho suggested steroid therapy or referral to rheumatology and suggest patient see her PCP. Patient is unable to tolerate blood pressure being taken due to pain. Pain and inflammation in joints getting worse since stopping the [place]    Also some swelling in the last few weeks  But labs are getting worse with regards to nutition status  Albumin is dropping  This will account for her swelling as well    Not referreing to rheumatology b/c the wait time to get in is just not feasable for her right now  Will try prednisone 10mg bid      Review of Systems   Constitutional: Negative for appetite change, fatigue and fever. HENT: Negative for congestion, ear pain, sinus pain, sore throat and trouble swallowing. Eyes: Negative for pain. Respiratory: Negative for cough, chest tightness, shortness of breath and wheezing. Cardiovascular: Negative for chest pain, palpitations and leg swelling. Gastrointestinal: Negative for abdominal pain, constipation, diarrhea, nausea and vomiting. Endocrine: Negative for cold intolerance and heat intolerance. Genitourinary: Negative for difficulty urinating, hematuria and pelvic pain. Musculoskeletal: Positive for arthralgias, back pain, gait problem and myalgias. Negative for joint swelling. Skin: Negative for rash and wound. Neurological: Negative for dizziness, syncope and headaches. Hematological: Negative for adenopathy. Psychiatric/Behavioral: Negative for confusion, sleep disturbance and suicidal ideas.            Current Outpatient Medications:     glimepiride (AMARYL) 2 MG tablet, Take 2 mg by mouth 2 times daily, Disp: , Rfl:     predniSONE (DELTASONE) 10 MG tablet, Take 1 tablet by mouth 2 times daily, Disp: 60 tablet, Rfl:

## 2020-03-17 NOTE — TELEPHONE ENCOUNTER
Pharmacy calling in stated they received 2 scripts for prednisone on with 60 tablets no refills and another 60 tab with 5 refills, they need to know which one to fill, please advise.

## 2020-03-17 NOTE — TELEPHONE ENCOUNTER
Her pharmacy told her that Diclofenac is similar to aspirin, and since she is allergic to it, to call her doctor to see if she should  to take it?

## 2020-03-17 NOTE — TELEPHONE ENCOUNTER
Left message with Medhat Upton at [facility]. Dr. Barb Choudhury wants patient to take prednisone 10mg twice  Day    And Ensure High Protein   Clear/Fruit Flavored twice a day.

## 2020-03-24 ENCOUNTER — TELEPHONE (OUTPATIENT)
Dept: FAMILY MEDICINE CLINIC | Age: 75
End: 2020-03-24

## 2020-03-24 RX ORDER — ALPRAZOLAM 0.5 MG/1
.25-1 TABLET ORAL NIGHTLY PRN
Qty: 45 TABLET | Refills: 0 | Status: SHIPPED | OUTPATIENT
Start: 2020-03-24 | End: 2020-04-23

## 2020-03-24 NOTE — TELEPHONE ENCOUNTER
Patient is agreeable to try xanax as a PRN. She will continue to monitor her sugar levels and call if she continues to trend low. Pharmacy is [facility] in [place].

## 2020-03-24 NOTE — TELEPHONE ENCOUNTER
Patient calling the mobility&range of motion has greatly improved on voltaren&prednisone. Her BS is 187 first day of prednisone. From then on the rest of the week her bs has been 80. She reduced her glimepiride to 2 pills daily not 4. She is not sleeping at all. Any advisemnt ?

## 2020-04-09 RX ORDER — GLUCOSAMINE HCL/CHONDROITIN SU 500-400 MG
CAPSULE ORAL
Qty: 100 STRIP | Refills: 11 | Status: SHIPPED
Start: 2020-04-09 | End: 2020-11-23

## 2020-04-09 NOTE — TELEPHONE ENCOUNTER
Last Appointment:  3/17/2020  No future appointments.    Patient left a vm requesting a refill on test strips

## 2020-04-13 ENCOUNTER — TELEPHONE (OUTPATIENT)
Dept: FAMILY MEDICINE CLINIC | Age: 75
End: 2020-04-13

## 2020-04-13 NOTE — TELEPHONE ENCOUNTER
Pharmacy calling in stated they received a fax for a refill on prednisone but it did not have a date and would like to know should the date be today and should it be refilled stated there are no more refills on the script that was sent over on  3/17/2020, please advise.

## 2020-05-04 NOTE — TELEPHONE ENCOUNTER
Name of Medication(s) Requested:  Lancets & Synthroid    Pharmacy Requested:   [facility]    Medication(s) pended? [x] Yes  [] No    Last Appointment:  3/17/2020    Future appts:  No future appointments. Does patient need call back? [] Yes  [x] No        She also wants you to know that she is going off Hospice because she is getting better.

## 2020-05-05 RX ORDER — LEVOTHYROXINE SODIUM 112 MCG
112 TABLET ORAL DAILY
Qty: 30 TABLET | Refills: 3 | Status: SHIPPED
Start: 2020-05-05 | End: 2020-09-10 | Stop reason: SDUPTHER

## 2020-05-05 RX ORDER — BLOOD-GLUCOSE METER
1 KIT MISCELLANEOUS DAILY
Qty: 100 EACH | Refills: 5 | Status: SHIPPED
Start: 2020-05-05 | End: 2020-11-25 | Stop reason: SDUPTHER

## 2020-05-12 ENCOUNTER — TELEPHONE (OUTPATIENT)
Dept: PHARMACY | Facility: CLINIC | Age: 75
End: 2020-05-12

## 2020-05-15 ENCOUNTER — VIRTUAL VISIT (OUTPATIENT)
Dept: FAMILY MEDICINE CLINIC | Age: 75
End: 2020-05-15
Payer: MEDICARE

## 2020-05-15 ENCOUNTER — TELEPHONE (OUTPATIENT)
Dept: FAMILY MEDICINE CLINIC | Age: 75
End: 2020-05-15

## 2020-05-15 PROCEDURE — 99214 OFFICE O/P EST MOD 30 MIN: CPT | Performed by: FAMILY MEDICINE

## 2020-05-15 RX ORDER — PREDNISONE 10 MG/1
1 TABLET ORAL 2 TIMES DAILY
COMMUNITY
Start: 2020-05-06 | End: 2020-07-01 | Stop reason: DRUGHIGH

## 2020-05-15 ASSESSMENT — ENCOUNTER SYMPTOMS
DIARRHEA: 0
CHEST TIGHTNESS: 0
ABDOMINAL PAIN: 1
EYE PAIN: 0
SINUS PAIN: 0
SHORTNESS OF BREATH: 0
VOMITING: 0
WHEEZING: 0
BACK PAIN: 0
NAUSEA: 0
COUGH: 0
CONSTIPATION: 0
TROUBLE SWALLOWING: 0
SORE THROAT: 0

## 2020-05-16 LAB
ABSOLUTE BASO #: 0.1 10*3/UL (ref 0–0.1)
ABSOLUTE EOS #: 0 10*3/UL (ref 0–0.4)
ABSOLUTE NEUT #: 8.1 10*3/UL (ref 2.3–7.9)
ALBUMIN: 3.1 GM/DL (ref 3.1–4.5)
ALP BLD-CCNC: 94 U/L (ref 45–117)
ALT SERPL-CCNC: 51 U/L (ref 12–78)
AST SERPL-CCNC: 24 IU/L (ref 3–35)
BASOPHILS %: 0.5 % (ref 0–1)
BILIRUB SERPL-MCNC: 0.6 MG/DL (ref 0.2–1)
BUN BLDV-MCNC: 21 MG/DL (ref 7–24)
CALCIUM SERPL-MCNC: 8.9 MG/DL (ref 8.5–10.5)
CHLORIDE BLD-SCNC: 101 MMOL/L (ref 98–107)
CO2: 25 MMOL/L (ref 21–32)
CREAT SERPL-MCNC: 0.78 MG/DL (ref 0.55–1.02)
EOSINOPHILS %: 0 % (ref 1–4)
GFR AFRICAN AMERICAN: > 60 ML/MIN
GFR SERPL CREATININE-BSD FRML MDRD: >60 ML/MIN/
GLUCOSE: 316 MG/DL (ref 65–99)
HCT VFR BLD CALC: 37.6 % (ref 37–47)
HEMOGLOBIN: 11.8 G/DL (ref 12–16)
IMMATURE GRANULOCYTES #: 0.3 10*3/UL (ref 0–0.1)
IMMATURE GRANULOCYTES: 3 % (ref 0–1)
LYMPHOCYTE %: 6.2 % (ref 27–41)
LYMPHOCYTES # BLD: 0.6 10*3/UL (ref 1.3–4.4)
MCH RBC QN AUTO: 26.9 PG (ref 27–31)
MCHC RBC AUTO-ENTMCNC: 31.4 G/DL (ref 33–37)
MCV RBC AUTO: 85.8 FL (ref 81–99)
MONOCYTES # BLD: 0.6 10*3/UL (ref 0.1–1)
MONOCYTES %: 6.2 % (ref 3–9)
NEUTROPHILS %: 84.1 % (ref 47–73)
NUCLEATED RED BLOOD CELLS: 0 % (ref 0–0)
PDW BLD-RTO: 18.8 % (ref 0–14.5)
PLATELET # BLD: 241 10*3/UL (ref 130–400)
PMV BLD AUTO: 8.8 FL (ref 9.6–12.3)
POTASSIUM SERPL-SCNC: 4.3 MMOL/L (ref 3.5–5.1)
RBC # BLD: 4.38 10*6/UL (ref 4.1–5.1)
SODIUM BLD-SCNC: 134 MMOL/L (ref 136–145)
T4 FREE: 1.27 NG/DL (ref 0.76–1.46)
TOTAL PROTEIN: 6.4 GM/DL (ref 6.4–8.2)
TSH SERPL DL<=0.05 MIU/L-ACNC: 1.6 UIU/ML (ref 0.36–4.75)
WBC # BLD: 9.6 10*3/UL (ref 4.8–10.8)

## 2020-05-19 ENCOUNTER — TELEPHONE (OUTPATIENT)
Dept: FAMILY MEDICINE CLINIC | Age: 75
End: 2020-05-19

## 2020-05-19 RX ORDER — DICLOFENAC SODIUM 75 MG/1
75 TABLET, DELAYED RELEASE ORAL 2 TIMES DAILY
Qty: 60 TABLET | Refills: 3 | Status: SHIPPED
Start: 2020-05-19 | End: 2020-11-25

## 2020-05-19 NOTE — TELEPHONE ENCOUNTER
Patient had appointment 05/15/20, statin therapy was not discussed. Patient is still currently on Hospice but will likely be off of it soon.      Kota Davies PharmD  5/19/2020 8:24 AM  Office: [phone]  Department: [phone], option 7    ============================================================================  CLINICAL PHARMACY CONSULT: MED RECONCILIATION/REVIEW ADDENDUM    For Pharmacy Admin Tracking Only    PHSO: Yes  Total # of Interventions Recommended: 1  - New Order #: 1 New Medication Order Reason(s): Needs Additional Medication Therapy  Total Interventions Accepted: 0  Time Spent (min): Fabián Anderson  [address]

## 2020-05-26 ENCOUNTER — TELEPHONE (OUTPATIENT)
Dept: FAMILY MEDICINE CLINIC | Age: 75
End: 2020-05-26

## 2020-06-01 ENCOUNTER — TELEPHONE (OUTPATIENT)
Dept: PHARMACY | Facility: CLINIC | Age: 75
End: 2020-06-01

## 2020-06-05 ENCOUNTER — TELEPHONE (OUTPATIENT)
Dept: FAMILY MEDICINE CLINIC | Age: 75
End: 2020-06-05

## 2020-06-09 RX ORDER — SODIUM CHLORIDE 0.9 % (FLUSH) 0.9 %
10 SYRINGE (ML) INJECTION PRN
Status: CANCELLED | OUTPATIENT
Start: 2020-06-09

## 2020-06-09 RX ORDER — HEPARIN SODIUM (PORCINE) LOCK FLUSH IV SOLN 100 UNIT/ML 100 UNIT/ML
500 SOLUTION INTRAVENOUS PRN
Status: CANCELLED | OUTPATIENT
Start: 2020-06-09

## 2020-06-17 ENCOUNTER — HOSPITAL ENCOUNTER (OUTPATIENT)
Dept: INFUSION THERAPY | Age: 75
Setting detail: INFUSION SERIES
Discharge: HOME | End: 2020-06-17
Payer: MEDICARE

## 2020-06-17 VITALS
OXYGEN SATURATION: 98 % | TEMPERATURE: 98.1 F | WEIGHT: 138 LBS | HEIGHT: 63 IN | BODY MASS INDEX: 24.45 KG/M2 | RESPIRATION RATE: 16 BRPM | SYSTOLIC BLOOD PRESSURE: 144 MMHG | DIASTOLIC BLOOD PRESSURE: 69 MMHG | HEART RATE: 84 BPM

## 2020-06-17 DIAGNOSIS — C54.1 ADENOCARCINOMA OF ENDOMETRIUM (HCC): ICD-10-CM

## 2020-06-17 DIAGNOSIS — C50.412 MALIGNANT NEOPLASM OF UPPER-OUTER QUADRANT OF LEFT FEMALE BREAST, UNSPECIFIED ESTROGEN RECEPTOR STATUS (HCC): ICD-10-CM

## 2020-06-17 DIAGNOSIS — C56.9 MALIGNANT NEOPLASM OF OVARY, UNSPECIFIED LATERALITY (HCC): Primary | ICD-10-CM

## 2020-06-17 PROCEDURE — 96523 IRRIG DRUG DELIVERY DEVICE: CPT

## 2020-06-17 PROCEDURE — 2580000003 HC RX 258: Performed by: FAMILY MEDICINE

## 2020-06-17 PROCEDURE — 6360000002 HC RX W HCPCS: Performed by: FAMILY MEDICINE

## 2020-06-17 RX ORDER — HEPARIN SODIUM (PORCINE) LOCK FLUSH IV SOLN 100 UNIT/ML 100 UNIT/ML
500 SOLUTION INTRAVENOUS PRN
Status: DISCONTINUED | OUTPATIENT
Start: 2020-06-17 | End: 2020-06-18 | Stop reason: HOSPADM

## 2020-06-17 RX ORDER — HEPARIN SODIUM (PORCINE) LOCK FLUSH IV SOLN 100 UNIT/ML 100 UNIT/ML
500 SOLUTION INTRAVENOUS PRN
Status: CANCELLED | OUTPATIENT
Start: 2020-06-17

## 2020-06-17 RX ORDER — SODIUM CHLORIDE 0.9 % (FLUSH) 0.9 %
10 SYRINGE (ML) INJECTION PRN
Status: DISCONTINUED | OUTPATIENT
Start: 2020-06-17 | End: 2020-06-18 | Stop reason: HOSPADM

## 2020-06-17 RX ORDER — SODIUM CHLORIDE 0.9 % (FLUSH) 0.9 %
10 SYRINGE (ML) INJECTION PRN
Status: CANCELLED | OUTPATIENT
Start: 2020-06-17

## 2020-06-17 RX ADMIN — SODIUM CHLORIDE, PRESERVATIVE FREE 10 ML: 5 INJECTION INTRAVENOUS at 13:10

## 2020-06-17 RX ADMIN — SODIUM CHLORIDE, PRESERVATIVE FREE 10 ML: 5 INJECTION INTRAVENOUS at 13:31

## 2020-06-17 RX ADMIN — Medication 500 UNITS: at 13:26

## 2020-06-17 RX ADMIN — SODIUM CHLORIDE, PRESERVATIVE FREE 10 ML: 5 INJECTION INTRAVENOUS at 13:30

## 2020-06-17 RX ADMIN — SODIUM CHLORIDE, PRESERVATIVE FREE 10 ML: 5 INJECTION INTRAVENOUS at 13:25

## 2020-06-17 RX ADMIN — Medication 500 UNITS: at 13:32

## 2020-06-17 RX ADMIN — SODIUM CHLORIDE, PRESERVATIVE FREE 20 ML: 5 INJECTION INTRAVENOUS at 13:00

## 2020-07-01 ENCOUNTER — VIRTUAL VISIT (OUTPATIENT)
Dept: FAMILY MEDICINE CLINIC | Age: 75
End: 2020-07-01
Payer: COMMERCIAL

## 2020-07-01 PROCEDURE — 99214 OFFICE O/P EST MOD 30 MIN: CPT | Performed by: FAMILY MEDICINE

## 2020-07-01 RX ORDER — GLIMEPIRIDE 2 MG/1
4 TABLET ORAL
Status: SHIPPED | COMMUNITY
Start: 2020-07-01 | End: 2021-03-04

## 2020-07-01 RX ORDER — PREDNISONE 10 MG/1
5 TABLET ORAL 2 TIMES DAILY
Status: SHIPPED | COMMUNITY
Start: 2020-07-01 | End: 2020-11-25

## 2020-07-01 RX ORDER — PREDNISONE 2.5 MG
5 TABLET ORAL NIGHTLY
COMMUNITY
Start: 2020-06-10 | End: 2020-11-06

## 2020-07-01 ASSESSMENT — ENCOUNTER SYMPTOMS
ABDOMINAL PAIN: 0
CHEST TIGHTNESS: 0
VOMITING: 0
SINUS PAIN: 0
TROUBLE SWALLOWING: 0
COUGH: 0
EYE PAIN: 0
CONSTIPATION: 0
SORE THROAT: 0
WHEEZING: 0
DIARRHEA: 0
BACK PAIN: 0
SHORTNESS OF BREATH: 0
NAUSEA: 0

## 2020-07-01 NOTE — PROGRESS NOTES
20    Name: Fany Buckner  :1945   Sex:female   Age:74 y.o. Chief Complaint   Patient presents with    Arthritis    Diabetes     Patient presents from home for video visit. She is tapering the prednisone. Patient is seeing Dr. Luis Felipe See for rheumatology, Dr. Luis Felipe See is doing a slower taper of the prednisone. Patient is on 5 mg in the am and 2.5 mg in the pm. She is taking her Glimepiride once a day, only if her sugar level is above 100. Patient says her sugars have been in the low 90's. She is having a little more pain with the tapering of the prednisone, patient takes the diclofenac as needed. TeleMedicine Video Visit    This visit was performed as a virtual video visit using a synchronous, two-way, audio-video telehealth technology platform. Patient identification was verified at the start of the visit, including the patient's telephone number and physical location. I discussed with the patient the nature of our telehealth visits, that:     Due to the nature of an audio- video modality, the only components of a physical exam that could be done are the elements supported by direct observation. I would evaluate the patient and recommend diagnostics and treatments based on my assessment. If it was felt that the patient should be evaluated in clinic or an emergency room setting, then they would be directed there. Our sessions are not being recorded and that personal health information is protected. Our team would provide follow up care in person if/when the patient needs it. Patient does agree to proceed with telemedicine consultation. Patient's location: home address in [facility]  Physician  location other address in [facility] other people involved in call were Noelle Caceres and my     See below for progress note    Time spent: Greater than Not billed by time    This visit was completed virtually using Doxy. 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 HENT: Negative for congestion, ear pain, sinus pain, sore throat and trouble swallowing. Eyes: Negative for pain. Respiratory: Negative for cough, chest tightness, shortness of breath and wheezing. Cardiovascular: Negative for chest pain, palpitations and leg swelling. Gastrointestinal: Negative for abdominal pain, constipation, diarrhea, nausea and vomiting. Endocrine: Negative for cold intolerance and heat intolerance. Genitourinary: Negative for difficulty urinating, dysuria, frequency, hematuria, pelvic pain and urgency. Musculoskeletal: Positive for arthralgias. Negative for back pain, gait problem, joint swelling and myalgias. Skin: Negative for rash and wound. Neurological: Negative for dizziness, syncope, light-headedness and headaches. Hematological: Negative for adenopathy. Psychiatric/Behavioral: Negative for confusion, dysphoric mood, self-injury, sleep disturbance and suicidal ideas. The patient is not nervous/anxious.             Current Outpatient Medications:     predniSONE (DELTASONE) 10 MG tablet, Take 0.5 tablets by mouth 2 times daily 5 mg in a.m. 2.5mg in the evening, Disp: , Rfl:     glimepiride (AMARYL) 2 MG tablet, Take 1 tablet by mouth daily, Disp: , Rfl:     predniSONE (DELTASONE) 2.5 MG tablet, Take 1 tablet by mouth nightly, Disp: , Rfl:     nystatin-triamcinolone (MYCOLOG II) 013088-4.1 UNIT/GM-% cream, APPLY SPARINGLY TO AFFECTED AREA(S) TWICE DAILY, Disp: , Rfl:     diclofenac (VOLTAREN) 75 MG EC tablet, Take 1 tablet by mouth 2 times daily, Disp: 60 tablet, Rfl: 3    Ez Smart Blood Glucose Lancets MISC, 1 each by Subdermal route daily, Disp: 100 each, Rfl: 5    SYNTHROID 112 MCG tablet, Take 1 tablet by mouth Daily, Disp: 30 tablet, Rfl: 3    blood glucose monitor strips, Test one time a day & as needed for symptoms of irregular blood glucose. E11.9, Disp: 100 strip, Rfl: 11    melatonin 3 MG TABS tablet, Take 3 mg by mouth daily, Disp: , Rfl:     Licorice-Glycine (RHIZINATE 3X) 400-50 MG CHEW, Take by mouth, Disp: , Rfl:     loratadine (CLARITIN) 10 MG tablet, Take 10 mg by mouth daily, Disp: , Rfl:     montelukast (SINGULAIR) 10 MG tablet, Take by mouth, Disp: , Rfl:     CANNABIDIOL PO, , Disp: , Rfl:   Allergies   Allergen Reactions    Famotidine Anxiety, Nausea Only and Palpitations     Other reaction(s): Dizziness    Percocet [Oxycodone-Acetaminophen] Other (See Comments)     Prolong use, GI issues    Asa [Aspirin] Hives    Levothyroxine      Other reaction(s): Free Text  Other reaction(s): severe sideeffects/depression,    Omeprazole Hives     Other reaction(s): Hives, Urticaria    Protonix [Pantoprazole Sodium] Other (See Comments)     Other reaction(s):  Other: See Comments  DEPRESSION    Erythromycin Rash    Keflet [Cephalexin] Rash    Levaquin [Levofloxacin In D5w] Rash    Pcn [Penicillins] Rash    Polyethylene Glycol Rash     Bloating,gas    Tetracycline Rash    Tetracyclines & Related Rash      Past Medical History:   Diagnosis Date    Cancer [facility])     endometrial cancer, breast    Diabetes mellitus ([facility] [address]     diet controlled    Diverticulitis     GERD (gastroesophageal reflux disease)     Gout     Hiatal hernia     Macular degeneration     Osteoarthritis     Osteopenia      Patient Active Problem List    Diagnosis Date Noted    Osteoarthritis     Metastatic adenocarcinoma to intra-abdominal site ([facility] [address] 2020    Diabetes mellitus ([address] 2020    Chronic pain due to neoplasm 10/09/2019    Stress and adjustment reaction 2019    Acquired hypothyroidism 2019    Mild episode of recurrent major depressive disorder ([address] 2019    Endometrial cancer ([address] 2019    GERD (gastroesophageal reflux disease) 2019    Ovarian cancer ([address] 2017    Adenocarcinoma of endometrium ([facility] [address] 2017    Intestinal nodule 2017    Malignant neoplasm of upper-outer quadrant of left 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 ([facility] [address] 10/31/2016      Past Surgical History:   Procedure Laterality Date    BREAST LUMPECTOMY Left 2016    left breast sentinelnode dissection, blue dye injection    BREAST SURGERY Left 10/2016    cancer     SECTION      x 3    CHG UNLISTED DX RADIOGRAPHIC PROCEDURE N/A 2018    BRONCHOSCOPY ELECTROMAGNETIC performed by [facility]ang [facility] at [facility]    ENDOSCOPY, COLON, DIAGNOSTIC      EYE SURGERY Bilateral 2016    cataracts    HYSTERECTOMY  2016    total with BSO    HYSTERECTOMY, TOTAL ABDOMINAL  2016    [place] [address] BRUSHING/PROTECTED BRUSHINGS  2018    BRONCHOSCOPY BRUSHINGS performed by Yanni Liang MD at [address] [place] [address] [facility]  2018    BRONCHOSCOPY ALVEOLAR LAVAGE performed by Yanni Liang MD at [address] [place] [address] [address] EBUS DX/TX INTERVENTION [address] LES N/A 2018    BRONCHOSCOPY ULTRASOUND performed by Yanni Liang MD at [address] W/TRANSBRONCHIAL LUNG BX 1 LOBE  2018    BRONCHOSCOPY/TRANSBRONCHIAL NEEDLE BIOPSY performed by Yanni Liang MD at [place] [address] hypothyroidism  Comments:  stable  no changes    Endometrial cancer [facility])  Comments:  off hospice    will do a follow up visit in September via VV again since she is immune compromised      I independently reviewed and updated the chief complaint, HPI, past medical and surgical history, medications, allergies and ROS as entered by the LPN. Seen by:   Bret Bone DO

## 2020-07-08 ENCOUNTER — HOSPITAL ENCOUNTER (OUTPATIENT)
Age: 75
Discharge: HOME | End: 2020-07-10
Payer: MEDICARE

## 2020-07-08 LAB
ALBUMIN SERPL-MCNC: 3.8 G/DL (ref 3.5–5.2)
ALP BLD-CCNC: 81 U/L (ref 35–104)
ALT SERPL-CCNC: 21 U/L (ref 0–32)
ANION GAP SERPL CALCULATED.3IONS-SCNC: 15 MMOL/L (ref 7–16)
AST SERPL-CCNC: 22 U/L (ref 0–31)
BILIRUB SERPL-MCNC: 0.5 MG/DL (ref 0–1.2)
BUN BLDV-MCNC: 14 MG/DL (ref 8–23)
C-REACTIVE PROTEIN: 2.4 MG/DL (ref 0–0.4)
CALCIUM SERPL-MCNC: 9.4 MG/DL (ref 8.6–10.2)
CHLORIDE BLD-SCNC: 99 MMOL/L (ref 98–107)
CO2: 24 MMOL/L (ref 22–29)
CREAT SERPL-MCNC: 0.7 MG/DL (ref 0.5–1)
GFR AFRICAN AMERICAN: >60
GFR NON-AFRICAN AMERICAN: >60 ML/MIN/1.73
GLUCOSE BLD-MCNC: 200 MG/DL (ref 74–99)
HCT VFR BLD CALC: 38.1 % (ref 34–48)
HEMOGLOBIN: 11.6 G/DL (ref 11.5–15.5)
MCH RBC QN AUTO: 26.9 PG (ref 26–35)
MCHC RBC AUTO-ENTMCNC: 30.4 % (ref 32–34.5)
MCV RBC AUTO: 88.4 FL (ref 80–99.9)
PDW BLD-RTO: 16.2 FL (ref 11.5–15)
PLATELET # BLD: 260 E9/L (ref 130–450)
PMV BLD AUTO: 9.1 FL (ref 7–12)
POTASSIUM SERPL-SCNC: 4.4 MMOL/L (ref 3.5–5)
RBC # BLD: 4.31 E12/L (ref 3.5–5.5)
RHEUMATOID FACTOR: <10 IU/ML (ref 0–13)
SODIUM BLD-SCNC: 138 MMOL/L (ref 132–146)
TOTAL CK: 25 U/L (ref 20–180)
TOTAL PROTEIN: 6.5 G/DL (ref 6.4–8.3)
WBC # BLD: 8.8 E9/L (ref 4.5–11.5)

## 2020-07-08 PROCEDURE — 86200 CCP ANTIBODY: CPT

## 2020-07-08 PROCEDURE — 82550 ASSAY OF CK (CPK): CPT

## 2020-07-08 PROCEDURE — 86038 ANTINUCLEAR ANTIBODIES: CPT

## 2020-07-08 PROCEDURE — 86431 RHEUMATOID FACTOR QUANT: CPT

## 2020-07-08 PROCEDURE — 86140 C-REACTIVE PROTEIN: CPT

## 2020-07-08 PROCEDURE — 36415 COLL VENOUS BLD VENIPUNCTURE: CPT

## 2020-07-08 PROCEDURE — 85027 COMPLETE CBC AUTOMATED: CPT

## 2020-07-08 PROCEDURE — 80053 COMPREHEN METABOLIC PANEL: CPT

## 2020-07-10 LAB — ANTI-NUCLEAR ANTIBODY (ANA): NEGATIVE

## 2020-07-11 LAB — CCP IGG ANTIBODIES: 3 UNITS (ref 0–19)

## 2020-07-15 ENCOUNTER — HOSPITAL ENCOUNTER (OUTPATIENT)
Dept: INFUSION THERAPY | Age: 75
Setting detail: INFUSION SERIES
Discharge: HOME | End: 2020-07-15
Payer: MEDICARE

## 2020-07-15 VITALS
TEMPERATURE: 98.2 F | HEART RATE: 84 BPM | SYSTOLIC BLOOD PRESSURE: 122 MMHG | OXYGEN SATURATION: 99 % | DIASTOLIC BLOOD PRESSURE: 58 MMHG | RESPIRATION RATE: 16 BRPM

## 2020-07-15 DIAGNOSIS — C56.9 MALIGNANT NEOPLASM OF OVARY, UNSPECIFIED LATERALITY (HCC): Primary | ICD-10-CM

## 2020-07-15 DIAGNOSIS — C50.412 MALIGNANT NEOPLASM OF UPPER-OUTER QUADRANT OF LEFT FEMALE BREAST, UNSPECIFIED ESTROGEN RECEPTOR STATUS (HCC): ICD-10-CM

## 2020-07-15 DIAGNOSIS — C54.1 ADENOCARCINOMA OF ENDOMETRIUM (HCC): ICD-10-CM

## 2020-07-15 PROCEDURE — 96523 IRRIG DRUG DELIVERY DEVICE: CPT

## 2020-07-15 PROCEDURE — 6360000002 HC RX W HCPCS: Performed by: FAMILY MEDICINE

## 2020-07-15 PROCEDURE — 2580000003 HC RX 258: Performed by: FAMILY MEDICINE

## 2020-07-15 RX ORDER — HEPARIN SODIUM (PORCINE) LOCK FLUSH IV SOLN 100 UNIT/ML 100 UNIT/ML
500 SOLUTION INTRAVENOUS PRN
Status: DISCONTINUED | OUTPATIENT
Start: 2020-07-15 | End: 2020-07-16 | Stop reason: HOSPADM

## 2020-07-15 RX ORDER — SODIUM CHLORIDE 0.9 % (FLUSH) 0.9 %
10 SYRINGE (ML) INJECTION PRN
Status: DISCONTINUED | OUTPATIENT
Start: 2020-07-15 | End: 2020-07-16 | Stop reason: HOSPADM

## 2020-07-15 RX ORDER — SODIUM CHLORIDE 0.9 % (FLUSH) 0.9 %
10 SYRINGE (ML) INJECTION PRN
Status: CANCELLED | OUTPATIENT
Start: 2020-07-15

## 2020-07-15 RX ORDER — HEPARIN SODIUM (PORCINE) LOCK FLUSH IV SOLN 100 UNIT/ML 100 UNIT/ML
500 SOLUTION INTRAVENOUS PRN
Status: CANCELLED | OUTPATIENT
Start: 2020-07-15

## 2020-07-15 RX ADMIN — SODIUM CHLORIDE, PRESERVATIVE FREE 10 ML: 5 INJECTION INTRAVENOUS at 13:17

## 2020-07-15 RX ADMIN — SODIUM CHLORIDE, PRESERVATIVE FREE 10 ML: 5 INJECTION INTRAVENOUS at 13:16

## 2020-07-15 RX ADMIN — Medication 500 UNITS: at 13:17

## 2020-08-12 ENCOUNTER — TELEPHONE (OUTPATIENT)
Dept: RADIATION ONCOLOGY | Age: 75
End: 2020-08-12

## 2020-08-12 ENCOUNTER — HOSPITAL ENCOUNTER (OUTPATIENT)
Dept: INFUSION THERAPY | Age: 75
Setting detail: INFUSION SERIES
Discharge: HOME | End: 2020-08-12
Payer: MEDICARE

## 2020-08-12 VITALS
DIASTOLIC BLOOD PRESSURE: 65 MMHG | BODY MASS INDEX: 24.45 KG/M2 | WEIGHT: 138 LBS | OXYGEN SATURATION: 99 % | HEIGHT: 63 IN | TEMPERATURE: 97.7 F | HEART RATE: 88 BPM | SYSTOLIC BLOOD PRESSURE: 147 MMHG | RESPIRATION RATE: 16 BRPM

## 2020-08-12 DIAGNOSIS — C56.9 MALIGNANT NEOPLASM OF OVARY, UNSPECIFIED LATERALITY (HCC): Primary | ICD-10-CM

## 2020-08-12 DIAGNOSIS — C50.412 MALIGNANT NEOPLASM OF UPPER-OUTER QUADRANT OF LEFT FEMALE BREAST, UNSPECIFIED ESTROGEN RECEPTOR STATUS (HCC): ICD-10-CM

## 2020-08-12 DIAGNOSIS — C54.1 ADENOCARCINOMA OF ENDOMETRIUM (HCC): ICD-10-CM

## 2020-08-12 PROCEDURE — 6360000002 HC RX W HCPCS: Performed by: FAMILY MEDICINE

## 2020-08-12 PROCEDURE — 2580000003 HC RX 258: Performed by: FAMILY MEDICINE

## 2020-08-12 PROCEDURE — 96523 IRRIG DRUG DELIVERY DEVICE: CPT

## 2020-08-12 RX ORDER — SODIUM CHLORIDE 0.9 % (FLUSH) 0.9 %
10 SYRINGE (ML) INJECTION PRN
Status: CANCELLED | OUTPATIENT
Start: 2020-08-12

## 2020-08-12 RX ORDER — SODIUM CHLORIDE 0.9 % (FLUSH) 0.9 %
10 SYRINGE (ML) INJECTION PRN
Status: DISCONTINUED | OUTPATIENT
Start: 2020-08-12 | End: 2020-08-13 | Stop reason: HOSPADM

## 2020-08-12 RX ORDER — HEPARIN SODIUM (PORCINE) LOCK FLUSH IV SOLN 100 UNIT/ML 100 UNIT/ML
500 SOLUTION INTRAVENOUS PRN
Status: CANCELLED | OUTPATIENT
Start: 2020-08-12

## 2020-08-12 RX ORDER — HEPARIN SODIUM (PORCINE) LOCK FLUSH IV SOLN 100 UNIT/ML 100 UNIT/ML
500 SOLUTION INTRAVENOUS PRN
Status: DISCONTINUED | OUTPATIENT
Start: 2020-08-12 | End: 2020-08-13 | Stop reason: HOSPADM

## 2020-08-12 RX ADMIN — SODIUM CHLORIDE, PRESERVATIVE FREE 10 ML: 5 INJECTION INTRAVENOUS at 13:26

## 2020-08-12 RX ADMIN — Medication 500 UNITS: at 13:26

## 2020-08-12 RX ADMIN — SODIUM CHLORIDE, PRESERVATIVE FREE 10 ML: 5 INJECTION INTRAVENOUS at 13:25

## 2020-08-12 NOTE — TELEPHONE ENCOUNTER
Financial assistance application completed and emailed in on the following accounts:    [de-identified]  [id number]  [id number]    Accounts were added to my spreadsheet for tracking of determination.  Electronically signed by Alexandra Garcia on 8/12/2020 at 1:51 PM

## 2020-09-04 ENCOUNTER — HOSPITAL ENCOUNTER (OUTPATIENT)
Age: 75
Discharge: HOME | End: 2020-09-06
Payer: MEDICARE

## 2020-09-04 LAB
C-REACTIVE PROTEIN: 2 MG/DL (ref 0–0.4)
SEDIMENTATION RATE, ERYTHROCYTE: 20 MM/HR (ref 0–20)

## 2020-09-04 PROCEDURE — 85651 RBC SED RATE NONAUTOMATED: CPT

## 2020-09-04 PROCEDURE — 86140 C-REACTIVE PROTEIN: CPT

## 2020-09-04 PROCEDURE — 36415 COLL VENOUS BLD VENIPUNCTURE: CPT

## 2020-09-10 RX ORDER — LEVOTHYROXINE SODIUM 112 MCG
112 TABLET ORAL DAILY
Qty: 30 TABLET | Refills: 3 | Status: SHIPPED
Start: 2020-09-10 | End: 2020-11-25 | Stop reason: SDUPTHER

## 2020-09-10 NOTE — TELEPHONE ENCOUNTER
Last Appointment:  7/1/2020  Future Appointments   Date Time Provider Stephanie Lee   9/16/2020 11:00 AM DO YULIANA Olivarez [facility]   9/16/2020  1:00 PM [facility] [place] calling in for refill on synthroid

## 2020-09-16 ENCOUNTER — HOSPITAL ENCOUNTER (OUTPATIENT)
Dept: INFUSION THERAPY | Age: 75
Setting detail: INFUSION SERIES
Discharge: HOME | End: 2020-09-16
Payer: MEDICARE

## 2020-09-16 ENCOUNTER — APPOINTMENT (OUTPATIENT)
Dept: CT IMAGING | Age: 75
End: 2020-09-16
Payer: MEDICARE

## 2020-09-16 ENCOUNTER — VIRTUAL VISIT (OUTPATIENT)
Dept: FAMILY MEDICINE CLINIC | Age: 75
End: 2020-09-16
Payer: MEDICARE

## 2020-09-16 ENCOUNTER — HOSPITAL ENCOUNTER (OUTPATIENT)
Age: 75
Setting detail: OBSERVATION
Discharge: HOME | End: 2020-09-18
Attending: INTERNAL MEDICINE | Admitting: INTERNAL MEDICINE
Payer: MEDICARE

## 2020-09-16 ENCOUNTER — APPOINTMENT (OUTPATIENT)
Dept: GENERAL RADIOLOGY | Age: 75
End: 2020-09-16
Payer: MEDICARE

## 2020-09-16 VITALS
TEMPERATURE: 98.1 F | WEIGHT: 150 LBS | RESPIRATION RATE: 16 BRPM | SYSTOLIC BLOOD PRESSURE: 132 MMHG | BODY MASS INDEX: 26.57 KG/M2 | DIASTOLIC BLOOD PRESSURE: 61 MMHG | OXYGEN SATURATION: 96 % | HEART RATE: 105 BPM

## 2020-09-16 DIAGNOSIS — C56.9 MALIGNANT NEOPLASM OF OVARY, UNSPECIFIED LATERALITY (HCC): Primary | ICD-10-CM

## 2020-09-16 DIAGNOSIS — C54.1 ADENOCARCINOMA OF ENDOMETRIUM (HCC): ICD-10-CM

## 2020-09-16 DIAGNOSIS — C50.412 MALIGNANT NEOPLASM OF UPPER-OUTER QUADRANT OF LEFT FEMALE BREAST, UNSPECIFIED ESTROGEN RECEPTOR STATUS (HCC): ICD-10-CM

## 2020-09-16 PROBLEM — F33.0 MILD EPISODE OF RECURRENT MAJOR DEPRESSIVE DISORDER (HCC): Status: RESOLVED | Noted: 2019-07-09 | Resolved: 2020-09-16

## 2020-09-16 PROBLEM — F43.29 STRESS AND ADJUSTMENT REACTION: Status: RESOLVED | Noted: 2019-07-23 | Resolved: 2020-09-16

## 2020-09-16 PROBLEM — C79.89 METASTATIC ADENOCARCINOMA TO INTRA-ABDOMINAL SITE (HCC): Status: RESOLVED | Noted: 2020-03-17 | Resolved: 2020-09-16

## 2020-09-16 PROBLEM — R07.9 CHEST PAIN: Status: ACTIVE | Noted: 2020-09-16

## 2020-09-16 LAB
ALBUMIN SERPL-MCNC: 4 G/DL (ref 3.5–5.2)
ALP BLD-CCNC: 120 U/L (ref 35–104)
ALT SERPL-CCNC: 37 U/L (ref 0–32)
ANION GAP SERPL CALCULATED.3IONS-SCNC: 11 MMOL/L (ref 7–16)
AST SERPL-CCNC: 26 U/L (ref 0–31)
BASOPHILS ABSOLUTE: 0.05 E9/L (ref 0–0.2)
BASOPHILS RELATIVE PERCENT: 0.5 % (ref 0–2)
BILIRUB SERPL-MCNC: 0.4 MG/DL (ref 0–1.2)
BUN BLDV-MCNC: 15 MG/DL (ref 8–23)
CALCIUM SERPL-MCNC: 9.9 MG/DL (ref 8.6–10.2)
CHLORIDE BLD-SCNC: 96 MMOL/L (ref 98–107)
CO2: 29 MMOL/L (ref 22–29)
CREAT SERPL-MCNC: 0.6 MG/DL (ref 0.5–1)
EOSINOPHILS ABSOLUTE: 0.01 E9/L (ref 0.05–0.5)
EOSINOPHILS RELATIVE PERCENT: 0.1 % (ref 0–6)
GFR AFRICAN AMERICAN: >60
GFR NON-AFRICAN AMERICAN: >60 ML/MIN/1.73
GLUCOSE BLD-MCNC: 222 MG/DL (ref 74–99)
HCT VFR BLD CALC: 41.5 % (ref 34–48)
HEMOGLOBIN: 13.3 G/DL (ref 11.5–15.5)
IMMATURE GRANULOCYTES #: 0.16 E9/L
IMMATURE GRANULOCYTES %: 1.6 % (ref 0–5)
LYMPHOCYTES ABSOLUTE: 1.51 E9/L (ref 1.5–4)
LYMPHOCYTES RELATIVE PERCENT: 14.8 % (ref 20–42)
MCH RBC QN AUTO: 26.2 PG (ref 26–35)
MCHC RBC AUTO-ENTMCNC: 32 % (ref 32–34.5)
MCV RBC AUTO: 81.9 FL (ref 80–99.9)
MONOCYTES ABSOLUTE: 0.69 E9/L (ref 0.1–0.95)
MONOCYTES RELATIVE PERCENT: 6.8 % (ref 2–12)
NEUTROPHILS ABSOLUTE: 7.79 E9/L (ref 1.8–7.3)
NEUTROPHILS RELATIVE PERCENT: 76.2 % (ref 43–80)
PDW BLD-RTO: 15.7 FL (ref 11.5–15)
PLATELET # BLD: 271 E9/L (ref 130–450)
PMV BLD AUTO: 8.3 FL (ref 7–12)
POTASSIUM SERPL-SCNC: 3.9 MMOL/L (ref 3.5–5)
PRO-BNP: 130 PG/ML (ref 0–125)
RBC # BLD: 5.07 E12/L (ref 3.5–5.5)
SODIUM BLD-SCNC: 136 MMOL/L (ref 132–146)
TOTAL PROTEIN: 7.5 G/DL (ref 6.4–8.3)
TROPONIN: <0.01 NG/ML (ref 0–0.03)
WBC # BLD: 10.2 E9/L (ref 4.5–11.5)

## 2020-09-16 PROCEDURE — 2580000003 HC RX 258: Performed by: RADIOLOGY

## 2020-09-16 PROCEDURE — 84484 ASSAY OF TROPONIN QUANT: CPT

## 2020-09-16 PROCEDURE — 83880 ASSAY OF NATRIURETIC PEPTIDE: CPT

## 2020-09-16 PROCEDURE — 6360000002 HC RX W HCPCS: Performed by: FAMILY MEDICINE

## 2020-09-16 PROCEDURE — 99214 OFFICE O/P EST MOD 30 MIN: CPT | Performed by: FAMILY MEDICINE

## 2020-09-16 PROCEDURE — 71260 CT THORAX DX C+: CPT

## 2020-09-16 PROCEDURE — 2580000003 HC RX 258: Performed by: FAMILY MEDICINE

## 2020-09-16 PROCEDURE — 80053 COMPREHEN METABOLIC PANEL: CPT

## 2020-09-16 PROCEDURE — G0439 PPPS, SUBSEQ VISIT: HCPCS | Performed by: FAMILY MEDICINE

## 2020-09-16 PROCEDURE — 93005 ELECTROCARDIOGRAM TRACING: CPT | Performed by: PHYSICIAN ASSISTANT

## 2020-09-16 PROCEDURE — 85025 COMPLETE CBC W/AUTO DIFF WBC: CPT

## 2020-09-16 PROCEDURE — 71046 X-RAY EXAM CHEST 2 VIEWS: CPT

## 2020-09-16 PROCEDURE — 99284 EMERGENCY DEPT VISIT MOD MDM: CPT

## 2020-09-16 PROCEDURE — 6360000004 HC RX CONTRAST MEDICATION: Performed by: RADIOLOGY

## 2020-09-16 PROCEDURE — 99285 EMERGENCY DEPT VISIT HI MDM: CPT

## 2020-09-16 PROCEDURE — 36593 DECLOT VASCULAR DEVICE: CPT

## 2020-09-16 PROCEDURE — 6370000000 HC RX 637 (ALT 250 FOR IP): Performed by: EMERGENCY MEDICINE

## 2020-09-16 RX ORDER — HEPARIN SODIUM (PORCINE) LOCK FLUSH IV SOLN 100 UNIT/ML 100 UNIT/ML
500 SOLUTION INTRAVENOUS PRN
Status: DISCONTINUED | OUTPATIENT
Start: 2020-09-16 | End: 2020-09-17 | Stop reason: HOSPADM

## 2020-09-16 RX ORDER — SODIUM CHLORIDE 0.9 % (FLUSH) 0.9 %
10 SYRINGE (ML) INJECTION PRN
Status: DISCONTINUED | OUTPATIENT
Start: 2020-09-16 | End: 2020-09-17 | Stop reason: HOSPADM

## 2020-09-16 RX ORDER — CLOPIDOGREL BISULFATE 75 MG/1
75 TABLET ORAL ONCE
Status: COMPLETED | OUTPATIENT
Start: 2020-09-16 | End: 2020-09-16

## 2020-09-16 RX ORDER — SODIUM CHLORIDE 0.9 % (FLUSH) 0.9 %
10 SYRINGE (ML) INJECTION 2 TIMES DAILY
Status: DISCONTINUED | OUTPATIENT
Start: 2020-09-16 | End: 2020-09-18 | Stop reason: HOSPADM

## 2020-09-16 RX ORDER — HEPARIN SODIUM (PORCINE) LOCK FLUSH IV SOLN 100 UNIT/ML 100 UNIT/ML
500 SOLUTION INTRAVENOUS PRN
Status: CANCELLED | OUTPATIENT
Start: 2020-09-16

## 2020-09-16 RX ORDER — SODIUM CHLORIDE 0.9 % (FLUSH) 0.9 %
10 SYRINGE (ML) INJECTION PRN
Status: CANCELLED | OUTPATIENT
Start: 2020-09-16

## 2020-09-16 RX ADMIN — Medication 10 ML: at 23:21

## 2020-09-16 RX ADMIN — IOPAMIDOL 70 ML: 755 INJECTION, SOLUTION INTRAVENOUS at 23:27

## 2020-09-16 RX ADMIN — CLOPIDOGREL BISULFATE 75 MG: 75 TABLET ORAL at 23:01

## 2020-09-16 RX ADMIN — SODIUM CHLORIDE, PRESERVATIVE FREE 10 ML: 5 INJECTION INTRAVENOUS at 13:57

## 2020-09-16 RX ADMIN — ALTEPLASE 2 MG: 2.2 INJECTION, POWDER, LYOPHILIZED, FOR SOLUTION INTRAVENOUS at 15:05

## 2020-09-16 RX ADMIN — SODIUM CHLORIDE, PRESERVATIVE FREE 10 ML: 5 INJECTION INTRAVENOUS at 14:05

## 2020-09-16 RX ADMIN — NITROGLYCERIN 1 INCH: 20 OINTMENT TOPICAL at 23:01

## 2020-09-16 RX ADMIN — Medication 500 UNITS: at 13:32

## 2020-09-16 RX ADMIN — SODIUM CHLORIDE, PRESERVATIVE FREE 10 ML: 5 INJECTION INTRAVENOUS at 13:31

## 2020-09-16 RX ADMIN — SODIUM CHLORIDE, PRESERVATIVE FREE 10 ML: 5 INJECTION INTRAVENOUS at 13:30

## 2020-09-16 RX ADMIN — SODIUM CHLORIDE, PRESERVATIVE FREE 10 ML: 5 INJECTION INTRAVENOUS at 14:11

## 2020-09-16 RX ADMIN — SODIUM CHLORIDE, PRESERVATIVE FREE 10 ML: 5 INJECTION INTRAVENOUS at 13:55

## 2020-09-16 RX ADMIN — SODIUM CHLORIDE, PRESERVATIVE FREE 10 ML: 5 INJECTION INTRAVENOUS at 14:01

## 2020-09-16 ASSESSMENT — PAIN DESCRIPTION - DESCRIPTORS: DESCRIPTORS: PRESSURE

## 2020-09-16 ASSESSMENT — PATIENT HEALTH QUESTIONNAIRE - PHQ9
SUM OF ALL RESPONSES TO PHQ QUESTIONS 1-9: 0
1. LITTLE INTEREST OR PLEASURE IN DOING THINGS: 0
2. FEELING DOWN, DEPRESSED OR HOPELESS: 0
SUM OF ALL RESPONSES TO PHQ9 QUESTIONS 1 & 2: 0
SUM OF ALL RESPONSES TO PHQ QUESTIONS 1-9: 0

## 2020-09-16 ASSESSMENT — PAIN DESCRIPTION - PAIN TYPE: TYPE: ACUTE PAIN

## 2020-09-16 ASSESSMENT — ENCOUNTER SYMPTOMS
EYE DISCHARGE: 0
COUGH: 0
TROUBLE SWALLOWING: 0
EYE PAIN: 0
ABDOMINAL PAIN: 0
EYE REDNESS: 0
CHEST TIGHTNESS: 0
DIARRHEA: 0
ABDOMINAL DISTENTION: 0
CONSTIPATION: 0
SHORTNESS OF BREATH: 0
SINUS PRESSURE: 0
BACK PAIN: 0
DIARRHEA: 0
SORE THROAT: 0
NAUSEA: 0
WHEEZING: 0
SHORTNESS OF BREATH: 1
SORE THROAT: 0
COUGH: 0
VOMITING: 0
EYE PAIN: 0
NAUSEA: 0
WHEEZING: 0
SINUS PAIN: 0
BACK PAIN: 1
VOMITING: 0

## 2020-09-16 ASSESSMENT — LIFESTYLE VARIABLES
AUDIT TOTAL SCORE: INCOMPLETE
AUDIT-C TOTAL SCORE: INCOMPLETE
HOW OFTEN DO YOU HAVE A DRINK CONTAINING ALCOHOL: NEVER
HOW OFTEN DO YOU HAVE A DRINK CONTAINING ALCOHOL: 0

## 2020-09-16 ASSESSMENT — PAIN DESCRIPTION - LOCATION: LOCATION: CHEST

## 2020-09-16 ASSESSMENT — PAIN SCALES - GENERAL: PAINLEVEL_OUTOF10: 2

## 2020-09-16 NOTE — PROGRESS NOTES
MISC 1 each by Subdermal route daily  Daneil Slice, DO   blood glucose monitor strips Test one time a day & as needed for symptoms of irregular blood glucose.  E11.9  Helena Barahona, DO   melatonin 3 MG TABS tablet Take 6 mg by mouth nightly as needed   Historical Provider, MD   loratadine (CLARITIN) 10 MG tablet Take 10 mg by mouth daily  Historical Provider, MD   montelukast (SINGULAIR) 10 MG tablet Take by mouth  Historical Provider, MD   CANNABIDIOL PO   Historical Provider, MD         Past Medical History:   Diagnosis Date    Cancer ([facility] [address]     endometrial cancer, breast    Diabetes mellitus ([facility] [address]     diet controlled    Diverticulitis     GERD (gastroesophageal reflux disease)     Gout     Hiatal hernia     Macular degeneration     Osteoarthritis     Osteopenia        Past Surgical History:   Procedure Laterality Date    BREAST LUMPECTOMY Left 2016    left breast sentinelnode dissection, blue dye injection    BREAST SURGERY Left 10/2016    cancer     SECTION      x 3    CHG UNLISTED DX RADIOGRAPHIC PROCEDURE N/A 2018    BRONCHOSCOPY ELECTROMAGNETIC performed by Opal R[facility] at [facility]    ENDOSCOPY, COLON, DIAGNOSTIC      EYE SURGERY Bilateral     cataracts    HYSTERECTOMY  2016    total with BSO    HYSTERECTOMY, TOTAL ABDOMINAL  2016    [place] [address] BRUSHING/PROTECTED BRUSHINGS  2018    BRONCHOSCOPY BRUSHINGS performed by Opal Rocha MD at [address] [place] [address]  2018    BRONCHOSCOPY ALVEOLAR LAVAGE performed by Opal Rocha MD at [address] [place] [address] [address] EBUS DX/TX INTERVENTION [address] LES N/A 2018    BRONCHOSCOPY ULTRASOUND performed by Opal Rocha MD at [address] [facility] W/TRANSBRONCHIAL LUNG BX 1 LOBE  2018    BRONCHOSCOPY/TRANSBRONCHIAL NEEDLE BIOPSY performed by Opal Rocha MD at [address] [place] [address] BRONCHOSCOPY W/TRANSBRONCHIAL LUNG BX EACH LOBE  8/17/2018    BRONCHOSCOPY/TRANSBRONCHIAL NEEDLE BIOPSY ADDL LOBE performed by Victoria Talamantes MD at [address] [place] [address] TUNNELED CTR VAD W/SUBQ PORT AGE 5 YR/> N/A 12/4/2018    MEDIPORT INSERTION performed by Brandon Santos MD at [facility] TONSILLECTOMY           Family History   Problem Relation Age of Onset    High Blood Pressure Mother     Heart Disease Mother     Diabetes Mother     Arthritis Father         rheumatoid    Heart Disease Father     Cancer Maternal Aunt         ovarian carcinoma       CareTeam (Including outside providers/suppliers regularly involved in providing care):   Patient Care Team:  Georgiana Seip, DO as PCP - General  Georgiana Seip, DO as PCP - [facility] Empaneled Provider  Victoria Talamantes MD as Consulting Physician (Pulmonology)  Jennifer Goodwin MD as Consulting Physician (Obstetrics & Gynecology)    [facility] Readings from Last 3 Encounters:   08/12/20 138 lb (62.6 kg)   06/17/20 138 lb (62.6 kg)   03/17/20 131 lb 9.6 oz (59.7 kg)     No flowsheet data found. There is no height or weight on file to calculate BMI. Based upon direct observation of the patient, evaluation of cognition reveals recent and remote memory intact. No physical exam due to video visit    Patient's complete Health Risk Assessment and screening values have been reviewed and are found in Flowsheets. The following problems were reviewed today and where indicated follow up appointments were made and/or referrals ordered.     Positive Risk Factor Screenings with Interventions:     Substance History:  Social History     Tobacco History     Smoking Status  Former Smoker Smoking Frequency  0.25 packs/day for 20 years (5 pk yrs) Smoking Tobacco Type  Cigarettes    Smokeless Tobacco Use  Never Used          Alcohol History     Alcohol Use Status  Yes Comment  rare          Drug Use     Drug Use Status  Yes Types  Marijuana Comment  medical          Sexual Activity     Sexually Active  Not Asked               Alcohol Screening:       A score of 8 or more is associated with harmful or hazardous drinking. A score of 13 or more in women, and 15 or more in men, is likely to indicate alcohol dependence. Substance Abuse Interventions:  · Alcohol misuse/dependence:  educational materials provided    General Health:  General  In general, how would you say your health is?: Good  In the past 7 days, have you experienced any of the following? New or Increased Pain, New or Increased Fatigue, Loneliness, Social Isolation, Stress or Anger?: (!) New or Increased Pain, New or Increased Fatigue  Do you get the social and emotional support that you need?: Yes  Do you have a Living Will?: Yes  General Health Risk Interventions:  · Fatigue: patient declines any further evaluation/treatment for this issue    Health Habits/Nutrition:  Health Habits/Nutrition  Do you exercise for at least 20 minutes 2-3 times per week?: (!) No  Have you lost any weight without trying in the past 3 months?: No  Do you eat fewer than 2 meals per day?: No  Have you seen a dentist within the past year?: (!) No  There is no height or weight on file to calculate BMI.   Health Habits/Nutrition Interventions:  · Dental exam overdue:  patient encouraged to make appointment with his/her dentist    Hearing/Vision:  No exam data present  Hearing/Vision  Do you or your family notice any trouble with your hearing?: No  Do you have difficulty driving, watching TV, or doing any of your daily activities because of your eyesight?: No  Have you had an eye exam within the past year?: (!) No  Hearing/Vision Interventions:  · Vision concerns:  patient encouraged to make appointment with his/her eye specialist    Personalized Preventive Plan   Current Health Maintenance Status  Immunization History   Administered Date(s) Administered    Influenza Virus Vaccine 01/01/2008    Pneumococcal Conjugate 13-valent (Loretta Moreno) 05/23/2017    Pneumococcal Polysaccharide ([id number]) 05/23/2017    Td Vaccine >6yo [facility] 05/23/2017    Tdap (Boostrix, Adacel) 08/20/2009, 05/23/2017        Health Maintenance   Topic Date Due    Diabetic foot exam  11/10/1955    Diabetic retinal exam  11[birth year]10/1956    Diabetic microalbuminuria test  11/10/1964    Annual Wellness Visit (AWV)  07/15/2020    Flu vaccine (1) 09/01/2020    Shingles Vaccine (1 of 2) 11/04/2020 (Originally 11/10/1996)    Lipid screen  03/17/2021 (Originally 3/26/2019)    Breast cancer screen  10/24/2020    A1C test (Diabetic or Prediabetic)  10/26/2020    TSH testing  05/16/2021    Pneumococcal 65+ yrs at Risk Vaccine (2 of 2 - PPSV23) 05/23/2022    Colon cancer screen colonoscopy  05/15/2025    DTaP/Tdap/Td vaccine (3 - Td) 05/23/2027    DEXA (modify frequency per FRAX score)  Completed    Hepatitis A vaccine  Aged Out    Hib vaccine  Aged Out    Meningococcal (ACWY) vaccine  Aged Out    Hepatitis C screen  Discontinued     Recommendations for Preventive Services Due: see orders and patient instructions/AVS.  . Recommended screening schedule for the next 5-10 years is provided to the patient in written form: see Patient Instructions/AVS.    Lauro Montalvo was seen today for medicare awv. Diagnoses and all orders for this visit:    Encounter for well adult exam without abnormal findings    Acquired hypothyroidism  Comments:  stable  labs in 3 months              Laura Helm is a 69 y.o. female being evaluated by a Virtual Visit (video and audio) encounter to address concerns as mentioned above. A caregiver was present when appropriate. Due to this being a TeleHealth encounter ([facility] emergency), evaluation of the following organ systems was limited: Vitals/Constitutional/EENT/Resp/CV/GI//MS/Neuro/Skin/Heme-Lymph-Imm.   Pursuant to the emergency declaration under the [facility] waiver authority and the Coronavirus Preparedness and Response Supplemental Appropriations Act, this Virtual Visit was conducted with patient's (and/or legal guardian's) consent, to reduce the patient's risk of exposure to COVID-19 and provide necessary medical care. The patient (and/or legal guardian) has also been advised to contact this office for worsening conditions or problems, and seek emergency medical treatment and/or call 911 if deemed necessary. Patient identification was verified at the start of the visit: Yes    Services were provided through a video synchronous discussion virtually to substitute for in-person clinic visit. Patient and provider were located at their individual homes. --Ronn Dyer DO on 9/16/2020 at 12:47 PM    An electronic signature was used to authenticate this note.

## 2020-09-16 NOTE — PROGRESS NOTES
Attempted to call Dr Radha Solorzano office regarding port issues. [facility] and he is not in that office today. Called other office and no answer.

## 2020-09-16 NOTE — PROGRESS NOTES
Checked port after cath-ilda instilled with no blood return. Will let sit for another 1/2 hour and recheck.

## 2020-09-16 NOTE — PROGRESS NOTES
Rechecked after another half hour and no blood return. deaccessed mendieta needle and covered site with 2x2. Patient states she is maybe supposed to have port removed.  Informed her that we will notify Dr Kevin Asif office and notify her if he wants anything else done

## 2020-09-16 NOTE — PROGRESS NOTES
20    Name: Vanessa Shaikh  :1945   Sex:female   Age:74 y.o. Chief Complaint   Patient presents with    Hypothyroidism    Diabetes    Peripheral Neuropathy     Patient presents from home for video visit. She recently saw Dr. Alexis Bailey, consult was received. She is taking 10mg in the morning and 5 mg at night. She says that her sugar levels are good as long as she behaves in what she eats. Patient takes her Glimepiride in the morning if her sugar level is above 90. She denies hypoglycemia. Patient asks how long she needs to have her port in, she goes monthly to have it flushed. Patient has been tired recently, she is going to talk to her homeopathic specialist about what vitamins she is taking. Patient says that she tends her garden, she drives herself to her appointments, sometimes this tires her out. She has been having numbness and tingling in right leg from her hip to her knee, she believes this numbness is in an area that she has injured in the past. She hasn't been using her medical marijuana, but thinks she should start using it again. TeleMedicine Video Visit    This visit was performed as a virtual video visit using a synchronous, two-way, audio-video telehealth technology platform. Patient identification was verified at the start of the visit, including the patient's telephone number and physical location. I discussed with the patient the nature of our telehealth visits, that:     Due to the nature of an audio- video modality, the only components of a physical exam that could be done are the elements supported by direct observation. I would evaluate the patient and recommend diagnostics and treatments based on my assessment. If it was felt that the patient should be evaluated in clinic or an emergency room setting, then they would be directed there. Our sessions are not being recorded and that personal health information is protected.   Our team would provide follow up care in person if/when the patient needs it. Patient does agree to proceed with telemedicine consultation. Patient's location: home address in [facility]  Physician  location other address in [facility] other people involved in call were Anupam Seaman and my     See below for progres note    Time spent: Greater than Not billed by time    This visit was completed virtually using Doxy. me      Patient presents for a video visit for a follow up on hypothyroidism and diabetes  She has been taking her thyroid meds with no issues, labs were last done in may  She has had not issues    Diabetes has been doing okay  She is on chronic prednisone for inflammatory arthritis by DR Donna Ozuna as a complication from her chemotherapy  She seems to be doing well and watches her sugars and is taking glimepiride  Will get labs in December to check a1c    She also still has her port  Getting it flushed monthly  Would like it removed  She is done with any systemic cancer treatments  She has decided enough is enough and wants it out  Will refer to DR Christoph Servin    She is currently on medical marijuana from DR Dottie Morgan in [place] using gummies and from her nebulizer  They certainly help with her pain and nausea    She has been ahving a lot of issues with fatigue and decreased endurance, simple tasks tire her out very quickly, like grocery shopping, she has to nap afterward  After her appt at Dr Del Ruiz office she was wiped out for 24 to 36hrs later  She plans to go back to her naturalpath and get back on some of her vitamins    Blood pressure was also up at her appt last week with him  She will watch blood pressures and if it remains high we will get her treated          Review of Systems   Constitutional: Positive for fatigue. Negative for appetite change and fever. HENT: Negative for congestion, ear pain, sinus pain, sore throat and trouble swallowing. Eyes: Negative for pain.    Respiratory: Negative for cough, chest tightness, shortness of breath and Allergen Reactions    Famotidine Anxiety, Nausea Only and Palpitations     Other reaction(s): Dizziness    Percocet [Oxycodone-Acetaminophen] Other (See Comments)     Prolong use, GI issues    Asa [Aspirin] Hives    Levothyroxine      Other reaction(s): Free Text  Other reaction(s): severe sideeffects/depression,    Omeprazole Hives     Other reaction(s): Hives, Urticaria    Protonix [Pantoprazole Sodium] Other (See Comments)     Other reaction(s):  Other: See Comments  DEPRESSION    Erythromycin Rash    Keflet [Cephalexin] Rash    Levaquin [Levofloxacin In D5w] Rash    Pcn [Penicillins] Rash    Polyethylene Glycol Rash     Bloating,gas    Tetracycline Rash    Tetracyclines & Related Rash      Past Medical History:   Diagnosis Date    Cancer ([facility]     endometrial cancer, breast    Diabetes mellitus ([facility]     diet controlled    Diverticulitis     GERD (gastroesophageal reflux disease)     Gout     Hiatal hernia     Macular degeneration     Osteoarthritis     Osteopenia      Patient Active Problem List    Diagnosis Date Noted    Osteoarthritis     Diabetes mellitus ([facility] 2020    Chronic pain due to neoplasm 10/09/2019    Acquired hypothyroidism 2019    GERD (gastroesophageal reflux disease) 2019    Ovarian cancer ([address] 2017    Adenocarcinoma of endometrium ([facility] [facility] 2017    Intestinal nodule 2017    Malignant neoplasm of upper-outer quadrant of left 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 ([facility] 10/31/2016      Past Surgical History:   Procedure Laterality Date    BREAST LUMPECTOMY Left 2016    left breast sentinelnode dissection, blue dye injection    BREAST SURGERY Left 10/2016    cancer     SECTION      x 3    CHG UNLISTED DX RADIOGRAPHIC PROCEDURE N/A 2018    BRONCHOSCOPY ELECTROMAGNETIC performed by [facility]rend [facility] at [facility]    ENDOSCOPY, COLON, DIAGNOSTIC      EYE SURGERY Bilateral  cataracts    HYSTERECTOMY  07/2016    total with BSO    HYSTERECTOMY, TOTAL ABDOMINAL  07/2016    [address] BRUSHING/PROTECTED BRUSHINGS  8/17/2018    BRONCHOSCOPY BRUSHINGS performed by Elvira Garcia MD at [address] [facility] [address]  8/17/2018    BRONCHOSCOPY ALVEOLAR LAVAGE performed by Elvira Garcia MD at [facility] [address] EBUS DX/TX INTERVENTION PERPH LES N/A 8/17/2018    BRONCHOSCOPY ULTRASOUND performed by Elvira Garcia MD at [address] [facility] W/TRANSBRONCHIAL LUNG BX 1 LOBE  8/17/2018    BRONCHOSCOPY/TRANSBRONCHIAL NEEDLE BIOPSY performed by Elvira Garcia MD at [address] [facility] W/TRANSBRONCHIAL LUNG BX EACH LOBE  8/17/2018    BRONCHOSCOPY/TRANSBRONCHIAL NEEDLE BIOPSY ADDL LOBE performed by Elvira Garcia MD at [address] TUNNELED CTR VAD W/SUBQ PORT AGE 5 YR/> N/A 12/4/2018    MEDIPORT INSERTION performed by Francisco Osorio MD at [address] [facility] History     Tobacco History     Smoking Status  Former Smoker Smoking Frequency  0.25 packs/day for 20 years (5 pk yrs) Smoking Tobacco Type  Cigarettes    Smokeless Tobacco Use  Never Used          Alcohol History     Alcohol Use Status  Yes Comment  rare          Drug Use     Drug Use Status  Yes Types  Marijuana Comment  medical          Sexual Activity     Sexually Active  Not Asked            There were no vitals taken for this visit. EXAM:   Physical Exam  Vitals signs and nursing note reviewed. Constitutional:       General: She is not in acute distress. Appearance: Normal appearance. She is not ill-appearing. Comments: Look good today during video visit, she has gained some weight back   HENT:      Head: Normocephalic and atraumatic. Eyes:      Conjunctiva/sclera: Conjunctivae normal.      Pupils: Pupils are equal, round, and reactive to light.    Musculoskeletal:      Comments: Gait steady during video visit, she walked around her living room   Neurological:      Mental Status: She is alert and oriented to person, place, and time. Mental status is at baseline. Psychiatric:         Mood and Affect: Mood normal.         Thought Content: Thought content normal.      no other physical exam due to video visit    Diane was seen today for hypothyroidism, diabetes and peripheral neuropathy. Diagnoses and all orders for this visit:    Type 2 diabetes mellitus without complication, without long-term current use of insulin (HCC)  Comments:  stable  labs in 3 months with a1c and microalbumin  Orders:  -     CBC Auto Differential; Future  -     Comprehensive Metabolic Panel; Future  -     Hemoglobin A1C; Future  -     Microalbumin, Ur; Future    Acquired hypothyroidism  Comments:  stable  labs in 3 months  Orders:  -     T4, Free; Future  -     TSH without Reflex; Future    Vitamin B 12 deficiency  -     VITAMIN B12 & FOLATE; Future    Magnesium deficiency  -     MAGNESIUM; Future    Encounter for venous access device care  Comments:  refer to DR Marijane Prader forpossible removal  Orders:  -     Luis Alberto Hidalgo MD, General Surgery, [facility]    Adenocarcinoma of endometrium [facility])  Comments:  she is done with any form of treatment  on medical marijuana and supplements for chronic pain from her neoplasm      appt in 3 months with labs before appt      I independently reviewed and updated the chief complaint, HPI, past medical and surgical history, medications, allergies and ROS as entered by the LPN. Seen by:   Izabela Garcia DO

## 2020-09-16 NOTE — PATIENT INSTRUCTIONS
Personalized Preventive Plan for Noreen Brink - 9/16/2020  Medicare offers a range of preventive health benefits. Some of the tests and screenings are paid in full while other may be subject to a deductible, co-insurance, and/or copay. Some of these benefits include a comprehensive review of your medical history including lifestyle, illnesses that may run in your family, and various assessments and screenings as appropriate. After reviewing your medical record and screening and assessments performed today your provider may have ordered immunizations, labs, imaging, and/or referrals for you. A list of these orders (if applicable) as well as your Preventive Care list are included within your After Visit Summary for your review. Other Preventive Recommendations:    · A preventive eye exam performed by an eye specialist is recommended every 1-2 years to screen for glaucoma; cataracts, macular degeneration, and other eye disorders. · A preventive dental visit is recommended every 6 months. · Try to get at least 150 minutes of exercise per week or 10,000 steps per day on a pedometer . · Order or download the FREE \"Exercise & Physical Activity: Your Everyday Guide\" from [employer]. Call 6-536.638.1987 or search [employer] online. · You need 8608-7900 mg of calcium and 5772-7101 IU of vitamin D per day. It is possible to meet your calcium requirement with diet alone, but a vitamin D supplement is usually necessary to meet this goal.  · When exposed to the sun, use a sunscreen that protects against both UVA and UVB radiation with an SPF of 30 or greater. Reapply every 2 to 3 hours or after sweating, drying off with a towel, or swimming. · Always wear a seat belt when traveling in a car. Always wear a helmet when riding a bicycle or motorcycle.

## 2020-09-17 ENCOUNTER — TELEPHONE (OUTPATIENT)
Dept: SURGERY | Age: 75
End: 2020-09-17

## 2020-09-17 ENCOUNTER — TELEPHONE (OUTPATIENT)
Dept: ONCOLOGY | Age: 75
End: 2020-09-17

## 2020-09-17 LAB
ALBUMIN SERPL-MCNC: 3.6 G/DL (ref 3.5–5.2)
ALP BLD-CCNC: 93 U/L (ref 35–104)
ALT SERPL-CCNC: 28 U/L (ref 0–32)
ANION GAP SERPL CALCULATED.3IONS-SCNC: 11 MMOL/L (ref 7–16)
AST SERPL-CCNC: 17 U/L (ref 0–31)
BASOPHILS ABSOLUTE: 0.04 E9/L (ref 0–0.2)
BASOPHILS RELATIVE PERCENT: 0.4 % (ref 0–2)
BILIRUB SERPL-MCNC: 0.3 MG/DL (ref 0–1.2)
BUN BLDV-MCNC: 13 MG/DL (ref 8–23)
CALCIUM SERPL-MCNC: 9.4 MG/DL (ref 8.6–10.2)
CHLORIDE BLD-SCNC: 100 MMOL/L (ref 98–107)
CHOLESTEROL, TOTAL: 205 MG/DL (ref 0–199)
CK MB: <1 NG/ML (ref 0–4.3)
CK MB: <1 NG/ML (ref 0–4.3)
CO2: 27 MMOL/L (ref 22–29)
CREAT SERPL-MCNC: 0.6 MG/DL (ref 0.5–1)
EKG ATRIAL RATE: 93 BPM
EKG P AXIS: 55 DEGREES
EKG P-R INTERVAL: 116 MS
EKG Q-T INTERVAL: 336 MS
EKG QRS DURATION: 84 MS
EKG QTC CALCULATION (BAZETT): 417 MS
EKG R AXIS: -11 DEGREES
EKG T AXIS: 35 DEGREES
EKG VENTRICULAR RATE: 93 BPM
EOSINOPHILS ABSOLUTE: 0 E9/L (ref 0.05–0.5)
EOSINOPHILS RELATIVE PERCENT: 0 % (ref 0–6)
GFR AFRICAN AMERICAN: >60
GFR NON-AFRICAN AMERICAN: >60 ML/MIN/1.73
GLUCOSE BLD-MCNC: 135 MG/DL (ref 74–99)
HCT VFR BLD CALC: 35.5 % (ref 34–48)
HDLC SERPL-MCNC: 123 MG/DL
HEMOGLOBIN: 11.4 G/DL (ref 11.5–15.5)
IMMATURE GRANULOCYTES #: 0.13 E9/L
IMMATURE GRANULOCYTES %: 1.4 % (ref 0–5)
LACTIC ACID: 2 MMOL/L (ref 0.5–2.2)
LDL CHOLESTEROL CALCULATED: 47 MG/DL (ref 0–99)
LV EF: 63 %
LVEF MODALITY: NORMAL
LYMPHOCYTES ABSOLUTE: 1.68 E9/L (ref 1.5–4)
LYMPHOCYTES RELATIVE PERCENT: 18.5 % (ref 20–42)
MCH RBC QN AUTO: 26.1 PG (ref 26–35)
MCHC RBC AUTO-ENTMCNC: 32.1 % (ref 32–34.5)
MCV RBC AUTO: 81.2 FL (ref 80–99.9)
METER GLUCOSE: 120 MG/DL (ref 74–99)
METER GLUCOSE: 120 MG/DL (ref 74–99)
METER GLUCOSE: 130 MG/DL (ref 74–99)
METER GLUCOSE: 163 MG/DL (ref 74–99)
MONOCYTES ABSOLUTE: 0.71 E9/L (ref 0.1–0.95)
MONOCYTES RELATIVE PERCENT: 7.8 % (ref 2–12)
NEUTROPHILS ABSOLUTE: 6.54 E9/L (ref 1.8–7.3)
NEUTROPHILS RELATIVE PERCENT: 71.9 % (ref 43–80)
PDW BLD-RTO: 15.5 FL (ref 11.5–15)
PLATELET # BLD: 211 E9/L (ref 130–450)
PMV BLD AUTO: 8.2 FL (ref 7–12)
POTASSIUM REFLEX MAGNESIUM: 3.8 MMOL/L (ref 3.5–5)
RBC # BLD: 4.37 E12/L (ref 3.5–5.5)
SODIUM BLD-SCNC: 138 MMOL/L (ref 132–146)
TOTAL CK: 18 U/L (ref 20–180)
TOTAL CK: 19 U/L (ref 20–180)
TOTAL PROTEIN: 6.2 G/DL (ref 6.4–8.3)
TRIGL SERPL-MCNC: 174 MG/DL (ref 0–149)
TROPONIN: <0.01 NG/ML (ref 0–0.03)
TROPONIN: <0.01 NG/ML (ref 0–0.03)
VLDLC SERPL CALC-MCNC: 35 MG/DL
WBC # BLD: 9.1 E9/L (ref 4.5–11.5)

## 2020-09-17 PROCEDURE — 84484 ASSAY OF TROPONIN QUANT: CPT

## 2020-09-17 PROCEDURE — G0378 HOSPITAL OBSERVATION PER HR: HCPCS

## 2020-09-17 PROCEDURE — 2580000003 HC RX 258: Performed by: INTERNAL MEDICINE

## 2020-09-17 PROCEDURE — 6370000000 HC RX 637 (ALT 250 FOR IP): Performed by: STUDENT IN AN ORGANIZED HEALTH CARE EDUCATION/TRAINING PROGRAM

## 2020-09-17 PROCEDURE — 99220 PR INITIAL OBSERVATION CARE/DAY 70 MINUTES: CPT | Performed by: INTERNAL MEDICINE

## 2020-09-17 PROCEDURE — 82550 ASSAY OF CK (CPK): CPT

## 2020-09-17 PROCEDURE — 82553 CREATINE MB FRACTION: CPT

## 2020-09-17 PROCEDURE — 85025 COMPLETE CBC W/AUTO DIFF WBC: CPT

## 2020-09-17 PROCEDURE — 82962 GLUCOSE BLOOD TEST: CPT

## 2020-09-17 PROCEDURE — 80053 COMPREHEN METABOLIC PANEL: CPT

## 2020-09-17 PROCEDURE — 99204 OFFICE O/P NEW MOD 45 MIN: CPT | Performed by: INTERNAL MEDICINE

## 2020-09-17 PROCEDURE — 80061 LIPID PANEL: CPT

## 2020-09-17 PROCEDURE — 6370000000 HC RX 637 (ALT 250 FOR IP): Performed by: INTERNAL MEDICINE

## 2020-09-17 PROCEDURE — 83605 ASSAY OF LACTIC ACID: CPT

## 2020-09-17 PROCEDURE — 93306 TTE W/DOPPLER COMPLETE: CPT

## 2020-09-17 PROCEDURE — 99223 1ST HOSP IP/OBS HIGH 75: CPT | Performed by: INTERNAL MEDICINE

## 2020-09-17 PROCEDURE — 36415 COLL VENOUS BLD VENIPUNCTURE: CPT

## 2020-09-17 PROCEDURE — APPSS60 APP SPLIT SHARED TIME 46-60 MINUTES: Performed by: NURSE PRACTITIONER

## 2020-09-17 RX ORDER — NICOTINE POLACRILEX 4 MG
15 LOZENGE BUCCAL PRN
Status: DISCONTINUED | OUTPATIENT
Start: 2020-09-17 | End: 2020-09-18 | Stop reason: HOSPADM

## 2020-09-17 RX ORDER — LEVOTHYROXINE SODIUM 112 UG/1
112 TABLET ORAL DAILY
Status: DISCONTINUED | OUTPATIENT
Start: 2020-09-17 | End: 2020-09-18 | Stop reason: HOSPADM

## 2020-09-17 RX ORDER — ISOSORBIDE MONONITRATE 30 MG/1
30 TABLET, EXTENDED RELEASE ORAL DAILY
Status: DISCONTINUED | OUTPATIENT
Start: 2020-09-17 | End: 2020-09-18

## 2020-09-17 RX ORDER — ACETAMINOPHEN 325 MG/1
650 TABLET ORAL EVERY 4 HOURS PRN
Status: DISCONTINUED | OUTPATIENT
Start: 2020-09-17 | End: 2020-09-18 | Stop reason: HOSPADM

## 2020-09-17 RX ORDER — DEXTROSE MONOHYDRATE 25 G/50ML
12.5 INJECTION, SOLUTION INTRAVENOUS PRN
Status: DISCONTINUED | OUTPATIENT
Start: 2020-09-17 | End: 2020-09-18 | Stop reason: HOSPADM

## 2020-09-17 RX ORDER — PREDNISONE 1 MG/1
10 TABLET ORAL DAILY
Status: DISCONTINUED | OUTPATIENT
Start: 2020-09-17 | End: 2020-09-18 | Stop reason: HOSPADM

## 2020-09-17 RX ORDER — SODIUM CHLORIDE 0.9 % (FLUSH) 0.9 %
10 SYRINGE (ML) INJECTION EVERY 12 HOURS SCHEDULED
Status: DISCONTINUED | OUTPATIENT
Start: 2020-09-17 | End: 2020-09-18 | Stop reason: HOSPADM

## 2020-09-17 RX ORDER — SODIUM CHLORIDE 0.9 % (FLUSH) 0.9 %
10 SYRINGE (ML) INJECTION PRN
Status: DISCONTINUED | OUTPATIENT
Start: 2020-09-17 | End: 2020-09-18 | Stop reason: HOSPADM

## 2020-09-17 RX ORDER — PROMETHAZINE HYDROCHLORIDE 25 MG/1
12.5 TABLET ORAL EVERY 6 HOURS PRN
Status: DISCONTINUED | OUTPATIENT
Start: 2020-09-17 | End: 2020-09-18 | Stop reason: HOSPADM

## 2020-09-17 RX ORDER — ONDANSETRON 2 MG/ML
4 INJECTION INTRAMUSCULAR; INTRAVENOUS EVERY 6 HOURS PRN
Status: DISCONTINUED | OUTPATIENT
Start: 2020-09-17 | End: 2020-09-18 | Stop reason: HOSPADM

## 2020-09-17 RX ORDER — PREDNISONE 1 MG/1
5 TABLET ORAL NIGHTLY
Status: DISCONTINUED | OUTPATIENT
Start: 2020-09-17 | End: 2020-09-18 | Stop reason: HOSPADM

## 2020-09-17 RX ORDER — DEXTROSE MONOHYDRATE 50 MG/ML
100 INJECTION, SOLUTION INTRAVENOUS PRN
Status: DISCONTINUED | OUTPATIENT
Start: 2020-09-17 | End: 2020-09-18 | Stop reason: HOSPADM

## 2020-09-17 RX ORDER — SENNA PLUS 8.6 MG/1
1 TABLET ORAL DAILY PRN
Status: DISCONTINUED | OUTPATIENT
Start: 2020-09-17 | End: 2020-09-18 | Stop reason: HOSPADM

## 2020-09-17 RX ADMIN — ACETAMINOPHEN 650 MG: 325 TABLET ORAL at 18:24

## 2020-09-17 RX ADMIN — SODIUM CHLORIDE, PRESERVATIVE FREE 10 ML: 5 INJECTION INTRAVENOUS at 13:06

## 2020-09-17 RX ADMIN — Medication 10 ML: at 21:30

## 2020-09-17 RX ADMIN — INSULIN LISPRO 1 UNITS: 100 INJECTION, SOLUTION INTRAVENOUS; SUBCUTANEOUS at 21:30

## 2020-09-17 RX ADMIN — ACETAMINOPHEN 650 MG: 325 TABLET ORAL at 23:25

## 2020-09-17 RX ADMIN — PREDNISONE 5 MG: 5 TABLET ORAL at 21:30

## 2020-09-17 RX ADMIN — ISOSORBIDE MONONITRATE 30 MG: 30 TABLET, EXTENDED RELEASE ORAL at 15:41

## 2020-09-17 ASSESSMENT — PAIN DESCRIPTION - FREQUENCY: FREQUENCY: INTERMITTENT

## 2020-09-17 ASSESSMENT — PAIN DESCRIPTION - PROGRESSION: CLINICAL_PROGRESSION: GRADUALLY WORSENING

## 2020-09-17 ASSESSMENT — PAIN SCALES - GENERAL
PAINLEVEL_OUTOF10: 5
PAINLEVEL_OUTOF10: 0
PAINLEVEL_OUTOF10: 1
PAINLEVEL_OUTOF10: 3

## 2020-09-17 ASSESSMENT — PAIN DESCRIPTION - ONSET: ONSET: GRADUAL

## 2020-09-17 ASSESSMENT — PAIN DESCRIPTION - LOCATION: LOCATION: HEAD

## 2020-09-17 ASSESSMENT — PAIN - FUNCTIONAL ASSESSMENT: PAIN_FUNCTIONAL_ASSESSMENT: ACTIVITIES ARE NOT PREVENTED

## 2020-09-17 ASSESSMENT — PAIN DESCRIPTION - PAIN TYPE: TYPE: ACUTE PAIN

## 2020-09-17 ASSESSMENT — PAIN DESCRIPTION - DESCRIPTORS: DESCRIPTORS: HEADACHE

## 2020-09-17 NOTE — PROGRESS NOTES
Called Dr Louise Capellan office regarding patients port. Spoke with Beto Zhang and she stated she would get in touch with Dr Louise Capellan and take care of things. She will call us back if we need to do anything further.

## 2020-09-17 NOTE — PLAN OF CARE
Problem: Pain:  Description: Pain management should include both nonpharmacologic and pharmacologic interventions.   Goal: Pain level will decrease  Description: Pain level will decrease  Outcome: Met This Shift  Goal: Control of acute pain  Description: Control of acute pain  Outcome: Met This Shift  Goal: Control of chronic pain  Description: Control of chronic pain  Outcome: Met This Shift     Problem: Cardiac:  Goal: Ability to maintain an adequate cardiac output will improve  Description: Ability to maintain an adequate cardiac output will improve  Outcome: Met This Shift  Goal: Complications related to the disease process, condition or treatment will be avoided or minimized  Description: Complications related to the disease process, condition or treatment will be avoided or minimized  Outcome: Met This Shift  Goal: Hemodynamic stability will improve  Description: Hemodynamic stability will improve  Outcome: Met This Shift  Goal: Risk factors for ineffective tissue perfusion will decrease  Description: Risk factors for ineffective tissue perfusion will decrease  Outcome: Met This Shift     Problem: Fluid Volume:  Goal: Will show no signs or symptoms of fluid imbalance  Description: Will show no signs or symptoms of fluid imbalance  Outcome: Met This Shift

## 2020-09-17 NOTE — ED PROVIDER NOTES
CHEST HEAVINESS WITH EXERTION AND SOB OVER LAST DAY. NO CHEST DISCOMFORT NOW. HISTORY OF DM, HTN AND FAMILY HISTORY FOR CAD. HISTORY OF ENDOMETRIAL AND BREAST ADENOCARINOMA. NO TREATMENT FOR 1 YEAR. NO KNOWN LUNG METASTAIS    The history is provided by the patient. Review of Systems   Constitutional: Negative for chills and fever. HENT: Negative for ear pain, sinus pressure and sore throat. Eyes: Negative for pain, discharge and redness. Respiratory: Positive for shortness of breath. Negative for cough and wheezing. Cardiovascular: Positive for chest pain. Gastrointestinal: Negative for abdominal distention, diarrhea, nausea and vomiting. Genitourinary: Negative for dysuria and frequency. Musculoskeletal: Negative for arthralgias and back pain. Skin: Negative for rash and wound. Neurological: Negative for weakness and headaches. Hematological: Negative for adenopathy. All other systems reviewed and are negative. Physical Exam  Vitals signs and nursing note reviewed. Constitutional:       Appearance: She is well-developed. HENT:      Head: Normocephalic and atraumatic. Eyes:      Conjunctiva/sclera: Conjunctivae normal.   Neck:      Musculoskeletal: Normal range of motion and neck supple. Cardiovascular:      Rate and Rhythm: Normal rate and regular rhythm. Heart sounds: Normal heart sounds. No murmur. Pulmonary:      Effort: Pulmonary effort is normal. No respiratory distress. Breath sounds: Normal breath sounds. No wheezing or rales. Abdominal:      General: Bowel sounds are normal.      Palpations: Abdomen is soft. Tenderness: There is no abdominal tenderness. There is no guarding or rebound. Skin:     General: Skin is warm and dry. Neurological:      Mental Status: She is alert and oriented to person, place, and time. Cranial Nerves: No cranial nerve deficit.       Coordination: Coordination normal.          Procedures     MDM --------------------------------------------- PAST HISTORY ---------------------------------------------  Past Medical History:  has a past medical history of Cancer ([facility] Diabetes mellitus ([facility] Diverticulitis, GERD (gastroesophageal reflux disease), Gout, Hiatal hernia, Macular degeneration, Osteoarthritis, and Osteopenia. Past Surgical History:  has a past surgical history that includes  section; Tonsillectomy; cyst removal; Hysterectomy (2016); Breast surgery (Left, 10/2016); eye surgery (Bilateral, ); Colonoscopy; Endoscopy, colon, diagnostic; Breast lumpectomy (Left, 2016); chg unlisted dx radiographic procedure (N/A, 2018); [facility] les (N/A, 2018); pr [facility] w/brncl alveolar lavage (2018); pr [facility] brushing/protected brushings (2018); pr bronchoscopy w/transbronchial lung bx 1 lobe (2018); pr bronchoscopy w/transbronchial lung bx each lobe (2018); pr insj tunneled ctr vad w/subq port age 12 yr/> (N/A, 2018); and Hysterectomy, total abdominal (2016). Social History:  reports that she has quit smoking. Her smoking use included cigarettes. She has a 5.00 pack-year smoking history. She has never used smokeless tobacco. She reports current alcohol use. She reports current drug use. Drug: Marijuana. Family History: family history includes Arthritis in her father; Cancer in her maternal aunt; Diabetes in her mother; Heart Disease in her father and mother; High Blood Pressure in her mother. The patients home medications have been reviewed. Allergies: Famotidine; Percocet [oxycodone-acetaminophen]; Asa [aspirin]; Levothyroxine; Omeprazole; Protonix [pantoprazole sodium]; Erythromycin; Keflet [cephalexin]; Levaquin [levofloxacin in d5w]; Pcn [penicillins]; Polyethylene glycol;  Tetracycline; and Tetracyclines & related    -------------------------------------------------- RESULTS -------------------------------------------------    LABS:  Results for orders placed or performed during the hospital encounter of 09/16/20   CBC Auto Differential   Result Value Ref Range    WBC 10.2 4.5 - 11.5 E9/L    RBC 5.07 3.50 - 5.50 E12/L    Hemoglobin 13.3 11.5 - 15.5 g/dL    Hematocrit 41.5 34.0 - 48.0 %    MCV 81.9 80.0 - 99.9 fL    MCH 26.2 26.0 - 35.0 pg    MCHC 32.0 32.0 - 34.5 %    RDW 15.7 (H) 11.5 - 15.0 fL    Platelets 695 909 - 377 E9/L    MPV 8.3 7.0 - 12.0 fL    Neutrophils % 76.2 43.0 - 80.0 %    Immature Granulocytes % 1.6 0.0 - 5.0 %    Lymphocytes % 14.8 (L) 20.0 - 42.0 %    Monocytes % 6.8 2.0 - 12.0 %    Eosinophils % 0.1 0.0 - 6.0 %    Basophils % 0.5 0.0 - 2.0 %    Neutrophils Absolute 7.79 (H) 1.80 - 7.30 E9/L    Immature Granulocytes # 0.16 E9/L    Lymphocytes Absolute 1.51 1.50 - 4.00 E9/L    Monocytes Absolute 0.69 0.10 - 0.95 E9/L    Eosinophils Absolute 0.01 (L) 0.05 - 0.50 E9/L    Basophils Absolute 0.05 0.00 - 0.20 E9/L   Comprehensive Metabolic Panel   Result Value Ref Range    Sodium 136 132 - 146 mmol/L    Potassium 3.9 3.5 - 5.0 mmol/L    Chloride 96 (L) 98 - 107 mmol/L    CO2 29 22 - 29 mmol/L    Anion Gap 11 7 - 16 mmol/L    Glucose 222 (H) 74 - 99 mg/dL    BUN 15 8 - 23 mg/dL    CREATININE 0.6 0.5 - 1.0 mg/dL    GFR Non-African American >60 >=60 mL/min/1.73    GFR African American >60     Calcium 9.9 8.6 - 10.2 mg/dL    Total Protein 7.5 6.4 - 8.3 g/dL    Alb 4.0 3.5 - 5.2 g/dL    Total Bilirubin 0.4 0.0 - 1.2 mg/dL    Alkaline Phosphatase 120 (H) 35 - 104 U/L    ALT 37 (H) 0 - 32 U/L    AST 26 0 - 31 U/L   Brain Natriuretic Peptide   Result Value Ref Range    Pro- (H) 0 - 125 pg/mL   Troponin   Result Value Ref Range    Troponin <0.01 0.00 - 0.03 ng/mL   EKG 12 Lead   Result Value Ref Range    Ventricular Rate 93 BPM    Atrial Rate 93 BPM    P-R Interval 116 ms    QRS Duration 84 ms    Q-T Interval 336 ms    QTc Calculation (Bazett) 417 ms    P Axis 55 degrees R Axis -11 degrees    T Axis 35 degrees       RADIOLOGY:  XR CHEST (2 VW)   Final Result   Evidence of extensive bilateral pulmonary metastatic disease without   evidence of acute cardiopulmonary pathology. CT CHEST PULMONARY EMBOLISM W CONTRAST    (Results Pending)       EKG: This EKG is signed and interpreted by me. Rate: 93  Rhythm: Sinus  Interpretation: non-specific EKG  Comparison: stable as compared to patient's most recent EKG      ------------------------- NURSING NOTES AND VITALS REVIEWED ---------------------------   The nursing notes within the ED encounter and vital signs as below have been reviewed. BP (!) 161/85   Pulse 86   Temp 97.7 °F (36.5 °C) (Oral)   Resp 18   Ht 5' 3.5\" (1.613 m)   Wt 150 lb (68 kg)   SpO2 98%   BMI 26.15 kg/m²   Oxygen Saturation Interpretation: Normal      ------------------------------------------ PROGRESS NOTES ------------------------------------------     Consultations:  MEDICINE AWARE CTA PENDING    Counseling:   I have spoken with the patient and discussed todays results, in addition to providing specific details for the plan of care and counseling regarding the diagnosis and prognosis. Their questions are answered at this time and they are agreeable with the plan.      --------------------------------- ADDITIONAL PROVIDER NOTES ---------------------------------         This patient's ED course included: a personal history and physicial examination, re-evaluation prior to disposition, multiple bedside re-evaluations, IV medications, cardiac monitoring, continuous pulse oximetry and complex medical decision making and emergency management    This patient has remained hemodynamically stable during their ED course. Critical Care: 35         --------------------------------- IMPRESSION AND DISPOSITION ---------------------------------    IMPRESSION  No diagnosis found.     DISPOSITION  Disposition: Admit to telemetry  Patient condition is stable             Antonio Miller MD  09/16/20 [id number]

## 2020-09-17 NOTE — H&P
[facility]   History and Physical      CHIEF COMPLAINT:  Chest pressure    History of Present Illness: pt is a 69 y.o. female who presents with chest pain. For approx last few weeks, pt noted being tired with less activity. Over last 24 hours, pt noted chest pressure and sob with activity. Pt given ntg and pt states pain improved. Pt had her port checked earlier and with her symptoms came to ER.pt has hx of dm. Pt has hx of endometrial cancer and breast cancer. Pt denies recent illness, cough, fevers, chills,n/v, abd pain, diarrhea, constipation, melena, hematochezia, change in urination,dysuria, or hematuria         REVIEW OF SYSTEMS:    A detailed system review was conducted with patient and is negative unless stated in hpi.         PMH:  Past Medical History:   Diagnosis Date    Cancer [facility])     endometrial cancer, breast    Diabetes mellitus ([facility] [address]     diet controlled    Diverticulitis     GERD (gastroesophageal reflux disease)     Gout     Hiatal hernia     Macular degeneration     Osteoarthritis     Osteopenia        Surgical History:  Past Surgical History:   Procedure Laterality Date    BREAST LUMPECTOMY Left 2016    left breast sentinelnode dissection, blue dye injection    BREAST SURGERY Left 10/2016    cancer     SECTION      x 3    CHG UNLISTED DX RADIOGRAPHIC PROCEDURE N/A 2018    BRONCHOSCOPY ELECTROMAGNETIC performed by Yandy Rodriguez MD at [facility] area    ENDOSCOPY, COLON, DIAGNOSTIC      EYE SURGERY Bilateral 2016    cataracts    HYSTERECTOMY  2016    total with BSO    HYSTERECTOMY, TOTAL ABDOMINAL  2016    [address] BRUSHING/PROTECTED BRUSHINGS  2018    BRONCHOSCOPY BRUSHINGS performed by Yandy Rodriguez MD at [address] [place] [address]  2018    BRONCHOSCOPY ALVEOLAR LAVAGE performed by Yandy Rodriguez MD at [address] [place] [address] [address] EBUS DX/TX INTERVENTION PERPH LES N/A 8/17/2018    BRONCHOSCOPY ULTRASOUND performed by Jr Fair MD at [address] [facility] W/TRANSBRONCHIAL LUNG BX 1 LOBE  8/17/2018    BRONCHOSCOPY/TRANSBRONCHIAL NEEDLE BIOPSY performed by Jr Fair MD at [facility] W/TRANSBRONCHIAL LUNG BX EACH LOBE  8/17/2018    BRONCHOSCOPY/TRANSBRONCHIAL NEEDLE BIOPSY ADDL LOBE performed by Jr Fair MD at [address] TUNNELED CTR VAD W/SUBQ PORT AGE 5 YR/> N/A 12/4/2018    MEDIPORT INSERTION performed by Tammy Barry MD at [facility]         Medications Prior to Admission:    Prior to Admission medications    Medication Sig Start Date End Date Taking? Authorizing Provider   SYNTHROID 112 MCG tablet Take 1 tablet by mouth Daily 9/10/20   Teri Titus DO   predniSONE (DELTASONE) 2.5 MG tablet Take 5 mg by mouth nightly  6/10/20   Historical Provider, MD   nystatin-triamcinolone [facility]) 024947-4.8 UNIT/GM-% cream APPLY SPARINGLY TO AFFECTED AREA(S) TWICE DAILY 6/27/20   Historical Provider, MD   predniSONE (DELTASONE) 10 MG tablet Take 10 mg by mouth daily In the morning 7/1/20   Teri Titus DO   glimepiride (AMARYL) 2 MG tablet Take 1 tablet by mouth daily 7/1/20   Teri Titus DO   diclofenac (VOLTAREN) 75 MG EC tablet Take 1 tablet by mouth 2 times daily  Patient taking differently: Take 75 mg by mouth 2 times daily as needed  5/19/20   Teri Titus DO   Ez Smart Blood Glucose Lancets MISC 1 each by Subdermal route daily 5/5/20   Teri Titus DO   blood glucose monitor strips Test one time a day & as needed for symptoms of irregular blood glucose.  E11.9 4/9/20   Teri Titus DO   melatonin 3 MG TABS tablet Take 6 mg by mouth nightly as needed     Historical Provider, MD   loratadine (CLARITIN) 10 MG tablet Take 10 mg by mouth daily as needed     Historical Provider, MD   montelukast (SINGULAIR) 10 MG tablet Take by mouth nightly as needed  6/24/19   Historical Provider, MD   CANNABIDIOL PO Inhale 3 puffs into the lungs nightly as needed     Historical Provider, MD       Allergies:    Famotidine; Percocet [oxycodone-acetaminophen]; George Parkinson [aspirin]; Omeprazole; Protonix [pantoprazole sodium]; Erythromycin; Keflet [cephalexin]; Levaquin [levofloxacin in d5w]; Pcn [penicillins]; Polyethylene glycol; Tetracycline; and Tetracyclines & related    Social History:    reports that she has quit smoking. Her smoking use included cigarettes. She has a 5.00 pack-year smoking history. She has never used smokeless tobacco. She reports current alcohol use. She reports current drug use. Drug: Marijuana. Family History:       Problem Relation Age of Onset    High Blood Pressure Mother     Heart Disease Mother     Diabetes Mother     Arthritis Father         rheumatoid    Heart Disease Father     Cancer Maternal Aunt         ovarian carcinoma           PHYSICAL EXAM:    Vitals:  BP (!) 138/90   Pulse 77   Temp 99.3 °F (37.4 °C) (Oral)   Resp 16   Ht 5' 3.5\" (1.613 m)   Wt 150 lb (68 kg)   SpO2 97%   BMI 26.15 kg/m²     General:  Appears comfortable. Answers questions appropriately and cooperative with exam  HEENT:  Mucous membranes moist. No erythema, rhinorrhea, or post-nasal drip noted. Neck:  No carotid bruits. Heart:  Rhythm regular at rate of 80  Lungs:  CTA. No wheeze, rales, or rhonchi  Abdomen:  Positive bowel sounds positive. Soft. Non-tender. No guarding, rebound or rigidity. Breast/Rectal/Genitourinary: not pertinent. Extremities:  Negative for lower extremity edema  Skin:  Warm and dry  Vascular: 2/4 Dorsalis Pedis pulses bilaterally. Neuro:  Cranial nerves 2-12 grossly intact, no focal weakness or change in sensation noted. Extraocular muscles intact. Pupils equal, round, reactive to light.               LABS:  Recent Labs     09/16/20 2027      K 3.9   CL 96*   CO2 29   BUN 15 CREATININE 0.6   GLUCOSE 222*   CALCIUM 9.9       Recent Labs     20   WBC 10.2   RBC 5.07   HGB 13.3   HCT 41.5   MCV 81.9   MCH 26.2   MCHC 32.0   RDW 15.7*      MPV 8.3       No results for input(s): POCGLU in the last 72 hours. CBC with Differential:    Lab Results   Component Value Date    WBC 10.2 2020    RBC 5.07 2020    HGB 13.3 2020    HCT 41.5 2020     2020    MCV 81.9 2020    MCH 26.2 2020    MCHC 32.0 2020    RDW 15.7 2020    NRBC 0.0 2020    LYMPHOPCT 14.8 2020    LYMPHOPCT 6.2 2020    MONOPCT 6.8 2020    BASOPCT 0.5 2020    MONOSABS 0.69 2020    LYMPHSABS 1.51 2020    EOSABS 0.01 2020    BASOSABS 0.05 2020     CMP:    Lab Results   Component Value Date     2020    K 3.9 2020    K 3.9 2018    CL 96 2020    CO2 29 2020    BUN 15 2020    CREATININE 0.6 2020    GFRAA >60 2020    LABGLOM >60 2020    LABGLOM >60 2020    GLUCOSE 222 2020    GLUCOSE 316 2020    PROT 7.5 2020    LABALBU 4.0 2020    LABALBU 3.1 2020    CALCIUM 9.9 2020    BILITOT 0.4 2020    ALKPHOS 120 2020    AST 26 2020    ALT 37 2020     Troponin:    Lab Results   Component Value Date    TROPONINI <0.01 2020       Radiology: Xr Chest (2 Vw)    Result Date: 2020  Patient MRN: [id number] : 1945 Age:  69 years Gender: Female Order Date: 2020 8:41 PM Exam: XR CHEST (2 VW) Number of Images: 2 view Indication:  Chest pain Comparison: CT chest 2019 and a 2019 Findings: The lungs are symmetrically expanded, and show scattered metastatic nodules throughout the lungs. There is no evidence of consolidative pneumonia, or pleural effusion, and no evidence of cardiac decompensation. . There is aortic intimal calcification but no evidence of cardiac enlargement or decompensation. There is a left-sided central venous infusion port catheter, which has a \"twist\" beneath the left clavicular neck. Skeletal structures show no evidence of acute pathology. Degenerative changes of the spine with kyphosis and ankylosis are noted. Overlying EKG leads are present. Surgical clips are distributed about the left chest wall soft tissues. Evidence of extensive bilateral pulmonary metastatic disease without evidence of acute cardiopulmonary pathology. Ct Chest Pulmonary Embolism W Contrast    Result Date: 9/16/2020  Clinical indications: Chest pain. Pulmonary embolus. COMPARISON: August 18, 2019. Exposure control: This examination and all examinations utilizing ionizing radiation at this facility done so according to the ALARA (as low as reasonably achievable) principal for radiation dose reduction. Technique: Axial, sagittal, and coronal computed tomography of the chest was performed following intravenous administration of 90 mL of Isovue-370. 3-D postprocessing. Three-dimensional reconstructions of the arterial tree. MIP imaging. FINDINGS: Evaluation of the pulmonary arterial tree reveals no intraluminal filling defect to suggest embolic phenomenon. There is no evidence of aortic dissection. Within the thoracic inlet, the thyroid gland is unremarkable. The major airways are patent. Compared to a prior CT of August 18, 2019, there are numerous new enlarged mediastinal lymph nodes and bilateral hilar lymphadenopathy is present. The heart is enlarged. There is no evidence of pericardial fluid. Sliding hiatal hernia is present in the stomach is partially intrathoracic. Evaluation of the lung parenchyma reveals innumerable pulmonary parenchymal nodules bilaterally, some of which are cavitary. These range in size from approximately 5 mm up to approximately 2.3 cm in the right upper lobe and 2.0 cm and the left posterior costophrenic angle.  Within the upper abdomen, there is no evidence of adrenal gland nodule or hepatic lesion. Splenic artery aneurysm is present. . There is diffuse bone demineralization. Degenerative spondylosis and increased kyphosis of thoracic spine. There is mild compression fracture of T12 which is remote. Skeletal structures are negative for evidence of aggressive process or acute fracture. Findings most worrisome for metastatic disease. Innumerable pulmonary parenchymal nodules bilaterally, some of which are cavitary. These range in size from approximately 5 mm up to approximately 2.3 cm in the right upper lobe and 2.0 cm and the left posterior costophrenic angle. Mediastinal and bilateral hilar lymphadenopathy. No evidence for pulmonary embolism or aortic dissection. ASSESSMENT:      Active Problems:    Chest pain  Resolved Problems:    * No resolved hospital problems. *      PLAN:    1. Chest pain - pressure and sob relieved with ntg. Will check card enz. Monitor on tele and ask cardiology to evaluate  2. Abnormal ct scan - given cancer hx, will ask hem/onc to evaluate  3. Dm type 2 controlled monitor bs and tx with insulin  4. Elevated lfts monitor    Please note: I did attempt to clarify with pt about code status. At this time, her wishes are more consistent with dnr-cca but discussed with her that this can change as she wishes.          Electronically signed by Delvin Sandifer, DO on 9/17/2020 at 4:48 AM

## 2020-09-17 NOTE — CONSULTS
I independently interviewed and examined the patient. I have reviewed the above documentation completed by the RD. Please see my additional contributions to the HPI, physical exam, and assessment / medical decision making. Reason for consult: Chest pain    She was not previously known to [facility]) cardiology. Mrs. Mila Gan is a 52-year-old female with history of Gout, GERD, Type DM2 on OHA, uterine cancer diagnosed in 6/2016 and was treated with brachytherapy and chemo, left breast cancer 10/2016, s/p lumpectomy , XRT and chemo. She was diagnosed with lung mets/ primary and received chemo with Caye Mattock which she stopped in 12/2019 that made her very sick and did not follow up with oncology service. She presented to the hospital with complaints of chest heaviness and dyspnea with exertion for the past several weeks with worsening fatigue. 9/16/2020 she had a midsternal pressure 8/10 without any radiation and noted improvement with sublingual nitroglycerin. In the emergency room her pressure was 151/82 heart rate of 113 sats were 97% room air. Renal functions are normal.  Troponins x2 are negative. EKG showed normal sinus rhythm with frequent premature atrial complexes, nonspecific T wave abnormalities. Currently she is chest pain-free. She underwent CTA chest which showed innumerable bilateral pulmonary parenchymal nodules some of which are or cavitary. Mediastinal and bilateral hiatal lymphadenopathy. No pulmonary embolism or dissection was noted.     Review of Systems:  Cardiac: As per HPI  General: No fever, chills  Pulmonary: As per HPI  GI: No nausea, vomiting  Musculoskeletal: [place] x 4, no focal motor deficits  Skin: Intact, no rashes  Neuro/Psych: No headache or seizures    Physical Exam:  BP (!) 112/50 Comment: manual  Pulse 68   Temp 98.2 °F (36.8 °C) (Oral)   Resp 14   Ht 5' 3.5\" (1.613 m)   Wt 150 lb (68 kg)   SpO2 96%   BMI 26.15 kg/m²   Appearance: Awake, alert, no acute respiratory distress  Skin: Intact, no rash  Head: Normocephalic, atraumatic  ENMT: MMM, no rhinorrhea  Neck: Supple, no carotid bruits  Lungs: Clear to auscultation bilaterally. No wheezes, rales, or rhonchi. Cardiac: Regular rate and rhythm, +S1S2, no murmurs apparent  Abdomen: Soft, +bowel sounds  Extremities: Moves all extremities x 4, no lower extremity edema  Neurologic: No focal motor deficits apparent, normal mood and affect    Telemetry findings reviewed: SR at rate 60s, no new tachy/bradyarrhythmias overnight    EKG: Normal sinus rhythm, frequent premature atrial complexes, nonspecific T wave abnormality, abnormal EKG. Vitals were reviewed: Blood pressure 112/50 with heart rate of 68. Labs were reviewed: BMP normal, troponin less than 0.01×3, proBNP 130, liver function normal.  CBC showed hemoglobin of 11.4 and rest of the CBC was normal.    Assessment;  · Typical chest pain with negative troponins nonspecific T wave changes, extensive metastatic lung disease. Differentials include ischemia versus lung mets causing the pain. · History of left breast cancer diagnosed in October 2016 status post left breast lumpectomy, status post radiation and chemo  · History of uterine cancer diagnosed in June 2016 status post hysterectomy, brachytherapy and chemo  · History of lung primary neoplasm versus metastatic disease treated with Keytruda and subsequently developed intolerance. Now has extensive metastatic disease. Off chemotherapy since December 2019  · History of GERD  · History of gout  · Allergic to aspirin  · DNR CCA        Plan:   · Would recommend medical therapy considering extensive metastatic disease. We will get an echocardiogram to assess LV function. I would like to see oncology input regarding her metastatic disease and prognosis prior to doing a stress test.  · Oncology consult was already placed awaiting further input.   · We will start her on antianginal medication with Imdur 30 mg p.o. daily and if she remains hemodynamically stable then we will add beta-blockers for angina. · Continue current medications as per primary service  · We will obtain lipid panel    Thank you for consulting us and will be following with you for any further recommendations. Faisal Benavidez MD, Zohra Miller.   [facility]) Cardiology

## 2020-09-17 NOTE — TELEPHONE ENCOUNTER
Verbal order per Dr. Garth Morelos for CT Chest performed 9-16-20 be compared by the reading radiologist Dr. Vinay Niño to the CT chest performed 8-18-19 with addendum made to reflect comparsion. Kiya stated new report will follow.

## 2020-09-17 NOTE — CONSULTS
Inpatient Medical Oncology Consult Note    Reason for Visit: Endometrial cancer    Referring Physician: Avril Lemus DO    PCP:  Royal Isela DO    History of Present Illness:  69 y.o. 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  -Adjuvant radiation therapy completed March 28, 2017.  -Endocrine therapy: Arimidex started March 30, 2017. She did not tolerate due to severe depression;  -Arimidex discontinued.   -Started on Femara after approximately 2 weeks, but did not tolerate Femara.  Chose observation. Bilateral screening mammogram on 10/24/2019 negative for malignancy  Clinically, there is no evidence of recurrence.      IA papillary serous endometrial adenocarcinoma, FIGO grade 3, - LVSI, tumor size 4.5 cm;    BRCA/Myrisk testing Negative  7/20/16-Exam under anesthesia, diagnostic laparoscopy, total laparoscopic hysterectomy, bilateral salpingo-oophorectomy, pelvic and periaortic lymphadenectomy, omentectomy.     HDR VAGINAL BRACHYTHERAPY-9/29/16, 10/6/16, 10/13/16     08/19/2017 PET/CT scan: Hypermetabolic bilateral lung nodules suspicious for metastasis.   Hypermetabolic nodule in the right lower quadrant anterior to the psoas muscle suspicious for metastasis.     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     She completed 2 cycles of chemotherapy with Taxol Carboplatin and Avastin on 10/17 and 11/17.  She had poor tolerance to chemotherapy, therefore she declined additional chemotherapy and opted for Natural remedies instead      on 06/12/2018: 15 (<39UmL)       Re-staging scans on 06/15/2018:  CT of the abdomen and pelvis: 2.2 x 1.3 cm soft tissue nodule along the superior aspect of the urinary bladder suspicious for metastases, decreased in size since 1/23/18; no evidence of new abnormalities since prior visit; diverticulosis without evidence of diverticulitis. CT Chest:  Multiple bilateral lung nodules suspicious for metastases, increased in size and number since 1/23/2018.     Bronchoscopy/EBUS was performed on 08/17/2018  Respiratory Gram Stain/Cx: Negative for organisms. Aspergillus Galactomannan Antigen Negative  BAL RUL Negative for malignant cells. Bronchial smears shake RUL (predominance of blood); Negative for malignant cells. FNA Martínez TBNA RML (predominance of blood) Negative for malignant cells. FNA Martínez TBNA RUL (predominance of blood)  Negative for malignant cells. EBUS FNA R10 (scant lymph node sampling) Negative for malignant cells. EBUS FNA Station 7: Negative for malignant cells.     Case discussed with Dr. Yolanda Merrill at [facility]. She continued to decline systemic chemotherapy (Taxol/Carbo +/- Herceptin). She did not want hormonal therapy as she did not tolerate this well with her breast cancer. Patient declined palliative care/Hospice care and wanted to proceed with immunotherapy Alda Harrington). Side effects of Keytruda reviewed with patient. She agreed to proceed. Cycle # 1 [place] was on 08/29/2018. Cycle # 2 Keytruda was on 09/19/2018. Cycle # 3 Keytruda was on 10/10/2018. CT chest 10/23/2018 noted Significant improvement in the previously noted multiple bilateral pulmonary nodules concerning for  improving pulmonary metastasis. CT abdomen/pelvis 10/23/2018 stable examination. Continue Keytruda and repeat scans in 3 months. Cycle # 4 Keytruda was on 10/31/2018. Cycle # 5 Keytruda was on 11/21/2018. Cycle # 6 Keytruda was on 12/19/2018. Cycle # 7 Keytruda was on 01/09/2019. Cycle # 8 Keytruda was on 01/30/2019. CT chest 02/13/2019 stable LLL nodule measuring 5 mm. Stable RUL nodule measuring 2 mm.  No evidence of mediastinal, hilar or axillary LN.  CT Abdomen/pelvis 02/13/2019 stable exam.  Continue Keytruda and repeat scans in 3 months. Cycle # 9 Keytruda was on 02/28/2019. Cycle # 10 Keytruda was on 03/20/2019. Cycle # 11 Myrene Eboni was on 04/11/2019. Cycle # 12 Myrene Eboni was on 05/02/2019. CT chest 05/17/2019 noted no metastatic disease. CT abdomen/pelvis 05/17/2019 stable exam.  Continue Keytruda and repeat scans in 3 months. Cycle # 13 Keytruda was on 05/23/2019. Cycle # 14 Keytruda was on 06/13/2019. Cycle # 15 Keytruda was on 07/11/2019. Cycle # 16 Myrene Eboni was on 08/01/2019. CT chest 08/18/2019 stable to slightly more prominent LLL nodule 4.2 mm  CT abdomen/pelvis 08/18/2019 enhancing right psoas mass increased partially compressing the distal right ureter causing hydronephrosis in the right kidney. Urology team on board. Cycle # 17 Keytruda was on 08/22/2019. MRI right hip 09/03/2019:Trochanteric bursitis. Heterogenous joint effusion may be infected or hemorrhagic. Increased T2 signal superior acetabulum is most likely degenerative such as subchondral cyst formation. Osteoarthritis. No evidence for avascular necrosis or transient osteoporosis. Patient received injection to right hip of 4cc lidocaine/1 cc kenalog on 09/24/2019 with orthopedics. Evaluated by Dr. Leland Stanford at [facility] on 09/19/2019 who recommended to continue Myrene Eboni and referred her to Dr. Noemi Vazquez for consideration of SBRT to treat the psoas lesion. Recommended revisiting ureteral catheter with urology after assessment with Rad Onc   Cycle # 18 Myrene Eboni was on 09/26/2019. Cycle # 19 Myrene Eboni was on 10/24/2019. Appointment with Dr. Noemi Vazquez on 10/28/2019 who recommended SBRT to the oligoprogressive lesion adjacent to/investing the right psoas muscle with the goal of allowing her to continue her systemic therapy with Keytruda. Cycle # 20 Myrene Eboni was on 11/14/2019.     SBRT was started on 11/21/2019 and completed 12/12/2019.  The R Psoas metastasis PTV received a total dose of 3750 cGy in 6 fractions at 625 cGy/fraction prescribed to Max Dose at 78.0% IDL using 10 MV photons with VMAT Coplanar technique.      Due to weakness, significant fatigue poor appetite and severe arthralgias in bilateral knees. She declined Keytruda at that time and wanted to proceed with Hospice care. She presented to the hospital with complaints of chest heaviness and dyspnea with exertion for the past several weeks with worsening fatigue. CTA chest which showed innumerable bilateral pulmonary parenchymal nodules some of which are or cavitary. Mediastinal and bilateral hiatal lymphadenopathy. No pulmonary embolism or dissection was noted. Review of Systems;  CONSTITUTIONAL: No fever, chills. Fair appetite and energy level. ENMT: Eyes: No diplopia; Nose: No epistaxis. Mouth: No sore throat. RESPIRATORY: No hemoptysis. + shortness of breath  CARDIOVASCULAR: No palpitations. Chest heaviness  GASTROINTESTINAL: No nausea/vomiting, abdominal pain, diarrhea/constipation. GENITOURINARY: No dysuria, urinary frequency, hematuria. NEURO: No syncope, presyncope, headache.   Remainder:  ROS NEGATIVE    Past Medical History:      Diagnosis Date    Cancer [facility])     endometrial cancer, breast    Diabetes mellitus ([address]     diet controlled    Diverticulitis     GERD (gastroesophageal reflux disease)     Gout     Hiatal hernia     Macular degeneration     Osteoarthritis     Osteopenia        Past Surgical History:      Procedure Laterality Date    BREAST LUMPECTOMY Left 2016    left breast sentinelnode dissection, blue dye injection    BREAST SURGERY Left 10/2016    cancer     SECTION      x 3    CHG UNLISTED DX RADIOGRAPHIC PROCEDURE N/A 2018    BRONCHOSCOPY ELECTROMAGNETIC performed by Reji Sahu [facility] at [facility]    ENDOSCOPY, COLON, DIAGNOSTIC      EYE SURGERY Bilateral 2016    cataracts    HYSTERECTOMY  07/2016    total with BSO    HYSTERECTOMY, TOTAL ABDOMINAL  07/2016    [place] [address] BRUSHING/PROTECTED BRUSHINGS  8/17/2018    BRONCHOSCOPY BRUSHINGS performed by Kelly Llamas MD at [address] [place] [address] [address] [facility]  8/17/2018    BRONCHOSCOPY ALVEOLAR LAVAGE performed by Kelly Llamas MD at [address] [place] [address] [address] EBUS DX/TX INTERVENTION [address] LES N/A 8/17/2018    BRONCHOSCOPY ULTRASOUND performed by Kelly Llamas MD at [address] [place] [address] W/TRANSBRONCHIAL LUNG BX 1 LOBE  8/17/2018    BRONCHOSCOPY/TRANSBRONCHIAL NEEDLE BIOPSY performed by Kelly Llamas MD at [address] [place] [address] W/TRANSBRONCHIAL LUNG BX EACH LOBE  8/17/2018    BRONCHOSCOPY/TRANSBRONCHIAL NEEDLE BIOPSY ADDL LOBE performed by Kelly Llamas MD at [address] TUNNELED CTR VAD W/SUBQ PORT AGE 5 YR/> N/A 12/4/2018    MEDIPORT INSERTION performed by oSl Brian MD at [facility] TONSILLECTOMY       Family History:  Family History   Problem Relation Age of Onset    High Blood Pressure Mother     Heart Disease Mother     Diabetes Mother     Arthritis Father         rheumatoid    Heart Disease Father     Cancer Maternal Aunt         ovarian carcinoma     Medications:  Reviewed and reconciled.     Social History:  Social History     Socioeconomic History    Marital status:      Spouse name: Not on file    Number of children: Not on file    Years of education: Not on file    Highest education level: Not on file   Occupational History    Not on file   Social Needs    Financial resource strain: Not on file    Food insecurity     Worry: Not on file     Inability: Not on file   [employer] needs     Medical: Not on file     Non-medical: Not on file   Tobacco Use    Smoking status: Former Smoker     Packs/day: 0.25     Years: 20.00     Pack years: 5.00     Types: Cigarettes    Smokeless tobacco: Never Used   Substance and Sexual Activity    Alcohol use: Yes     Frequency: Monthly or less     Comment: rarely    Drug use: Yes     Types: Marijuana     Comment: medical    Sexual activity: Not on file   Lifestyle    Physical activity     Days per week: Not on file     Minutes per session: Not on file    Stress: Not on file   Relationships    Social connections     Talks on phone: Not on file     Gets together: Not on file     Attends Yazdanism service: Not on file     Active member of club or organization: Not on file     Attends meetings of clubs or organizations: Not on file     Relationship status: Not on file    Intimate partner violence     Fear of current or ex partner: Not on file     Emotionally abused: Not on file     Physically abused: Not on file     Forced sexual activity: Not on file   Other Topics Concern    Not on file   Social History Narrative    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 Allergies: Allergies   Allergen Reactions    Famotidine Anxiety, Nausea Only and Palpitations     Other reaction(s): Dizziness    Percocet [Oxycodone-Acetaminophen] Other (See Comments)     Prolong use, GI issues    Asa [Aspirin] Hives    Omeprazole Hives     Other reaction(s): Hives, Urticaria    Protonix [Pantoprazole Sodium] Other (See Comments)     Other reaction(s): Other: See Comments  DEPRESSION    Erythromycin Rash    Keflet [Cephalexin] Rash    Levaquin [Levofloxacin In D5w] Rash    Pcn [Penicillins] Rash    Polyethylene Glycol Rash     Bloating,gas    Tetracycline Rash    Tetracyclines & Related Rash     Physical Exam:  /62   Pulse 95   Temp 98.6 °F (37 °C) (Oral)   Resp 14   Ht 5' 3.5\" (1.613 m)   Wt 150 lb (68 kg)   SpO2 96%   BMI 26.15 kg/m²   GENERAL: Alert, oriented x 3, tired  HEENT: PERRLA; EOMI. Oropharynx clear. NECK: Supple. Without lymphadenopathy. LUNGS: Good air entry bilaterally. No wheezing, crackles or ronchi. CARDIOVASCULAR: Regular rate.  No murmurs, rubs or gallops. ABDOMEN: Soft. Non-tender, non-distended. Positive bowel sounds. EXTREMITIES: Without clubbing, cyanosis, or edema. NEUROLOGIC: No focal deficits. ECOG PS 2    Diagnostics:  Lab Results   Component Value Date    WBC 9.1 09/17/2020    HGB 11.4 (L) 09/17/2020    HCT 35.5 09/17/2020    MCV 81.2 09/17/2020     09/17/2020     Lab Results   Component Value Date     09/17/2020    K 3.8 09/17/2020     09/17/2020    CO2 27 09/17/2020    BUN 13 09/17/2020    CREATININE 0.6 09/17/2020    GLUCOSE 135 (H) 09/17/2020    CALCIUM 9.4 09/17/2020    PROT 6.2 (L) 09/17/2020    LABALBU 3.6 09/17/2020    BILITOT 0.3 09/17/2020    ALKPHOS 93 09/17/2020    AST 17 09/17/2020    ALT 28 09/17/2020    LABGLOM >60 09/17/2020    GFRAA >60 09/17/2020     Impression/Plan:  pT2 pN0  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  -Adjuvant radiation therapy completed March 28, 2017.  -Endocrine therapy: Arimidex started March 30, 2017. She did not tolerate due to severe depression;  -Arimidex discontinued.   -Started on Femara after approximately 2 weeks, but did not tolerate Femara.  Chose observation. Bilateral screening mammogram on 10/24/2019 negative for malignancy     IA papillary serous endometrial adenocarcinoma, FIGO grade 3, - LVSI, tumor size 4.5 cm;    BRCA/Myrisk testing Negative  7/20/16-Exam under anesthesia, diagnostic laparoscopy, total laparoscopic hysterectomy, bilateral salpingo-oophorectomy, pelvic and periaortic lymphadenectomy, omentectomy.     HDR VAGINAL BRACHYTHERAPY-9/29/16, 10/6/16, 10/13/16     08/19/2017 PET/CT scan: Hypermetabolic bilateral lung nodules suspicious for metastasis.   Hypermetabolic nodule in the right lower quadrant anterior to the psoas muscle suspicious for metastasis.     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     She completed 2 cycles of chemotherapy with Taxol Carboplatin and Avastin on 10/17 and 11/17.  She had poor tolerance to chemotherapy, therefore she declined additional chemotherapy and opted for Natural remedies instead      on 06/12/2018: 15 (<39UmL)       Re-staging scans on 06/15/2018:  CT of the abdomen and pelvis: 2.2 x 1.3 cm soft tissue nodule along the superior aspect of the urinary bladder suspicious for metastases, decreased in size since 1/23/18; no evidence of new abnormalities since prior visit; diverticulosis without evidence of diverticulitis. CT Chest:  Multiple bilateral lung nodules suspicious for metastases, increased in size and number since 1/23/2018.     Bronchoscopy/EBUS was performed on 08/17/2018  Respiratory Gram Stain/Cx: Negative for organisms. Aspergillus Galactomannan Antigen Negative  BAL RUL Negative for malignant cells. Bronchial smears shake RUL (predominance of blood); Negative for malignant cells. FNA Martínez TBNA RML (predominance of blood) Negative for malignant cells. FNA Martínez TBNA RUL (predominance of blood)  Negative for malignant cells. EBUS FNA R10 (scant lymph node sampling) Negative for malignant cells. EBUS FNA Station 7: Negative for malignant cells.     Case discussed with [facility]. Seldon Brunner at [facility]. She continued to decline systemic chemotherapy (Taxol/Carbo +/- Herceptin). She did not want hormonal therapy as she did not tolerate this well with her breast cancer. Patient declined palliative care/Hospice care and wanted to proceed with immunotherapy [place]). Side effects of Keytruda reviewed with patient. She agreed to proceed. Cycle # 1 Maikel Bump was on 08/29/2018. Cycle # 2 Keytruda was on 09/19/2018. Cycle # 3 Keytruda was on 10/10/2018.    CT chest 10/23/2018 noted Significant improvement in the previously noted multiple bilateral pulmonary nodules concerning for  improving pulmonary metastasis. CT abdomen/pelvis 10/23/2018 stable examination. Continue Keytruda and repeat scans in 3 months. Cycle # 4 Keytruda was on 10/31/2018. Cycle # 5 Keytruda was on 11/21/2018. Cycle # 6 Keytruda was on 12/19/2018. Cycle # 7 Keytruda was on 01/09/2019. Cycle # 8 Keytruda was on 01/30/2019. CT chest 02/13/2019 stable LLL nodule measuring 5 mm. Stable RUL nodule measuring 2 mm. No evidence of mediastinal, hilar or axillary LN. CT Abdomen/pelvis 02/13/2019 stable exam.  Continue Keytruda and repeat scans in 3 months. Cycle # 9 Keytruda was on 02/28/2019. Cycle # 10 Keytruda was on 03/20/2019. Cycle # 11 [place] was on 04/11/2019. Cycle # 12 [place] was on 05/02/2019. CT chest 05/17/2019 noted no metastatic disease. CT abdomen/pelvis 05/17/2019 stable exam.  Continue Keytruda and repeat scans in 3 months. Cycle # 13 Keytruda was on 05/23/2019. Cycle # 14 Keytruda was on 06/13/2019. Cycle # 15 Keytruda was on 07/11/2019. Cycle # 16 Imelda Chock was on 08/01/2019. CT chest 08/18/2019 stable to slightly more prominent LLL nodule 4.2 mm  CT abdomen/pelvis 08/18/2019 enhancing right psoas mass increased partially compressing the distal right ureter causing hydronephrosis in the right kidney. Urology team on board. Cycle # 17 Keytruda was on 08/22/2019. MRI right hip 09/03/2019:Trochanteric bursitis. Heterogenous joint effusion may be infected or hemorrhagic. Increased T2 signal superior acetabulum is most likely degenerative such as subchondral cyst formation. Osteoarthritis. No evidence for avascular necrosis or transient osteoporosis. Patient received injection to right hip of 4cc lidocaine/1 cc kenalog on 09/24/2019 with orthopedics.   Evaluated by Dr. Junaid Argueta at [facility] on 09/19/2019 who recommended to continue Imelda Chock and referred her to Dr. Shannan Moran for consideration of SBRT to treat the psoas lesion. Recommended revisiting ureteral catheter with urology after assessment with Rad Onc   Cycle # 18 [facility] was on 09/26/2019. Cycle # 19 [facility] was on 10/24/2019. Appointment with Dr. Michelle Ramírez on 10/28/2019 who recommended SBRT to the oligoprogressive lesion adjacent to/investing the right psoas muscle with the goal of allowing her to continue her systemic therapy with Keytruda. Cycle # 20 [facility] was on 11/14/2019.     SBRT was started on 11/21/2019 and completed 12/12/2019. The R Psoas metastasis PTV received a total dose of 3750 cGy in 6 fractions at 625 cGy/fraction prescribed to Max Dose at 78.0% IDL using 10 MV photons with VMAT Coplanar technique.      Due to weakness, significant fatigue poor appetite and severe arthralgias in bilateral knees. She declined Keytruda at that time and wanted to proceed with Hospice care. Also followed with rheumatology for [facility] induced arthritis improved with prednisone. She presented to the hospital with complaints of chest heaviness and dyspnea with exertion for the past several weeks with worsening fatigue. CTA chest which showed innumerable bilateral pulmonary parenchymal nodules some of which are or cavitary. Mediastinal and bilateral hiatal lymphadenopathy. No pulmonary embolism or dissection was noted. Comparison to prior exam made by radiology team;   Innumerable pulmonary parenchymal nodules  bilaterally, mediastinal and hilar lymphadenopathy are in fact new as compared to the prior exam.  Overall disease progression. CT abdomen/pelvis ordered as well. To see as outpatient for f/u and discuss options. Thank you for allowing us to participate in the care of   Eli Galvin MD   8/20/8932  Board Certified Medical Oncologist

## 2020-09-17 NOTE — CONSULTS
Inpatient Cardiology Consultation      Reason for Consult:  Chest pain     Consulting Physician: Dr. Mel Thornton    Requesting Physician:  Dr. Sheldon Johnson    Date of Consultation: 9/17/2020    HISTORY OF PRESENT ILLNESS:   Ms. Richard Ball is a 76-year-old elderly  female who is not known to [facility] cardiology physicians. Patient reported having a Holter Monitor ~ 8-10 years ago for \"palpitations\" and was normal.     PMH: Gout, GERD, diabetes mellitus, endometrial CA and breast CA, osteoarthritis, history of hiatal hernia repair, diverticulitis, macular degeneration, and hypothyroidism (on replacement therapy), DNR-CCA. [facility]-ED on 9/16/2020 with complaints of chest heaviness and ENRIQUEZ over the past several weeks with worsening fatigue. On 9/16/2020 patient had midsternal chest pressure, 8/10, non-radiating and was given nitroglycerin sublingual with improvement in the ED. Upon arrival to the ED: Blood pressure 151/82, heart rate 113, afebrile, 97% on room air. Labs: Sodium 136, potassium 3.9, BUN 15, creatinine 0.6, blood glucose 222, lactic acid 2.0, troponin negative x2, proBNP 130, alk phos 120, ALT 37, WBC 10.2, H/H stable, platelet count 932. CXR: Evidence of extensive bilateral pulmonary metastatic disease without evidence of acute cardiopulmonary pathology. CT chest with contrast: Findings most worrisome for metastatic disease, innumerable pulmonary parenchymal nodules bilaterally, size ranging from 5 mm up to 2.3 cm in the right upper lobe and 2.0 cm in the left posterior costophrenic angle, mediastinal and bilateral Beverly lymphadenopathy, no evidence of PE or aortic dissection. EKG: Sinus rhythm with frequent PACs, nonspecific T wave changes, rate 93 bpm.    ER medications: Nitroglycerin sublingual x1 and Plavix 75 mg x 1. Patient was admitted to a telemetry monitored unit for further evaluation management.      Please note: past medical records were reviewed per electronic medical record (EMR) - see detailed reports under Past Medical/ Surgical History. Past Medical History:    1. DM: diagnosed in 2018. 2. GERD  3. Gout  4. OA  5. Hiatal hernia without repair  6. History of Macular degeneration   7. Former smoker: quit in 2005; 10 pack year smoker   8. History of left-sided breast cancer status post lumpectomy 12/2017 followed by 21 fx adjuvant RT. \"IA papillary serous endometrial adenocarcinoma, FIGO grade 3, s/p TLH-BSO on 7/20/16, 4.5 cm tumor with negative LVSI, s/p HDR vaginal cylinder brachytherapy x3 fractions completed 10/13/16, with biopsy-proven recurrence in anterior right psoas muscle on FNA from 9/21/17, s/p 2 cycles carbo/taxol + Avastin discontinued due to poor tolerance, s/p progression with mass superior to urinary bladder and multiple bilateral lung nodules on CT staging scans from 6/15/18, s/p bronchoscopy/EBUS with negative biopsies of mediastinal LN, RML TBNA or RUL TBNA, now on maintenance Pembrolizumab x 19 cycles most recently 10/24/19, now with progression in the right psoas muscle mass\" obtained from [facility] records. · Patient followed up with Dr. Salvatore Zapien. Patient was started on Keytruda 8/2018. Cycle #20 Gianfranco Herb was on 11/14/2019. SBRT was started on 11/21/2019 and completed 12/12/2019. The R Psoas metastasis PTV received a total dose of 3750 cGy in 6 fractions at 625 cGy/fraction prescribed to Max Dose at 78.0% IDL using 10 MV photons with VMAT Coplanar technique. Patient recommended holding Keytruda 1/2020. · Hospice was consulted  · Patient opting for no chemo or radiation. · Right chest wall Mediport due to be removed in the near future. · DNR CCA. 5. \"Right ureter obstruction\" per patient -- no clinical documentation noted. Medications Prior to admit:  Prior to Admission medications    Medication Sig Start Date End Date Taking?  Authorizing Provider   SYNTHROID 112 MCG tablet Take 1 tablet by mouth Daily 9/10/20   Gasepr Floyd,    predniSONE (DELTASONE) 2.5 MG tablet Take 5 mg by mouth nightly  6/10/20   Historical Provider, MD   nystatin-triamcinolone [facility]) 014005-9.2 UNIT/GM-% cream APPLY SPARINGLY TO AFFECTED AREA(S) TWICE DAILY 6/27/20   Historical Provider, MD   predniSONE (DELTASONE) 10 MG tablet Take 10 mg by mouth daily In the morning 7/1/20   Elizabeth Primrose, DO   glimepiride (AMARYL) 2 MG tablet Take 1 tablet by mouth daily 7/1/20   Elizabeth Primrose, DO   diclofenac (VOLTAREN) 75 MG EC tablet Take 1 tablet by mouth 2 times daily  Patient taking differently: Take 75 mg by mouth 2 times daily as needed  5/19/20   Elizabeth Primrose, DO   Ez Smart Blood Glucose Lancets MISC 1 each by Subdermal route daily 5/5/20   Elizabeth Primrose, DO   blood glucose monitor strips Test one time a day & as needed for symptoms of irregular blood glucose.  E11.9 4/9/20   Elizabeth Primrose, DO   melatonin 3 MG TABS tablet Take 6 mg by mouth nightly as needed     Historical Provider, MD   loratadine (CLARITIN) 10 MG tablet Take 10 mg by mouth daily as needed     Historical Provider, MD   montelukast (SINGULAIR) 10 MG tablet Take by mouth nightly as needed  6/24/19   Historical Provider, MD   CANNABIDIOL PO Inhale 3 puffs into the lungs nightly as needed     Historical Provider, MD       Current Medications:    Current Facility-Administered Medications: predniSONE (DELTASONE) tablet 5 mg, 5 mg, Oral, Nightly  predniSONE (DELTASONE) tablet 10 mg, 10 mg, Oral, Daily  levothyroxine (SYNTHROID) tablet 112 mcg, 112 mcg, Oral, Daily  sodium chloride flush 0.9 % injection 10 mL, 10 mL, Intravenous, 2 times per day  sodium chloride flush 0.9 % injection 10 mL, 10 mL, Intravenous, PRN  promethazine (PHENERGAN) tablet 12.5 mg, 12.5 mg, Oral, Q6H PRN **OR** ondansetron (ZOFRAN) injection 4 mg, 4 mg, Intravenous, Q6H PRN  enoxaparin (LOVENOX) injection 40 mg, 40 mg, Subcutaneous, Daily  senna (SENOKOT) tablet 8.6 mg, 1 tablet, Oral, Daily PRN  insulin lispro (HUMALOG) injection vial 0-6 Units, 0-6 Units, Subcutaneous, TID WC  insulin lispro (HUMALOG) injection vial 0-3 Units, 0-3 Units, Subcutaneous, Nightly  glucose (GLUTOSE) 40 % oral gel 15 g, 15 g, Oral, PRN  dextrose 50 % IV solution, 12.5 g, Intravenous, PRN  glucagon (rDNA) injection 1 mg, 1 mg, Intramuscular, PRN  dextrose 5 % solution, 100 mL/hr, Intravenous, PRN  sodium chloride flush 0.9 % injection 10 mL, 10 mL, Intravenous, BID    Allergies:  Famotidine; Percocet [oxycodone-acetaminophen]; Asa [aspirin]; Omeprazole; Protonix [pantoprazole sodium]; Erythromycin; Keflet [cephalexin]; Levaquin [levofloxacin in d5w]; Pcn [penicillins]; Polyethylene glycol; Tetracycline; and Tetracyclines & related    Social History: Former smoker: Quit 2005; 10 pack years. Denies alcohol and illicit drug use  Patient uses medical marijuana via vaping. Denies using a walker or cane for ambulation. Family History:   Noncontributory due to advanced age. REVIEW OF SYSTEMS:     · Constitutional: + fatigue. Denies fevers, chills or night sweats  · Eyes: Denies visual changes or drainage  · ENT: Denies headaches or hearing loss. No mouth sores or sore throat. No epistaxis   · Cardiovascular: See HPI. No lower extremity swelling. · Respiratory: + Chronic ENRIQUEZ, + dry cough, Denies orthopnea or PND. No hemoptysis   · Gastrointestinal: Denies hematemesis or anorexia. No hematochezia or melena    · Genitourinary: Denies urgency, dysuria or hematuria. · Musculoskeletal: Denies gait disturbance, weakness or joint complaints  · Integumentary: Denies rash, hives or pruritis   · Neurological: Denies dizziness, headaches or seizures. No numbness or tingling  · Psychiatric: Denies anxiety or depression. · Endocrine: Denies temperature intolerance. No recent weight change. .  · Hematologic/Lymphatic: Denies abnormal bruising or bleeding.  No swollen lymph nodes    PHYSICAL EXAM:   BP (!) 112/50 Comment: manual  Pulse 77   Temp 98.2 °F (36.8 °C) (Oral) Resp 14   Ht 5' 3.5\" (1.613 m)   Wt 150 lb (68 kg)   SpO2 97%   BMI 26.15 kg/m²   CONST:  Well developed, well nourished elderly  female who appears of stated age. Awake, alert and cooperative. No apparent distress. HEENT:   Head- Normocephalic, atraumatic   Eyes- Conjunctivae pink, anicteric  Throat- Oral mucosa pink and moist  Neck-  No stridor, trachea midline, no jugular venous distention. No carotid bruit. CHEST: Chest symmetrical and non-tender to palpation. No accessory muscle use or intercostal retractions. Mediport present on exam.   RESPIRATORY: Lung sounds - clear throughout fields   CARDIOVASCULAR:     Heart Inspection- shows no noted pulsations  Heart Palpation- no heaves or thrills; PMI is non-displaced   Heart Ausculation- Regular rate and rhythm, no murmur. No s3, s4 or rub   PV: No lower extremity edema. No varicosities. Pedal pulses palpable, no clubbing or cyanosis   ABDOMEN: Soft, non-tender to light palpation. Bowel sounds present. No palpable masses no organomegaly; no abdominal bruit  MS: Good muscle strength and tone. No atrophy or abnormal movements. : Deferred  SKIN: Warm and dry no statis dermatitis or ulcers   NEURO / PSYCH: Oriented to person, place and time. Speech clear and appropriate. Follows all commands. Pleasant affect     DATA:    ECG: See HPI.    Tele strips: SR.   Diagnostic:    Labs:   CBC:   Recent Labs     09/16/20 2027 09/17/20  0520   WBC 10.2 9.1   HGB 13.3 11.4*   HCT 41.5 35.5    211     BMP:   Recent Labs     09/16/20 2027 09/17/20  0520    138   K 3.9 3.8   CO2 29 27   BUN 15 13   CREATININE 0.6 0.6   LABGLOM >60 >60   CALCIUM 9.9 9.4     HgA1c:   Lab Results   Component Value Date    LABA1C 6.4 (H) 10/26/2019     proBNP:   Recent Labs     09/16/20 2027   PROBNP 130*     CARDIAC ENZYMES:  Recent Labs     09/16/20 2027 09/17/20  0520   CKTOTAL  --  19*   CKMB  --  <1.0   TROPONINI <0.01 <0.01     FASTING LIPID PANEL:  Lab Results

## 2020-09-17 NOTE — PROGRESS NOTES
Progress Note  Date:2020       XNVO:[id number]  Patient Robin Awad     YOB: 1945     Age:74 y.o. Subjective    Subjective:  Symptoms:  Resolved. Diet:  Adequate intake. Activity level: Normal.    Pain:  She reports no pain. Review of Systems  Objective         Vitals Last 24 Hours:  TEMPERATURE:  Temp  Av.2 °F (36.8 °C)  Min: 97.7 °F (36.5 °C)  Max: 99.3 °F (37.4 °C)  RESPIRATIONS RANGE: Resp  Av.4  Min: 16  Max: 18  PULSE OXIMETRY RANGE: SpO2  Av.8 %  Min: 95 %  Max: 98 %  PULSE RANGE: Pulse  Av.6  Min: 77  Max: 113  BLOOD PRESSURE RANGE: Systolic (13XRD), ASW:318 , Min:132 , WAN:087   ; Diastolic (59WYN), ZIQ:94, Min:61, Max:90    I/O (24Hr): No intake or output data in the 24 hours ending 20 0138  Objective  Labs/Imaging/Diagnostics    Labs:  CBC:  Recent Labs     20   WBC 10.2   RBC 5.07   HGB 13.3   HCT 41.5   MCV 81.9   RDW 15.7*        CHEMISTRIES:  Recent Labs     20      K 3.9   CL 96*   CO2 29   BUN 15   CREATININE 0.6   GLUCOSE 222*     PT/INR:No results for input(s): PROTIME, INR in the last 72 hours. APTT:No results for input(s): APTT in the last 72 hours. LIVER PROFILE:  Recent Labs     20   AST 26   ALT 37*   BILITOT 0.4   ALKPHOS 120*       Imaging Last 24 Hours:  Xr Chest (2 Vw)    Result Date: 2020  Patient MRN: [id number] : 1945 Age:  69 years Gender: Female Order Date: 2020 8:41 PM Exam: XR CHEST (2 VW) Number of Images: 2 view Indication:  Chest pain Comparison: CT chest 2019 and 2019 Findings: The lungs are symmetrically expanded, and show scattered metastatic nodules throughout the lungs. There is no evidence of consolidative pneumonia, or pleural effusion, and no evidence of cardiac decompensation. . There is aortic intimal calcification but no evidence of cardiac enlargement or decompensation.  There is a left-sided central venous infusion port catheter, which has a \"twist\" beneath the left clavicular neck. Skeletal structures show no evidence of acute pathology. Degenerative changes of the spine with kyphosis and ankylosis are noted. Overlying EKG leads are present. Surgical clips are distributed about the left chest wall soft tissues. Evidence of extensive bilateral pulmonary metastatic disease without evidence of acute cardiopulmonary pathology. Ct Chest Pulmonary Embolism W Contrast    Result Date: 9/16/2020  Clinical indications: Chest pain. Pulmonary embolus. COMPARISON: August 18, 2019. Exposure control: This examination and all examinations utilizing ionizing radiation at this facility done so according to the ALARA (as low as reasonably achievable) principal for radiation dose reduction. Technique: Axial, sagittal, and coronal computed tomography of the chest was performed following intravenous administration of 90 mL of Isovue-370. 3-D postprocessing. Three-dimensional reconstructions of the arterial tree. MIP imaging. FINDINGS: Evaluation of the pulmonary arterial tree reveals no intraluminal filling defect to suggest embolic phenomenon. There is no evidence of aortic dissection. Within the thoracic inlet, the thyroid gland is unremarkable. The major airways are patent. Compared to a prior CT of August 18, 2019, there are numerous new enlarged mediastinal lymph nodes and bilateral hilar lymphadenopathy is present. The heart is enlarged. There is no evidence of pericardial fluid. Sliding hiatal hernia is present in the stomach is partially intrathoracic. Evaluation of the lung parenchyma reveals innumerable pulmonary parenchymal nodules bilaterally, some of which are cavitary. These range in size from approximately 5 mm up to approximately 2.3 cm in the right upper lobe and 2.0 cm and the left posterior costophrenic angle. Within the upper abdomen, there is no evidence of adrenal gland nodule or hepatic lesion.  Splenic artery aneurysm is present. . There is diffuse bone demineralization. Degenerative spondylosis and increased kyphosis of thoracic spine. There is mild compression fracture of T12 which is remote. Skeletal structures are negative for evidence of aggressive process or acute fracture. Findings most worrisome for metastatic disease. Innumerable pulmonary parenchymal nodules bilaterally, some of which are cavitary. These range in size from approximately 5 mm up to approximately 2.3 cm in the right upper lobe and 2.0 cm and the left posterior costophrenic angle. Mediastinal and bilateral hilar lymphadenopathy. No evidence for pulmonary embolism or aortic dissection.      Assessment//Plan           Hospital Problems           Last Modified POA    Chest pain 9/16/2020 Yes        Assessment & Plan    Electronically signed by Carol Ortiz RN on 9/17/20 at 1:38 AM EDT

## 2020-09-17 NOTE — TELEPHONE ENCOUNTER
Sharifa from the infusion center called and stated that she was unable to access patient's port yesterday even after using cath-flow and wanted Dr Harsha Red aware because patient wanted the port removed. MA looked into patient's chart and saw that patient was admitted early this am for sob. MA called and informed Sharifa of this.   Electronically signed by Perfecto Regalado MA on 9/17/2020 at 12:09 PM

## 2020-09-18 ENCOUNTER — APPOINTMENT (OUTPATIENT)
Dept: NUCLEAR MEDICINE | Age: 75
End: 2020-09-18
Payer: MEDICARE

## 2020-09-18 ENCOUNTER — APPOINTMENT (OUTPATIENT)
Dept: CT IMAGING | Age: 75
End: 2020-09-18
Payer: MEDICARE

## 2020-09-18 VITALS
RESPIRATION RATE: 18 BRPM | DIASTOLIC BLOOD PRESSURE: 60 MMHG | HEART RATE: 99 BPM | SYSTOLIC BLOOD PRESSURE: 129 MMHG | OXYGEN SATURATION: 97 % | HEIGHT: 64 IN | TEMPERATURE: 98.7 F | BODY MASS INDEX: 26.96 KG/M2 | WEIGHT: 157.9 LBS

## 2020-09-18 PROBLEM — I20.9 ANGINA PECTORIS (HCC): Status: ACTIVE | Noted: 2020-09-16

## 2020-09-18 LAB
LV EF: 82 %
LVEF MODALITY: NORMAL
METER GLUCOSE: 172 MG/DL (ref 74–99)
METER GLUCOSE: 188 MG/DL (ref 74–99)

## 2020-09-18 PROCEDURE — 6360000004 HC RX CONTRAST MEDICATION: Performed by: RADIOLOGY

## 2020-09-18 PROCEDURE — A9500 TC99M SESTAMIBI: HCPCS | Performed by: RADIOLOGY

## 2020-09-18 PROCEDURE — 6360000002 HC RX W HCPCS: Performed by: INTERNAL MEDICINE

## 2020-09-18 PROCEDURE — 82962 GLUCOSE BLOOD TEST: CPT

## 2020-09-18 PROCEDURE — G0378 HOSPITAL OBSERVATION PER HR: HCPCS

## 2020-09-18 PROCEDURE — 99217 PR OBSERVATION CARE DISCHARGE MANAGEMENT: CPT | Performed by: STUDENT IN AN ORGANIZED HEALTH CARE EDUCATION/TRAINING PROGRAM

## 2020-09-18 PROCEDURE — 93016 CV STRESS TEST SUPVJ ONLY: CPT | Performed by: INTERNAL MEDICINE

## 2020-09-18 PROCEDURE — 2580000003 HC RX 258: Performed by: INTERNAL MEDICINE

## 2020-09-18 PROCEDURE — 78452 HT MUSCLE IMAGE SPECT MULT: CPT

## 2020-09-18 PROCEDURE — 74177 CT ABD & PELVIS W/CONTRAST: CPT

## 2020-09-18 PROCEDURE — 3430000000 HC RX DIAGNOSTIC RADIOPHARMACEUTICAL: Performed by: RADIOLOGY

## 2020-09-18 PROCEDURE — 93018 CV STRESS TEST I&R ONLY: CPT | Performed by: INTERNAL MEDICINE

## 2020-09-18 PROCEDURE — 6370000000 HC RX 637 (ALT 250 FOR IP): Performed by: INTERNAL MEDICINE

## 2020-09-18 PROCEDURE — 93017 CV STRESS TEST TRACING ONLY: CPT

## 2020-09-18 PROCEDURE — 99225 PR SBSQ OBSERVATION CARE/DAY 25 MINUTES: CPT | Performed by: INTERNAL MEDICINE

## 2020-09-18 RX ADMIN — INSULIN LISPRO 1 UNITS: 100 INJECTION, SOLUTION INTRAVENOUS; SUBCUTANEOUS at 14:20

## 2020-09-18 RX ADMIN — IOHEXOL 50 ML: 240 INJECTION, SOLUTION INTRATHECAL; INTRAVASCULAR; INTRAVENOUS; ORAL at 09:06

## 2020-09-18 RX ADMIN — REGADENOSON 0.4 MG: 0.08 INJECTION, SOLUTION INTRAVENOUS at 11:10

## 2020-09-18 RX ADMIN — Medication 10 MILLICURIE: at 09:25

## 2020-09-18 RX ADMIN — PREDNISONE 10 MG: 5 TABLET ORAL at 08:35

## 2020-09-18 RX ADMIN — SODIUM CHLORIDE, PRESERVATIVE FREE 10 ML: 5 INJECTION INTRAVENOUS at 08:36

## 2020-09-18 RX ADMIN — Medication 30 MILLICURIE: at 11:15

## 2020-09-18 RX ADMIN — LEVOTHYROXINE SODIUM 112 MCG: 112 TABLET ORAL at 05:37

## 2020-09-18 RX ADMIN — IOPAMIDOL 110 ML: 755 INJECTION, SOLUTION INTRAVENOUS at 09:06

## 2020-09-18 ASSESSMENT — PAIN DESCRIPTION - PAIN TYPE: TYPE: ACUTE PAIN

## 2020-09-18 ASSESSMENT — PAIN - FUNCTIONAL ASSESSMENT: PAIN_FUNCTIONAL_ASSESSMENT: ACTIVITIES ARE NOT PREVENTED

## 2020-09-18 ASSESSMENT — PAIN DESCRIPTION - PROGRESSION: CLINICAL_PROGRESSION: NOT CHANGED

## 2020-09-18 ASSESSMENT — PAIN DESCRIPTION - FREQUENCY: FREQUENCY: INTERMITTENT

## 2020-09-18 ASSESSMENT — PAIN DESCRIPTION - LOCATION: LOCATION: HEAD

## 2020-09-18 ASSESSMENT — PAIN DESCRIPTION - DESCRIPTORS: DESCRIPTORS: HEADACHE

## 2020-09-18 ASSESSMENT — PAIN SCALES - GENERAL: PAINLEVEL_OUTOF10: 1

## 2020-09-18 NOTE — PROGRESS NOTES
Pharm stress test completed with physician, RNs, nuclear medicine staff, and patient wearing procedure masks.

## 2020-09-18 NOTE — DISCHARGE SUMMARY
[facility] Physician Discharge Summary       DO [address] [address]  [phone]            Activity level: As tolerated     Dispo:Home     Condition on discharge: Stable    Patient ID:  Noreen Ramirez  [id number]  89 y.o.  1945    Admit date: 9/16/2020    Discharge date and time:  9/18/2020  3:49 PM    Admission Diagnoses: Active Problems:    Angina pectoris (HCC)    Abnormal CT scan    Controlled type 2 diabetes mellitus without complication (HCC)    Elevated LFTs  Resolved Problems:    * No resolved hospital problems. *      Discharge Diagnoses: Active Problems:    Angina pectoris (HCC)    Abnormal CT scan    Controlled type 2 diabetes mellitus without complication (HCC)    Elevated LFTs  Resolved Problems:    * No resolved hospital problems. *      Consults:  IP CONSULT TO CARDIOLOGY  IP CONSULT TO ONCOLOGY    Procedures: stress test    Hospital Course:   Patient Noreen Ramirez is a 69 y.o. presented with Chest pain [R07.9]  Chest pain [R07.9]   Patient was admitted & managed for Chest pain - relieved with ntg, card enz unremarkable, EKG not suggestive of ischemic changes. Diabetic, ex smoker, was on chemo, no past card workup, Monitor on tele and cardiology on board, ECHO 9/17- ef- 60-65%,No regional wall motion abnormalities. Stress  9/18 . 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 These range in size from approximately 5 mm up to approximately 2.3 cm in the right upper lobe and 2.0 cm and the left posterior costophrenic angle. Mediastinal and bilateral hilar lymphadenopathy       given cancer hx, hem/onc consulted. Was on Keytruda, completed      therapy Nov 2019. F/u with Dr Brandan Koo. Ct abd & pelvis-   1. Multiple new cannonball lung metastases.    2. New malignant-appearing retroperitoneal evidence of cardiac decompensation. . There is aortic intimal calcification but no evidence of cardiac enlargement or decompensation. There is a left-sided central venous infusion port catheter, which has a \"twist\" beneath the left clavicular neck. Skeletal structures show no evidence of acute pathology. Degenerative changes of the spine with kyphosis and ankylosis are noted. Overlying EKG leads are present. Surgical clips are distributed about the left chest wall soft tissues. Evidence of extensive bilateral pulmonary metastatic disease without evidence of acute cardiopulmonary pathology. Ct Chest Pulmonary Embolism W Contrast    Addendum Date: 9/17/2020    Addendum: This examination was in fact compared to the prior CT of August 18, 2019. Innumerable pulmonary parenchymal nodules bilaterally, mediastinal and hilar lymphadenopathy are in fact new as compared to the prior exam.    Result Date: 9/17/2020  Clinical indications: Chest pain. Pulmonary embolus. COMPARISON: August 18, 2019. Exposure control: This examination and all examinations utilizing ionizing radiation at this facility done so according to the ALARA (as low as reasonably achievable) principal for radiation dose reduction. Technique: Axial, sagittal, and coronal computed tomography of the chest was performed following intravenous administration of 90 mL of Isovue-370. 3-D postprocessing. Three-dimensional reconstructions of the arterial tree. MIP imaging. FINDINGS: Evaluation of the pulmonary arterial tree reveals no intraluminal filling defect to suggest embolic phenomenon. There is no evidence of aortic dissection. Within the thoracic inlet, the thyroid gland is unremarkable. The major airways are patent. Compared to a prior CT of August 18, 2019, there are numerous new enlarged mediastinal lymph nodes and bilateral hilar lymphadenopathy is present. The heart is enlarged. There is no evidence of pericardial fluid.  Sliding hiatal hernia is

## 2020-09-18 NOTE — PROGRESS NOTES
INPATIENT CARDIOLOGY FOLLOW-UP    Name: Fany Buckner    Age: 69 y.o. Date of Admission: 9/16/2020  8:23 PM    Date of Service: 9/18/2020    Chief Complaint: Follow-up for Chest pain    Interim History:  No new overnight cardiac complaints no further episodes of chest pain. She took Imdur yesterday and later in the night she had a hypertension blood pressure drop in the 80s. Her blood pressure back to normal now. Currently with no complaints of CP, SOB, palpitations, dizziness, or lightheadedness. SR on telemetry. Review of Systems:   Cardiac: As per HPI  General: No fever, chills  Pulmonary: As per HPI  HEENT: No visual disturbances, difficult swallowing  GI: No nausea, vomiting  Endocrine: No thyroid disease or DM  Musculoskeletal: PARIS x 4, no focal motor deficits  Skin: Intact, no rashes  Neuro/Psych: No headache or seizures    Problem List:  Patient Active Problem List   Diagnosis    Malignant [facility] of upper-outer quadrant of left 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 ([facility]    Ovarian cancer ([facility]    Adenocarcinoma of endometrium ([facility]    Intestinal nodule    GERD (gastroesophageal reflux disease)    Acquired hypothyroidism    Chronic pain due to neoplasm    Diabetes mellitus ([facility]    Osteoarthritis    Chest pain    Abnormal CT scan    Controlled type 2 diabetes mellitus without complication (HCC)    Elevated LFTs       Allergies: Allergies   Allergen Reactions    Famotidine Anxiety, Nausea Only and Palpitations     Other reaction(s): Dizziness    Percocet [Oxycodone-Acetaminophen] Other (See Comments)     Prolong use, GI issues    Asa [Aspirin] Hives    Omeprazole Hives     Other reaction(s): Hives, Urticaria    Protonix [Pantoprazole Sodium] Other (See Comments)     Other reaction(s):  Other: See Comments  DEPRESSION    Erythromycin Rash    Keflet [Cephalexin] Rash    Levaquin [Levofloxacin In D5w] Rash    Pcn [Penicillins] Rash    Polyethylene Glycol Rash     Bloating,gas    Tetracycline Rash MPV 8.3 8.2     Lab Results   Component Value Date    CKTOTAL 18 (L) 09/17/2020    CKMB <1.0 09/17/2020    TROPONINI <0.01 09/17/2020    TROPONINI <0.01 09/17/2020    TROPONINI <0.01 09/16/2020     Lab Results   Component Value Date    INR 1.1 08/01/2014    PROTIME 11.3 08/01/2014     Lab Results   Component Value Date    TSH 1.600 05/16/2020     Lab Results   Component Value Date    LABA1C 6.4 (H) 10/26/2019     No results found for: EAG  Lab Results   Component Value Date    CHOL 205 (H) 09/17/2020    CHOL 197 03/26/2018     Lab Results   Component Value Date    TRIG 174 (H) 09/17/2020    TRIG 117 03/26/2018     Lab Results   Component Value Date     09/17/2020    HDL 83 (A) 03/26/2018     Lab Results   Component Value Date    LDLCALC 47 09/17/2020    LDLCALC 93 03/26/2018     Lab Results   Component Value Date    LABVLDL 35 09/17/2020     Lab Results   Component Value Date    CHOLHDLRATIO 2.4 03/26/2018       Cardiac Tests:  Telemetry findings reviewed: SR at rate 60s, no new tachy/bradyarrhythmias overnight     EKG: Normal sinus rhythm, frequent premature atrial complexes, nonspecific T wave abnormality, abnormal EKG.    Vitals were reviewed: Blood pressure 112/50 with heart rate of 68.     Labs were reviewed: BMP normal, troponin less than 0.01×3, proBNP 130, liver function normal.  CBC showed hemoglobin of 11.4 and rest of the CBC was normal.    Lipid panel total cholesterol 205, , LDL 47 triglycerides 174. TTE- 9/17/2020:  Normal left ventricle size and systolic function. Ejection fraction is visually estimated at 60-65%. No regional wall motion abnormalities seen. Normal left ventricle wall thickness. Normal diastolic function. Normal right ventricular size and function. TAPSE 21 mm. No hemodynamically significant aortic stenosis is present. MERCY by direct   planimetry is 3.2 cm2. Physiologic and/or trace tricuspid regurgitation. RVSP is 33 mmHg.  Normal estimated PA

## 2020-09-18 NOTE — PROGRESS NOTES
[facility] Quality Flow/Interdisciplinary Rounds Progress Note        Quality Flow Rounds held on September 18, 2020    Disciplines Attending:  Bedside Nurse, ,  and Nursing Unit Leadership    Anselmo Barry was admitted on 9/16/2020  8:23 PM    Anticipated Discharge Date:  Expected Discharge Date: 09/18/20    Disposition:    Dorian Score:  Dorian Scale Score: 22    Readmission Risk              Risk of Unplanned Readmission:        0           Discussed patient goal for the day, patient clinical progression, and barriers to discharge. The following Goal(s) of the Day/Commitment(s) have been identified:  stress test, ct abdomen.        Archana Puentes  September 18, 2020

## 2020-09-18 NOTE — PROGRESS NOTES
Limited Brief Communication           [facility] Progress Note    Admitting Date and Time: 9/16/2020  8:23 PM  Admit Dx: Chest pain [R07.9]  Chest pain [R07.9]    Subjective:  Patient is being followed for Chest pain [R07.9]  Chest pain [R07.9]   Pt feels her chest pain is improved. Per RN: NPO for stress. ROS: denies fever, chills, cp, sob, n/v, HA unless stated above.  predniSONE  5 mg Oral Nightly    predniSONE  10 mg Oral Daily    levothyroxine  112 mcg Oral Daily    sodium chloride flush  10 mL Intravenous 2 times per day    enoxaparin  40 mg Subcutaneous Daily    insulin lispro  0-6 Units Subcutaneous TID WC    insulin lispro  0-3 Units Subcutaneous Nightly    sodium chloride flush  10 mL Intravenous BID     regadenoson, 0.4 mg, ONCE PRN  sodium chloride flush, 10 mL, PRN  promethazine, 12.5 mg, Q6H PRN    Or  ondansetron, 4 mg, Q6H PRN  senna, 1 tablet, Daily PRN  glucose, 15 g, PRN  dextrose, 12.5 g, PRN  glucagon (rDNA), 1 mg, PRN  dextrose, 100 mL/hr, PRN  perflutren lipid microspheres, 1.5 mL, ONCE PRN  acetaminophen, 650 mg, Q4H PRN         Objective:    /64   Pulse 72   Temp 97.6 °F (36.4 °C) (Oral)   Resp 18   Ht 5' 3.5\" (1.613 m)   Wt 157 lb 14.4 oz (71.6 kg)   SpO2 97%   BMI 27.53 kg/m²     General Appearance: alert and oriented to person, place and time and in no acute distress  Skin: warm and dry  Head: normocephalic and atraumatic  Eyes: pupils equal, round, and reactive to light, extraocular eye movements intact, conjunctivae normal  Neck: neck supple and non tender without mass   Pulmonary/Chest: clear to auscultation bilaterally- no wheezes, rales or rhonchi, normal air movement, no respiratory distress.  Mediport In situ  Cardiovascular: normal rate, normal S1 and S2 and no carotid bruits  Abdomen: soft, non-tender, non-distended, normal bowel sounds, no masses or organomegaly  Extremities: no cyanosis, no clubbing and no edema  Neurologic: no cranial nerve deficit and speech normal        Recent Labs     20  0520    138   K 3.9 3.8   CL 96* 100   CO2 29 27   BUN 15 13   CREATININE 0.6 0.6   GLUCOSE 222* 135*   CALCIUM 9.9 9.4       Recent Labs     20  0520   WBC 10.2 9.1   RBC 5.07 4.37   HGB 13.3 11.4*   HCT 41.5 35.5   MCV 81.9 81.2   MCH 26.2 26.1   MCHC 32.0 32.1   RDW 15.7* 15.5*    211   MPV 8.3 8.2       Micro:  No components found for: [facility])    Radiology:   Xr Chest (2 Vw)    Result Date: 2020  Patient MRN: [id number] : 1945 Age:  69 years Gender: Female Order Date: 2020 8:41 PM Exam: XR CHEST (2 VW) Number of Images: 2 view Indication:  Chest pain Comparison: CT chest 2019 and 2019 Findings: The lungs are symmetrically expanded, and show scattered metastatic nodules throughout the lungs. There is no evidence of consolidative pneumonia, or pleural effusion, and no evidence of cardiac decompensation. . There is aortic intimal calcification but no evidence of cardiac enlargement or decompensation. There is a left-sided central venous infusion port catheter, which has a \"twist\" beneath the left clavicular neck. Skeletal structures show no evidence of acute pathology. Degenerative changes of the spine with kyphosis and ankylosis are noted. Overlying EKG leads are present. Surgical clips are distributed about the left chest wall soft tissues. Evidence of extensive bilateral pulmonary metastatic disease without evidence of acute cardiopulmonary pathology.     Ct Abdomen Pelvis W Iv Contrast Additional Contrast? Oral    Result Date: 2020  Patient MRN:  [id number] : 1945 Age: 69 years Gender: Female Order Date:  2020 9:05 AM EXAM: CT ABDOMEN PELVIS W IV CONTRAST NUMBER OF IMAGES \ views:  65 INDICATION: Restaging endometrial ca COMPARISON: 2019 TECHNIQUE: Axial computerized tomography sections of the abdomen and pelvis with sagittal and coronal MPR reconstructions were obtained from the top of the diaphragm to the pelvis. Contrast dose: 110 ml. Isovue 370 intravenously injected. Scanning performed post IV contrast administration. Low-dose CT  acquisition technique included one of following options; 1 . Automated exposure control, 2. Adjustment of MA and or KV according to patient's size or 3. Use of iterative reconstruction. FINDINGS: THORACIC BASE: There are multiple cannonball metastases now present. Sizable hiatus hernia again noted. LIVER: Fatty infiltrated. BILIARY: The gallbladder and bile ducts are unremarkable. PANCREAS: Unremarkable. SPLEEN: Unremarkable. ADRENALS:  Unremarkable. KIDNEYS:  Right-sided hydronephrosis has slightly diminished. GI: No evidence of free air or bowel obstruction. The appendix is not visualized. Sigmoid diverticulosis noted. PELVIS: Unremarkable. MSK: No acute osseous findings. OTHER: A metastatic lesion in the right psoas muscle has regressed. There is new metastatic adenopathy in the retroperitoneum adjacent to the IVC and adjacent to the aortic bifurcation as well as adjacent to the right common iliac artery and vein. 1. Multiple new cannonball lung metastases. 2. New malignant-appearing retroperitoneal adenopathy as outlined above. 3. Right psoas muscle mass lesion appears to have moderately regressed. 4. Fatty liver. Hiatus hernia. Sigmoid diverticulosis. Ct Chest Pulmonary Embolism W Contrast    Addendum Date: 9/17/2020    Addendum: This examination was in fact compared to the prior CT of August 18, 2019. Innumerable pulmonary parenchymal nodules bilaterally, mediastinal and hilar lymphadenopathy are in fact new as compared to the prior exam.    Result Date: 9/17/2020  Clinical indications: Chest pain. Pulmonary embolus. COMPARISON: August 18, 2019. Exposure control:  This examination and all examinations utilizing ionizing radiation at this facility done so according to the ALARA (as low as reasonably scan    Controlled type 2 diabetes mellitus without complication (HCC)    Elevated LFTs  Resolved Problems:    * No resolved hospital problems. *      Plan:    1. Chest pain - relieved with ntg, card enz unremarkable, EKG not suggestive of ischemic changes. Diabetic, ex smoker, was on chemo, no past card workup, Monitor on tele and cardiology on board, ECHO 9/17- ef- 60-65%,No regional wall motion abnormalities. NPO for stress today. DC imdur,     2. 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 These range in size from approximately 5 mm up to approximately 2.3 cm in the right upper lobe and 2.0 cm and the left posterior costophrenic angle. Mediastinal and bilateral hilar lymphadenopathy       given cancer hx, hem/onc consulted. Was on Keytruda, completed      therapy Nov 2019. F/u with Dr Siddhartha Alegre. Ct abd & pelvis-   1. Multiple new cannonball lung metastases. 2. New malignant-appearing retroperitoneal adenopathy as outlined   above. 3. Right psoas muscle mass lesion appears to have moderately   regressed. 4. Fatty liver. Hiatus hernia. Sigmoid diverticulosis. Overall ds progression. 3. Dm type 2- on po steroids, controlled for now, monitor bs, On low dose ISS, holding home dose of glimepiride. 4. Elevated lfts - improved    5. Hypothyroidism- on replacement. 6. Gout/ Keytruda induced arthritis. 7. GERD      DVT- lovenox  Diet- NPO  Code- DNR CCA      NOTE: This report was transcribed using voice recognition software. Every effort was made to ensure accuracy; however, inadvertent computerized transcription errors may be present.   Electronically signed by Roddy Riley MD on 9/18/2020 at 10:04 AM

## 2020-09-18 NOTE — PROGRESS NOTES
Secure message sent to [facility] re: stress results and discharge. Okay for discharge.

## 2020-09-18 NOTE — PROCEDURES
[facility] Nuclear Stress Test:    Cardiologist: Dr. Anita Jarrett EKG: NSR,normal ekg    Indications for study: Chest pain    1. No chest pain  2. No new arrhythmias  3. No EKG changes suggestive of stress induced ischemia  4. Nuclear images pending    Wander Ordonez MD., [facility].    [address] Cardiology

## 2020-09-18 NOTE — PROGRESS NOTES
Saranya Rojas NP text,  pt felt dizzy this AM, dizziness resolved, bp 126/64 now , last night BP 88/48, pt is concerned about medication Imdur

## 2020-09-18 NOTE — CARE COORDINATION
Met with patient about diagnosis and discharge plan of care. Pt lives with granddaughter. No needs for home. PCP Dr Gricelda Ross.  Plan is home with no needs-O

## 2020-09-21 ENCOUNTER — TELEPHONE (OUTPATIENT)
Dept: SURGERY | Age: 75
End: 2020-09-21

## 2020-09-21 NOTE — TELEPHONE ENCOUNTER
Terri Pitt is scheduled for mediport removal with Dr Jones Grajeda on 09-28-20 at [facility] at 10:45 am. Patient was told to arrive at 8:45 am. Patient needs to be NPO after midnight the night before procedure. All surgery instructions were explained to the patient and a surgery letter was also mailed out. MA informed patient that PAT will also be calling to review pre-op instructions and medications. Patient verbalized understanding.   Electronically signed by Liya Layne MA on 9/21/2020 at 3:22 PM

## 2020-09-21 NOTE — TELEPHONE ENCOUNTER
Prior Authorization Form:      DEMOGRAPHICS:                     Patient Name:  Liss Deng  Patient :  1945            Insurance:  Payor: Kristin De Leon / Plan: Carolyn Black PPO / Product Type: Medicare /   Insurance ID Number:    Payor/Plan Subscr  Sex Relation Sub. Ins. ID Effective Group Num   1.  Kristin Ordazilfarrah Gut 1945 Female  [id number]                                   [id number]         DIAGNOSIS & PROCEDURE:                       Procedure/Operation: Mediport removal           CPT Code: 39174    Diagnosis:  Cancer Lt Breast    ICD10 Code: C50.412    [facility]    Surgeon:  Dr Simi Genao INFORMATION:                          Date: 20    Time: 10:45 am              Anesthesia:  [facility]                                                       Status:  Outpatient        Special Comments:         Electronically signed by Malena Sow MA on 2020 at 3:24 PM Didi Parra

## 2020-09-22 ENCOUNTER — TELEPHONE (OUTPATIENT)
Dept: CARDIOLOGY CLINIC | Age: 75
End: 2020-09-22

## 2020-09-23 ENCOUNTER — VIRTUAL VISIT (OUTPATIENT)
Dept: FAMILY MEDICINE CLINIC | Age: 75
End: 2020-09-23
Payer: MEDICARE

## 2020-09-23 ENCOUNTER — TELEPHONE (OUTPATIENT)
Dept: INFUSION THERAPY | Age: 75
End: 2020-09-23

## 2020-09-23 PROBLEM — C79.9 METASTATIC ADENOCARCINOMA (HCC): Status: ACTIVE | Noted: 2017-11-07

## 2020-09-23 PROCEDURE — 99213 OFFICE O/P EST LOW 20 MIN: CPT | Performed by: FAMILY MEDICINE

## 2020-09-23 ASSESSMENT — ENCOUNTER SYMPTOMS
COUGH: 0
BACK PAIN: 1
VOMITING: 0
CHEST TIGHTNESS: 0
CONSTIPATION: 0
TROUBLE SWALLOWING: 0
NAUSEA: 0
WHEEZING: 0
EYE PAIN: 0
SINUS PAIN: 0
DIARRHEA: 0
ABDOMINAL PAIN: 0
SORE THROAT: 0
SHORTNESS OF BREATH: 0

## 2020-09-23 NOTE — PROGRESS NOTES
20    Name: Terri Pitt  :1945   Sex:female   Age:74 y.o. Chief Complaint   Patient presents with    Tachycardia     Patient presents from home for video visit. She was recently in the ER due to Tachycardia. Patient had CT scans done while in the hospital. She says she was advised that she does not have any pulmonary embolisms. Patient says they wanted to start her on Imdur, but she refused because of side effects she has had in the past. Patient is scheduled to have her port removed by Dr. Jones Grajeda on 2020. She says they did end up doing CT of her abdomen because she is having low back pain that wraps around to her pelvis. She feels more comfortable when up and walking around rather than sitting. TeleMedicine Video Visit    This visit was performed as a virtual video visit using a synchronous, two-way, audio-video telehealth technology platform. Patient identification was verified at the start of the visit, including the patient's telephone number and physical location. I discussed with the patient the nature of our telehealth visits, that:     Due to the nature of an audio- video modality, the only components of a physical exam that could be done are the elements supported by direct observation. I would evaluate the patient and recommend diagnostics and treatments based on my assessment. If it was felt that the patient should be evaluated in clinic or an emergency room setting, then they would be directed there. Our sessions are not being recorded and that personal health information is protected. Our team would provide follow up care in person if/when the patient needs it. Patient does agree to proceed with telemedicine consultation.     Patient's location: home address in [facility]  Physician  location other address in [facility] other people involved in call were Maye Willis and my     See below for progress note    Time spent: Greater than Not billed by time    This visit was completed virtually using Doxy. me    Patient presents with video visit for ED follow up  She went in with new onset tachycardia  Not really any chest pain'  But has been having more back pain, not when she gets up but after she has been up for a while  Taking tylenol right now and that helps  Still using her medical marijuana as well    She had ct chest and abdomen and pelvis as part of her stay  The ct chest show new metastatic disease in lungs bilaterally with some of them being cavitary  This was reviewed with her  Also new retroperitoneal adenopathy in abdomen and pelvis  This may be the source of her back aches    She says she thought he cancer was spreading  She has discussed some options with dr Batool Bowie in the recent past and as of right now she is not interested in any trials at [facility], she also still plans to get her port out next week  For now she is going to continue her current treatment course with tylenol and medical marijuana  She will let us know if anything changes with regards to her pain  She also said after 4 years she thinks she has battled enough and she is tired    She seemed to understand out discussion and realizes her disease is progressing          Review of Systems   Constitutional: Negative for appetite change, fatigue and fever. HENT: Negative for congestion, ear pain, sinus pain, sore throat and trouble swallowing. Eyes: Negative for pain. Respiratory: Negative for cough, chest tightness, shortness of breath and wheezing. Cardiovascular: Negative for chest pain, palpitations and leg swelling. Gastrointestinal: Negative for abdominal pain, constipation, diarrhea, nausea and vomiting. Endocrine: Negative for cold intolerance and heat intolerance. Genitourinary: Positive for pelvic pain. Negative for difficulty urinating, dysuria, frequency and hematuria. Musculoskeletal: Positive for back pain. Negative for arthralgias, gait problem, joint swelling and myalgias.    Skin: Negative for rash and wound. Neurological: Negative for dizziness, syncope, light-headedness and headaches. Hematological: Negative for adenopathy. Psychiatric/Behavioral: Negative for confusion, dysphoric mood, self-injury, sleep disturbance and suicidal ideas. The patient is not nervous/anxious. Current Outpatient Medications:     SYNTHROID 112 MCG tablet, Take 1 tablet by mouth Daily, Disp: 30 tablet, Rfl: 3    predniSONE (DELTASONE) 2.5 MG tablet, Take 5 mg by mouth nightly , Disp: , Rfl:     nystatin-triamcinolone (MYCOLOG II) 870707-9.1 UNIT/GM-% cream, APPLY SPARINGLY TO AFFECTED AREA(S) TWICE DAILY, Disp: , Rfl:     predniSONE (DELTASONE) 10 MG tablet, Take 10 mg by mouth daily In the morning, Disp: , Rfl:     glimepiride (AMARYL) 2 MG tablet, Take 1 tablet by mouth daily, Disp: , Rfl:     diclofenac (VOLTAREN) 75 MG EC tablet, Take 1 tablet by mouth 2 times daily (Patient taking differently: Take 75 mg by mouth 2 times daily as needed ), Disp: 60 tablet, Rfl: 3    Ez Smart Blood Glucose Lancets MISC, 1 each by Subdermal route daily, Disp: 100 each, Rfl: 5    blood glucose monitor strips, Test one time a day & as needed for symptoms of irregular blood glucose.  E11.9, Disp: 100 strip, Rfl: 11    melatonin 3 MG TABS tablet, Take 6 mg by mouth nightly as needed , Disp: , Rfl:     loratadine (CLARITIN) 10 MG tablet, Take 10 mg by mouth daily as needed , Disp: , Rfl:     montelukast (SINGULAIR) 10 MG tablet, Take by mouth nightly as needed , Disp: , Rfl:     CANNABIDIOL PO, Inhale 3 puffs into the lungs nightly as needed , Disp: , Rfl:   Allergies   Allergen Reactions    Famotidine Anxiety, Nausea Only and Palpitations     Other reaction(s): Dizziness    Percocet [Oxycodone-Acetaminophen] Other (See Comments)     Prolong use, GI issues    Asa [Aspirin] Hives    Omeprazole Hives     Other reaction(s): Hives, Urticaria    Protonix [Pantoprazole Sodium] Other (See Comments) Other reaction(s):  Other: See Comments  DEPRESSION    Erythromycin Rash    Keflet [Cephalexin] Rash    Levaquin [Levofloxacin In D5w] Rash    Pcn [Penicillins] Rash    Polyethylene Glycol Rash     Bloating,gas    Tetracycline Rash    Tetracyclines & Related Rash      Past Medical History:   Diagnosis Date    Cancer [facility])     endometrial cancer, breast-  2016    Diabetes mellitus ([address]     diet controlled    Diverticulitis     GERD (gastroesophageal reflux disease)     Gout     Hiatal hernia     Macular degeneration     Osteoarthritis     Osteopenia      Patient Active Problem List    Diagnosis Date Noted    Abnormal CT scan     Controlled type 2 diabetes mellitus without complication (HCC)     Elevated LFTs     Chest pain 2020    Osteoarthritis     Diabetes mellitus ([address] 2020    Chronic pain due to neoplasm 10/09/2019    Acquired hypothyroidism 2019    GERD (gastroesophageal reflux disease) 2019    Ovarian cancer ([address] 2017    Metastatic adenocarcinoma ([address] 2017    Intestinal nodule 2017    Malignant neoplasm of upper-outer quadrant of left 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 ([address] 10/31/2016      Past Surgical History:   Procedure Laterality Date    BREAST LUMPECTOMY Left 2016    left breast sentinelnode dissection, blue dye injection    BREAST SURGERY Left 10/2016    cancer     SECTION      x 3    CHG UNLISTED DX RADIOGRAPHIC PROCEDURE N/A 2018    BRONCHOSCOPY ELECTROMAGNETIC performed by Kayden Gómez MD at [facility] area    ENDOSCOPY, COLON, DIAGNOSTIC      EYE SURGERY Bilateral 2016    cataracts    HYSTERECTOMY  2016    total with BSO    HYSTERECTOMY, TOTAL ABDOMINAL  2016    [address] [address] BRUSHING/PROTECTED BRUSHINGS  2018    BRONCHOSCOPY BRUSHINGS performed by Kayden Gómez MD at [address] [place] [address]  2018    BRONCHOSCOPY ALVEOLAR LAVAGE performed by Verito Leach MD at [address] EBUS DX/TX INTERVENTION PERPH LES N/A 8/17/2018    BRONCHOSCOPY ULTRASOUND performed by Verito Leach MD at [address] [facility] W/TRANSBRONCHIAL LUNG BX 1 LOBE  8/17/2018    BRONCHOSCOPY/TRANSBRONCHIAL NEEDLE BIOPSY performed by Verito Leach MD at [address] [facility] W/TRANSBRONCHIAL LUNG BX EACH LOBE  8/17/2018    BRONCHOSCOPY/TRANSBRONCHIAL NEEDLE BIOPSY ADDL LOBE performed by Verito Leach MD at [address] TUNNELED CTR VAD W/SUBQ PORT AGE 5 YR/> N/A 12/4/2018    MEDIPORT INSERTION performed by Samantha Page MD at [place] [address] History     Tobacco History     Smoking Status  Former Smoker Smoking Frequency  0.25 packs/day for 20 years (5 pk yrs) Smoking Tobacco Type  Cigarettes    Smokeless Tobacco Use  Never Used          Alcohol History     Alcohol Use Status  Yes Comment  rarely          Drug Use     Drug Use Status  Yes Types  Marijuana Comment  medical          Sexual Activity     Sexually Active  Not Asked            There were no vitals taken for this visit. EXAM:   Physical Exam  Vitals signs and nursing note reviewed. Constitutional:       Appearance: Normal appearance. HENT:      Head: Normocephalic and atraumatic. Neurological:      General: No focal deficit present. Mental Status: She is alert and oriented to person, place, and time. Psychiatric:         Mood and Affect: Mood normal.         Thought Content: Thought content normal.      no other physical due to video visit    Diane was seen today for tachycardia.     Diagnoses and all orders for this visit:    Chronic pain due to neoplasm  Comments:  tylenol and medical marijuana for now    Metastatic adenocarcinoma [facility])  Comments:  she plans no further treatments  offered hospice, she will think about it  plans for port removal        I independently reviewed and updated the chief complaint, HPI, past medical and surgical history, medications, allergies and ROS as entered by the LPN. Seen by:   Clarence Mcbride DO

## 2020-09-23 NOTE — TELEPHONE ENCOUNTER
Call from Isidra at [place] [address] [phone]  Patient flagged revlimid survey and this needs to be lifted by RN through [address] Patient notified and understanding.

## 2020-09-23 NOTE — TELEPHONE ENCOUNTER
Patient left me a voice mail to call her regarding some billing questions. I attempted to call patient and had to leave a message for her. She was encouraged to call me back to go over her questions.  Electronically signed by Bunny Bai on 9/23/2020 at 9:44 AM

## 2020-09-24 ENCOUNTER — TELEPHONE (OUTPATIENT)
Dept: RADIATION ONCOLOGY | Age: 75
End: 2020-09-24

## 2020-09-24 ENCOUNTER — TELEPHONE (OUTPATIENT)
Dept: SURGERY | Age: 75
End: 2020-09-24

## 2020-09-24 RX ORDER — ASCORBIC ACID
1 CRYSTALS ORAL 2 TIMES DAILY
COMMUNITY

## 2020-09-24 RX ORDER — ERGOCALCIFEROL 1.25 MG/1
50000 CAPSULE ORAL WEEKLY
COMMUNITY

## 2020-09-24 NOTE — TELEPHONE ENCOUNTER
Pt left message on voice mail concerned that the hospital has not set up her covid test for her mediport removal with Dr Navid Ramon. Please call her asap at [phone].  Message routed to Coy Carter and Harini Hernandez

## 2020-09-24 NOTE — TELEPHONE ENCOUNTER
Financial assistance applications completed and mailed in on the following accounts:    [de-identified]   [id number]     Accounts were added to my spreadsheet for tracking of determination.  Electronically signed by Mickey Keita on 9/24/2020 at 12:35 PM

## 2020-09-25 ENCOUNTER — HOSPITAL ENCOUNTER (OUTPATIENT)
Age: 75
Discharge: HOME | End: 2020-09-27
Payer: MEDICARE

## 2020-09-25 PROCEDURE — U0003 INFECTIOUS AGENT DETECTION BY NUCLEIC ACID (DNA OR RNA); SEVERE ACUTE RESPIRATORY SYNDROME CORONAVIRUS 2 (SARS-COV-2) (CORONAVIRUS DISEASE [COVID-19]), AMPLIFIED PROBE TECHNIQUE, MAKING USE OF HIGH THROUGHPUT TECHNOLOGIES AS DESCRIBED BY CMS-2020-01-R: HCPCS

## 2020-09-25 NOTE — TELEPHONE ENCOUNTER
MA spoke with Malik at PAT at [facility] an asked why patient did not get a call to have her Covid test done. Malik stated that it was overlooked and wanted to know if Dr Matheus Ag did want patient to have test.  MA spoke with Dr Matheus Ag and he does if there is a rapid test available. MA informed Malik and she will call patient.     Electronically signed by García Steele MA on 9/25/2020 at 11:57 AM

## 2020-09-27 ENCOUNTER — ANESTHESIA EVENT (OUTPATIENT)
Dept: OPERATING ROOM | Age: 75
End: 2020-09-27
Payer: MEDICARE

## 2020-09-27 LAB
SARS-COV-2: NOT DETECTED
SOURCE: NORMAL

## 2020-09-28 ENCOUNTER — ANESTHESIA (OUTPATIENT)
Dept: OPERATING ROOM | Age: 75
End: 2020-09-28
Payer: MEDICARE

## 2020-09-28 ENCOUNTER — HOSPITAL ENCOUNTER (OUTPATIENT)
Age: 75
Setting detail: OUTPATIENT SURGERY
Discharge: HOME | End: 2020-09-28
Attending: SURGERY | Admitting: SURGERY
Payer: MEDICARE

## 2020-09-28 VITALS
HEIGHT: 64 IN | SYSTOLIC BLOOD PRESSURE: 112 MMHG | TEMPERATURE: 97.3 F | DIASTOLIC BLOOD PRESSURE: 72 MMHG | RESPIRATION RATE: 16 BRPM | OXYGEN SATURATION: 100 % | WEIGHT: 152 LBS | BODY MASS INDEX: 25.95 KG/M2 | HEART RATE: 73 BPM

## 2020-09-28 VITALS — OXYGEN SATURATION: 99 % | SYSTOLIC BLOOD PRESSURE: 124 MMHG | DIASTOLIC BLOOD PRESSURE: 64 MMHG

## 2020-09-28 LAB — METER GLUCOSE: 128 MG/DL (ref 74–99)

## 2020-09-28 PROCEDURE — 2580000003 HC RX 258: Performed by: SURGERY

## 2020-09-28 PROCEDURE — 3700000000 HC ANESTHESIA ATTENDED CARE: Performed by: SURGERY

## 2020-09-28 PROCEDURE — 36590 REMOVAL TUNNELED CV CATH: CPT | Performed by: SURGERY

## 2020-09-28 PROCEDURE — 2709999900 HC NON-CHARGEABLE SUPPLY: Performed by: SURGERY

## 2020-09-28 PROCEDURE — 6360000002 HC RX W HCPCS: Performed by: NURSE ANESTHETIST, CERTIFIED REGISTERED

## 2020-09-28 PROCEDURE — 2500000003 HC RX 250 WO HCPCS: Performed by: SURGERY

## 2020-09-28 PROCEDURE — 3600000002 HC SURGERY LEVEL 2 BASE: Performed by: SURGERY

## 2020-09-28 PROCEDURE — 3600000012 HC SURGERY LEVEL 2 ADDTL 15MIN: Performed by: SURGERY

## 2020-09-28 PROCEDURE — 7100000011 HC PHASE II RECOVERY - ADDTL 15 MIN: Performed by: SURGERY

## 2020-09-28 PROCEDURE — 3700000001 HC ADD 15 MINUTES (ANESTHESIA): Performed by: SURGERY

## 2020-09-28 PROCEDURE — 2580000003 HC RX 258: Performed by: NURSE ANESTHETIST, CERTIFIED REGISTERED

## 2020-09-28 PROCEDURE — 7100000010 HC PHASE II RECOVERY - FIRST 15 MIN: Performed by: SURGERY

## 2020-09-28 PROCEDURE — 82962 GLUCOSE BLOOD TEST: CPT

## 2020-09-28 RX ORDER — SODIUM CHLORIDE 9 MG/ML
INJECTION, SOLUTION INTRAVENOUS CONTINUOUS PRN
Status: DISCONTINUED | OUTPATIENT
Start: 2020-09-28 | End: 2020-09-28 | Stop reason: SDUPTHER

## 2020-09-28 RX ORDER — TRAMADOL HYDROCHLORIDE 50 MG/1
50 TABLET ORAL EVERY 4 HOURS PRN
Qty: 18 TABLET | Refills: 0 | Status: SHIPPED | OUTPATIENT
Start: 2020-09-28 | End: 2020-10-01

## 2020-09-28 RX ORDER — BUPIVACAINE HYDROCHLORIDE AND EPINEPHRINE 2.5; 5 MG/ML; UG/ML
INJECTION, SOLUTION EPIDURAL; INFILTRATION; INTRACAUDAL; PERINEURAL CONTINUOUS PRN
Status: COMPLETED | OUTPATIENT
Start: 2020-09-28 | End: 2020-09-28

## 2020-09-28 RX ORDER — PROPOFOL 10 MG/ML
INJECTION, EMULSION INTRAVENOUS PRN
Status: DISCONTINUED | OUTPATIENT
Start: 2020-09-28 | End: 2020-09-28 | Stop reason: SDUPTHER

## 2020-09-28 RX ORDER — SODIUM CHLORIDE 0.9 % (FLUSH) 0.9 %
10 SYRINGE (ML) INJECTION PRN
Status: DISCONTINUED | OUTPATIENT
Start: 2020-09-28 | End: 2020-09-28 | Stop reason: HOSPADM

## 2020-09-28 RX ORDER — SODIUM CHLORIDE 0.9 % (FLUSH) 0.9 %
10 SYRINGE (ML) INJECTION EVERY 12 HOURS SCHEDULED
Status: DISCONTINUED | OUTPATIENT
Start: 2020-09-28 | End: 2020-09-28 | Stop reason: HOSPADM

## 2020-09-28 RX ORDER — CLINDAMYCIN PHOSPHATE 600 MG/50ML
600 INJECTION INTRAVENOUS
Status: COMPLETED | OUTPATIENT
Start: 2020-09-28 | End: 2020-09-28

## 2020-09-28 RX ADMIN — Medication 10 ML: at 09:24

## 2020-09-28 RX ADMIN — CLINDAMYCIN PHOSPHATE 600 MG: 600 INJECTION, SOLUTION INTRAVENOUS at 10:29

## 2020-09-28 RX ADMIN — PROPOFOL 350 MG: 10 INJECTION, EMULSION INTRAVENOUS at 10:33

## 2020-09-28 RX ADMIN — SODIUM CHLORIDE: 9 INJECTION, SOLUTION INTRAVENOUS at 10:29

## 2020-09-28 ASSESSMENT — PAIN - FUNCTIONAL ASSESSMENT: PAIN_FUNCTIONAL_ASSESSMENT: 0-10

## 2020-09-28 ASSESSMENT — PAIN SCALES - GENERAL: PAINLEVEL_OUTOF10: 0

## 2020-09-28 ASSESSMENT — PAIN DESCRIPTION - DESCRIPTORS: DESCRIPTORS: DISCOMFORT;ACHING

## 2020-09-28 ASSESSMENT — LIFESTYLE VARIABLES: SMOKING_STATUS: 0

## 2020-09-28 NOTE — ANESTHESIA PRE PROCEDURE
Department of Anesthesiology  Preprocedure Note       Name:  Shahida Snow   Age:  69 y.o.  :  1945                                          MRN:  [id number]         Date:  2020      Surgeon: Georges Martines):  Anh Rivera MD    Procedure: MEDIPORT REMOVAL (N/A Chest)    Medications prior to admission:   Prior to Admission medications    Medication Sig Start Date End Date Taking? Authorizing Provider   Multiple Vitamin (MULTI-DAY VITAMINS PO) Take by mouth daily   Yes Historical Provider, MD   NONFORMULARY Pancreatic enzymes daily   Yes Historical Provider, MD   Cyanocobalamin (VITAMIN B 12) 250 MCG LOZG Take by mouth daily   Yes Historical Provider, MD   Garlic 6943 MG TBEC Take by mouth daily   Yes Historical Provider, MD   vitamin D (ERGOCALCIFEROL) 1.25 MG (16899 UT) CAPS capsule Take 50,000 Units by mouth once a week Saturday   Yes Historical Provider, MD   NIACIN ER PO Take 75 mg by mouth every 3 days   Yes Historical Provider, MD   SYNTHROID 112 MCG tablet Take 1 tablet by mouth Daily 9/10/20  Yes Nika Record, DO   predniSONE (DELTASONE) 2.5 MG tablet Take 5 mg by mouth nightly  6/10/20  Yes Historical Provider, MD   predniSONE (DELTASONE) 10 MG tablet Take 10 mg by mouth daily In the morning 20  Yes Nika Record, DO   glimepiride (AMARYL) 2 MG tablet Take 1 tablet by mouth daily 20  Yes Nika Record, DO   Ez Smart Blood Glucose Lancets [address] [facility] 1 each by Subdermal route daily 20  Yes Nika Record, DO   blood glucose monitor strips Test one time a day & as needed for symptoms of irregular blood glucose.  E11.9 20  Yes Savannah Barahona, DO   melatonin 3 MG TABS tablet Take 6 mg by mouth nightly as needed    Yes Historical Provider, MD   loratadine (CLARITIN) 10 MG tablet Take 10 mg by mouth daily as needed    Yes Historical Provider, MD   CANNABIDIOL PO Inhale 3 puffs into the lungs nightly as needed    Yes Historical Provider, MD   nystatin-triamcinolone [facility]) 133063-8.3 UNIT/GM-% cream APPLY SPARINGLY TO AFFECTED AREA(S) TWICE DAILY 6/27/20   Historical Provider, MD   diclofenac (VOLTAREN) 75 MG EC tablet Take 1 tablet by mouth 2 times daily 5/19/20   Rogelio Etienne DO   montelukast (SINGULAIR) 10 MG tablet Take by mouth nightly as needed  6/24/19   Historical Provider, MD       Current medications:    Current Facility-Administered Medications   Medication Dose Route Frequency Provider Last Rate Last Dose    sodium chloride flush 0.9 % injection 10 mL  10 mL Intravenous 2 times per day Georges Roberto MD        sodium chloride flush 0.9 % injection 10 mL  10 mL Intravenous PRN Georges Roberto MD        clindamycin (CLEOCIN) 600 mg in dextrose 5 % 50 mL IVPB  600 mg Intravenous Q8H Georges Roberto MD           Allergies: Allergies   Allergen Reactions    Omeprazole Hives and Itching    Percocet [Oxycodone-Acetaminophen] Other (See Comments)     Prolong use, GI issues    Asa [Aspirin] Hives    Erythromycin Rash    Famotidine Nausea Only, Anxiety and Palpitations    Keflet [Cephalexin] Rash    Levaquin [Levofloxacin In D5w] Rash    Pcn [Penicillins] Rash    Polyethylene Glycol Other (See Comments)     Slowed peristalsis    Protonix [Pantoprazole Sodium] Other (See Comments)     Other reaction(s):  Other: See Comments  DEPRESSION    Tetracycline Rash    Tetracyclines & Related Rash       Problem List:    Patient Active Problem List   Diagnosis Code    Malignant neoplasm of upper-outer quadrant of left 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 ([facility] [address] [address] C50.412    Ovarian cancer ([facility] C56.9    Metastatic adenocarcinoma (HCC) C79.9    Intestinal nodule K63.9    GERD (gastroesophageal reflux disease) K21.9    Acquired hypothyroidism E03.9    Chronic pain due to neoplasm G89.3    Diabetes mellitus ([address] E11.9    Osteoarthritis M19.90    Chest pain R07.9    Abnormal CT scan R93.89    Controlled type 2 diabetes mellitus without complication (HCC) M84.8    Elevated LFTs R94.5 Past Medical History:        Diagnosis Date    Cancer [facility])     endometrial cancer, breast-     Diabetes mellitus ([address]     Diverticulitis     GERD (gastroesophageal reflux disease)     Gout     Hiatal hernia     Macular degeneration     Metastatic disease (HCC)     Osteoarthritis     Osteopenia        Past Surgical History:        Procedure Laterality Date    BREAST LUMPECTOMY Left 2016    left breast sentinelnode dissection, blue dye injection    BREAST SURGERY Left 10/2016    cancer     SECTION      x 3    CHG UNLISTED DX RADIOGRAPHIC PROCEDURE N/A 2018    BRONCHOSCOPY ELECTROMAGNETIC performed by Edwin Hodges MD at [facility] area    ENDOSCOPY, COLON, DIAGNOSTIC      EYE SURGERY Bilateral 2016    cataracts    HYSTERECTOMY  2016    total with BSO    HYSTERECTOMY, TOTAL ABDOMINAL  2016    [address] [address] BRUSHING/PROTECTED BRUSHINGS  2018    BRONCHOSCOPY BRUSHINGS performed by Edwin Hodges MD at [address] [place] [address] [address] [facility]  2018    BRONCHOSCOPY ALVEOLAR LAVAGE performed by Edwin Hodges MD at [address] [place] [address] [address] EBUS DX/TX INTERVENTION [address] LES N/A 2018    BRONCHOSCOPY ULTRASOUND performed by Edwin Hodges MD at [address] [place] [address] W/TRANSBRONCHIAL LUNG BX 1 LOBE  2018    BRONCHOSCOPY/TRANSBRONCHIAL NEEDLE BIOPSY performed by Edwin Hodges MD at [address] [place] [address] W/TRANSBRONCHIAL LUNG BX EACH LOBE  2018    BRONCHOSCOPY/TRANSBRONCHIAL NEEDLE BIOPSY ADDL LOBE performed by Edwin Hodges MD at [address] TUNNELED CTR VAD W/SUBQ PORT AGE 5 YR/> N/A 2018    MEDIPORT INSERTION performed by Francisco Osorio MD at [facility]         Social History:    Social History     Tobacco Use    Smoking status: Former Smoker     Packs/day: 0.25     Years: 20.00     Pack years: 5.00     Types: Cigarettes    Smokeless tobacco: Never Used   Substance Use Topics    Alcohol use: Yes     Frequency: Monthly or less     Comment: rarely                                Counseling given: Not Answered      Vital Signs (Current):   Vitals:    09/24/20 1637 09/28/20 0836   BP:  (!) 163/80   Pulse:  72   Resp:  20   Temp:  96.9 °F (36.1 °C)   TempSrc:  Temporal   SpO2:  97%   Weight: 152 lb (68.9 kg) 152 lb (68.9 kg)   Height: 5' 3.5\" (1.613 m) 5' 3.5\" (1.613 m)                                              BP Readings from Last 3 Encounters:   09/28/20 (!) 163/80   09/18/20 129/60   09/16/20 132/61       NPO Status: Time of last liquid consumption: 2300                        Time of last solid consumption: 2000(corrected)                        Date of last liquid consumption: 09/27/20                        Date of last solid food consumption: 09/27/20    BMI:   Wt Readings from Last 3 Encounters:   09/28/20 152 lb (68.9 kg)   09/18/20 157 lb 14.4 oz (71.6 kg)   09/16/20 150 lb (68 kg)     Body mass index is 26.5 kg/m². CBC:   Lab Results   Component Value Date    WBC 9.1 09/17/2020    RBC 4.37 09/17/2020    HGB 11.4 09/17/2020    HCT 35.5 09/17/2020    MCV 81.2 09/17/2020    RDW 15.5 09/17/2020     09/17/2020       CMP:   Lab Results   Component Value Date     09/17/2020    K 3.8 09/17/2020     09/17/2020    CO2 27 09/17/2020    BUN 13 09/17/2020    CREATININE 0.6 09/17/2020    GFRAA >60 09/17/2020    LABGLOM >60 09/17/2020    LABGLOM >60 05/16/2020    GLUCOSE 135 09/17/2020    GLUCOSE 316 05/16/2020    PROT 6.2 09/17/2020    CALCIUM 9.4 09/17/2020    BILITOT 0.3 09/17/2020    ALKPHOS 93 09/17/2020    AST 17 09/17/2020    ALT 28 09/17/2020       POC Tests: No results for input(s): POCGLU, POCNA, POCK, POCCL, POCBUN, POCHEMO, POCHCT in the last 72 hours.     Coags:   Lab Results   Component Value Date    PROTIME 11.3 08/01/2014    INR 1.1 08/01/2014    APTT 35.7 08/01/2014       HCG (If Applicable): No results found for: PREGTESTUR, PREGSERUM, HCG, HCGQUANT     ABGs: No results found for: PHART, PO2ART, TBH4MLV, EFT8IIN, BEART, Q7FFQLPV     Type & Screen (If Applicable):  No results found for: LABABO, [address]    Anesthesia Evaluation  Patient summary reviewed and Nursing notes reviewed no history of anesthetic complications:   Airway: Mallampati: II  TM distance: >3 FB   Neck ROM: full  Mouth opening: > = 3 FB Dental:          Pulmonary:Negative Pulmonary ROS breath sounds clear to auscultation      (-) not a current smoker                           Cardiovascular:Negative CV ROS  Exercise tolerance: good (>4 METS),           Rhythm: regular  Rate: normal           Beta Blocker:  Not on Beta Blocker         Neuro/Psych:                ROS comment: Chronic back pain GI/Hepatic/Renal:   (+) hiatal hernia, GERD:,           Endo/Other:    (+) DiabetesType II DM, , hypothyroidism: arthritis:., malignancy/cancer. Abdominal:           Vascular: negative vascular ROS. Anesthesia Plan      MAC     ASA 3       Induction: intravenous. Anesthetic plan and risks discussed with patient. Plan discussed with CRNA.             Avi Mortensen DO   9/28/2020

## 2020-09-28 NOTE — H&P
Surgery History and Physical/Consult Note    CC:    Undesired port    HPI:  This is a 69 y.o. female with PMH below admitted 2020 for removal of undesired port. She would no longer wishes to go through chemo.       PMH:  Past Medical History:   Diagnosis Date    Cancer [facility])     endometrial cancer, breast-     Diabetes mellitus ([address]     Diverticulitis     GERD (gastroesophageal reflux disease)     Gout     Hiatal hernia     Macular degeneration     Metastatic disease (HCC)     Osteoarthritis     Osteopenia        PSH:  Past Surgical History:   Procedure Laterality Date    BREAST LUMPECTOMY Left 2016    left breast sentinelnode dissection, blue dye injection    BREAST SURGERY Left 10/2016    cancer     SECTION      x 3    CHG UNLISTED DX RADIOGRAPHIC PROCEDURE N/A 2018    BRONCHOSCOPY ELECTROMAGNETIC performed by Yanni Liang MD at [facility] area    ENDOSCOPY, COLON, DIAGNOSTIC      EYE SURGERY Bilateral     cataracts    HYSTERECTOMY  2016    total with BSO    HYSTERECTOMY, TOTAL ABDOMINAL  2016    [place] [address] BRUSHING/PROTECTED BRUSHINGS  2018    BRONCHOSCOPY BRUSHINGS performed by Yanni Liang MD at [address] [place] [address] [address] [facility]  2018    BRONCHOSCOPY ALVEOLAR LAVAGE performed by Yanni Liang MD at [address] [place] [address] [address] EBUS DX/TX INTERVENTION [address] LES N/A 2018    BRONCHOSCOPY ULTRASOUND performed by Yanni Liang MD at [address] [place] [address] W/TRANSBRONCHIAL LUNG BX 1 LOBE  2018    BRONCHOSCOPY/TRANSBRONCHIAL NEEDLE BIOPSY performed by Yanni Liang MD at [address] [place] [address] W/TRANSBRONCHIAL LUNG BX EACH LOBE  2018    BRONCHOSCOPY/TRANSBRONCHIAL NEEDLE BIOPSY ADDL LOBE performed by Yanni Liang MD at [address] TUNNELED CTR VAD W/SUBQ PORT AGE 5 YR/> N/A 2018    MEDIPORT INSERTION performed by Ashley Grajeda MD at [facility] TONSILLECTOMY         Family History:  Family History   Problem Relation Age of Onset    High Blood Pressure Mother     Heart Disease Mother     Diabetes Mother     Arthritis Father         rheumatoid    Heart Disease Father     Cancer Maternal Aunt         ovarian carcinoma       Social History:   reports that she has quit smoking. Her smoking use included cigarettes. She has a 5.00 pack-year smoking history. She has never used smokeless tobacco. She reports current alcohol use. She reports current drug use. Drug: Marijuana. Review of Systems:  A 14pt complete review of systems was performed, and all pertinent positives and negatives are listed in the HPI. All other systems are negative. Allergies: Allergies   Allergen Reactions    Omeprazole Hives and Itching    Percocet [Oxycodone-Acetaminophen] Other (See Comments)     Prolong use, GI issues    Asa [Aspirin] Hives    Erythromycin Rash    Famotidine Nausea Only, Anxiety and Palpitations    Keflet [Cephalexin] Rash    Levaquin [Levofloxacin In D5w] Rash    Pcn [Penicillins] Rash    Polyethylene Glycol Other (See Comments)     Slowed peristalsis    Protonix [Pantoprazole Sodium] Other (See Comments)     Other reaction(s): Other: See Comments  DEPRESSION    Tetracycline Rash    Tetracyclines & Related Rash       Medications:  Home medications:   Prior to Admission medications    Medication Sig Start Date End Date Taking?  Authorizing Provider   Multiple Vitamin (MULTI-DAY VITAMINS PO) Take by mouth daily   Yes Historical Provider, MD   NONFORMULARY Pancreatic enzymes daily   Yes Historical Provider, MD   Cyanocobalamin (VITAMIN B 12) 250 MCG LOZG Take by mouth daily   Yes Historical Provider, MD   Garlic 7847 MG TBEC Take by mouth daily   Yes Historical Provider, MD   vitamin D (ERGOCALCIFEROL) 1.25 MG (96745 UT) CAPS capsule Take 50,000 Units by mouth once a week Saturday   Yes Historical Provider, MD   NIACIN ER PO Take 75 mg by mouth every 3 days   Yes Historical Provider, MD   SYNTHROID 112 MCG tablet Take 1 tablet by mouth Daily 9/10/20  Yes Karsten Fees, DO   predniSONE (DELTASONE) 2.5 MG tablet Take 5 mg by mouth nightly  6/10/20  Yes Historical Provider, MD   predniSONE (DELTASONE) 10 MG tablet Take 10 mg by mouth daily In the morning 7/1/20  Yes Karsten Fees, DO   glimepiride (AMARYL) 2 MG tablet Take 1 tablet by mouth daily 7/1/20  Yes Karsten Fees, DO   Ez Smart Blood Glucose Lancets MISC 1 each by Subdermal route daily 5/5/20  Yes Karsten Fees, DO   blood glucose monitor strips Test one time a day & as needed for symptoms of irregular blood glucose.  E11.9 4/9/20  Yes Savannah Barahona, DO   melatonin 3 MG TABS tablet Take 6 mg by mouth nightly as needed    Yes Historical Provider, MD   loratadine (CLARITIN) 10 MG tablet Take 10 mg by mouth daily as needed    Yes Historical Provider, MD   CANNABIDIOL PO Inhale 3 puffs into the lungs nightly as needed    Yes Historical Provider, MD   nystatin-triamcinolone [facility]) 539991-5.1 UNIT/GM-% cream APPLY SPARINGLY TO AFFECTED AREA(S) TWICE DAILY 6/27/20   Historical Provider, MD   diclofenac (VOLTAREN) 75 MG EC tablet Take 1 tablet by mouth 2 times daily 5/19/20   Karsten Fees, DO   montelukast (SINGULAIR) 10 MG tablet Take by mouth nightly as needed  6/24/19   Historical Provider, MD       Scheduled medications:   sodium chloride flush  10 mL Intravenous 2 times per day    clindamycin (CLEOCIN) IV  600 mg Intravenous On Call to OR       PRN medications: sodium chloride flush    Objective:    Physical Examination:  Vitals:    09/24/20 1637 09/28/20 0836   BP:  (!) 163/80   Pulse:  72   Resp:  20   Temp:  96.9 °F (36.1 °C)   TempSrc:  Temporal   SpO2:  97%   Weight: 152 lb (68.9 kg) 152 lb (68.9 kg)   Height: 5' 3.5\" (1.613 m) 5' 3.5\" (1.613 m)       General - alert, well appearing, and in no distress  HEENT - Normocephalic, Atraumatic. Neck - supple, trachea midline  Respiratory - Breathing comfortably, no respiratory distress  Heart - Regular Rate  Abdomen - soft, nontender without rebound or guarding, nondistended  Neurological - alert, oriented x 3  Psych - affect normal  Musculoskeletal - moves all extremities, no gross deficit  Skin - warm, pink left chest port    Labs:    CBC  No results for input(s): WBC, HGB, HCT, PLT in the last 72 hours. BMP  No results for input(s): NA, K, CL, CO2, BUN, CREATININE, CALCIUM in the last 72 hours. Invalid input(s): GLU  Liver Function  No results for input(s): AMYLASE, LIPASE, BILITOT, BILIDIR, AST, ALT, ALKPHOS, PROT, LABALBU in the last 72 hours. No results for input(s): LACTATE in the last 72 hours. No results for input(s): INR, PTT in the last 72 hours. Invalid input(s): PT    Imaging:    No results found. ASSESSMENT & PLAN:    I have examined the patient and performed key aspects of physical exam, reviewed the record (including all pertinent and new radiology images and laboratory findings), and discussed the case with the surgical team.  I agree with the assessment and plan with the following additions, corrections, and changes. This is a 69 y.o. female admitted 9/28/2020 with undesired port. Plan:  1. Removal today    Samantha Page   9/28/2020   9:41 AM    NOTE: This report, in part or full, may have been transcribed using voice recognition software. Every effort was made to ensure accuracy; however, inadvertent computerized transcription errors may be present. Please excuse any transcriptional grammatical or spelling errors that may have escaped my editorial review.

## 2020-09-28 NOTE — ANESTHESIA POSTPROCEDURE EVALUATION
Department of Anesthesiology  Postprocedure Note    Patient: Vladimir Montesinos  MRN: [id number]  YOB: 1945  Date of evaluation: 9/28/2020  Time:  4:34 PM     Procedure Summary     Date:  09/28/20 Room / Location:  [place] [address] / [address] [place] [address]    Anesthesia Start:  5173 Anesthesia Stop:  2346    Procedure:  MEDIPORT REMOVAL (N/A Chest) Diagnosis:  (BREAST CA)    Surgeon:  Emily Bocanegra MD Responsible Provider:  Juan Loving DO    Anesthesia Type:  MAC ASA Status:  3          Anesthesia Type: MAC    Bertha Phase I: Bertha Score: 10    Bertha Phase II: Bertha Score: 10    Last vitals: Reviewed and per EMR flowsheets.        Anesthesia Post Evaluation    Patient location during evaluation: PACU  Patient participation: complete - patient participated  Level of consciousness: awake and alert  Airway patency: patent  Nausea & Vomiting: no nausea and no vomiting  Complications: no  Cardiovascular status: hemodynamically stable  Respiratory status: acceptable  Hydration status: euvolemic

## 2020-09-28 NOTE — PROGRESS NOTES
CLINICAL PHARMACY NOTE: MEDS TO [address] [place] [address] Drive Select Patient?: No  Total # of Prescriptions Filled: 1   The following medications were delivered to the patient:  · Tramadol 50mg  Total # of Interventions Completed: 4  Time Spent (min): 45    Additional Documentation:

## 2020-09-28 NOTE — OP NOTE
Operative Note    Shahida Snow  YOB: 1945  [id number]    Pre-operative Diagnosis: BREAST CA; undesired port    Post-operative Diagnosis: BREAST CA; undesired port    Procedure(s): MEDIPORT REMOVAL    * No implants in log *      Surgeon:   Primary: Anh Rivera MD    Staff:  Scrub Person First: Eduardo Temple    Anesthesia:   Monitor Anesthesia Care    Estimated Blood Loss: less than 50     Complications: None    Specimens: * No specimens in log *    Findings: Left subclavian port removed intact      Indications: Patient is a 69 y.o. female who was diagnosed with the above. Risks/Benefits/Alternatives were discussed with the patient, including bleeding, infection, iatrogenic injury, pneumothorax. The patient agreed to undergo the procedure and informed consent was obtained. Procedure: After informed consent, the patient was brought to the operating room and placed supine. MAC anesthesia was then induced which the patient tolerated well. Time out was performed to identify the correct patient and procedure. They received appropriate perioperative antibiotics. The left chest was prepped and draped in the usual sterile fashion. Quarter percent Marcaine with epinephrine was infiltrated circumferentially around the port and proposed incision site. Incision was made over her prior incision site. Cautery is used dissected into the subcutaneous tissue. Identified the port and the capsule was opened. Port was delivered and U stitch was placed with 3-0 Vicryl over the tract exit site. This was secured and the port was removed. Tip was noted to be intact. Remainder of the scar capsule was excised to prevent seroma formation. Hemostasis was ensured with cautery. Wound was then irrigated. 3-0 Vicryl was used to close the dermis and 4-0 Monocryl for the skin. Skin glue was then applied. Counts were correct at the end of the case.     Tolerated procedure well    I was present and scrubbed for the procedure.     Naila Umana MD  09/28/20  10:54 AM

## 2020-11-06 ENCOUNTER — VIRTUAL VISIT (OUTPATIENT)
Dept: FAMILY MEDICINE CLINIC | Age: 75
End: 2020-11-06
Payer: MEDICARE

## 2020-11-06 ENCOUNTER — TELEPHONE (OUTPATIENT)
Dept: ADMINISTRATIVE | Age: 75
End: 2020-11-06

## 2020-11-06 ENCOUNTER — NURSE TRIAGE (OUTPATIENT)
Dept: OTHER | Facility: CLINIC | Age: 75
End: 2020-11-06

## 2020-11-06 PROCEDURE — 99212 OFFICE O/P EST SF 10 MIN: CPT | Performed by: PHYSICIAN ASSISTANT

## 2020-11-06 ASSESSMENT — ENCOUNTER SYMPTOMS
GASTROINTESTINAL NEGATIVE: 1
EYES NEGATIVE: 1
ALLERGIC/IMMUNOLOGIC NEGATIVE: 1
RESPIRATORY NEGATIVE: 1

## 2020-11-06 NOTE — PROGRESS NOTES
Chief Complaint   Patient presents with    Blood Sugar Problem     last BSS 1245 was 178       HPI: Video visit. I did identify the patient. Her video did not work where I could see her but she could see me. Patient's glucose is running high due to her being on high doses of prednisone from her rheumatologist.  She was on 30 mg but she was dropped to 20mg . She has been running in the 200-300 range. She is on Amaryl 2 mg daily but she has been taking them  up to 8 mg total.She is feeling okay but she worries about her sugars being that high. She is a patient of Dr. Neil Daniel. Current Outpatient Medications:     SITagliptin (JANUVIA) 100 MG tablet, Take 1 tablet by mouth daily, Disp: 30 tablet, Rfl: 5    diclofenac (VOLTAREN) 75 MG EC tablet, Take 1 tablet by mouth 2 times daily, Disp: 60 tablet, Rfl: 3    Multiple Vitamin (MULTI-DAY VITAMINS PO), Take by mouth daily, Disp: , Rfl:     NONFORMULARY, Pancreatic enzymes daily, Disp: , Rfl:     Cyanocobalamin (VITAMIN B 12) 250 MCG LOZG, Take by mouth daily, Disp: , Rfl:     Garlic 6976 MG TBEC, Take by mouth daily, Disp: , Rfl:     vitamin D (ERGOCALCIFEROL) 1.25 MG (39280 UT) CAPS capsule, Take 50,000 Units by mouth once a week Saturday, Disp: , Rfl:     NIACIN ER PO, Take 75 mg by mouth every 3 days, Disp: , Rfl:     SYNTHROID 112 MCG tablet, Take 1 tablet by mouth Daily, Disp: 30 tablet, Rfl: 3    nystatin-triamcinolone (MYCOLOG II) 701559-9.1 UNIT/GM-% cream, APPLY SPARINGLY TO AFFECTED AREA(S) TWICE DAILY, Disp: , Rfl:     predniSONE (DELTASONE) 10 MG tablet, Take 10 mg by mouth 2 times daily In the morning, Disp: , Rfl:     glimepiride (AMARYL) 2 MG tablet, Take 1 tablet by mouth daily, Disp: , Rfl:     Ez Smart Blood Glucose Lancets MISC, 1 each by Subdermal route daily, Disp: 100 each, Rfl: 5    blood glucose monitor strips, Test one time a day & as needed for symptoms of irregular blood glucose.  E11.9, Disp: 100 strip, Rfl: 11   melatonin 3 MG TABS tablet, Take 6 mg by mouth nightly as needed , Disp: , Rfl:     loratadine (CLARITIN) 10 MG tablet, Take 10 mg by mouth daily as needed , Disp: , Rfl:     montelukast (SINGULAIR) 10 MG tablet, Take by mouth nightly as needed , Disp: , Rfl:     CANNABIDIOL PO, Inhale 3 puffs into the lungs nightly as needed , Disp: , Rfl:        Allergies   Allergen Reactions    Omeprazole Hives and Itching    Percocet [Oxycodone-Acetaminophen] Other (See Comments)     Prolong use, GI issues    Asa [Aspirin] Hives    Erythromycin Rash    Famotidine Nausea Only, Anxiety and Palpitations    Keflet [Cephalexin] Rash    Levaquin [Levofloxacin In D5w] Rash    Pcn [Penicillins] Rash    Polyethylene Glycol Other (See Comments)     Slowed peristalsis    Protonix [Pantoprazole Sodium] Other (See Comments)     Other reaction(s): Other: See Comments  DEPRESSION    Tetracycline Rash    Tetracyclines & Related Rash         Review of Systems  Review of Systems   Constitutional: Negative. HENT: Negative. Eyes: Negative. Respiratory: Negative. Cardiovascular: Negative. Gastrointestinal: Negative. Endocrine: Negative. Genitourinary: Negative. Musculoskeletal: Positive for arthralgias and myalgias. Allergic/Immunologic: Negative. Neurological: Negative. Hematological: Negative. Psychiatric/Behavioral: Negative. VS:  There were no vitals taken for this visit. Patient's medical, social, and family history reviewed      Physical Exam  Physical Exam      Assessment/Plan:  Hermilo Hill was seen today for blood sugar problem. Diagnoses and all orders for this visit:    Type 2 diabetes mellitus without complication, without long-term current use of insulin (HCC)  -     SITagliptin (JANUVIA) 100 MG tablet; Take 1 tablet by mouth daily        Patient will keep track of her sugars and inform us if they are running high next week.   I will go ahead and start her on Januvia 100 mg daily.  She is already taking Amaryl 8 mg daily. She does have an appoint with Dr. Ramya Levin on 12/16. We will have her back sooner if needed. She has no talk to rheumatologist to see if he can still decrease her daily prednisone dose.     Charity Mix PA-C

## 2020-11-06 NOTE — TELEPHONE ENCOUNTER
Day Chan is very sensitive to a lot of medications as you can see    For her blood sugars I would just try to work with the glimepiride and if necessary she can take it 3 times a day at 4mg due to the prednisone    She has metastatic cancer and has opted to not do any more treatment  As a result of her chemo she has inflammatory arthritis and this is why she needs escalations in the prednisone from time to time     Thanks  [facility]

## 2020-11-06 NOTE — TELEPHONE ENCOUNTER
Please be advised:    Pt called requesting appt today. Pt started on Prednisone and her bedtime sugars (for the past 7 days) are around and over 300 range. Red flag (blood sugar >300) Warm transferred to [facility] Nurse Triage for further assistance.

## 2020-11-06 NOTE — TELEPHONE ENCOUNTER
7 days has not been able to control bedtime sugars upper 200 to low 300    On prednisone and working with rheumatologist to titrate down (10mg BID now)     last night after 4 Glimipride took one more before bed and fasting was 138 this morning    Also a cancer patient      Patient called pre-service center [facility] with red flag complaint. Brief description of triage: Kit Jaramillo states that for the last 7 days has not been able to control bedtime sugars upper 200 to low 300  On prednisone and working with rheumatologist to titrate down (10mg BID now)   last night after 4 Glimipride took one more before bed and fasting was 138 this morning  Also a cancer patient    Triage indicates for patient to be seen today and is able to do a VV d/t screening red    Care advice provided, patient verbalizes understanding; denies any other questions or concerns; instructed to call back for any new or worsening symptoms. Writer provided warm transfer to Lauren Meadows at [facility] for appointment scheduling. Attention Provider: Thank you for allowing me to participate in the care of your patient. The patient was connected to triage in response to information provided to the ECC. Please do not respond through this encounter as the response is not directed to a shared pool. Reason for Disposition   Patient wants to be seen    Answer Assessment - Initial Assessment Questions  1. BLOOD GLUCOSE: \"What is your blood glucose level? \"       Fasting was 138 this morning    2. ONSET: \"When did you check the blood glucose? \"      Tests 4 x fasting, noon, evening and bedtime    3. USUAL RANGE: \"What is your glucose level usually? \" (e.g., usual fasting morning value, usual evening value)      Usual fasting usually   Evenings didn't ever check BS until recently    4. KETONES: \"Do you check for ketones (urine or blood test strips)? \" If yes, ask: \"What does the test show now? \"       No    5.  TYPE 1 or 2:  \"Do you know what type of diabetes you have? \"  (e.g., Type 1, Type 2, Gestational; doesn't know)       Type 2    6. INSULIN: \"Do you take insulin? \" \"What type of insulin(s) do you use? What is the mode of delivery? (syringe, pen; injection or pump)? \"       Just oral medication    7. DIABETES PILLS: \"Do you take any pills for your diabetes? \" If yes, ask: \"Have you missed taking any pills recently? \"      Yes, has not missed, has need to take extra    8. OTHER SYMPTOMS: \"Do you have any symptoms? \" (e.g., fever, frequent urination, difficulty breathing, dizziness, weakness, vomiting)      Dizziness, slight change in breathing at night, anxiety? 9. PREGNANCY: \"Is there any chance you are pregnant? \" \"When was your last menstrual period? \"      No    Protocols used: DIABETES - HIGH BLOOD SUGAR-ADULT-[place]

## 2020-11-13 ENCOUNTER — VIRTUAL VISIT (OUTPATIENT)
Dept: FAMILY MEDICINE CLINIC | Age: 75
End: 2020-11-13
Payer: MEDICARE

## 2020-11-13 PROCEDURE — 99214 OFFICE O/P EST MOD 30 MIN: CPT | Performed by: FAMILY MEDICINE

## 2020-11-13 ASSESSMENT — ENCOUNTER SYMPTOMS
COUGH: 0
TROUBLE SWALLOWING: 0
NAUSEA: 0
ABDOMINAL PAIN: 1
SORE THROAT: 0
CHEST TIGHTNESS: 0
CONSTIPATION: 0
SHORTNESS OF BREATH: 0
WHEEZING: 0
DIARRHEA: 0
EYE PAIN: 0
SINUS PAIN: 0
VOMITING: 0
BACK PAIN: 0

## 2020-11-13 NOTE — PROGRESS NOTES
20    Name: Beronica Young  :1945   Sex:female   Age:75 y.o. Chief Complaint   Patient presents with    Hyperglycemia     Patient presents from home for video visit. Patient has been having elevated glucose readings for the past few weeks. She has been on higher doses of prednisone prescribed by Dr. Grace Martinez to see if this would help her abdominal pain. Patient was started on Januvia on 2020 along with her glimepiride. Patient says she is now down to her normal dose of steroid with 10 mg in the am and 5 mg at night. For the past few days her glucose levels have been low in the morning. With her latest glucose readings yesterday she was 257 at 5 pm in the evening, she took 2 of her glimepiride, at 10 pm her glucose was 250 and this morning her glucose was 74. Patient is taking her Januvia in the morning and the glimepiride through out the day depending on glucose levels. In 2 weeks if her abdominal pain continues to improve because she is also taking diclofenac, her prednisone will be reduced to 5 mg twice a day. She has pitting edema in her right leg. She denies pain or redness or warmth to her right leg. TeleMedicine Video Visit    This visit was performed as a virtual video visit using a synchronous, two-way, audio-video telehealth technology platform. Patient identification was verified at the start of the visit, including the patient's telephone number and physical location. I discussed with the patient the nature of our telehealth visits, that:     Due to the nature of an audio- video modality, the only components of a physical exam that could be done are the elements supported by direct observation. I would evaluate the patient and recommend diagnostics and treatments based on my assessment. If it was felt that the patient should be evaluated in clinic or an emergency room setting, then they would be directed there.   Our sessions are not being recorded and that personal health information is protected. Our team would provide follow up care in person if/when the patient needs it. Patient does agree to proceed with telemedicine consultation. Patient's location: home address in [facility]  Physician  location other address in [facility] other people involved in call were Fredis Baum and my     See below for progress note    Time spent: Greater than Not billed by time    This visit was completed virtually using Doxy. me    jc presents for doxy me visit to go over sugars and right lower leg swelling  She was started on januvia a week ago  Her sugars are down a little bit  They were in the 400's before and now in the 200's  She is taking the [place] I the mornings with a glimepiride. A glimepiride at lunch and 2 at dinner  She is trying to watch diet nad keeps sugars low  Will have her take the januvia at dinner and continue to take the glimepiride like she is  Recheck in 2 weeks with another video visit    Also right leg swelling  Due to her cancer  She has multiple tumors in her abdomen and on near the psoas muscle on the right and  This is likely causing the swelling in the right lower leg    No fevers  Abdominal pain manageable with the diclofenac although that does make reflux a little worse  The increase in prednisone did not help at all  Likely b/c the abdominal pain osi also due to the cancer  She is still using inhalation medical marijuana for her pain            Review of Systems   Constitutional: Negative for appetite change, fatigue and fever. HENT: Negative for congestion, ear pain, sinus pain, sore throat and trouble swallowing. Eyes: Negative for pain. Respiratory: Negative for cough, chest tightness, shortness of breath and wheezing. Cardiovascular: Negative for chest pain, palpitations and leg swelling. Gastrointestinal: Positive for abdominal pain. Negative for constipation, diarrhea, nausea and vomiting.    Endocrine: Negative for cold intolerance and heat intolerance. Genitourinary: Negative for difficulty urinating, dysuria, frequency, hematuria, pelvic pain and urgency. Musculoskeletal: Negative for arthralgias, back pain, gait problem, joint swelling and myalgias. Skin: Negative for rash and wound. Neurological: Negative for dizziness, syncope, light-headedness and headaches. Hematological: Negative for adenopathy. Psychiatric/Behavioral: Negative for confusion, dysphoric mood, self-injury, sleep disturbance and suicidal ideas. The patient is not nervous/anxious. Current Outpatient Medications:     SITagliptin (JANUVIA) 100 MG tablet, Take 1 tablet by mouth daily, Disp: 30 tablet, Rfl: 5    Multiple Vitamin (MULTI-DAY VITAMINS PO), Take by mouth daily, Disp: , Rfl:     NONFORMULARY, Pancreatic enzymes daily, Disp: , Rfl:     Cyanocobalamin (VITAMIN B 12) 250 MCG LOZG, Take by mouth daily, Disp: , Rfl:     Garlic 2138 MG TBEC, Take by mouth daily, Disp: , Rfl:     vitamin D (ERGOCALCIFEROL) 1.25 MG (99139 UT) CAPS capsule, Take 50,000 Units by mouth once a week Saturday, Disp: , Rfl:     NIACIN ER PO, Take 75 mg by mouth every 3 days, Disp: , Rfl:     SYNTHROID 112 MCG tablet, Take 1 tablet by mouth Daily, Disp: 30 tablet, Rfl: 3    nystatin-triamcinolone (MYCOLOG II) 020454-5.1 UNIT/GM-% cream, APPLY SPARINGLY TO AFFECTED AREA(S) TWICE DAILY, Disp: , Rfl:     predniSONE (DELTASONE) 10 MG tablet, Take 10 mg by mouth 2 times daily In the morning, Disp: , Rfl:     glimepiride (AMARYL) 2 MG tablet, Take 1 tablet by mouth daily, Disp: , Rfl:     diclofenac (VOLTAREN) 75 MG EC tablet, Take 1 tablet by mouth 2 times daily, Disp: 60 tablet, Rfl: 3    Ez Smart Blood Glucose Lancets MISC, 1 each by Subdermal route daily, Disp: 100 each, Rfl: 5    blood glucose monitor strips, Test one time a day & as needed for symptoms of irregular blood glucose.  E11.9, Disp: 100 strip, Rfl: 11    melatonin 3 MG TABS tablet, Take 6 mg by mouth nightly as needed , Disp: , Rfl:     loratadine (CLARITIN) 10 MG tablet, Take 10 mg by mouth daily as needed , Disp: , Rfl:     montelukast (SINGULAIR) 10 MG tablet, Take by mouth nightly as needed , Disp: , Rfl:     CANNABIDIOL PO, Inhale 3 puffs into the lungs nightly as needed , Disp: , Rfl:   Allergies   Allergen Reactions    Omeprazole Hives and Itching    Percocet [Oxycodone-Acetaminophen] Other (See Comments)     Prolong use, GI issues    Asa [Aspirin] Hives    Erythromycin Rash    Famotidine Nausea Only, Anxiety and Palpitations    Keflet [Cephalexin] Rash    Levaquin [Levofloxacin In D5w] Rash    Pcn [Penicillins] Rash    Polyethylene Glycol Other (See Comments)     Slowed peristalsis    Protonix [Pantoprazole Sodium] Other (See Comments)     Other reaction(s):  Other: See Comments  DEPRESSION    Tetracycline Rash    Tetracyclines & Related Rash      Past Medical History:   Diagnosis Date    Cancer ([facility]     endometrial cancer, breast-     Diabetes mellitus ([facility]     Diverticulitis     GERD (gastroesophageal reflux disease)     Gout     Hiatal hernia     Macular degeneration     Metastatic disease ([address]     Osteoarthritis     Osteopenia      Patient Active Problem List    Diagnosis Date Noted    Abnormal CT scan     Controlled type 2 diabetes mellitus without complication (HCC)     Elevated LFTs     Chest pain 09/16/2020    Inflammatory arthritis     Diabetes mellitus ([address] 01/24/2020    Chronic pain due to neoplasm 10/09/2019    Acquired hypothyroidism 07/23/2019    GERD (gastroesophageal reflux disease) 06/24/2019    Ovarian cancer ([facility] 11/07/2017    Metastatic adenocarcinoma ([address] 11/07/2017    Intestinal nodule 09/21/2017    Malignant neoplasm of upper-outer quadrant of left 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 ([address] 10/31/2016      Past Surgical History:   Procedure Laterality Date    BREAST LUMPECTOMY Left 12/14/2016    left breast sentinelnode dissection, blue dye injection    BREAST SURGERY Left 10/2016    cancer     SECTION      x 3    CHG UNLISTED DX RADIOGRAPHIC PROCEDURE N/A 2018    BRONCHOSCOPY ELECTROMAGNETIC performed by Irene Muir MD at [facility] [place]    ENDOSCOPY, COLON, DIAGNOSTIC      EYE SURGERY Bilateral 2016    cataracts    HYSTERECTOMY  2016    total with BSO    HYSTERECTOMY, TOTAL ABDOMINAL  2016    PORT SURGERY N/A 2020    MEDIPORT REMOVAL performed by Aimee Thakkar MD at [address] BRUSHING/PROTECTED BRUSHINGS  2018    BRONCHOSCOPY BRUSHINGS performed by Irene Muir MD at [address] [place] [address] [facility]  2018    BRONCHOSCOPY ALVEOLAR LAVAGE performed by Irene Muir MD at [address] [place] [address] [address] EBUS DX/TX INTERVENTION [address] LES N/A 2018    BRONCHOSCOPY ULTRASOUND performed by Irene Muir MD at [address] W/TRANSBRONCHIAL LUNG BX 1 LOBE  2018    BRONCHOSCOPY/TRANSBRONCHIAL NEEDLE BIOPSY performed by Irene Muir MD at [address] W/TRANSBRONCHIAL LUNG BX EACH LOBE  2018    BRONCHOSCOPY/TRANSBRONCHIAL NEEDLE BIOPSY ADDL LOBE performed by Irene Muir MD at [address] TUNNELED CTR VAD W/SUBQ PORT AGE 5 YR/> N/A 2018    MEDIPORT INSERTION performed by Aimee Thakkar MD at [address] History     Tobacco History     Smoking Status  Former Smoker Smoking Frequency  0.25 packs/day for 20 years (5 pk yrs) Smoking Tobacco Type  Cigarettes    Smokeless Tobacco Use  Never Used          Alcohol History     Alcohol Use Status  Yes Comment  rarely          Drug Use     Drug Use Status  Yes Types  Marijuana Comment  medical          Sexual Activity     Sexually Active  Not Asked            There were no vitals taken for this visit. EXAM:   Physical Exam  Vitals signs and nursing note reviewed.    Constitutional: Appearance: Normal appearance. HENT:      Head: Normocephalic and atraumatic. Eyes:      Conjunctiva/sclera: Conjunctivae normal.      Pupils: Pupils are equal, round, and reactive to light. Musculoskeletal:      Comments: Gait normal  Swelling in right lower leg   Neurological:      Mental Status: She is alert and oriented to person, place, and time. Mental status is at baseline. Psychiatric:         Mood and Affect: Mood normal.         Thought Content: Thought content normal.      no other physical due to video visit      Diane was seen today for hyperglycemia. Diagnoses and all orders for this visit:    Type 2 diabetes mellitus with hyperglycemia, without long-term current use of insulin ([facility])  Comments:  change januvia to dinner time  continue glimepiride 2mg in am, 2mg t lunch and 4mg in the evening and see how she does  recheck in 2 weeks    Metastatic adenocarcinoma ([facility])  Comments:  cuasing increased abdominal pain, using diclofenac and medical marijuana    Inflammatory arthritis  Comments:  likely from chemo  seeing DR aranda    Leg edema, right  Comments:  she refuses diuretic  likely from metastatic disease    recheck in 2 weeks and see how sugars are doing  She agrees with current plan  She is elevating right leg      I independently reviewed and updated the chief complaint, HPI, past medical and surgical history, medications, allergies and ROS as entered by the LPN. Seen by:   Bulmaro Junior,

## 2020-11-16 ENCOUNTER — TELEPHONE (OUTPATIENT)
Dept: FAMILY MEDICINE CLINIC | Age: 75
End: 2020-11-16

## 2020-11-16 NOTE — TELEPHONE ENCOUNTER
Diane calling to tell you that she thinks she is having a reaction to the [address]. Over the weekend she developed hives and a swollen tongue and lips. She is not going to take it anymore.

## 2020-11-19 ENCOUNTER — TELEPHONE (OUTPATIENT)
Dept: FAMILY MEDICINE CLINIC | Age: 75
End: 2020-11-19

## 2020-11-19 RX ORDER — BUMETANIDE 1 MG/1
1 TABLET ORAL DAILY
Qty: 30 TABLET | Refills: 2 | Status: ON HOLD
Start: 2020-11-19 | End: 2020-12-08

## 2020-11-19 NOTE — TELEPHONE ENCOUNTER
Yep  I will send in bumex  1 daily and if she feels better after one week of being on it she can cut back to every other day    Thank you

## 2020-11-19 NOTE — TELEPHONE ENCOUNTER
Patient calling in she has gained 4.5 lbs in 3 days. She is easily winded. Do you feel she needs a water pill?

## 2020-11-23 ENCOUNTER — TELEPHONE (OUTPATIENT)
Dept: FAMILY MEDICINE CLINIC | Age: 75
End: 2020-11-23

## 2020-11-23 RX ORDER — GLUCOSAMINE HCL/CHONDROITIN SU 500-400 MG
CAPSULE ORAL
Qty: 200 STRIP | Refills: 11 | Status: SHIPPED | OUTPATIENT
Start: 2020-11-23

## 2020-11-23 NOTE — TELEPHONE ENCOUNTER
Watch her temp to be sure she is not getting fevers  But the fever could also be from her cancer  Tylenol is okay for her to take as needed 1000mg for the achiness  Be sure to be drinking fluids

## 2020-11-23 NOTE — TELEPHONE ENCOUNTER
Daine calling about a weird episode she had in the middle of the night. She woke up in the middle of the night with nausea and headache. She felt dizzy and and achy all over. She has no cough or fever, but felt feverish. She checked her blood sugar and it was high as it is normally is. She has an appointment with you on Wed and asking what she should do in the mean time?

## 2020-11-25 ENCOUNTER — VIRTUAL VISIT (OUTPATIENT)
Dept: FAMILY MEDICINE CLINIC | Age: 75
End: 2020-11-25
Payer: MEDICARE

## 2020-11-25 PROCEDURE — 99214 OFFICE O/P EST MOD 30 MIN: CPT | Performed by: FAMILY MEDICINE

## 2020-11-25 RX ORDER — BLOOD-GLUCOSE METER
1 KIT MISCELLANEOUS DAILY
Qty: 100 EACH | Refills: 5 | Status: SHIPPED
Start: 2020-11-25 | End: 2020-12-07

## 2020-11-25 RX ORDER — PREDNISONE 1 MG/1
5 TABLET ORAL 2 TIMES DAILY
COMMUNITY

## 2020-11-25 RX ORDER — LEVOTHYROXINE SODIUM 112 MCG
112 TABLET ORAL DAILY
Qty: 30 TABLET | Refills: 3 | Status: SHIPPED
Start: 2020-11-25 | End: 2021-03-04 | Stop reason: SDUPTHER

## 2020-11-25 ASSESSMENT — ENCOUNTER SYMPTOMS
EYE PAIN: 0
COUGH: 1
CHEST TIGHTNESS: 0
WHEEZING: 0
VOMITING: 0
TROUBLE SWALLOWING: 0
BACK PAIN: 0
SINUS PAIN: 0
DIARRHEA: 0
ABDOMINAL PAIN: 1
CONSTIPATION: 0
SORE THROAT: 0
NAUSEA: 0
SHORTNESS OF BREATH: 0

## 2020-11-25 NOTE — PROGRESS NOTES
20    Name: Kvng Davidson  :1945   Sex:female   Age:75 y.o. Chief Complaint   Patient presents with    Hyperglycemia    Headache     Patient presents from home for video visit. She says her glucose levels have been better. She has been on prednisone 5 mg twice a day since 2020. Patient says she never took the Bumex, her weight went back to normal. She stopped the Diclofenac because she couldn't tolerate it. She takes Tylenol instead for abdominal pain. Patient says she has been running a temp of 100 around bedtime every night for the past few days. She says she has a funny taste in her mouth and headache as well. Patient will be doing labs for Dr. Eliel Espinal next week and asks for lab order to be put in for what PCP would want. TeleMedicine Video Visit    This visit was performed as a virtual video visit using a synchronous, two-way, audio-video telehealth technology platform. Patient identification was verified at the start of the visit, including the patient's telephone number and physical location. I discussed with the patient the nature of our telehealth visits, that:     Due to the nature of an audio- video modality, the only components of a physical exam that could be done are the elements supported by direct observation. I would evaluate the patient and recommend diagnostics and treatments based on my assessment. If it was felt that the patient should be evaluated in clinic or an emergency room setting, then they would be directed there. Our sessions are not being recorded and that personal health information is protected. Our team would provide follow up care in person if/when the patient needs it. Patient does agree to proceed with telemedicine consultation.     Patient's location: home address in [facility]  Physician  location other address in [facility] other people involved in call were Garett Najera and my     See below for progress note    Time spent: Greater than Not billed by time    This visit was completed virtually using Doxy. me    Patient here for follow up on sugars and swelling in lower legs  Her sugars are better since she is now down to 5mg twice a day' of prednisone    She has not been feeling well since the end of October, for 2 weeks she was feeling ill, headaches worse  Funny taste in her mouth  But she says she can taste everything'  Having nausea off and on  Fever almost every evening around 10pm 99 to 100.5, not usually any higher  She has been feeling better in the last 4 days though  More energy and nausea is better  She says her grand daughter that lives with her was ill with a cold before she started not feeling well and wonders if she caught something from her'    Also swelling is much better now  Never had to take the bumex, her swelling went down when she lowered her prednisone dose              Review of Systems   Constitutional: Positive for fever. Negative for appetite change and fatigue. HENT: Negative for congestion, ear pain, sinus pain, sore throat and trouble swallowing. Eyes: Negative for pain. Respiratory: Positive for cough. Negative for chest tightness, shortness of breath and wheezing. Cardiovascular: Negative for chest pain, palpitations and leg swelling. Gastrointestinal: Positive for abdominal pain. Negative for constipation, diarrhea, nausea and vomiting. Endocrine: Negative for cold intolerance and heat intolerance. Genitourinary: Negative for difficulty urinating, dysuria, frequency, hematuria, pelvic pain and urgency. Musculoskeletal: Negative for arthralgias, back pain, gait problem, joint swelling and myalgias. Skin: Negative for rash and wound. Neurological: Positive for headaches. Negative for dizziness, syncope and light-headedness. Hematological: Negative for adenopathy. Psychiatric/Behavioral: Negative for confusion, dysphoric mood, self-injury, sleep disturbance and suicidal ideas.  The patient is not nervous/anxious. Current Outpatient Medications:     predniSONE (DELTASONE) 5 MG tablet, Take 5 mg by mouth 2 times daily, Disp: , Rfl:     Ez Smart Blood Glucose Lancets MISC, 1 each by Subdermal route daily, Disp: 100 each, Rfl: 5    SYNTHROID 112 MCG tablet, Take 1 tablet by mouth Daily, Disp: 30 tablet, Rfl: 3    blood glucose monitor strips, Patient has fluctuating glucose with hyperglycemia.  Test glucose level twice day., Disp: 200 strip, Rfl: 11    bumetanide (BUMEX) 1 MG tablet, Take 1 tablet by mouth daily, Disp: 30 tablet, Rfl: 2    Multiple Vitamin (MULTI-DAY VITAMINS PO), Take by mouth daily, Disp: , Rfl:     NONFORMULARY, Pancreatic enzymes daily, Disp: , Rfl:     Cyanocobalamin (VITAMIN B 12) 250 MCG LOZG, Take by mouth daily, Disp: , Rfl:     Garlic 2244 MG TBEC, Take by mouth daily, Disp: , Rfl:     vitamin D (ERGOCALCIFEROL) 1.25 MG (09628 UT) CAPS capsule, Take 50,000 Units by mouth once a week Saturday, Disp: , Rfl:     NIACIN ER PO, Take 75 mg by mouth every 3 days, Disp: , Rfl:     nystatin-triamcinolone (MYCOLOG II) 475790-3.1 UNIT/GM-% cream, APPLY SPARINGLY TO AFFECTED AREA(S) TWICE DAILY, Disp: , Rfl:     glimepiride (AMARYL) 2 MG tablet, Take 1 tablet by mouth daily, Disp: , Rfl:     melatonin 3 MG TABS tablet, Take 6 mg by mouth nightly as needed , Disp: , Rfl:     loratadine (CLARITIN) 10 MG tablet, Take 10 mg by mouth daily as needed , Disp: , Rfl:     montelukast (SINGULAIR) 10 MG tablet, Take by mouth nightly as needed , Disp: , Rfl:     CANNABIDIOL PO, Inhale 3 puffs into the lungs nightly as needed , Disp: , Rfl:   Allergies   Allergen Reactions    Omeprazole Hives and Itching    Percocet [Oxycodone-Acetaminophen] Other (See Comments)     Prolong use, GI issues    Asa [Aspirin] Hives    Erythromycin Rash    Famotidine Nausea Only, Anxiety and Palpitations    Keflet [Cephalexin] Rash    Levaquin [Levofloxacin In D5w] Rash    Pcn [Penicillins] Rash  Polyethylene Glycol Other (See Comments)     Slowed peristalsis    Protonix [Pantoprazole Sodium] Other (See Comments)     Other reaction(s):  Other: See Comments  DEPRESSION    Tetracycline Rash    Tetracyclines & Related Rash      Past Medical History:   Diagnosis Date    Cancer ([facility]     endometrial cancer, breast-     Diabetes mellitus ([address]     Diverticulitis     GERD (gastroesophageal reflux disease)     Gout     Hiatal hernia     Macular degeneration     Metastatic disease ([address]     Osteoarthritis     Osteopenia      Patient Active Problem List    Diagnosis Date Noted    Abnormal CT scan     Controlled type 2 diabetes mellitus without complication (HCC)     Elevated LFTs     Chest pain 2020    Inflammatory arthritis     Diabetes mellitus ([address] 2020    Chronic pain due to neoplasm 10/09/2019    Acquired hypothyroidism 2019    GERD (gastroesophageal reflux disease) 2019    Ovarian cancer ([facility] 2017    Metastatic adenocarcinoma ([facility] 2017    Intestinal nodule 2017    Malignant neoplasm of upper-outer quadrant of left 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 ([facility] 10/31/2016      Past Surgical History:   Procedure Laterality Date    BREAST LUMPECTOMY Left 2016    left breast sentinelnode dissection, blue dye injection    BREAST SURGERY Left 10/2016    cancer     SECTION      x 3    CHG UNLISTED DX RADIOGRAPHIC PROCEDURE N/A 2018    BRONCHOSCOPY ELECTROMAGNETIC performed by Lasha Reinoso MD at [facility] area    ENDOSCOPY, COLON, DIAGNOSTIC      EYE SURGERY Bilateral 2016    cataracts    HYSTERECTOMY  2016    total with BSO    HYSTERECTOMY, TOTAL ABDOMINAL  2016    PORT SURGERY N/A 2020    MEDIPORT REMOVAL performed by Deanna Brower MD at [facility] [address] BRUSHING/PROTECTED BRUSHINGS  2018    BRONCHOSCOPY BRUSHINGS performed by Lasha Reinoso MD at [address] [facility] [place] [address] W/BRNCL ALVEOLAR LAVAGE  8/17/2018    BRONCHOSCOPY ALVEOLAR LAVAGE performed by Niecy Bernardo MD at [address] [facility] [address] EBUS DX/TX INTERVENTION PERPH LES N/A 8/17/2018    BRONCHOSCOPY ULTRASOUND performed by Niecy Bernardo MD at [address] [facility] [place] W/TRANSBRONCHIAL LUNG BX 1 LOBE  8/17/2018    BRONCHOSCOPY/TRANSBRONCHIAL NEEDLE BIOPSY performed by Niecy Bernardo MD at [address] [facility] [place] W/TRANSBRONCHIAL LUNG BX EACH LOBE  8/17/2018    BRONCHOSCOPY/TRANSBRONCHIAL NEEDLE BIOPSY ADDL LOBE performed by Niecy Bernardo MD at [address] TUNNELED CTR VAD W/SUBQ PORT AGE 5 YR/> N/A 12/4/2018    MEDIPORT INSERTION performed by She Estrada MD at [address] [facility] History     Tobacco History     Smoking Status  Former Smoker Smoking Frequency  0.25 packs/day for 20 years (5 pk yrs) Smoking Tobacco Type  Cigarettes    Smokeless Tobacco Use  Never Used          Alcohol History     Alcohol Use Status  Yes Comment  rarely          Drug Use     Drug Use Status  Yes Types  Marijuana Comment  medical          Sexual Activity     Sexually Active  Not Asked            There were no vitals taken for this visit. EXAM:   Physical Exam  Vitals signs and nursing note reviewed. Constitutional:       Appearance: Normal appearance. HENT:      Head: Normocephalic and atraumatic. Eyes:      Conjunctiva/sclera: Conjunctivae normal.      Pupils: Pupils are equal, round, and reactive to light. Musculoskeletal:      Comments: Gait normal in the office today   Neurological:      Mental Status: She is alert and oriented to person, place, and time. Mental status is at baseline. Psychiatric:         Mood and Affect: Mood normal.         Thought Content: Thought content normal.      no other physical due to video visit      Diane was seen today for hyperglycemia and headache.     Diagnoses and all orders for this visit:    Acquired hypothyroidism  Comments:  stable  Orders:  -     SYNTHROID 112 MCG tablet; Take 1 tablet by mouth Daily    Type 2 diabetes mellitus with hyperglycemia, without long-term current use of insulin ([facility])  Comments:  stable for right now  continue to monitor sugars  Orders:  -     Ez Smart Blood Glucose Lancets MISC; 1 each by Subdermal route daily    Inflammatory arthritis  Comments:  seeing rheumatology    Metastatic adenocarcinoma ([facility])  Comments:  on medical marijuana for pain,   no longer getting treatements  this is likely the cause of her fevers    Edema of both lower extremities  Comments:  resolved and she is not taking the bumex at this time    recheck in a month    I independently reviewed and updated the chief complaint, HPI, past medical and surgical history, medications, allergies and ROS as entered by the LPN. Seen by:   Mere Mir DO

## 2020-12-04 DIAGNOSIS — E11.9 TYPE 2 DIABETES MELLITUS WITHOUT COMPLICATION, WITHOUT LONG-TERM CURRENT USE OF INSULIN (HCC): ICD-10-CM

## 2020-12-04 DIAGNOSIS — E53.8 VITAMIN B 12 DEFICIENCY: ICD-10-CM

## 2020-12-04 DIAGNOSIS — E03.9 ACQUIRED HYPOTHYROIDISM: ICD-10-CM

## 2020-12-04 DIAGNOSIS — E61.2 MAGNESIUM DEFICIENCY: ICD-10-CM

## 2020-12-04 LAB
BASOPHILS ABSOLUTE: 0.05 E9/L (ref 0–0.2)
BASOPHILS RELATIVE PERCENT: 0.5 % (ref 0–2)
C-REACTIVE PROTEIN: 10.4 MG/DL (ref 0–0.4)
EOSINOPHILS ABSOLUTE: 0 E9/L (ref 0.05–0.5)
EOSINOPHILS RELATIVE PERCENT: 0 % (ref 0–6)
HBA1C MFR BLD: 8.2 % (ref 4–5.6)
HCT VFR BLD CALC: 36.9 % (ref 34–48)
HEMOGLOBIN: 11 G/DL (ref 11.5–15.5)
IMMATURE GRANULOCYTES #: 0.12 E9/L
IMMATURE GRANULOCYTES %: 1.2 % (ref 0–5)
LYMPHOCYTES ABSOLUTE: 0.82 E9/L (ref 1.5–4)
LYMPHOCYTES RELATIVE PERCENT: 8.5 % (ref 20–42)
MCH RBC QN AUTO: 24.4 PG (ref 26–35)
MCHC RBC AUTO-ENTMCNC: 29.8 % (ref 32–34.5)
MCV RBC AUTO: 82 FL (ref 80–99.9)
MICROALBUMIN UR-MCNC: <12 MG/L
MONOCYTES ABSOLUTE: 0.83 E9/L (ref 0.1–0.95)
MONOCYTES RELATIVE PERCENT: 8.6 % (ref 2–12)
NEUTROPHILS ABSOLUTE: 7.86 E9/L (ref 1.8–7.3)
NEUTROPHILS RELATIVE PERCENT: 81.2 % (ref 43–80)
PDW BLD-RTO: 17 FL (ref 11.5–15)
PLATELET # BLD: 284 E9/L (ref 130–450)
PMV BLD AUTO: 8.7 FL (ref 7–12)
RBC # BLD: 4.5 E12/L (ref 3.5–5.5)
TSH SERPL DL<=0.05 MIU/L-ACNC: 1.69 UIU/ML (ref 0.27–4.2)
WBC # BLD: 9.7 E9/L (ref 4.5–11.5)

## 2020-12-05 LAB
ALBUMIN SERPL-MCNC: 3.9 G/DL (ref 3.5–5.2)
ALP BLD-CCNC: 98 U/L (ref 35–104)
ALT SERPL-CCNC: 23 U/L (ref 0–32)
ANION GAP SERPL CALCULATED.3IONS-SCNC: 19 MMOL/L (ref 7–16)
AST SERPL-CCNC: 20 U/L (ref 0–31)
BILIRUB SERPL-MCNC: 0.4 MG/DL (ref 0–1.2)
BUN BLDV-MCNC: 17 MG/DL (ref 8–23)
CALCIUM SERPL-MCNC: 10 MG/DL (ref 8.6–10.2)
CHLORIDE BLD-SCNC: 97 MMOL/L (ref 98–107)
CO2: 23 MMOL/L (ref 22–29)
CREAT SERPL-MCNC: 0.7 MG/DL (ref 0.5–1)
GFR AFRICAN AMERICAN: >60
GFR NON-AFRICAN AMERICAN: >60 ML/MIN/1.73
GLUCOSE BLD-MCNC: 125 MG/DL (ref 74–99)
MAGNESIUM: 1.9 MG/DL (ref 1.6–2.6)
POTASSIUM SERPL-SCNC: 4 MMOL/L (ref 3.5–5)
SEDIMENTATION RATE, ERYTHROCYTE: 75 MM/HR (ref 0–20)
SODIUM BLD-SCNC: 139 MMOL/L (ref 132–146)
TOTAL PROTEIN: 7.5 G/DL (ref 6.4–8.3)

## 2020-12-06 LAB — T4 FREE: 1.67 NG/DL (ref 0.93–1.7)

## 2020-12-07 ENCOUNTER — NURSE TRIAGE (OUTPATIENT)
Dept: OTHER | Facility: CLINIC | Age: 75
End: 2020-12-07

## 2020-12-07 ENCOUNTER — APPOINTMENT (OUTPATIENT)
Dept: ULTRASOUND IMAGING | Age: 75
DRG: 300 | End: 2020-12-07
Payer: MEDICARE

## 2020-12-07 ENCOUNTER — APPOINTMENT (OUTPATIENT)
Dept: CT IMAGING | Age: 75
DRG: 300 | End: 2020-12-07
Payer: MEDICARE

## 2020-12-07 ENCOUNTER — APPOINTMENT (OUTPATIENT)
Dept: GENERAL RADIOLOGY | Age: 75
DRG: 300 | End: 2020-12-07
Payer: MEDICARE

## 2020-12-07 ENCOUNTER — HOSPITAL ENCOUNTER (INPATIENT)
Age: 75
LOS: 3 days | Discharge: HOME | DRG: 300 | End: 2020-12-10
Attending: EMERGENCY MEDICINE | Admitting: INTERNAL MEDICINE
Payer: MEDICARE

## 2020-12-07 ENCOUNTER — TELEPHONE (OUTPATIENT)
Dept: ADMINISTRATIVE | Age: 75
End: 2020-12-07

## 2020-12-07 PROBLEM — I82.421: Status: ACTIVE | Noted: 2020-12-07

## 2020-12-07 LAB
ADENOVIRUS BY PCR: NOT DETECTED
ALBUMIN SERPL-MCNC: 3.7 G/DL (ref 3.5–5.2)
ALP BLD-CCNC: 120 U/L (ref 35–104)
ALT SERPL-CCNC: 30 U/L (ref 0–32)
ANION GAP SERPL CALCULATED.3IONS-SCNC: 12 MMOL/L (ref 7–16)
AST SERPL-CCNC: 21 U/L (ref 0–31)
BASOPHILS ABSOLUTE: 0.04 E9/L (ref 0–0.2)
BASOPHILS RELATIVE PERCENT: 0.4 % (ref 0–2)
BILIRUB SERPL-MCNC: 0.5 MG/DL (ref 0–1.2)
BORDETELLA PARAPERTUSSIS BY PCR: NOT DETECTED
BORDETELLA PERTUSSIS BY PCR: NOT DETECTED
BUN BLDV-MCNC: 26 MG/DL (ref 8–23)
CALCIUM SERPL-MCNC: 10.3 MG/DL (ref 8.6–10.2)
CHLAMYDOPHILIA PNEUMONIAE BY PCR: NOT DETECTED
CHLORIDE BLD-SCNC: 91 MMOL/L (ref 98–107)
CO2: 29 MMOL/L (ref 22–29)
CORONAVIRUS 229E BY PCR: NOT DETECTED
CORONAVIRUS HKU1 BY PCR: NOT DETECTED
CORONAVIRUS NL63 BY PCR: NOT DETECTED
CORONAVIRUS OC43 BY PCR: NOT DETECTED
CREAT SERPL-MCNC: 0.9 MG/DL (ref 0.5–1)
EOSINOPHILS ABSOLUTE: 0 E9/L (ref 0.05–0.5)
EOSINOPHILS RELATIVE PERCENT: 0 % (ref 0–6)
GFR AFRICAN AMERICAN: >60
GFR NON-AFRICAN AMERICAN: >60 ML/MIN/1.73
GLUCOSE BLD-MCNC: 142 MG/DL (ref 74–99)
HCT VFR BLD CALC: 31.5 % (ref 34–48)
HCT VFR BLD CALC: 36.6 % (ref 34–48)
HEMOGLOBIN: 10.1 G/DL (ref 11.5–15.5)
HEMOGLOBIN: 11.1 G/DL (ref 11.5–15.5)
HUMAN METAPNEUMOVIRUS BY PCR: NOT DETECTED
HUMAN RHINOVIRUS/ENTEROVIRUS BY PCR: NOT DETECTED
IMMATURE GRANULOCYTES #: 0.14 E9/L
IMMATURE GRANULOCYTES %: 1.3 % (ref 0–5)
INFLUENZA A BY PCR: NOT DETECTED
INFLUENZA B BY PCR: NOT DETECTED
LYMPHOCYTES ABSOLUTE: 0.75 E9/L (ref 1.5–4)
LYMPHOCYTES RELATIVE PERCENT: 7 % (ref 20–42)
MCH RBC QN AUTO: 24.2 PG (ref 26–35)
MCH RBC QN AUTO: 25.1 PG (ref 26–35)
MCHC RBC AUTO-ENTMCNC: 30.3 % (ref 32–34.5)
MCHC RBC AUTO-ENTMCNC: 32.1 % (ref 32–34.5)
MCV RBC AUTO: 78.4 FL (ref 80–99.9)
MCV RBC AUTO: 79.9 FL (ref 80–99.9)
METER GLUCOSE: 156 MG/DL (ref 74–99)
MONOCYTES ABSOLUTE: 0.79 E9/L (ref 0.1–0.95)
MONOCYTES RELATIVE PERCENT: 7.4 % (ref 2–12)
MYCOPLASMA PNEUMONIAE BY PCR: NOT DETECTED
NEUTROPHILS ABSOLUTE: 8.96 E9/L (ref 1.8–7.3)
NEUTROPHILS RELATIVE PERCENT: 83.9 % (ref 43–80)
PARAINFLUENZA VIRUS 1 BY PCR: NOT DETECTED
PARAINFLUENZA VIRUS 2 BY PCR: NOT DETECTED
PARAINFLUENZA VIRUS 3 BY PCR: NOT DETECTED
PARAINFLUENZA VIRUS 4 BY PCR: NOT DETECTED
PDW BLD-RTO: 16.3 FL (ref 11.5–15)
PDW BLD-RTO: 16.8 FL (ref 11.5–15)
PLATELET # BLD: 260 E9/L (ref 130–450)
PLATELET # BLD: 264 E9/L (ref 130–450)
PMV BLD AUTO: 8.6 FL (ref 7–12)
PMV BLD AUTO: 9.2 FL (ref 7–12)
POTASSIUM REFLEX MAGNESIUM: 3.9 MMOL/L (ref 3.5–5)
PRO-BNP: 94 PG/ML (ref 0–450)
RBC # BLD: 4.02 E12/L (ref 3.5–5.5)
RBC # BLD: 4.58 E12/L (ref 3.5–5.5)
RESPIRATORY SYNCYTIAL VIRUS BY PCR: NOT DETECTED
SARS-COV-2, PCR: NOT DETECTED
SODIUM BLD-SCNC: 132 MMOL/L (ref 132–146)
TOTAL PROTEIN: 7.5 G/DL (ref 6.4–8.3)
TROPONIN: <0.01 NG/ML (ref 0–0.03)
WBC # BLD: 10.7 E9/L (ref 4.5–11.5)
WBC # BLD: 9.2 E9/L (ref 4.5–11.5)

## 2020-12-07 PROCEDURE — 71275 CT ANGIOGRAPHY CHEST: CPT

## 2020-12-07 PROCEDURE — 93005 ELECTROCARDIOGRAM TRACING: CPT | Performed by: EMERGENCY MEDICINE

## 2020-12-07 PROCEDURE — 73502 X-RAY EXAM HIP UNI 2-3 VIEWS: CPT

## 2020-12-07 PROCEDURE — 6360000002 HC RX W HCPCS: Performed by: INTERNAL MEDICINE

## 2020-12-07 PROCEDURE — 71046 X-RAY EXAM CHEST 2 VIEWS: CPT

## 2020-12-07 PROCEDURE — 99222 1ST HOSP IP/OBS MODERATE 55: CPT | Performed by: INTERNAL MEDICINE

## 2020-12-07 PROCEDURE — 0202U NFCT DS 22 TRGT SARS-COV-2: CPT

## 2020-12-07 PROCEDURE — 6360000004 HC RX CONTRAST MEDICATION: Performed by: RADIOLOGY

## 2020-12-07 PROCEDURE — 96365 THER/PROPH/DIAG IV INF INIT: CPT

## 2020-12-07 PROCEDURE — 80053 COMPREHEN METABOLIC PANEL: CPT

## 2020-12-07 PROCEDURE — 83880 ASSAY OF NATRIURETIC PEPTIDE: CPT

## 2020-12-07 PROCEDURE — 99285 EMERGENCY DEPT VISIT HI MDM: CPT

## 2020-12-07 PROCEDURE — 2580000003 HC RX 258: Performed by: RADIOLOGY

## 2020-12-07 PROCEDURE — G0378 HOSPITAL OBSERVATION PER HR: HCPCS

## 2020-12-07 PROCEDURE — 84484 ASSAY OF TROPONIN QUANT: CPT

## 2020-12-07 PROCEDURE — 82962 GLUCOSE BLOOD TEST: CPT

## 2020-12-07 PROCEDURE — 85025 COMPLETE CBC W/AUTO DIFF WBC: CPT

## 2020-12-07 PROCEDURE — 96372 THER/PROPH/DIAG INJ SC/IM: CPT

## 2020-12-07 PROCEDURE — 93970 EXTREMITY STUDY: CPT

## 2020-12-07 PROCEDURE — 2060000000 HC ICU INTERMEDIATE R&B

## 2020-12-07 PROCEDURE — 85027 COMPLETE CBC AUTOMATED: CPT

## 2020-12-07 PROCEDURE — 85730 THROMBOPLASTIN TIME PARTIAL: CPT

## 2020-12-07 RX ORDER — SODIUM CHLORIDE 0.9 % (FLUSH) 0.9 %
10 SYRINGE (ML) INJECTION ONCE
Status: COMPLETED | OUTPATIENT
Start: 2020-12-07 | End: 2020-12-07

## 2020-12-07 RX ORDER — HEPARIN SODIUM 1000 [USP'U]/ML
80 INJECTION, SOLUTION INTRAVENOUS; SUBCUTANEOUS ONCE
Status: COMPLETED | OUTPATIENT
Start: 2020-12-07 | End: 2020-12-07

## 2020-12-07 RX ORDER — HEPARIN SODIUM 1000 [USP'U]/ML
80 INJECTION, SOLUTION INTRAVENOUS; SUBCUTANEOUS PRN
Status: DISCONTINUED | OUTPATIENT
Start: 2020-12-07 | End: 2020-12-10 | Stop reason: HOSPADM

## 2020-12-07 RX ORDER — MORPHINE SULFATE 2 MG/ML
1 INJECTION, SOLUTION INTRAMUSCULAR; INTRAVENOUS EVERY 4 HOURS PRN
Status: DISCONTINUED | OUTPATIENT
Start: 2020-12-07 | End: 2020-12-10 | Stop reason: HOSPADM

## 2020-12-07 RX ORDER — HEPARIN SODIUM 10000 [USP'U]/100ML
18 INJECTION, SOLUTION INTRAVENOUS CONTINUOUS
Status: DISCONTINUED | OUTPATIENT
Start: 2020-12-07 | End: 2020-12-10 | Stop reason: HOSPADM

## 2020-12-07 RX ORDER — BLOOD-GLUCOSE METER
KIT MISCELLANEOUS
Qty: 100 EACH | Refills: 5 | Status: SHIPPED
Start: 2020-12-07 | End: 2020-12-08

## 2020-12-07 RX ORDER — TRAMADOL HYDROCHLORIDE 50 MG/1
50 TABLET ORAL EVERY 6 HOURS PRN
Status: DISCONTINUED | OUTPATIENT
Start: 2020-12-07 | End: 2020-12-10 | Stop reason: HOSPADM

## 2020-12-07 RX ORDER — WARFARIN SODIUM 5 MG/1
5 TABLET ORAL
Status: DISPENSED | OUTPATIENT
Start: 2020-12-07 | End: 2020-12-08

## 2020-12-07 RX ORDER — HEPARIN SODIUM 1000 [USP'U]/ML
40 INJECTION, SOLUTION INTRAVENOUS; SUBCUTANEOUS PRN
Status: DISCONTINUED | OUTPATIENT
Start: 2020-12-07 | End: 2020-12-10 | Stop reason: HOSPADM

## 2020-12-07 RX ADMIN — IOPAMIDOL 80 ML: 755 INJECTION, SOLUTION INTRAVENOUS at 16:28

## 2020-12-07 RX ADMIN — HEPARIN SODIUM AND DEXTROSE 18 UNITS/KG/HR: 10000; 5 INJECTION INTRAVENOUS at 23:10

## 2020-12-07 RX ADMIN — Medication 10 ML: at 16:28

## 2020-12-07 RX ADMIN — HEPARIN SODIUM 5440 UNITS: 1000 INJECTION INTRAVENOUS; SUBCUTANEOUS at 23:03

## 2020-12-07 ASSESSMENT — PAIN DESCRIPTION - PAIN TYPE: TYPE: ACUTE PAIN

## 2020-12-07 NOTE — TELEPHONE ENCOUNTER
Reason for Disposition   MILD difficulty breathing (e.g., minimal/no SOB at rest, SOB with walking, pulse < 100) of new onset or worse than normal    Answer Assessment - Initial Assessment Questions  1. RESPIRATORY STATUS: \"Describe your breathing? \" (e.g., wheezing, shortness of breath, unable to speak, severe coughing)       SOB with exertion    2. ONSET: \"When did this breathing problem begin? \"       Pt reports for a few weeks. 3. PATTERN \"Does the difficult breathing come and go, or has it been constant since it started? \"       Intermittent - with exertion    4. SEVERITY: \"How bad is your breathing? \" (e.g., mild, moderate, severe)     - MILD: No SOB at rest, mild SOB with walking, speaks normally in sentences, can lay down, no retractions, pulse < 100.     - MODERATE: SOB at rest, SOB with minimal exertion and prefers to sit, cannot lie down flat, speaks in phrases, mild retractions, audible wheezing, pulse 100-120.     - SEVERE: Very SOB at rest, speaks in single words, struggling to breathe, sitting hunched forward, retractions, pulse > 120       Mild     5. RECURRENT SYMPTOM: \"Have you had difficulty breathing before? \" If so, ask: \"When was the last time? \" and \"What happened that time? \"       Denies    6. CARDIAC HISTORY: \"Do you have any history of heart disease? \" (e.g., heart attack, angina, bypass surgery, angioplasty)       Denies    7. LUNG HISTORY: \"Do you have any history of lung disease? \"  (e.g., pulmonary embolus, asthma, emphysema)      Cancer     8. CAUSE: \"What do you think is causing the breathing problem? \"       Pt is unsure of cause. 9. OTHER SYMPTOMS: \"Do you have any other symptoms? (e.g., dizziness, runny nose, cough, chest pain, fever)  Sob with exertion. Pt reports having fallen on right hip and leg where she has lymphedema. 10. PREGNANCY: \"Is there any chance you are pregnant? \" \"When was your last menstrual period? \"        N/A    11.  TRAVEL: \"Have you traveled out of

## 2020-12-07 NOTE — ED PROVIDER NOTES
ATTENDING PROVIDER ATTESTATION:     Fannie Helms presented to the emergency department for evaluation of Shortness of Breath (x3 days, sent in by pcp to r/o PE, denies fever/cough. lung cancer patient ) and Leg Swelling (right leg lymphedema has gotten much worse. had fall on oct 21 on right hip )   and was initially evaluated by the Medical Resident. See Original ED Note for H&P and ED course above. I have reviewed and discussed the case, including pertinent history (medical, surgical, family and social) and exam findings with the Medical Resident assigned to Fannie Analia. I have personally performed and/or participated in the history, exam, medical decision making, and procedures and agree with all pertinent clinical information. Critical Care:  Please note that the withdrawal or failure to initiate urgent interventions for this patient would likely result in a life threatening deterioration or permanent disability. Accordingly this patient received 32 minutes of critical care time, excluding separately billable procedures. I, Dr. Leonarda Rivers MD, am the primary provider of this record. I have reviewed my findings and recommendations with the assigned Medical Resident, Fannie Helms and members of family present at the time of disposition. My findings/plan: The primary encounter diagnosis was Acute deep vein thrombosis of right iliac vein ([address] Diagnoses of Pulmonary nodules and Cavitary lesion of lung were also pertinent to this visit. New Prescriptions    No medications on file     Leonarda Rivers MD     HPI:   Fannie Helms is a 77 y.o. female presenting to the ED for increasing shortness of breath, beginning 2 weeks ago. The complaint has been persistent, moderate in severity, and worsened by exertion.   Patient states that she has a significant past medical history including metastatic endometrial and breast cancer in addition to chronic shortness of breath secondary to metastasis. Patient also states that she has chronic lymphedema to her right lower extremity. She states however that for the last 2 weeks or so she has been having a cough and worsening shortness of breath. States that she also recently had a fall onto her right hip on October 21 and since then she has had worsening lymphedema on the right. States that her PCP, Dr. Dorothea Park sent her to the ED to be evaluated for pulmonary embolism versus COVID-19. He does endorse right lower quadrant pain, however she states that she thinks this is from her hip because it has been since she fell and radiating up from the hip joint. She denies any headache, fever, chest pain, nausea, vomiting, other abdominal pain, diarrhea, constipation, urinary symptoms. Review of Systems:   Please see HPI above. All bolded are positive. All un-bolded are negative.     Constitutional Symptoms: fever, chills, fatigue, generalized weakness, diaphoresis, increase in thirst, loss of appetite  Eyes: vision change   Ears, Nose, Mouth, Throat: hearing loss, nasal congestion, sores in the mouth  Cardiovascular: chest pain, chest heaviness, palpitations  Respiratory: shortness of breath, wheezing, coughing  Gastrointestinal: abdominal pain, nausea, vomiting, diarrhea, constipation, melena, hematochezia, hematemesis  Genitourinary: dysuria, hematuria, increased frequency  Musculoskeletal: lower extremity edema, myalgias, arthralgias, back pain  Integumentary: rashes, itching   Neurological: headache, lightheadedness, dizziness, confusion, syncope, numbness, tingling, focal weakness  Psychiatric: depression, suicidal ideation, anxiety          --------------------------------------------- PAST HISTORY ---------------------------------------------  Past Medical History:  has a past medical history of [facility] ([facility] Diabetes mellitus ([facility] Diverticulitis, GERD (gastroesophageal reflux disease), Gout, Hiatal hernia, Macular degeneration, Metastatic disease ([facility] Osteoarthritis, and Osteopenia. Past Surgical History:  has a past surgical history that includes  section; Tonsillectomy; cyst removal; Hysterectomy (2016); Breast surgery (Left, 10/2016); eye surgery (Bilateral, ); Colonoscopy; Endoscopy, colon, diagnostic; Breast lumpectomy (Left, 2016); chg unlisted dx radiographic procedure (N/A, 2018); [facility] (N/A, 2018); pr brncc w/brncl alveolar lavage (2018); pr [facility] brushing/protected brushings (2018); pr bronchoscopy w/transbronchial lung bx 1 lobe (2018); pr bronchoscopy w/transbronchial lung bx each lobe (2018); pr insj tunneled ctr vad w/subq port age 12 yr/> (N/A, 2018); Hysterectomy, total abdominal (2016); and Port Surgery (N/A, 2020). Social History:  reports that she has quit smoking. Her smoking use included cigarettes. She has a 5.00 pack-year smoking history. She has never used smokeless tobacco. She reports current alcohol use. She reports current drug use. Drug: Marijuana. Family History: family history includes Arthritis in her father; Cancer in her maternal aunt; Diabetes in her mother; Heart Disease in her father and mother; High Blood Pressure in her mother. The patients home medications have been reviewed. Allergies: Omeprazole; Percocet [oxycodone-acetaminophen]; Januvia [sitagliptin]; Graydon Felisa [aspirin]; Erythromycin; Famotidine; Keflet [cephalexin]; Levaquin [levofloxacin in d5w]; Pcn [penicillins]; Polyethylene glycol; Protonix [pantoprazole sodium];  Tetracycline; and Tetracyclines & related        ---------------------------------------------------PHYSICAL EXAM--------------------------------------    Constitutional/General: Alert and oriented x3, well appearing, non toxic in NAD  Head: Normocephalic and atraumatic  Eyes: PERRL, EOMI, conjunctive normal, sclera non icteric  Mouth: Oropharynx clear, handling secretions  Neck: Supple, full ROM, no stridor  Respiratory: Scattered rhonchi throughout lung fields, no wheezes, rales. Not in respiratory distress  Cardiovascular:  Regular rate. Regular rhythm. No murmurs, gallops, or rubs. Patient has 3+ nonpitting edema consistent with lymphedema to the right lower extremity  Chest: No chest wall tenderness  GI: Mild lower quadrant tenderness bilaterally, Non distended. No organomegaly, no palpable masses,  No rebound, guarding, or rigidity. Musculoskeletal: Moves all extremities x 4. Warm and well perfused, no clubbing, cyanosis. As noted before, patient does have lymphedema noted to right lower extremity. DP and PT pulses are easily palpable. No tenderness to palpation of right hip, however patient does have pain with range of motion to the right hip. Integument: skin warm and dry. No rashes. Lymphatic: no lymphadenopathy noted  Neurologic: GCS 15, no focal deficits, symmetric strength 5/5 in the upper and lower extremities bilaterally  Psychiatric: Normal Affect    -------------------------------------------------- RESULTS -------------------------------------------------  I have personally reviewed all laboratory and imaging results for this patient. Results are listed below.      LABS:  Results for orders placed or performed during the hospital encounter of 12/07/20   Respiratory Panel, Molecular, with COVID-19 (Restricted: peds pts or suitable admitted adults)    Specimen: Nasopharyngeal   Result Value Ref Range    Adenovirus by PCR Not Detected Not Detected    Bordetella parapertussis by PCR Not Detected Not Detected    Bordetella pertussis by PCR Not Detected Not Detected    Chlamydophilia pneumoniae by PCR Not Detected Not Detected    Coronavirus 229E by PCR Not Detected Not Detected    Coronavirus HKU1 by PCR Not Detected Not Detected    Coronavirus NL63 by PCR Not Detected Not Detected    Coronavirus OC43 by PCR Not Detected Not Detected SARS-CoV-2, PCR Not Detected Not Detected    Human Metapneumovirus by PCR Not Detected Not Detected    Human Rhinovirus/Enterovirus by PCR Not Detected Not Detected    Influenza A by PCR Not Detected Not Detected    Influenza B by PCR Not Detected Not Detected    Mycoplasma pneumoniae by PCR Not Detected Not Detected    Parainfluenza Virus 1 by PCR Not Detected Not Detected    Parainfluenza Virus 2 by PCR Not Detected Not Detected    Parainfluenza Virus 3 by PCR Not Detected Not Detected    Parainfluenza Virus 4 by PCR Not Detected Not Detected    Respiratory Syncytial Virus by PCR Not Detected Not Detected   CBC auto differential   Result Value Ref Range    WBC 10.7 4.5 - 11.5 E9/L    RBC 4.58 3.50 - 5.50 E12/L    Hemoglobin 11.1 (L) 11.5 - 15.5 g/dL    Hematocrit 36.6 34.0 - 48.0 %    MCV 79.9 (L) 80.0 - 99.9 fL    MCH 24.2 (L) 26.0 - 35.0 pg    MCHC 30.3 (L) 32.0 - 34.5 %    RDW 16.3 (H) 11.5 - 15.0 fL    Platelets 313 480 - 886 E9/L    MPV 8.6 7.0 - 12.0 fL    Neutrophils % 83.9 (H) 43.0 - 80.0 %    Immature Granulocytes % 1.3 0.0 - 5.0 %    Lymphocytes % 7.0 (L) 20.0 - 42.0 %    Monocytes % 7.4 2.0 - 12.0 %    Eosinophils % 0.0 0.0 - 6.0 %    Basophils % 0.4 0.0 - 2.0 %    Neutrophils Absolute 8.96 (H) 1.80 - 7.30 E9/L    Immature Granulocytes # 0.14 E9/L    Lymphocytes Absolute 0.75 (L) 1.50 - 4.00 E9/L    Monocytes Absolute 0.79 0.10 - 0.95 E9/L    Eosinophils Absolute 0.00 (L) 0.05 - 0.50 E9/L    Basophils Absolute 0.04 0.00 - 0.20 E9/L   Comprehensive Metabolic Panel w/ Reflex to MG   Result Value Ref Range    Sodium 132 132 - 146 mmol/L    Potassium reflex Magnesium 3.9 3.5 - 5.0 mmol/L    Chloride 91 (L) 98 - 107 mmol/L    CO2 29 22 - 29 mmol/L    Anion Gap 12 7 - 16 mmol/L    Glucose 142 (H) 74 - 99 mg/dL    BUN 26 (H) 8 - 23 mg/dL    CREATININE 0.9 0.5 - 1.0 mg/dL    GFR Non-African American >60 >=60 mL/min/1.73    GFR African American >60     Calcium 10.3 (H) 8.6 - 10.2 mg/dL    Total Protein 7.5 6.4 - 8.3 g/dL    Alb 3.7 3.5 - 5.2 g/dL    Total Bilirubin 0.5 0.0 - 1.2 mg/dL    Alkaline Phosphatase 120 (H) 35 - 104 U/L    ALT 30 0 - 32 U/L    AST 21 0 - 31 U/L   Troponin   Result Value Ref Range    Troponin <0.01 0.00 - 0.03 ng/mL   Brain Natriuretic Peptide   Result Value Ref Range    Pro-BNP 94 0 - 450 pg/mL   POCT Glucose   Result Value Ref Range    Meter Glucose 156 (H) 74 - 99 mg/dL   EKG 12 Lead   Result Value Ref Range    Ventricular Rate 110 BPM    Atrial Rate 110 BPM    P-R Interval 120 ms    QRS Duration 82 ms    Q-T Interval 324 ms    QTc Calculation (Bazett) 438 ms    P Axis 50 degrees    R Axis -10 degrees    T Axis 20 degrees       RADIOLOGY:  Interpreted by Radiologist.  US DUP LOWER EXTREMITIES BILATERAL VENOUS   Final Result   1. Age-uncertain, partially-occlusive thrombus within the right lower   extremity, from the visualized distal right iliac, through the distal right   superficial femoral, veins. 2.  Age-uncertain, minimal, partially-occlusive thrombus is noted within a   superficial vein at the level of the left calf. 3.  Left lower extremity negative for deep venous thrombosis. 4.  Minimal to moderate scattered soft tissue edema is most notable the level   of the right popliteal region and calf. .      CTA PULMONARY W CONTRAST   Final Result   1. There is no evidence of a pulmonary embolus. 2. Multiple bilateral solid pulmonary nodules most consistent with widespread   metastatic pulmonary disease   3. Multifocal bilateral thick-walled cavitary lesions also favored to   represent multifocal bilateral metastatic disease. Please note the cavitary   lesions are larger in size when compared to prior study. The largest is seen   within the left lower lobe and measures 2.0 x 2.0 cm.   4. Mediastinal and right hilar lymphadenopathy. XR HIP RIGHT (2-3 VIEWS)   Final Result   No acute displaced fractures.       XR CHEST (2 VW)   Final Result   Multiple cavitary lung lesions bilaterally which likely represent neoplasm. These do demonstrate some interval progression.              ------------------------- NURSING NOTES AND VITALS REVIEWED ---------------------------   The nursing notes within the ED encounter and vital signs as below have been reviewed by myself. /63   Pulse 92   Temp 98.2 °F (36.8 °C) (Oral)   Resp 15   Ht 5' 3\" (1.6 m)   Wt 150 lb (68 kg)   SpO2 99%   BMI 26.57 kg/m²   Oxygen Saturation Interpretation: Normal    The patients available past medical records and past encounters were reviewed. ------------------------------ ED COURSE/MEDICAL DECISION MAKING----------------------  Medications   heparin (porcine) injection 5,440 Units (has no administration in time range)   heparin (porcine) injection 5,440 Units (has no administration in time range)   heparin (porcine) injection 2,720 Units (has no administration in time range)   heparin 25,000 units in dextrose 5% 250 mL infusion (has no administration in time range)   traMADol (ULTRAM) tablet 50 mg (has no administration in time range)   morphine (PF) injection 1 mg (has no administration in time range)   warfarin (COUMADIN) tablet 5 mg (has no administration in time range)   warfarin (COUMADIN) daily dosing (placeholder) (has no administration in time range)   iopamidol (ISOVUE-370) 76 % injection 80 mL (80 mLs Intravenous Given 12/7/20 1628)   sodium chloride flush 0.9 % injection 10 mL (10 mLs Intravenous Given 12/7/20 1628)         ED COURSE:  ED Course as of Dec 07 2210   Mon Dec 07, 2020   1729 EKG: This EKG is signed and interpreted by me. Rate: 110  Rhythm: Sinus  Interpretation: Sinus tachycardia, normal IL interval, normal QRS, normal QT interval, no acute ST or T wave changes  Comparison: no previous EKG available        [JA]   1804 Patient has swelling to her right lower extremity. Soft compartments, distal pulses intact. No evidence of compartment syndrome.     [JA]   1930 Spoke to Dr. Renata Perry, he recommends patient be brought into the hospital for heparin drip and then be bridged to Coumadin. Did call Dr. Renetta Lemons for admission, however he recommends that I speak to oncologist first as he feels that patient would benefit from Lovenox injections. Call currently placed to Dr. Mason Jackson. [DS]   2033 Spoke to Dr. Jana Eaton for oncology who is agreeable to heparin drip and then bridging to Coumadin. Did inform Dr. Renetta Lemons and he is agreeable to this as well. Patient will be admitted. [DS]      ED Course User Index  [DS] [facility], DO  [JA] Kwabena Molina MD       Medical Decision Making:    Patient presented to the ER today with chief complaint of right-sided leg swelling, increasing shortness of breath, and right-sided hip pain status post fall October 21. Patient has significant metastatic disease. There was concern by her PCP for PE. CTA was done which shows multiple cavitary lesions secondary to metastatic disease. Bilateral lower extremity ultrasounds were obtained which demonstrates a professional clot in the left calf and more concerning is that patient has a occlusive right distal iliac thrombus. I have confirmed with internal medicine, oncology, and vascular surgery and decision made to start patient on heparin drip with plan to bridge to Coumadin. Patient is agreeable to this and agreeable to admission. Dr. Renetta Lemons is agreeable to admission plan as well. All questions were answered    This patient's ED course included: a personal history and physicial examination, re-evaluation prior to disposition, multiple bedside re-evaluations, cardiac monitoring and continuous pulse oximetry    This patient has remained hemodynamically stable during their ED course. Re-Evaluations:             Re-evaluation.   Patients symptoms show no change    Re-examination  12/7/20   3:21 PM EST          Vital Signs:   Vitals:    12/07/20 1236 12/07/20 1745 12/07/20 1900 12/07/20 2007 BP: 125/72 117/63 110/64 117/63   Pulse: 120 92 99 92   Resp: 18 20 18 15   Temp: 97 °F (36.1 °C) 98.1 °F (36.7 °C) 98.1 °F (36.7 °C) 98.2 °F (36.8 °C)   TempSrc:    Oral   SpO2: 98% 96% 95% 99%   Weight:    150 lb (68 kg)   Height:    5' 3\" (1.6 m)     Card/Pulm:  Rhythm: normal rate. Heart Sounds: no murmurs, gallops, or rubs. clear to auscultation, no wheezes or rales and unlabored breathing. Consultations:             Vascular surgery: Dr. Tg Larson   Oncology: Dr. Sierra Koch: None        Counseling: The emergency provider has spoken with the patient and discussed todays results, in addition to providing specific details for the plan of care and counseling regarding the diagnosis and prognosis. Questions are answered at this time and they are agreeable with the plan.       --------------------------------- IMPRESSION AND DISPOSITION ---------------------------------    IMPRESSION  1. Acute deep vein thrombosis of right iliac vein (HCC)    2. Pulmonary nodules    3. Cavitary lesion of lung        DISPOSITION  Disposition: Admit to telemetry  Patient condition is stable    NOTE: This report was transcribed using voice recognition software.  Every effort was made to ensure accuracy; however, inadvertent computerized transcription errors may be present       [facility],   Resident  12/07/20 2037       Clarence Lewis MD  12/07/20 7246

## 2020-12-07 NOTE — TELEPHONE ENCOUNTER
She should be going to the ED for the shortness of breath--being seen here is really not appropriate  She has metastatic cancer and will likely need xrays and CT of chest  She very well may have a clot in her lung from the cancer

## 2020-12-07 NOTE — TELEPHONE ENCOUNTER
Patient advised to go to ED. She is short of breath just walking 100 feet. Her thigh that has edema is 7.5 inches bigger than her other thigh and the pain has increased in severity. Patient says she will go to ED.

## 2020-12-07 NOTE — TELEPHONE ENCOUNTER
Patient called and stated she is SOB with any Exertion. Had a low grade fever appx for 10 days. It It was 14 days ago since the fever. She has pain in her right leg where she has Lymphedema, Had fallen in the end of October. Diane stated she has cancer. Called Nurse triage due to the Swelling. Spoke to [facility], Transferred the call.

## 2020-12-08 LAB
APTT: 196.3 SEC (ref 24.5–35.1)
APTT: 32.6 SEC (ref 24.5–35.1)
APTT: 39.3 SEC (ref 24.5–35.1)
APTT: 53.8 SEC (ref 24.5–35.1)
APTT: >240 SEC (ref 24.5–35.1)
EKG ATRIAL RATE: 110 BPM
EKG P AXIS: 50 DEGREES
EKG P-R INTERVAL: 120 MS
EKG Q-T INTERVAL: 324 MS
EKG QRS DURATION: 82 MS
EKG QTC CALCULATION (BAZETT): 438 MS
EKG R AXIS: -10 DEGREES
EKG T AXIS: 20 DEGREES
EKG VENTRICULAR RATE: 110 BPM
HBA1C MFR BLD: 7.5 % (ref 4–5.6)
INR BLD: 1.1
METER GLUCOSE: 156 MG/DL (ref 74–99)
METER GLUCOSE: 214 MG/DL (ref 74–99)
METER GLUCOSE: 223 MG/DL (ref 74–99)
PROTHROMBIN TIME: 13.3 SEC (ref 9.3–12.4)

## 2020-12-08 PROCEDURE — 6370000000 HC RX 637 (ALT 250 FOR IP): Performed by: INTERNAL MEDICINE

## 2020-12-08 PROCEDURE — 96376 TX/PRO/DX INJ SAME DRUG ADON: CPT

## 2020-12-08 PROCEDURE — 6360000002 HC RX W HCPCS: Performed by: INTERNAL MEDICINE

## 2020-12-08 PROCEDURE — 36415 COLL VENOUS BLD VENIPUNCTURE: CPT

## 2020-12-08 PROCEDURE — 2060000000 HC ICU INTERMEDIATE R&B

## 2020-12-08 PROCEDURE — 85610 PROTHROMBIN TIME: CPT

## 2020-12-08 PROCEDURE — 85730 THROMBOPLASTIN TIME PARTIAL: CPT

## 2020-12-08 PROCEDURE — 82962 GLUCOSE BLOOD TEST: CPT

## 2020-12-08 PROCEDURE — 83036 HEMOGLOBIN GLYCOSYLATED A1C: CPT

## 2020-12-08 PROCEDURE — 99223 1ST HOSP IP/OBS HIGH 75: CPT | Performed by: INTERNAL MEDICINE

## 2020-12-08 PROCEDURE — 99232 SBSQ HOSP IP/OBS MODERATE 35: CPT | Performed by: INTERNAL MEDICINE

## 2020-12-08 PROCEDURE — 96366 THER/PROPH/DIAG IV INF ADDON: CPT

## 2020-12-08 PROCEDURE — G0378 HOSPITAL OBSERVATION PER HR: HCPCS

## 2020-12-08 PROCEDURE — 93010 ELECTROCARDIOGRAM REPORT: CPT | Performed by: INTERNAL MEDICINE

## 2020-12-08 RX ORDER — SODIUM CHLORIDE 0.9 % (FLUSH) 0.9 %
10 SYRINGE (ML) INJECTION EVERY 12 HOURS SCHEDULED
Status: DISCONTINUED | OUTPATIENT
Start: 2020-12-08 | End: 2020-12-10 | Stop reason: HOSPADM

## 2020-12-08 RX ORDER — CETIRIZINE HYDROCHLORIDE 10 MG/1
10 TABLET ORAL DAILY
Status: DISCONTINUED | OUTPATIENT
Start: 2020-12-08 | End: 2020-12-10 | Stop reason: HOSPADM

## 2020-12-08 RX ORDER — ACETAMINOPHEN 650 MG/1
650 SUPPOSITORY RECTAL EVERY 6 HOURS PRN
Status: DISCONTINUED | OUTPATIENT
Start: 2020-12-08 | End: 2020-12-10 | Stop reason: HOSPADM

## 2020-12-08 RX ORDER — BUMETANIDE 1 MG/1
1 TABLET ORAL DAILY
Status: DISCONTINUED | OUTPATIENT
Start: 2020-12-08 | End: 2020-12-10 | Stop reason: HOSPADM

## 2020-12-08 RX ORDER — GLIMEPIRIDE 2 MG/1
2 TABLET ORAL DAILY
Status: DISCONTINUED | OUTPATIENT
Start: 2020-12-08 | End: 2020-12-10 | Stop reason: HOSPADM

## 2020-12-08 RX ORDER — NICOTINE POLACRILEX 4 MG
15 LOZENGE BUCCAL PRN
Status: DISCONTINUED | OUTPATIENT
Start: 2020-12-08 | End: 2020-12-10 | Stop reason: HOSPADM

## 2020-12-08 RX ORDER — SODIUM CHLORIDE 0.9 % (FLUSH) 0.9 %
10 SYRINGE (ML) INJECTION PRN
Status: DISCONTINUED | OUTPATIENT
Start: 2020-12-08 | End: 2020-12-10 | Stop reason: HOSPADM

## 2020-12-08 RX ORDER — ACETAMINOPHEN 325 MG/1
650 TABLET ORAL EVERY 6 HOURS PRN
Status: DISCONTINUED | OUTPATIENT
Start: 2020-12-08 | End: 2020-12-10 | Stop reason: HOSPADM

## 2020-12-08 RX ORDER — LANCETS 33 GAUGE
EACH MISCELLANEOUS
COMMUNITY
End: 2020-12-08 | Stop reason: SDUPTHER

## 2020-12-08 RX ORDER — PREDNISONE 1 MG/1
5 TABLET ORAL 2 TIMES DAILY
Status: DISCONTINUED | OUTPATIENT
Start: 2020-12-08 | End: 2020-12-10 | Stop reason: HOSPADM

## 2020-12-08 RX ORDER — DEXTROSE MONOHYDRATE 50 MG/ML
100 INJECTION, SOLUTION INTRAVENOUS PRN
Status: DISCONTINUED | OUTPATIENT
Start: 2020-12-08 | End: 2020-12-10 | Stop reason: HOSPADM

## 2020-12-08 RX ORDER — LANCETS 33 GAUGE
EACH MISCELLANEOUS
Qty: 100 EACH | Refills: 5 | Status: SHIPPED | OUTPATIENT
Start: 2020-12-08

## 2020-12-08 RX ORDER — POLYETHYLENE GLYCOL 3350 17 G/17G
17 POWDER, FOR SOLUTION ORAL DAILY PRN
Status: DISCONTINUED | OUTPATIENT
Start: 2020-12-08 | End: 2020-12-10 | Stop reason: HOSPADM

## 2020-12-08 RX ORDER — LEVOTHYROXINE SODIUM 112 UG/1
112 TABLET ORAL DAILY
Status: DISCONTINUED | OUTPATIENT
Start: 2020-12-08 | End: 2020-12-10 | Stop reason: HOSPADM

## 2020-12-08 RX ORDER — WARFARIN SODIUM 5 MG/1
5 TABLET ORAL
Status: COMPLETED | OUTPATIENT
Start: 2020-12-08 | End: 2020-12-08

## 2020-12-08 RX ORDER — DEXTROSE MONOHYDRATE 25 G/50ML
12.5 INJECTION, SOLUTION INTRAVENOUS PRN
Status: DISCONTINUED | OUTPATIENT
Start: 2020-12-08 | End: 2020-12-10 | Stop reason: HOSPADM

## 2020-12-08 RX ORDER — MONTELUKAST SODIUM 10 MG/1
10 TABLET ORAL NIGHTLY
Status: DISCONTINUED | OUTPATIENT
Start: 2020-12-08 | End: 2020-12-10 | Stop reason: HOSPADM

## 2020-12-08 RX ADMIN — PREDNISONE 5 MG: 5 TABLET ORAL at 13:42

## 2020-12-08 RX ADMIN — LEVOTHYROXINE SODIUM 112 MCG: 112 TABLET ORAL at 13:42

## 2020-12-08 RX ADMIN — INSULIN LISPRO 2 UNITS: 100 INJECTION, SOLUTION INTRAVENOUS; SUBCUTANEOUS at 16:38

## 2020-12-08 RX ADMIN — INSULIN LISPRO 1 UNITS: 100 INJECTION, SOLUTION INTRAVENOUS; SUBCUTANEOUS at 13:43

## 2020-12-08 RX ADMIN — PREDNISONE 5 MG: 5 TABLET ORAL at 21:38

## 2020-12-08 RX ADMIN — HEPARIN SODIUM 2720 UNITS: 1000 INJECTION INTRAVENOUS; SUBCUTANEOUS at 16:20

## 2020-12-08 RX ADMIN — INSULIN LISPRO 1 UNITS: 100 INJECTION, SOLUTION INTRAVENOUS; SUBCUTANEOUS at 21:38

## 2020-12-08 RX ADMIN — TRAMADOL HYDROCHLORIDE 50 MG: 50 TABLET, FILM COATED ORAL at 00:32

## 2020-12-08 RX ADMIN — WARFARIN SODIUM 5 MG: 5 TABLET ORAL at 18:51

## 2020-12-08 RX ADMIN — GLIMEPIRIDE 2 MG: 2 TABLET ORAL at 13:42

## 2020-12-08 RX ADMIN — TRAMADOL HYDROCHLORIDE 50 MG: 50 TABLET, FILM COATED ORAL at 08:08

## 2020-12-08 RX ADMIN — TRAMADOL HYDROCHLORIDE 50 MG: 50 TABLET, FILM COATED ORAL at 21:38

## 2020-12-08 ASSESSMENT — PAIN SCALES - GENERAL
PAINLEVEL_OUTOF10: 7
PAINLEVEL_OUTOF10: 0

## 2020-12-08 ASSESSMENT — PAIN DESCRIPTION - ORIENTATION: ORIENTATION: RIGHT

## 2020-12-08 ASSESSMENT — PAIN DESCRIPTION - ONSET: ONSET: ON-GOING

## 2020-12-08 ASSESSMENT — PAIN DESCRIPTION - DESCRIPTORS: DESCRIPTORS: CRAMPING;ACHING

## 2020-12-08 ASSESSMENT — PAIN DESCRIPTION - PAIN TYPE: TYPE: ACUTE PAIN

## 2020-12-08 ASSESSMENT — PAIN DESCRIPTION - LOCATION: LOCATION: LEG

## 2020-12-08 ASSESSMENT — PAIN DESCRIPTION - FREQUENCY: FREQUENCY: CONTINUOUS

## 2020-12-08 NOTE — PROGRESS NOTES
Pharmacy Consultation Note  (Anticoagulant Dosing and Monitoring)    Initial consult date: 12-7-2020  Consulting physician: Dr. Jovanny Chaudhari    Allergies:  Omeprazole; Percocet [oxycodone-acetaminophen]; Januvia [sitagliptin]; Lajuanda Older [aspirin]; Erythromycin; Famotidine; Keflet [cephalexin]; Levaquin [levofloxacin in d5w]; Pcn [penicillins]; Polyethylene glycol; Protonix [pantoprazole sodium]; Tetracycline; and Tetracyclines & related    77 y.o. female; 160 cm, 68 kg    Warfarin Indication Target   INR Range Home Dose  (if applicable) Diet/Feeding Tube   RLE DVT  2 - 3 N/A      Vitamin K or Blood product  Administration Date             Warfarin drug-drug interactions  Start  Stop Home Med? Comments                   TSH:    Lab Results   Component Value Date    TSH 1.690 12/04/2020        Hepatic Function Panel:                            Lab Results   Component Value Date    ALKPHOS 120 12/07/2020    ALT 30 12/07/2020    AST 21 12/07/2020    PROT 7.5 12/07/2020    BILITOT 0.5 12/07/2020    LABALBU 3.7 12/07/2020    LABALBU 3.1 05/16/2020       Date Warfarin Dose INR Heparin or LMWH HBG/HCT PLT Comment   12/7  N/A UFH infusion 11.1/36.6 264    12/8 5mg 1.1 Heparin gtt -- --                                 Assessment:  · 76 yo/F admitted for SOB, RLE pain, and fall at home. Active CA patient. · Found to have RLE DVT by US and CT Pulm was negative for PE. Starting on heparin infusion as bridge to warfarin therapy. Plan:  · Give warfarin 5 mg x1 tonight. · Daily PT/INR until the INR is stable within the therapeutic range. · Pharmacist will follow and monitor/adjust dosing as necessary.     Gaye Lemons PharmD, BCPS 12/8/2020 3:05 PM   Ext: [phone]

## 2020-12-08 NOTE — PROGRESS NOTES
[facility] Hospitalist   Progress Note    Admitting Date and Time: 12/7/2020  2:31 PM  Admit Dx: Acute deep vein thrombosis of right iliac vein (HCC) [I82.421]  Acute deep vein thrombosis of right iliac vein (HCC) [I82.421]    Subjective: Admitted in the evening of seventh, patient came with shortness of breath and pain and swelling in the leg, patient with CA breast, on Keytruda, did not tolerate, do have lung mets, has DVT in the right distal iliac through right superficial femoral vein. CTA with no PE. Vascular medicine had suggested heparin drip and bridging to Coumadin. Vascular medicine, hematology on consult as well as pharmacy dosing warfarin. Patient was admitted with Acute deep vein thrombosis of right iliac vein (HCC) [I82.421]  Acute deep vein thrombosis of right iliac vein (HCC) [I82.421]. Patient is awake, alert, resting, still in ED. Does say that initially this was thought to be lymphedema    Per RN: No new complaints    ROS: denies fever, chills, cp, sob, n/v, HA unless stated above.      warfarin  5 mg Oral Once    warfarin (COUMADIN) daily dosing (placeholder)   Other RX Placeholder     heparin (porcine), 80 Units/kg, PRN  heparin (porcine), 40 Units/kg, PRN  traMADol, 50 mg, Q6H PRN  morphine, 1 mg, Q4H PRN         Objective:    BP 98/63   Pulse 100   Temp 98 °F (36.7 °C)   Resp 18   Ht 5' 3\" (1.6 m)   Wt 150 lb (68 kg)   SpO2 95%   BMI 26.57 kg/m²   General Appearance: alert and oriented to person, place and time, well-developed and well-nourished, in no acute distress  Skin: warm and dry, no rash or erythema  Head: normocephalic and atraumatic  Eyes: pupils equal, round, and reactive to light, extraocular eye movements intact, conjunctivae normal  ENT: tympanic membrane, external ear and ear canal normal bilaterally, oropharynx clear and moist with normal mucous membranes  Neck: neck supple and non tender without mass, no thyromegaly or thyroid nodules, no XR CHEST (2 VW)   Final Result   Multiple cavitary lung lesions bilaterally which likely represent neoplasm. These do demonstrate some interval progression. Assessment:    Active Problems:    Acute deep vein thrombosis of right iliac vein (HCC)  Resolved Problems:    * No resolved hospital problems. *      Plan:  1. DVT, as per ED communication with vascular medicine patient remains on IV heparin, consult pending. 2.         Does complain of pain, however also states that with multiple pain control she is getting she is okay for now. 3.         Hypothyroidism, remains on Synthroid. 4.          Diabetes, on glimepiride, has been continued, Accu-Cheks high rate controlled.         Electronically signed by Amie Torres MD on 12/8/2020 at 8:40 AM

## 2020-12-08 NOTE — H&P
[facility] ENDOSCOPY    COLONOSCOPY      CYST REMOVAL      groin area    ENDOSCOPY, COLON, DIAGNOSTIC      EYE SURGERY Bilateral 2016    cataracts    HYSTERECTOMY  07/2016    total with BSO    HYSTERECTOMY, TOTAL ABDOMINAL  07/2016    PORT SURGERY N/A 9/28/2020    MEDIPORT REMOVAL performed by Valeri Witt MD at [address] BRUSHING/PROTECTED BRUSHINGS  8/17/2018    BRONCHOSCOPY BRUSHINGS performed by Shravan Mortensen MD at [address] [place] [address] [facility]  8/17/2018    BRONCHOSCOPY ALVEOLAR LAVAGE performed by Shravan Mortensen MD at [address] [place] [address] [address] DX/TX INTERVENTION [address] LES N/A 8/17/2018    BRONCHOSCOPY ULTRASOUND performed by Shravan Mortensen MD at [address] [place] [address] W/TRANSBRONCHIAL LUNG BX 1 LOBE  8/17/2018    BRONCHOSCOPY/TRANSBRONCHIAL NEEDLE BIOPSY performed by Shravan Mortensen MD at [address] [facility] W/TRANSBRONCHIAL LUNG BX EACH LOBE  8/17/2018    BRONCHOSCOPY/TRANSBRONCHIAL NEEDLE BIOPSY ADDL LOBE performed by Shravan Mortensen MD at [address] TUNNELED CTR VAD W/SUBQ PORT AGE 6 YR/> N/A 12/4/2018    MEDIPORT INSERTION performed by Valeri Witt MD at [place] [address]         Medications Prior to Admission:    Prior to Admission medications    Medication Sig Start Date End Date Taking? Authorizing Provider   Ez Smart Blood Glucose Lancets MISC Use to check glucose twice a day due to fluctuating glucose level. 12/7/20   Lsis Negrete DO   predniSONE (DELTASONE) 5 MG tablet Take 5 mg by mouth 2 times daily    Historical Provider, MD   SYNTHROID 112 MCG tablet Take 1 tablet by mouth Daily 11/25/20   Liss Negrete DO   blood glucose monitor strips Patient has fluctuating glucose with hyperglycemia. Test glucose level twice day.  11/23/20   Savannah Barrios,    bumetanide (BUMEX) 1 MG tablet Take 1 tablet by mouth daily 11/19/20   Liss Negrete DO   Multiple Vitamin (MULTI-DAY VITAMINS PO) Take by mouth daily    Historical Provider, MD   NONFORMULARY Pancreatic enzymes daily    Historical Provider, MD   Cyanocobalamin (VITAMIN B 12) 250 MCG LOZG Take by mouth daily    Historical Provider, MD   Garlic 6990 MG TBEC Take by mouth daily    Historical Provider, MD   vitamin D (ERGOCALCIFEROL) 1.25 MG (79121 UT) CAPS capsule Take 50,000 Units by mouth once a week Saturday    Historical Provider, MD   NIACIN ER PO Take 75 mg by mouth every 3 days    Historical Provider, MD   nystatin-triamcinolone (MYCOLOG II) 959136-1.6 UNIT/GM-% cream APPLY SPARINGLY TO AFFECTED AREA(S) TWICE DAILY 6/27/20   Historical Provider, MD   glimepiride (AMARYL) 2 MG tablet Take 1 tablet by mouth daily 7/1/20   Talita Parker DO   melatonin 3 MG TABS tablet Take 6 mg by mouth nightly as needed     Historical Provider, MD   loratadine (CLARITIN) 10 MG tablet Take 10 mg by mouth daily as needed     Historical Provider, MD   montelukast (SINGULAIR) 10 MG tablet Take by mouth nightly as needed  6/24/19   Historical Provider, MD   CANNABIDIOL PO Inhale 3 puffs into the lungs nightly as needed     Historical Provider, MD       Allergies:    Omeprazole; Percocet [oxycodone-acetaminophen]; Januvia [sitagliptin]; Heydi Luiz [aspirin]; Erythromycin; Famotidine; Keflet [cephalexin]; Levaquin [levofloxacin in d5w]; Pcn [penicillins]; Polyethylene glycol; Protonix [pantoprazole sodium]; Tetracycline; and Tetracyclines & related    Social History:    reports that she has quit smoking. Her smoking use included cigarettes. She has a 5.00 pack-year smoking history. She has never used smokeless tobacco. She reports current alcohol use. She reports current drug use. Drug: Marijuana. Family History:   family history includes Arthritis in her father; Cancer in her maternal aunt; Diabetes in her mother; Heart Disease in her father and mother; High Blood Pressure in her mother.        PHYSICAL EXAM:  Vitals:  /63   Pulse 92   Temp 98.2 °F (36.8 °C) (Oral)   Resp 15   Ht 5' 3\" (1.6 m)   Wt 150 lb (68 kg)   SpO2 99%   BMI 26.57 kg/m²   General Appearance: alert and oriented to person, place and time and in no acute distress  Skin: warm and dry, turgor not diminished  Head: normocephalic and atraumatic  Eyes: pupils equal, round, and reactive to light, extraocular eye movements intact, conjunctivae normal  Neck: neck supple and non tender without mass   Pulmonary/Chest: clear to auscultation bilaterally- no wheezes, rales or rhonchi, normal air movement, no respiratory distress  Cardiovascular: normal rate, normal S1 and S2 and no M/R/R  Abdomen: soft, non-tender, non-distended, normal bowel sounds, no masses or organomegaly  Extremities: no cyanosis, no clubbing and right leg with swelling and erythema. Neurologic: no cranial nerve deficit and speech normal        LABS:  Recent Labs     12/07/20  1418      K 3.9   CL 91*   CO2 29   BUN 26*   CREATININE 0.9   GLUCOSE 142*   CALCIUM 10.3*       Recent Labs     12/07/20  1418   WBC 10.7   RBC 4.58   HGB 11.1*   HCT 36.6   MCV 79.9*   MCH 24.2*   MCHC 30.3*   RDW 16.3*      MPV 8.6       No results for input(s): POCGLU in the last 72 hours. Radiology:   US DUP LOWER EXTREMITIES BILATERAL VENOUS   Final Result   1. Age-uncertain, partially-occlusive thrombus within the right lower   extremity, from the visualized distal right iliac, through the distal right   superficial femoral, veins. 2.  Age-uncertain, minimal, partially-occlusive thrombus is noted within a   superficial vein at the level of the left calf. 3.  Left lower extremity negative for deep venous thrombosis. 4.  Minimal to moderate scattered soft tissue edema is most notable the level   of the right popliteal region and calf. .      CTA PULMONARY W CONTRAST   Final Result   1. There is no evidence of a pulmonary embolus.    2. Multiple bilateral solid pulmonary nodules most consistent with widespread metastatic pulmonary disease   3. Multifocal bilateral thick-walled cavitary lesions also favored to   represent multifocal bilateral metastatic disease. Please note the cavitary   lesions are larger in size when compared to prior study. The largest is seen   within the left lower lobe and measures 2.0 x 2.0 cm.   4. Mediastinal and right hilar lymphadenopathy. XR HIP RIGHT (2-3 VIEWS)   Final Result   No acute displaced fractures. XR CHEST (2 VW)   Final Result   Multiple cavitary lung lesions bilaterally which likely represent neoplasm. These do demonstrate some interval progression. EKG: No acute normality    ASSESSMENT:      Active Problems:    Acute deep vein thrombosis of right iliac vein (HCC)  Cavitary lesions in the lungs  Metastatic breast cancer: Not a candidate for chemo  Hypothyroidism  Diabetes mellitus type 2    PLAN:  DVT:  -Per vascular surgery heparin and bridged to Coumadin    Hypothyroidism: Continue Synthroid  Diabetes mellitus 2: Continue glimepiride and SSI    Code Status: Full  DVT prophylaxis: On anticoagulation      NOTE: This report was transcribed using voice recognition software. Every effort was made to ensure accuracy; however, inadvertent computerized transcription errors may be present.   Electronically signed by Edwin Garrett MD on 12/7/2020 at 9:24 PM

## 2020-12-08 NOTE — PROGRESS NOTES
PTT returned @ 39.3. Bolus 2720 given and Drip increased to 18 unit/kg/hr and now running at 12.2. Next ptt due 2145.

## 2020-12-08 NOTE — CONSULTS
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  -Adjuvant radiation therapy completed March 28, 2017.  -Endocrine therapy: Arimidex started March 30, 2017. She did not tolerate due to severe depression;  -Arimidex discontinued.   -Started on Femara after approximately 2 weeks, but did not tolerate Femara.  Chose observation. Bilateral screening mammogram on 10/24/2019 negative for malignancy     IA papillary serous endometrial adenocarcinoma, FIGO grade 3, - LVSI, tumor size 4.5 cm;    BRCA/Myrisk testing Negative  7/20/16-Exam under anesthesia, diagnostic laparoscopy, total laparoscopic hysterectomy, bilateral salpingo-oophorectomy, pelvic and periaortic lymphadenectomy, omentectomy.     HDR VAGINAL BRACHYTHERAPY-9/29/16, 10/6/16, 10/13/16     08/19/2017 PET/CT scan: Hypermetabolic bilateral lung nodules suspicious for metastasis.   Hypermetabolic nodule in the right lower quadrant anterior to the psoas muscle suspicious for metastasis.     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     She completed 2 cycles of chemotherapy with Taxol Carboplatin and Avastin on 10/17 and 11/17.  She had poor tolerance to chemotherapy, therefore she declined additional chemotherapy and opted for Natural remedies instead      on 06/12/2018: 15 (<39UmL)       Re-staging scans on 06/15/2018:  CT of the abdomen and pelvis: 2.2 x 1.3 cm soft tissue nodule along the superior aspect of the urinary bladder suspicious for metastases, decreased in size since 1/23/18; no evidence of new abnormalities since prior visit; diverticulosis without evidence of diverticulitis. CT Chest:  Multiple bilateral lung nodules suspicious for metastases, increased in size and number since 1/23/2018.     Bronchoscopy/EBUS was performed on 08/17/2018  Respiratory Gram Stain/Cx: Negative for organisms. Aspergillus Galactomannan Antigen Negative  BAL RUL Negative for malignant cells. Bronchial smears shake RUL (predominance of blood); Negative for malignant cells. FNA Martínez TBNA RML (predominance of blood) Negative for malignant cells. FNA Martínez TBNA RUL (predominance of blood)  Negative for malignant cells. EBUS FNA R10 (scant lymph node sampling) Negative for malignant cells. EBUS FNA Station 7: Negative for malignant cells.     Case discussed with Dr. Junior Paez at [facility]. She continued to decline systemic chemotherapy (Taxol/Carbo +/- Herceptin). She did not want hormonal therapy as she did not tolerate this well with her breast cancer. Patient declined palliative care/Hospice care and wanted to proceed with immunotherapy Catie Cuellar). Side effects of Keytruda reviewed with patient. She agreed to proceed. Cycle # 1 Perri Netter was on 08/29/2018. Cycle # 2 Keytruda was on 09/19/2018. Cycle # 3 Keytruda was on 10/10/2018. CT chest 10/23/2018 noted Significant improvement in the previously noted multiple bilateral pulmonary nodules concerning for  improving pulmonary metastasis. CT abdomen/pelvis 10/23/2018 stable examination. Continue Keytruda and repeat scans in 3 months. Cycle # 4 Keytruda was on 10/31/2018. Cycle # 5 Keytruda was on 11/21/2018. Cycle # 6 Keytruda was on 12/19/2018. Cycle # 7 Keytruda was on 01/09/2019. Cycle # 8 Keytruda was on 01/30/2019. CT chest 02/13/2019 stable LLL nodule measuring 5 mm. Stable RUL nodule measuring 2 mm. No evidence of mediastinal, hilar or axillary LN.   CT Abdomen/pelvis cGy in 6 fractions at 625 cGy/fraction prescribed to Max Dose at 78.0% IDL using 10 MV photons with VMAT Coplanar technique.      Due to weakness, significant fatigue poor appetite and severe arthralgias in bilateral knees.  She declined Keytruda at that time and wanted to proceed with Hospice care. Also followed with rheumatology for [facility] induced inflammatory arthritis improved with prednisone.      Presented Sept 2020 to the hospital with complaints of chest heaviness and dyspnea with exertion for the past several weeks with worsening fatigue. CTA chest 09/16/2020   Innumerable pulmonary parenchymal nodules  bilaterally, mediastinal and hilar lymphadenopathy are in fact new as compared to the prior exam.    CT abdomen/pelvis 09/18/2020:  Right psoas muscle mass lesion appears to have moderately regressed  Multiple new cannonball lung metastases  New malignant-appearing retroperitoneal adenopathy    Overall disease progression. Presented in Dec 2020 with worsening SOB and coughing    CTA chest 12/07/2020:  No evidence of PE. Multiple bilateral solid pulmonary nodules most consistent with widespread metastatic pulmonary disease. Multifocal bilateral thick-walled cavitary lesions also favored to epresent multifocal bilateral metastatic disease.  Please note the cavitary lesions are larger in size when compared to prior study.  The largest is seen within the LLL and measures 2.0 x 2.0 cm  Mediastinal and right hilar LN    [place] LE U/S on 12/07/2020:  partially-occlusive thrombus within the right lower extremity, from the visualized distal right iliac, through the distal right superficial femoral veins. minimal, partially-occlusive thrombus is noted within a superficial vein at the level of the left calf  Left lower extremity negative for deep venous thrombosis    Review of Systems;  CONSTITUTIONAL: No fever, chills. Decline in appetite and energy level. ENMT: Eyes: No diplopia; Nose: No epistaxis. Mouth: No sore throat. RESPIRATORY: No hemoptysis. + shortness of breath, cough. CARDIOVASCULAR: No chest pain, palpitations. GASTROINTESTINAL: No nausea/vomiting, abdominal pain  GENITOURINARY: No dysuria, urinary frequency, hematuria. NEURO: No syncope, presyncope, headache.   Remainder:  ROS NEGATIVE    Past Medical History:      Diagnosis Date    Cancer [facility])     endometrial cancer, breast-     Diabetes mellitus ([address]     Diverticulitis     GERD (gastroesophageal reflux disease)     Gout     Hiatal hernia     Macular degeneration     Metastatic disease (HCC)     Osteoarthritis     Osteopenia      Past Surgical History:      Procedure Laterality Date    BREAST LUMPECTOMY Left 2016    left breast sentinelnode dissection, blue dye injection    BREAST SURGERY Left 10/2016    cancer     SECTION      x 3    CHG UNLISTED DX RADIOGRAPHIC PROCEDURE N/A 2018    BR[facility]    ENDOSCOPY, COLON, DIAGNOSTIC      EYE SURGERY Bilateral     cataracts    HYSTERECTOMY  2016    total with BSO    HYSTERECTOMY, TOTAL ABDOMINAL  2016    PORT SURGERY N/A 2020    MEDIPORT REMOVAL performed by Usha Long MD at [facility] [address] BRUSHING/PROTECTED BRUSHINGS  2018    BRONCHOSCOPY BRUSHINGS performed by Kalyani Khan MD at [address] [place] [address] [address] [facility]  2018    BRONCHOSCOPY ALVEOLAR LAVAGE performed by Kalyani Khan MD at [address] [place] [address] [address] DX/TX INTERVENTION [address] LES N/A 2018    BRONCHOSCOPY ULTRASOUND performed by Kalyani Khan MD at [facility] W/TRANSBRONCHIAL LUNG BX 1 LOBE  2018    BRONCHOSCOPY/TRANSBRONCHIAL NEEDLE BIOPSY performed by Kalyani Khan MD at [place] [address]  2018 BRONCHOSCOPY/TRANSBRONCHIAL NEEDLE BIOPSY ADDL LOBE performed by Reinaldo Holden MD at [address] [place] [address] TUNNELED CTR VAD W/SUBQ PORT AGE 5 YR/> N/A 12/4/2018    MEDIPORT INSERTION performed by Damian Hebert MD at [facility] TONSILLECTOMY       Family History:  Family History   Problem Relation Age of Onset    High Blood Pressure Mother     Heart Disease Mother     Diabetes Mother     Arthritis Father         rheumatoid    Heart Disease Father     Cancer Maternal Aunt         ovarian carcinoma     Medications:  Reviewed and reconciled.     Social History:  Social History     Socioeconomic History    Marital status:      Spouse name: Not on file    Number of children: Not on file    Years of education: Not on file    Highest education level: Not on file   Occupational History    Not on file   Social Needs    Financial resource strain: Not on file    Food insecurity     Worry: Not on file     Inability: Not on file   [employer] needs     Medical: Not on file     Non-medical: Not on file   Tobacco Use    Smoking status: Former Smoker     Packs/day: 0.25     Years: 20.00     Pack years: 5.00     Types: Cigarettes    Smokeless tobacco: Never Used   Substance and Sexual Activity    Alcohol use: Yes     Frequency: Monthly or less     Comment: rarely    Drug use: Yes     Types: Marijuana     Comment: medical    Sexual activity: Not on file   Lifestyle    Physical activity     Days per week: Not on file     Minutes per session: Not on file    Stress: Not on file   Relationships    Social connections     Talks on phone: Not on file     Gets together: Not on file     Attends Oriental orthodox service: Not on file     Active member of club or organization: Not on file     Attends meetings of clubs or organizations: Not on file     Relationship status: Not on file    Intimate partner violence     Fear of current or ex partner: Not on file     Emotionally abused: Not on file     Physically abused: Not on file     Forced sexual activity: Not on file   Other Topics Concern    Not on file   Social History Narrative    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 Allergies: Allergies   Allergen Reactions    Omeprazole Hives and Itching    Percocet [Oxycodone-Acetaminophen] Other (See Comments)     Prolong use, GI issues    Januvia [Sitagliptin]     Asa [Aspirin] Hives    Erythromycin Rash    Famotidine Nausea Only, Anxiety and Palpitations    Keflet [Cephalexin] Rash    Levaquin [Levofloxacin In D5w] Rash    Pcn [Penicillins] Rash    Polyethylene Glycol Other (See Comments)     Slowed peristalsis    Protonix [Pantoprazole Sodium] Other (See Comments)     Other reaction(s): Other: See Comments  DEPRESSION    Tetracycline Rash    Tetracyclines & Related Rash     Physical Exam:  /63   Pulse 107   Temp 98.3 °F (36.8 °C)   Resp 18   Ht 5' 3\" (1.6 m)   Wt 150 lb (68 kg)   SpO2 97%   BMI 26.57 kg/m²   GENERAL: Alert, oriented x 3, tired. HEENT: PERRLA; EOMI. Oropharynx clear. NECK: Supple. Without lymphadenopathy. LUNGS: Good air entry bilaterally. No wheezing, crackles or ronchi. CARDIOVASCULAR: Regular rate. No murmurs, rubs or gallops. ABDOMEN: Soft. Non-tender, non-distended. Positive bowel sounds. EXTREMITIES: Without clubbing, cyanosis. RLE edema. NEUROLOGIC: No focal deficits.    ECOG PS 2-3    Diagnostics:  Lab Results   Component Value Date    WBC 9.2 12/07/2020    HGB 10.1 (L) 12/07/2020    HCT 31.5 (L) 12/07/2020    MCV 78.4 (L) 12/07/2020     12/07/2020     Lab Results   Component Value Date     12/07/2020    K 3.9 12/07/2020    CL 91 (L) 12/07/2020    CO2 29 12/07/2020    BUN 26 (H) 12/07/2020    CREATININE 0.9 12/07/2020    GLUCOSE 142 (H) 12/07/2020    CALCIUM 10.3 (H) 12/07/2020    PROT 7.5 12/07/2020    LABALBU 3.7 12/07/2020    BILITOT 0.5 12/07/2020    ALKPHOS 120 (H) 12/07/2020    AST 21 12/07/2020    ALT 30 12/07/2020 LABGLOM >60 12/07/2020    GFRAA >60 12/07/2020     Impression/Plan:  78 y/o female with   pT2 pN0  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  -Adjuvant radiation therapy completed March 28, 2017.  -Endocrine therapy: Arimidex started March 30, 2017. She did not tolerate due to severe depression;  -Arimidex discontinued.   -Started on Femara after approximately 2 weeks, but did not tolerate Femara.  Chose observation. Bilateral screening mammogram on 10/24/2019 negative for malignancy     IA papillary serous endometrial adenocarcinoma, FIGO grade 3, - LVSI, tumor size 4.5 cm;    BRCA/Myrisk testing Negative  7/20/16-Exam under anesthesia, diagnostic laparoscopy, total laparoscopic hysterectomy, bilateral salpingo-oophorectomy, pelvic and periaortic lymphadenectomy, omentectomy.     HDR VAGINAL BRACHYTHERAPY-9/29/16, 10/6/16, 10/13/16     08/19/2017 PET/CT scan: Hypermetabolic bilateral lung nodules suspicious for metastasis.   Hypermetabolic nodule in the right lower quadrant anterior to the psoas muscle suspicious for metastasis.     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     She completed 2 cycles of chemotherapy with Taxol Carboplatin and Avastin on 10/17 and 11/17.  She had poor tolerance to chemotherapy, therefore she declined additional chemotherapy and opted for Natural remedies instead      on 06/12/2018: 15 (<39UmL)       Re-staging scans on 06/15/2018:  CT of the abdomen and pelvis: 2.2 x 1.3 cm soft tissue nodule along the superior aspect of the urinary bladder suspicious for metastases, decreased in size since 1/23/18; no evidence of new abnormalities since prior visit; diverticulosis without evidence of diverticulitis. CT Chest:  Multiple bilateral lung nodules suspicious for metastases, increased in size and number since 1/23/2018.     Bronchoscopy/EBUS was performed on 08/17/2018  Respiratory Gram Stain/Cx: Negative for organisms. Aspergillus Galactomannan Antigen Negative  BAL RUL Negative for malignant cells. Bronchial smears shake RUL (predominance of blood); Negative for malignant cells. FNA Martínez TBNA RML (predominance of blood) Negative for malignant cells. FNA Martínez TBNA RUL (predominance of blood)  Negative for malignant cells. EBUS FNA R10 (scant lymph node sampling) Negative for malignant cells. EBUS FNA Station 7: Negative for malignant cells.     Case discussed with [facility]. Mónica Fam at [facility]. She continued to decline systemic chemotherapy (Taxol/Carbo +/- Herceptin). She did not want hormonal therapy as she did not tolerate this well with her breast cancer. Patient declined palliative care/Hospice care and wanted to proceed with immunotherapy Norbert Fam). Side effects of Keytruda reviewed with patient. She agreed to proceed. Cycle # 1 [facility] was on 08/29/2018. Cycle # 2 Keytruda was on 09/19/2018. Cycle # 3 Keytruda was on 10/10/2018. CT chest 10/23/2018 noted Significant improvement in the previously noted multiple bilateral pulmonary nodules concerning for  improving pulmonary metastasis. CT abdomen/pelvis 10/23/2018 stable examination. Continue Keytruda and repeat scans in 3 months. Cycle # 4 Keytruda was on 10/31/2018. Cycle # 5 Keytruda was on 11/21/2018. Cycle # 6 Keytruda was on 12/19/2018. Cycle # 7 Keytruda was on 01/09/2019. Cycle # 8 Keytruda was on 01/30/2019. CT chest 02/13/2019 stable LLL nodule measuring 5 mm. Stable RUL nodule measuring 2 mm. No evidence of mediastinal, hilar or axillary LN. CT Abdomen/pelvis 02/13/2019 stable exam.  Continue Keytruda and repeat scans in 3 months. Cycle # 9 Keytruda was on 02/28/2019.   Cycle # 10 Keytruda was on 03/20/2019. Cycle # 11 [place] was on 04/11/2019. Cycle # 12 [place] was on 05/02/2019. CT chest 05/17/2019 noted no metastatic disease. CT abdomen/pelvis 05/17/2019 stable exam.  Continue Keytruda and repeat scans in 3 months. Cycle # 13 Keytruda was on 05/23/2019. Cycle # 14 Keytruda was on 06/13/2019. Cycle # 15 Keytruda was on 07/11/2019. Cycle # 16 [place] was on 08/01/2019. CT chest 08/18/2019 stable to slightly more prominent LLL nodule 4.2 mm  CT abdomen/pelvis 08/18/2019 enhancing right psoas mass increased partially compressing the distal right ureter causing hydronephrosis in the right kidney. Urology team on board. Cycle # 17 Keytruda was on 08/22/2019. MRI right hip 09/03/2019:Trochanteric bursitis. Heterogenous joint effusion may be infected or hemorrhagic. Increased T2 signal superior acetabulum is most likely degenerative such as subchondral cyst formation. Osteoarthritis. No evidence for avascular necrosis or transient osteoporosis. Patient received injection to right hip of 4cc lidocaine/1 cc kenalog on 09/24/2019 with orthopedics. Evaluated by Dr. Gaye Bowie at [facility] on 09/19/2019 who recommended to continue [place] and referred her to Dr. Reed Young for consideration of SBRT to treat the psoas lesion. Recommended revisiting ureteral catheter with urology after assessment with Rad Onc   Cycle # 18 [place] was on 09/26/2019. Cycle # 19 [place] was on 10/24/2019.     Appointment with Dr. Reed Young on 10/28/2019 who recommended SBRT to the oligoprogressive lesion adjacent to/investing the right psoas muscle with the goal of allowing her to continue her systemic therapy with Keytruda.    Cycle # 20 Keytruda was on 11/14/2019.     SBRT was started on 11/21/2019 and completed 12/12/2019. The R Psoas metastasis PTV received a total dose of 3750 cGy in 6 fractions at 625 cGy/fraction prescribed to Max Dose at 78.0% IDL using 10 MV photons with VMAT Coplanar technique.      Due to weakness, significant fatigue poor appetite and severe arthralgias in bilateral knees.  She declined Keytruda at that time and wanted to proceed with Hospice care. Also followed with rheumatology for [facility] induced inflammatory arthritis improved with prednisone.      Presented Sept 2020 to the hospital with complaints of chest heaviness and dyspnea with exertion for the past several weeks with worsening fatigue. CTA chest 09/16/2020   Innumerable pulmonary parenchymal nodules  bilaterally, mediastinal and hilar lymphadenopathy are in fact new as compared to the prior exam.    CT abdomen/pelvis 09/18/2020:  Right psoas muscle mass lesion appears to have moderately regressed  Multiple new cannonball lung metastases  New malignant-appearing retroperitoneal adenopathy    Overall disease progression. Presented in Dec 2020 with worsening SOB and coughing    CTA chest 12/07/2020:  No evidence of PE. Multiple bilateral solid pulmonary nodules most consistent with widespread metastatic pulmonary disease. Multifocal bilateral thick-walled cavitary lesions also favored to epresent multifocal bilateral metastatic disease.  Please note the cavitary lesions are larger in size when compared to prior study.  The largest is seen within the LLL and measures 2.0 x 2.0 cm  Mediastinal and right hilar LN    [place]ingo LE U/S on 12/07/2020:  partially-occlusive thrombus within the right lower extremity, from the visualized distal right iliac, through the distal right superficial femoral veins. minimal, partially-occlusive thrombus is noted within a superficial vein at the level of the left calf  Left lower extremity negative for deep venous thrombosis    On Heparin drip   Vascular surgery consulted. Scans showing overall disease progression. Recommend Hospice care; Hospice team consulted    Thank you for allowing us to participate in the care of   Katie Etienne MD   [id number]  Board Certified Medical Oncologist

## 2020-12-08 NOTE — PROGRESS NOTES
Pharmacy Consultation Note  (Anticoagulant Dosing and Monitoring)    Initial consult date: 12-7-2020  Consulting physician: Dr. Elijah Camarena    Allergies:  Omeprazole; Percocet [oxycodone-acetaminophen]; Januvia [sitagliptin]; Levie Ruths [aspirin]; Erythromycin; Famotidine; Keflet [cephalexin]; Levaquin [levofloxacin in d5w]; Pcn [penicillins]; Polyethylene glycol; Protonix [pantoprazole sodium]; Tetracycline; and Tetracyclines & related    77 y.o. female; 160 cm, 68 kg    Warfarin Indication Target   INR Range Home Dose  (if applicable) Diet/Feeding Tube   RLE DVT  2 - 3 N/A      Vitamin K or Blood product  Administration Date             Warfarin drug-drug interactions  Start  Stop Home Med? Comments                   TSH:    Lab Results   Component Value Date    TSH 1.690 12/04/2020        Hepatic Function Panel:                            Lab Results   Component Value Date    ALKPHOS 120 12/07/2020    ALT 30 12/07/2020    AST 21 12/07/2020    PROT 7.5 12/07/2020    BILITOT 0.5 12/07/2020    LABALBU 3.7 12/07/2020    LABALBU 3.1 05/16/2020       Date Warfarin Dose INR Heparin or LMWH HBG/HCT PLT Comment   12/7 5 mg N/A UFH infusion 11.1/36.6 264                                          Assessment:  · 74 yo/F admitted for SOB, RLE pain, and fall at home. Active CA patient. · Found to have RLE DVT by US and CT Pulm was negative for PE. Starting on heparin infusion as bridge to warfarin therapy. Plan:  · Give warfarin 5 mg x1 tonight. · Daily PT/INR until the INR is stable within the therapeutic range. · Pharmacist will follow and monitor/adjust dosing as necessary.     Mary López PharmD  12/7/2020  10:01 PM  Pager: [phone]

## 2020-12-09 PROBLEM — M79.89 LEG SWELLING: Status: ACTIVE | Noted: 2020-12-09

## 2020-12-09 LAB
ANION GAP SERPL CALCULATED.3IONS-SCNC: 11 MMOL/L (ref 7–16)
BASOPHILS ABSOLUTE: 0.03 E9/L (ref 0–0.2)
BASOPHILS RELATIVE PERCENT: 0.4 % (ref 0–2)
BUN BLDV-MCNC: 16 MG/DL (ref 8–23)
CALCIUM SERPL-MCNC: 9.2 MG/DL (ref 8.6–10.2)
CHLORIDE BLD-SCNC: 95 MMOL/L (ref 98–107)
CO2: 26 MMOL/L (ref 22–29)
CREAT SERPL-MCNC: 0.7 MG/DL (ref 0.5–1)
EOSINOPHILS ABSOLUTE: 0 E9/L (ref 0.05–0.5)
EOSINOPHILS RELATIVE PERCENT: 0 % (ref 0–6)
GFR AFRICAN AMERICAN: >60
GFR NON-AFRICAN AMERICAN: >60 ML/MIN/1.73
GLUCOSE BLD-MCNC: 167 MG/DL (ref 74–99)
HCT VFR BLD CALC: 32.2 % (ref 34–48)
HEMOGLOBIN: 9.7 G/DL (ref 11.5–15.5)
IMMATURE GRANULOCYTES #: 0.17 E9/L
IMMATURE GRANULOCYTES %: 2 % (ref 0–5)
INR BLD: 1.1
LYMPHOCYTES ABSOLUTE: 1.02 E9/L (ref 1.5–4)
LYMPHOCYTES RELATIVE PERCENT: 12 % (ref 20–42)
MCH RBC QN AUTO: 24.1 PG (ref 26–35)
MCHC RBC AUTO-ENTMCNC: 30.1 % (ref 32–34.5)
MCV RBC AUTO: 79.9 FL (ref 80–99.9)
METER GLUCOSE: 136 MG/DL (ref 74–99)
METER GLUCOSE: 160 MG/DL (ref 74–99)
METER GLUCOSE: 171 MG/DL (ref 74–99)
METER GLUCOSE: 230 MG/DL (ref 74–99)
MONOCYTES ABSOLUTE: 0.69 E9/L (ref 0.1–0.95)
MONOCYTES RELATIVE PERCENT: 8.1 % (ref 2–12)
NEUTROPHILS ABSOLUTE: 6.59 E9/L (ref 1.8–7.3)
NEUTROPHILS RELATIVE PERCENT: 77.5 % (ref 43–80)
PDW BLD-RTO: 16.4 FL (ref 11.5–15)
PLATELET # BLD: 282 E9/L (ref 130–450)
PMV BLD AUTO: 8.8 FL (ref 7–12)
POTASSIUM REFLEX MAGNESIUM: 5.2 MMOL/L (ref 3.5–5)
PROTHROMBIN TIME: 12.4 SEC (ref 9.3–12.4)
RBC # BLD: 4.03 E12/L (ref 3.5–5.5)
SODIUM BLD-SCNC: 132 MMOL/L (ref 132–146)
WBC # BLD: 8.5 E9/L (ref 4.5–11.5)

## 2020-12-09 PROCEDURE — 96376 TX/PRO/DX INJ SAME DRUG ADON: CPT

## 2020-12-09 PROCEDURE — 6370000000 HC RX 637 (ALT 250 FOR IP): Performed by: INTERNAL MEDICINE

## 2020-12-09 PROCEDURE — 82962 GLUCOSE BLOOD TEST: CPT

## 2020-12-09 PROCEDURE — 2060000000 HC ICU INTERMEDIATE R&B

## 2020-12-09 PROCEDURE — 85610 PROTHROMBIN TIME: CPT

## 2020-12-09 PROCEDURE — 6360000002 HC RX W HCPCS: Performed by: INTERNAL MEDICINE

## 2020-12-09 PROCEDURE — 99232 SBSQ HOSP IP/OBS MODERATE 35: CPT | Performed by: INTERNAL MEDICINE

## 2020-12-09 PROCEDURE — 36415 COLL VENOUS BLD VENIPUNCTURE: CPT

## 2020-12-09 PROCEDURE — 80048 BASIC METABOLIC PNL TOTAL CA: CPT

## 2020-12-09 PROCEDURE — 85025 COMPLETE CBC W/AUTO DIFF WBC: CPT

## 2020-12-09 PROCEDURE — 99222 1ST HOSP IP/OBS MODERATE 55: CPT | Performed by: SURGERY

## 2020-12-09 PROCEDURE — G0378 HOSPITAL OBSERVATION PER HR: HCPCS

## 2020-12-09 PROCEDURE — 99233 SBSQ HOSP IP/OBS HIGH 50: CPT | Performed by: INTERNAL MEDICINE

## 2020-12-09 RX ORDER — WARFARIN SODIUM 5 MG/1
5 TABLET ORAL
Status: COMPLETED | OUTPATIENT
Start: 2020-12-09 | End: 2020-12-09

## 2020-12-09 RX ADMIN — PREDNISONE 5 MG: 5 TABLET ORAL at 20:44

## 2020-12-09 RX ADMIN — PREDNISONE 5 MG: 5 TABLET ORAL at 08:17

## 2020-12-09 RX ADMIN — TRAMADOL HYDROCHLORIDE 50 MG: 50 TABLET, FILM COATED ORAL at 20:44

## 2020-12-09 RX ADMIN — INSULIN LISPRO 1 UNITS: 100 INJECTION, SOLUTION INTRAVENOUS; SUBCUTANEOUS at 06:16

## 2020-12-09 RX ADMIN — WARFARIN SODIUM 5 MG: 5 TABLET ORAL at 19:26

## 2020-12-09 RX ADMIN — HEPARIN SODIUM AND DEXTROSE 18 UNITS/KG/HR: 10000; 5 INJECTION INTRAVENOUS at 03:05

## 2020-12-09 RX ADMIN — INSULIN LISPRO 1 UNITS: 100 INJECTION, SOLUTION INTRAVENOUS; SUBCUTANEOUS at 15:40

## 2020-12-09 RX ADMIN — GLIMEPIRIDE 2 MG: 2 TABLET ORAL at 08:17

## 2020-12-09 RX ADMIN — TRAMADOL HYDROCHLORIDE 50 MG: 50 TABLET, FILM COATED ORAL at 06:13

## 2020-12-09 RX ADMIN — INSULIN LISPRO 2 UNITS: 100 INJECTION, SOLUTION INTRAVENOUS; SUBCUTANEOUS at 10:49

## 2020-12-09 RX ADMIN — LEVOTHYROXINE SODIUM 112 MCG: 112 TABLET ORAL at 06:11

## 2020-12-09 ASSESSMENT — PAIN DESCRIPTION - FREQUENCY
FREQUENCY: CONTINUOUS
FREQUENCY: CONTINUOUS

## 2020-12-09 ASSESSMENT — PAIN SCALES - GENERAL
PAINLEVEL_OUTOF10: 2
PAINLEVEL_OUTOF10: 0
PAINLEVEL_OUTOF10: 0
PAINLEVEL_OUTOF10: 5
PAINLEVEL_OUTOF10: 5

## 2020-12-09 ASSESSMENT — PAIN DESCRIPTION - ORIENTATION
ORIENTATION: RIGHT
ORIENTATION: RIGHT

## 2020-12-09 ASSESSMENT — PAIN DESCRIPTION - PROGRESSION: CLINICAL_PROGRESSION: GRADUALLY IMPROVING

## 2020-12-09 ASSESSMENT — PAIN DESCRIPTION - DESCRIPTORS
DESCRIPTORS: ACHING;DULL
DESCRIPTORS: ACHING;DULL;DISCOMFORT

## 2020-12-09 ASSESSMENT — PAIN DESCRIPTION - PAIN TYPE
TYPE: ACUTE PAIN
TYPE: ACUTE PAIN

## 2020-12-09 ASSESSMENT — PAIN DESCRIPTION - LOCATION
LOCATION: HIP;LEG
LOCATION: HIP;LEG

## 2020-12-09 ASSESSMENT — PAIN DESCRIPTION - ONSET
ONSET: ON-GOING
ONSET: ON-GOING

## 2020-12-09 ASSESSMENT — PAIN - FUNCTIONAL ASSESSMENT: PAIN_FUNCTIONAL_ASSESSMENT: PREVENTS OR INTERFERES SOME ACTIVE ACTIVITIES AND ADLS

## 2020-12-09 NOTE — CONSULTS
Chief Complaint: Patient seen for evaluation of deep vein thrombosis of the right leg      HPI: This patient, unfortunately has history of metastatic carcinoma left breast, underwent lumpectomy, chemotherapy and radiation therapy, tells me that she has been undergoing the treatment for last 4 months, nothing helped her, finally stopped taking chemotherapy, had side effects from [facility]ering    Patient in fact was seen by Dr. Aung Mata, from the oncology service, who recommended hospice consultation, patient tells me, last year she was on hospice for a while    Patient noticed increasing pain and swelling of the right leg, at least 4 to 5 days, came to the hospital, did undergo venous ultrasound study, abnormal revealed evidence of right iliofemoral pop vein thrombosis, patient was started intravenous heparin drip and vascular service is consulted    Patient denies any previous history of deep vein thrombosis    Patient did undergo CT of the chest, revealed no evidence of pulmonary mass, revealed evidence of multiple lesions suspicious for metastatic disease      Patient denies any tingling or numbness of the feet of the toes, no clinical symptoms of compartmental syndrome    Patient denies any focal lateralizing neurological symptoms like loss of speech, vision or loss of function of extremity    Patient can walk short distance slowly prior to admission, and denies any symptoms of rest pain    Allergies   Allergen Reactions    Omeprazole Hives and Itching    Percocet [Oxycodone-Acetaminophen] Other (See Comments)     Prolong use, GI issues    Januvia [Sitagliptin]     Asa [Aspirin] Hives    Erythromycin Rash    Famotidine Nausea Only, Anxiety and Palpitations    Keflet [Cephalexin] Rash    Levaquin [Levofloxacin In D5w] Rash    Pcn [Penicillins] Rash    Polyethylene Glycol Other (See Comments)     Slowed peristalsis    Protonix [Pantoprazole Sodium] Other (See Comments)     Other reaction(s):  Other: See Comments  DEPRESSION    Tetracycline Rash    Tetracyclines & Related Rash       Current Facility-Administered Medications   Medication Dose Route Frequency Provider Last Rate Last Dose    bumetanide (BUMEX) tablet 1 mg  1 mg Oral Daily Tati Early MD        cetirizine (ZYRTEC) tablet 10 mg  10 mg Oral Daily Tati Early MD        glimepiride (AMARYL) tablet 2 mg  2 mg Oral Daily Tati Early MD   2 mg at 12/09/20 0817    montelukast (SINGULAIR) tablet 10 mg  10 mg Oral Nightly Tati Sen MD   Stopped at 12/08/20 2119    levothyroxine (SYNTHROID) tablet 112 mcg  112 mcg Oral Daily Tati Early MD   112 mcg at 12/09/20 0944    predniSONE (DELTASONE) tablet 5 mg  5 mg Oral BID Tati Early MD   5 mg at 12/09/20 0817    sodium chloride flush 0.9 % injection 10 mL  10 mL Intravenous 2 times per day Chinmay Arciniega MD        sodium chloride flush 0.9 % injection 10 mL  10 mL Intravenous PRN Chinmay Arciniega MD        acetaminophen (TYLENOL) tablet 650 mg  650 mg Oral Q6H PRN Tati Early MD        Or    acetaminophen (TYLENOL) suppository 650 mg  650 mg Rectal Q6H PRN Tati Early MD        polyethylene glycol (GLYCOLAX) packet 17 g  17 g Oral Daily PRN Tati Early MD        glucose (GLUTOSE) 40 % oral gel 15 g  15 g Oral PRN Tati Early MD        dextrose 50 % IV solution  12.5 g Intravenous PRN Tati Early MD        glucagon (rDNA) injection 1 mg  1 mg Intramuscular PRN Tati Early MD        dextrose 5 % solution  100 mL/hr Intravenous PRN Tati Early MD        insulin lispro (HUMALOG) injection vial 0-6 Units  0-6 Units Subcutaneous TID WC Tati Early MD   1 Units at 12/09/20 0616    insulin lispro (HUMALOG) injection vial 0-3 Units  0-3 Units Subcutaneous Nightly Tati Early MD   1 Units at 12/08/20 2138    heparin (porcine) injection 5,440 Units  80 Units/kg Intravenous PRN Tati Early MD        heparin (porcine) injection 2,720 Units  40 W/TRANSBRONCHIAL LUNG BX 1 LOBE  8/17/2018    BRONCHOSCOPY/TRANSBRONCHIAL NEEDLE BIOPSY performed by Rogelio Molina MD at [address] [place] [address] W/TRANSBRONCHIAL LUNG BX EACH LOBE  8/17/2018    BRONCHOSCOPY/TRANSBRONCHIAL NEEDLE BIOPSY ADDL LOBE performed by Rogelio Molina MD at [address] TUNNELED CTR VAD W/SUBQ PORT AGE 5 YR/> N/A 12/4/2018    MEDIPORT INSERTION performed by Anna Lilly MD at [place] [address]         Family History   Problem Relation Age of Onset    High Blood Pressure Mother     Heart Disease Mother     Diabetes Mother     Arthritis Father         rheumatoid    Heart Disease Father     Cancer Maternal Aunt         ovarian carcinoma       Social History     Socioeconomic History    Marital status:      Spouse name: Not on file    Number of children: Not on file    Years of education: Not on file    Highest education level: Not on file   Occupational History    Not on file   Social Needs    Financial resource strain: Not on file    Food insecurity     Worry: Not on file     Inability: Not on file    Transportation needs     Medical: Not on file     Non-medical: Not on file   Tobacco Use    Smoking status: Former Smoker     Packs/day: 0.25     Years: 20.00     Pack years: 5.00     Types: Cigarettes    Smokeless tobacco: Never Used   Substance and Sexual Activity    Alcohol use: Yes     Frequency: Monthly or less     Comment: rarely    Drug use: Yes     Types: Marijuana     Comment: medical    Sexual activity: Not on file   Lifestyle    Physical activity     Days per week: Not on file     Minutes per session: Not on file    Stress: Not on file   Relationships    Social connections     Talks on phone: Not on file     Gets together: Not on file     Attends [employer] service: Not on file     Active member of club or organization: Not on file     Attends meetings of clubs or organizations: Not on file     Relationship status: Not on file    Intimate partner violence     Fear of current or ex partner: Not on file     Emotionally abused: Not on file     Physically abused: Not on file     Forced sexual activity: Not on file   Other Topics Concern    Not on file   Social History Narrative    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 Review of Systems:  Skin:  No abnormal pigmentation or rash. Eyes:  No blurring, diplopia or vision loss. Ears/Nose/Throat:  No hearing loss or vertigo. Respiratory:  No cough, pleuritic chest pain, dyspnea, or wheezing. Cardiovascular: No angina, palpitations . Gastrointestinal:  No nausea or vomiting; no abdominal pain or rectal bleeding. GERD  Musculoskeletal:  No arthritis or weakness. Inflammatory arthritis    Neurologic:  No paralysis, paresis, seizures or headaches. Hematologic/Lymphatic/Immunologic:  No anemia, abnormal bleeding/bruising. Endocrine:  No heat or cold intolerance. No polyphagia, polydipsia or polyuria. Diabetes mellitus noted, hypothyroidism      Physical Exam:  /68   Pulse 98   Temp 98.2 °F (36.8 °C) (Oral)   Resp 16   Ht 5' 2.5\" (1.588 m)   Wt 159 lb (72.1 kg)   SpO2 94%   BMI 28.62 kg/m²   General appearance:  Alert, awake, oriented x 3. No distress. Skin:  Warm and dry. Head:  Normocephalic. No masses, lesions or tenderness. Eyes:  Conjunctivae appear normal; PERRL. Ears:  External ears normal.  Nose/Sinuses:  Septum midline, mucosa normal; no drainage. Oropharynx:  Clear, no exudate noted. Neck:  No jugular venous distention, lymphadenopathy or thyromegaly. No evidence of carotid bruit      Lungs:  Clear to ausculation bilaterally. No rhonchi, crackles, wheezes. Heart:  Regular rate and rhythm. No rub or murmur. .    Abdomen:  Soft, non-tender. No masses, organomegaly. Musculoskeletal: No joint effusions, tenderness swelling or warmth. Neuro: Speech is intact. Moving all extremities.  No focal motor or sensory deficits. Extremities:  Both feet are warm to touch. The color of both feet is normal.  The toes are pink    Significant swelling right leg noted extending from the hip to the foot, firm but not tight, no clinical evidence of compartment syndrome, sensation intact, range of motion of ankle and the toes is intact      Pulses Right  Left    Brachial 3 3    Radial    3=normal   Femoral 2 2-3  2=diminished   Popliteal    1=barely palpable   Dorsalis pedis 1 2-3  0=absent   Posterior tibial    4=aneurysmal           Other pertinent information:1. The past medical records were reviewed. 2.    Lab Results   Component Value Date    WBC 8.5 12/09/2020    HGB 9.7 (L) 12/09/2020    HCT 32.2 (L) 12/09/2020    MCV 79.9 (L) 12/09/2020     12/09/2020      Lab Results   Component Value Date     12/09/2020    K 5.2 (H) 12/09/2020    CL 95 (L) 12/09/2020    CO2 26 12/09/2020    BUN 16 12/09/2020    CREATININE 0.7 12/09/2020    GLUCOSE 167 (H) 12/09/2020    CALCIUM 9.2 12/09/2020    PROT 7.5 12/07/2020    LABALBU 3.7 12/07/2020    BILITOT 0.5 12/07/2020    ALKPHOS 120 (H) 12/07/2020    AST 21 12/07/2020    ALT 30 12/07/2020    LABGLOM >60 12/09/2020    GFRAA >60 12/09/2020     Lab Results   Component Value Date    APTT 53.8 (H) 12/08/2020      Lab Results   Component Value Date    INR 1.1 12/09/2020    INR 1.1 12/08/2020    INR 1.1 08/01/2014    PROTIME 12.4 12/09/2020    PROTIME 13.3 (H) 12/08/2020    PROTIME 11.3 08/01/2014        3. US DUP LOWER EXTREMITIES BILATERAL VENOUS   Final Result   1. Age-uncertain, partially-occlusive thrombus within the right lower   extremity, from the visualized distal right iliac, through the distal right   superficial femoral, veins. 2.  Age-uncertain, minimal, partially-occlusive thrombus is noted within a   superficial vein at the level of the left calf. 3.  Left lower extremity negative for deep venous thrombosis.    4.  Minimal to moderate scattered soft tissue edema is most notable the level   of the right popliteal region and calf. .      CTA PULMONARY W CONTRAST   Final Result   1. There is no evidence of a pulmonary embolus. 2. Multiple bilateral solid pulmonary nodules most consistent with widespread   metastatic pulmonary disease   3. Multifocal bilateral thick-walled cavitary lesions also favored to   represent multifocal bilateral metastatic disease. Please note the cavitary   lesions are larger in size when compared to prior study. The largest is seen   within the left lower lobe and measures 2.0 x 2.0 cm.   4. Mediastinal and right hilar lymphadenopathy. XR HIP RIGHT (2-3 VIEWS)   Final Result   No acute displaced fractures. XR CHEST (2 VW)   Final Result   Multiple cavitary lung lesions bilaterally which likely represent neoplasm. These do demonstrate some interval progression. 4.  The history physical, oncology consultation notes were reviewed    5. The venous ultrasound was personally reviewed by me revealed evidence of chronic echogenic changes superimposed on acute deep vein thrombosis of the right lower extremity extreme from the ex iliac vein all the to the popliteal vein    Assessment:    1. Acute on chronic deep vein thrombosis right leg with significant swelling of the right leg extreme from the hip to the foot, without clinical evidence of compartment syndrome    2. Metastatic breast carcinoma, extensive metastatic disease, with lack of response to chemotherapy, patient unable to tolerate Keytruda    3.   Hospice on board        Plan:     I had a long detailed discussion with patient, all options, risks benefits and alternatives were explained, patient for now was recommended consider therapy, as patient does not wish any intervention, with instructions to keep the leg elevated, at least 1 foot above the heart level in 3-4 pillows, also recommend Ace bandage wrapping until the swelling goes down, eventually however fitted with a compression stockings    Patient was instructed to notify the nursing staff know, if she can increase the pain or swelling of the legs     All her questions were answered    I have also discussed the patient nurse, regarding keeping the leg elevated at least 1 foot above the heart level to decrease edema, also Ace bandage wraps applied reapply every 12 hours    Patient will need lifelong anticoagulation, type of anticoagulation will be deferred to the oncologist, Dr. Roddy Yanez regarding Lovenox versus Eliquis etc. as an outpatient setting      Thank you for letting us participate in the care of your patient        Electronically signed by Blanche Gonzalez MD on 12/9/2020 at 10:47 AM

## 2020-12-09 NOTE — CARE COORDINATION
Social Work/Discharge Planning:  Met with patient and attempted to complete initial assessment. Patient was on the phone and requested for this worker to return at a later time. Chart reviewed. Patient was seen by Arsalan Mcgarry and they are not following patient. Will continue to follow.   Electronically signed by ISIDORO Kennedy on 12/9/2020 at 2:00 PM

## 2020-12-09 NOTE — PROGRESS NOTES
[facility]   Progress Note    Admitting Date and Time: 12/7/2020  2:31 PM  Admit Dx: Acute deep vein thrombosis of right iliac vein (HCC) [I82.421]  Acute deep vein thrombosis of right iliac vein (HCC) [I82.421]    Subjective: Admitted in the evening of seventh, patient came with shortness of breath and pain and swelling in the leg, patient with CA breast, on Keytruda, did not tolerate, do have lung mets, has DVT in the right distal iliac through right superficial femoral vein. CTA with no PE. Vascular medicine had suggested heparin drip and bridging to Coumadin. Vascular medicine, hematology has evaluated, vascular medicine consult pending, pharmacy dosing warfarin, INR 1.1. Patient seen by vascular medicine, they have deferred anticoagulation to hematology. Patient was admitted with Acute deep vein thrombosis of right iliac vein (HCC) [I82.421]  Acute deep vein thrombosis of right iliac vein (HCC) [I82.421]. Patient is awake, alert, resting, still has no change in her symptoms involving right lower extremity. Patient was also concerned living in all house, of some fungal involvement. Per RN: No new complaints    ROS: denies fever, chills, cp, sob, n/v, HA unless stated above.      bumetanide  1 mg Oral Daily    cetirizine  10 mg Oral Daily    glimepiride  2 mg Oral Daily    montelukast  10 mg Oral Nightly    levothyroxine  112 mcg Oral Daily    predniSONE  5 mg Oral BID    sodium chloride flush  10 mL Intravenous 2 times per day    insulin lispro  0-6 Units Subcutaneous TID WC    insulin lispro  0-3 Units Subcutaneous Nightly    warfarin (COUMADIN) daily dosing (placeholder)   Other RX Placeholder     sodium chloride flush, 10 mL, PRN  acetaminophen, 650 mg, Q6H PRN    Or  acetaminophen, 650 mg, Q6H PRN  polyethylene glycol, 17 g, Daily PRN  glucose, 15 g, PRN  dextrose, 12.5 g, PRN  glucagon (rDNA), 1 mg, PRN  dextrose, 100 mL/hr, PRN  heparin (porcine), 80 Units/kg, PRN  heparin (porcine), 40 Units/kg, PRN  traMADol, 50 mg, Q6H PRN  morphine, 1 mg, Q4H PRN         Objective:    /68   Pulse 98   Temp 98.2 °F (36.8 °C) (Oral)   Resp 16   Ht 5' 2.5\" (1.588 m)   Wt 159 lb (72.1 kg)   SpO2 94%   BMI 28.62 kg/m²   General Appearance: alert and oriented to person, place and time, well-developed and well-nourished, in no acute distress  Skin: warm and dry, no rash or erythema  Head: normocephalic and atraumatic  Eyes: pupils equal, round, and reactive to light, extraocular eye movements intact, conjunctivae normal  ENT: tympanic membrane, external ear and ear canal normal bilaterally, oropharynx clear and moist with normal mucous membranes  Neck: neck supple and non tender without mass, no thyromegaly or thyroid nodules, no cervical lymphadenopathy   Pulmonary/Chest: clear to auscultation bilaterally- no wheezes, rales or rhonchi, normal air movement, no respiratory distress  Cardiovascular: normal rate, normal S1 and S2, no gallops, intact distal pulses and no carotid bruits  Abdomen: soft, non-tender, non-distended, normal bowel sounds, no masses or organomegaly    Right thigh with significant swelling, some erythema. Recent Labs     12/07/20  1418 12/09/20  0520    132   K 3.9 5.2*   CL 91* 95*   CO2 29 26   BUN 26* 16   CREATININE 0.9 0.7   GLUCOSE 142* 167*   CALCIUM 10.3* 9.2       Recent Labs     12/07/20  1418 12/07/20  2317 12/09/20  0520   WBC 10.7 9.2 8.5   RBC 4.58 4.02 4.03   HGB 11.1* 10.1* 9.7*   HCT 36.6 31.5* 32.2*   MCV 79.9* 78.4* 79.9*   MCH 24.2* 25.1* 24.1*   MCHC 30.3* 32.1 30.1*   RDW 16.3* 16.8* 16.4*    260 282   MPV 8.6 9.2 8.8           Radiology:   US DUP LOWER EXTREMITIES BILATERAL VENOUS   Final Result   1. Age-uncertain, partially-occlusive thrombus within the right lower   extremity, from the visualized distal right iliac, through the distal right   superficial femoral, veins.    2.  Age-uncertain, minimal, partially-occlusive thrombus is noted within a   superficial vein at the level of the left calf. 3.  Left lower extremity negative for deep venous thrombosis. 4.  Minimal to moderate scattered soft tissue edema is most notable the level   of the right popliteal region and calf. .      CTA PULMONARY W CONTRAST   Final Result   1. There is no evidence of a pulmonary embolus. 2. Multiple bilateral solid pulmonary nodules most consistent with widespread   metastatic pulmonary disease   3. Multifocal bilateral thick-walled cavitary lesions also favored to   represent multifocal bilateral metastatic disease. Please note the cavitary   lesions are larger in size when compared to prior study. The largest is seen   within the left lower lobe and measures 2.0 x 2.0 cm.   4. Mediastinal and right hilar lymphadenopathy. XR HIP RIGHT (2-3 VIEWS)   Final Result   No acute displaced fractures. XR CHEST (2 VW)   Final Result   Multiple cavitary lung lesions bilaterally which likely represent neoplasm. These do demonstrate some interval progression. Assessment:    Active Problems:    Acute deep vein thrombosis of right iliac vein (HCC)  Resolved Problems:    * No resolved hospital problems. *      Plan:  1. DVT, as per ED communication with vascular medicine patient remains on IV heparin, however have deferred anticoagulation to hematology. Evaluation by them for 2 days pending. 2.         Does complain of pain, however also states that with multiple pain control she is getting she is okay for now. 3.         Hypothyroidism, remains on Synthroid. 4.          Diabetes, on glimepiride, has been continued, Accu-Cheks high rate controlled. 5.         Will add IgE level for now.   Nursing updated  6.         DC planning, once anticoagulation issues are settled, likely in a day or so, will ask PT to see the patient        Electronically signed by Kiarra Fajardo MD on 12/9/2020 at 8:54 AM

## 2020-12-09 NOTE — PROGRESS NOTES
Pharmacy Consultation Note  (Anticoagulant Dosing and Monitoring)    Initial consult date: 12-7-2020  Consulting physician: Dr. Hima Erickson    Allergies:  Omeprazole; Percocet [oxycodone-acetaminophen]; Januvia [sitagliptin]; Domingo Valdovinos [aspirin]; Erythromycin; Famotidine; Keflet [cephalexin]; Levaquin [levofloxacin in d5w]; Pcn [penicillins]; Polyethylene glycol; Protonix [pantoprazole sodium]; Tetracycline; and Tetracyclines & related    77 y.o. female; 160 cm, 68 kg    Warfarin Indication Target   INR Range Home Dose  (if applicable) Diet/Feeding Tube   RLE DVT  2 - 3 N/A      Vitamin K or Blood product  Administration Date             Warfarin drug-drug interactions  Start  Stop Home Med? Comments                   TSH:    Lab Results   Component Value Date    TSH 1.690 12/04/2020        Hepatic Function Panel:                            Lab Results   Component Value Date    ALKPHOS 120 12/07/2020    ALT 30 12/07/2020    AST 21 12/07/2020    PROT 7.5 12/07/2020    BILITOT 0.5 12/07/2020    LABALBU 3.7 12/07/2020    LABALBU 3.1 05/16/2020       Date Warfarin Dose INR Heparin or LMWH HBG/HCT PLT Comment   12/7  N/A UFH infusion 11.1/36.6 264    12/8 5mg 1.1 Heparin gtt -- --    12/9 5mg 1.1 Heparin gtt 9.7/32.2 282                        Assessment:  · 74 yo/F admitted for SOB, RLE pain, and fall at home. Active CA patient. · Found to have RLE DVT by US and CT Pulm was negative for PE. Starting on heparin infusion as bridge to warfarin therapy.    · INR goal = 2-3; INR today = 1.1 (subtherapeutic, unchanged from yesterday)    Plan:  · Will give warfarin 5 mg x1 again tonight  · Daily PT/INR until the INR is stable within the therapeutic range  · Pharmacist will follow and monitor/adjust dosing as necessary    Anna Nolasco, RafatD, [facility]  12/9/2020  12:34 PM

## 2020-12-09 NOTE — PLAN OF CARE
Problem: Falls - Risk of:  Goal: Will remain free from falls  Description: Will remain free from falls  Outcome: Met This Shift     Problem: Falls - Risk of:  Goal: Absence of physical injury  Description: Absence of physical injury  Outcome: Met This Shift     Problem: Pain:  Goal: Control of acute pain  Description: Control of acute pain  Outcome: Met This Shift

## 2020-12-09 NOTE — PROGRESS NOTES
Placed a call to patient's daughter Keven Branch. She was unaware of hospice consult. She asked how patient was doing and if hospice was discussed with her. This nurse explained daughter's information was given with consult thus called her first before going to see patient. We conferenced in patient's other daughter Kishore Means who was unaware of the consult as well. We discussed patient being on hospice previously with [facility] of [facility]. They stated patient Radu Thompson makes her own decisions as long as she is not compromised and they support her in those decisions. Made a visit to patient's room. Radu Thompson was very polite. She stated she had hospice before and was pleased with [facility] services. She stated the physician wanted her to have a hospice consult. She is well aware of hospice and our services. She stated she is not ready for hospice again at this point. This nurse provided support and explained hospice would be available when she is ready for our services again. Updated patient's daughter Kishore Means. Updated case management team.  Updated charge nurse. Please call [facility] if our services are requested at a later date.    Electronically signed by Jony Welch RN on 12/9/2020 at 12:51 PM

## 2020-12-09 NOTE — PROGRESS NOTES
4.5 cm;    BRCA/Myrisk testing Negative  7/20/16-Exam under anesthesia, diagnostic laparoscopy, total laparoscopic hysterectomy, bilateral salpingo-oophorectomy, pelvic and periaortic lymphadenectomy, omentectomy.     HDR VAGINAL BRACHYTHERAPY-9/29/16, 10/6/16, 10/13/16     08/19/2017 PET/CT scan: Hypermetabolic bilateral lung nodules suspicious for metastasis. Hypermetabolic nodule in the right lower quadrant anterior to the psoas muscle suspicious for metastasis.     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     She completed 2 cycles of chemotherapy with Taxol Carboplatin and Avastin on 10/17 and 11/17.  She had poor tolerance to chemotherapy, therefore she declined additional chemotherapy and opted for Natural remedies instead      on 06/12/2018: 15 (<39UmL)       Re-staging scans on 06/15/2018:  CT of the abdomen and pelvis: 2.2 x 1.3 cm soft tissue nodule along the superior aspect of the urinary bladder suspicious for metastases, decreased in size since 1/23/18; no evidence of new abnormalities since prior visit; diverticulosis without evidence of diverticulitis. CT Chest:  Multiple bilateral lung nodules suspicious for metastases, increased in size and number since 1/23/2018.     Bronchoscopy/EBUS was performed on 08/17/2018  Respiratory Gram Stain/Cx: Negative for organisms. Aspergillus Galactomannan Antigen Negative  BAL RUL Negative for malignant cells. Bronchial smears shake RUL (predominance of blood); Negative for malignant cells. FNA Mauricio TBNA RML (predominance of blood) Negative for malignant cells. FNA Martínez TBNA RUL (predominance of blood)  Negative for malignant cells. EBUS FNA R10 (scant lymph node sampling) Negative for malignant cells. EBUS FNA Station 7: Negative for malignant cells.     Case discussed with Dr. Mónica Fam at [facility].   She bursitis. Heterogenous joint effusion may be infected or hemorrhagic. Increased T2 signal superior acetabulum is most likely degenerative such as subchondral cyst formation. Osteoarthritis. No evidence for avascular necrosis or transient osteoporosis. Patient received injection to right hip of 4cc lidocaine/1 cc kenalog on 09/24/2019 with orthopedics. Evaluated by Dr. Brian Courtney at [facility] on 09/19/2019 who recommended to continue Seward Ferns and referred her to Dr. Nickolas Whitfield for consideration of SBRT to treat the psoas lesion. Recommended revisiting ureteral catheter with urology after assessment with Rad Onc   Cycle # 18 Landon Ferns was on 09/26/2019. Cycle # 19 Seward Ferns was on 10/24/2019.     Appointment with Dr. Nickolas Whitfield on 10/28/2019 who recommended SBRT to the oligoprogressive lesion adjacent to/investing the right psoas muscle with the goal of allowing her to continue her systemic therapy with Keytruda.    Cycle # 20 Keytruda was on 11/14/2019.     SBRT was started on 11/21/2019 and completed 12/12/2019. The R Psoas metastasis PTV received a total dose of 3750 cGy in 6 fractions at 625 cGy/fraction prescribed to Max Dose at 78.0% IDL using 10 MV photons with VMAT Coplanar technique.      Due to weakness, significant fatigue poor appetite and severe arthralgias in bilateral knees.  She declined Jazmine Capone that time and wanted to proceed with Hospice care.   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      Presented Sept 2020 to the hospital with complaints of chest heaviness and dyspnea with exertion for the past several weeks with worsening fatigue.       CTA chest 09/16/2020   Innumerable pulmonary parenchymal nodules  bilaterally, mediastinal and hilar lymphadenopathy are in fact new as compared to the prior exam.     CT abdomen/pelvis 09/18/2020:  Right psoas muscle mass lesion appears to have moderately regressed  Multiple new cannonball lung metastases  New malignant-appearing retroperitoneal adenopathy     Overall disease progression.      Presented in Dec 2020 with worsening SOB and coughing     CTA chest 12/07/2020:  No evidence of PE. Multiple bilateral solid pulmonary nodules most consistent with widespread metastatic pulmonary disease. Multifocal bilateral thick-walled cavitary lesions also favored to epresent multifocal bilateral metastatic disease.  Please note the cavitary lesions are larger in size when compared to prior study.  The largest is seen within the LLL and measures 2.0 x 2.0 cm  Mediastinal and right hilar LN     [place] LE U/S on 12/07/2020:  partially-occlusive thrombus within the right lower extremity, from the visualized distal right iliac, through the distal right superficial femoral veins. minimal, partially-occlusive thrombus is noted within a superficial vein at the level of the left calf  Left lower extremity negative for deep venous thrombosis     On Heparin drip   Vascular surgery on board. Can be d/c on Eliquis. Side effects reviewed. Scans showing overall disease progression. Recommended Hospice care; Hospice team on board. Ok to d/c from Heme/Onc standpoint.     Bev Hathaway MD   74/[id number]/0213  Board Certified Medical Oncologist

## 2020-12-09 NOTE — CARE COORDINATION
Social Work/Discharge Planning:  Chart reviewed. Hospice consult noted. RN made referral yesterday to Arsalan Mcgarry. Called Dot with [facility] and confirmed a liaison will meet with patient. Will continue to follow.   Electronically signed by ISIDORO Figueroa on 12/9/2020 at 10:42 AM

## 2020-12-10 VITALS
DIASTOLIC BLOOD PRESSURE: 56 MMHG | OXYGEN SATURATION: 96 % | HEART RATE: 97 BPM | RESPIRATION RATE: 18 BRPM | WEIGHT: 172.3 LBS | TEMPERATURE: 98.3 F | SYSTOLIC BLOOD PRESSURE: 117 MMHG | HEIGHT: 63 IN | BODY MASS INDEX: 30.53 KG/M2

## 2020-12-10 LAB
ANION GAP SERPL CALCULATED.3IONS-SCNC: 9 MMOL/L (ref 7–16)
APTT: 51.5 SEC (ref 24.5–35.1)
BASOPHILS ABSOLUTE: 0.04 E9/L (ref 0–0.2)
BASOPHILS RELATIVE PERCENT: 0.5 % (ref 0–2)
BUN BLDV-MCNC: 12 MG/DL (ref 8–23)
CALCIUM SERPL-MCNC: 9.2 MG/DL (ref 8.6–10.2)
CHLORIDE BLD-SCNC: 97 MMOL/L (ref 98–107)
CO2: 30 MMOL/L (ref 22–29)
CREAT SERPL-MCNC: 0.7 MG/DL (ref 0.5–1)
EOSINOPHILS ABSOLUTE: 0.01 E9/L (ref 0.05–0.5)
EOSINOPHILS RELATIVE PERCENT: 0.1 % (ref 0–6)
GFR AFRICAN AMERICAN: >60
GFR NON-AFRICAN AMERICAN: >60 ML/MIN/1.73
GLUCOSE BLD-MCNC: 130 MG/DL (ref 74–99)
HCT VFR BLD CALC: 30.2 % (ref 34–48)
HEMOGLOBIN: 8.9 G/DL (ref 11.5–15.5)
IMMATURE GRANULOCYTES #: 0.2 E9/L
IMMATURE GRANULOCYTES %: 2.7 % (ref 0–5)
INR BLD: 1.1
LYMPHOCYTES ABSOLUTE: 0.84 E9/L (ref 1.5–4)
LYMPHOCYTES RELATIVE PERCENT: 11.2 % (ref 20–42)
MCH RBC QN AUTO: 23.8 PG (ref 26–35)
MCHC RBC AUTO-ENTMCNC: 29.5 % (ref 32–34.5)
MCV RBC AUTO: 80.7 FL (ref 80–99.9)
METER GLUCOSE: 139 MG/DL (ref 74–99)
METER GLUCOSE: 139 MG/DL (ref 74–99)
MONOCYTES ABSOLUTE: 0.68 E9/L (ref 0.1–0.95)
MONOCYTES RELATIVE PERCENT: 9.1 % (ref 2–12)
NEUTROPHILS ABSOLUTE: 5.7 E9/L (ref 1.8–7.3)
NEUTROPHILS RELATIVE PERCENT: 76.4 % (ref 43–80)
PDW BLD-RTO: 16.3 FL (ref 11.5–15)
PLATELET # BLD: 254 E9/L (ref 130–450)
PMV BLD AUTO: 8.3 FL (ref 7–12)
POTASSIUM REFLEX MAGNESIUM: 3.6 MMOL/L (ref 3.5–5)
PROTHROMBIN TIME: 12.9 SEC (ref 9.3–12.4)
RBC # BLD: 3.74 E12/L (ref 3.5–5.5)
SODIUM BLD-SCNC: 136 MMOL/L (ref 132–146)
WBC # BLD: 7.5 E9/L (ref 4.5–11.5)

## 2020-12-10 PROCEDURE — 36415 COLL VENOUS BLD VENIPUNCTURE: CPT

## 2020-12-10 PROCEDURE — 82962 GLUCOSE BLOOD TEST: CPT

## 2020-12-10 PROCEDURE — 6360000002 HC RX W HCPCS: Performed by: INTERNAL MEDICINE

## 2020-12-10 PROCEDURE — 85025 COMPLETE CBC W/AUTO DIFF WBC: CPT

## 2020-12-10 PROCEDURE — 85730 THROMBOPLASTIN TIME PARTIAL: CPT

## 2020-12-10 PROCEDURE — 99239 HOSP IP/OBS DSCHRG MGMT >30: CPT | Performed by: INTERNAL MEDICINE

## 2020-12-10 PROCEDURE — 96376 TX/PRO/DX INJ SAME DRUG ADON: CPT

## 2020-12-10 PROCEDURE — 97165 OT EVAL LOW COMPLEX 30 MIN: CPT

## 2020-12-10 PROCEDURE — 97161 PT EVAL LOW COMPLEX 20 MIN: CPT

## 2020-12-10 PROCEDURE — 82785 ASSAY OF IGE: CPT

## 2020-12-10 PROCEDURE — G0378 HOSPITAL OBSERVATION PER HR: HCPCS

## 2020-12-10 PROCEDURE — 97116 GAIT TRAINING THERAPY: CPT

## 2020-12-10 PROCEDURE — 6370000000 HC RX 637 (ALT 250 FOR IP): Performed by: INTERNAL MEDICINE

## 2020-12-10 PROCEDURE — 80048 BASIC METABOLIC PNL TOTAL CA: CPT

## 2020-12-10 PROCEDURE — 97535 SELF CARE MNGMENT TRAINING: CPT

## 2020-12-10 PROCEDURE — 85610 PROTHROMBIN TIME: CPT

## 2020-12-10 RX ADMIN — TRAMADOL HYDROCHLORIDE 50 MG: 50 TABLET, FILM COATED ORAL at 04:00

## 2020-12-10 RX ADMIN — PREDNISONE 5 MG: 5 TABLET ORAL at 08:47

## 2020-12-10 RX ADMIN — LEVOTHYROXINE SODIUM 112 MCG: 112 TABLET ORAL at 06:30

## 2020-12-10 RX ADMIN — GLIMEPIRIDE 2 MG: 2 TABLET ORAL at 08:47

## 2020-12-10 RX ADMIN — HEPARIN SODIUM AND DEXTROSE 18 UNITS/KG/HR: 10000; 5 INJECTION INTRAVENOUS at 03:56

## 2020-12-10 ASSESSMENT — PAIN DESCRIPTION - PROGRESSION: CLINICAL_PROGRESSION: NOT CHANGED

## 2020-12-10 ASSESSMENT — PAIN DESCRIPTION - PAIN TYPE: TYPE: ACUTE PAIN

## 2020-12-10 ASSESSMENT — PAIN DESCRIPTION - FREQUENCY: FREQUENCY: CONTINUOUS

## 2020-12-10 ASSESSMENT — PAIN SCALES - GENERAL
PAINLEVEL_OUTOF10: 3
PAINLEVEL_OUTOF10: 7

## 2020-12-10 ASSESSMENT — PAIN DESCRIPTION - ORIENTATION: ORIENTATION: RIGHT

## 2020-12-10 ASSESSMENT — PAIN DESCRIPTION - LOCATION: LOCATION: HIP;LEG

## 2020-12-10 ASSESSMENT — PAIN - FUNCTIONAL ASSESSMENT: PAIN_FUNCTIONAL_ASSESSMENT: PREVENTS OR INTERFERES SOME ACTIVE ACTIVITIES AND ADLS

## 2020-12-10 ASSESSMENT — PAIN DESCRIPTION - ONSET: ONSET: ON-GOING

## 2020-12-10 ASSESSMENT — PAIN DESCRIPTION - DESCRIPTORS: DESCRIPTORS: ACHING;DULL;DISCOMFORT

## 2020-12-10 NOTE — PLAN OF CARE
Problem: Falls - Risk of:  Goal: Will remain free from falls  Description: Will remain free from falls  12/10/2020 1131 by Lili Jorgensen RN  Outcome: Completed  12/10/2020 0022 by James Martines RN  Outcome: Met This Shift  Goal: Absence of physical injury  Description: Absence of physical injury  12/10/2020 1131 by Lili Jorgensen RN  Outcome: Completed  12/10/2020 0022 by James Martines RN  Outcome: Met This Shift     Problem: Pain:  Goal: Pain level will decrease  Description: Pain level will decrease  Outcome: Completed  Goal: Control of acute pain  Description: Control of acute pain  12/10/2020 1131 by Lili Jorgensen RN  Outcome: Completed  12/10/2020 0022 by James Martines RN  Outcome: Met This Shift  Goal: Control of chronic pain  Description: Control of chronic pain  Outcome: Completed

## 2020-12-10 NOTE — PROGRESS NOTES
Occupational Therapy  OCCUPATIONAL THERAPY INITIAL EVALUATION      Date:12/10/2020  Patient Name: Jason Jorgensen  MRN: [id number]  : 1945  Room: [address] [place] [address]    Referring Provider: Melvin Jorgensen MD   Evaluating OT: Matheus Woodruff, OTR/L - [id number]    AM-PAC Daily Activity Raw Score: 18/24      Recommended Adaptive Equipment: Extended Tub Bench    Diagnosis: Acute deep vein thrombosis of right iliac vein (HCC) [I82.421]    Pertinent Medical History: cancer, DM, macular degeneration, OA, osteopenia      Precautions: falls, bed alarm    Home Living: Patient lives alone in a two-floor home; patient's bedroom and bathroom are on the main living level. Bathroom Setup: tub shower (with tub seat and grab bars available) and elevated toilet seat (with grab bars)  Equipment Owned: [facility]    Prior Level of Function (PLOF): Patient was independent with ADLs and with IADLs. Patient reported experiencing increased difficulty with ADLs and IADLs recently, per patient. Patient was independent with functional mobility (without device) prior to this hospitalization. Patient noted that her daughter will be staying with her upon her return home. Occupation: Patient is a retired RN. Pain Level: Patient reported experiencing pain in her R LE, but did not rate/describe her pain. Cognition: Patient alert and oriented x3. WFL command follow demonstrated. Patient pleasant, cooperative, and motivated to return to [facility] and home environment. Memory: WFL   Sequencing: WFL   Problem Solving: WFL   Judgement/Safety: WFL grossly    Functional Assessment:   Initial Eval Status  Date: 12/10/2020 Treatment Status  Date:  Short Term Goals   Feeding Independent with items of lunch tray at conclusion of this session.   Independent   Grooming CGA  Mod I  (seated/standing at sink)   UB Dressing Setup  Independent   LB Dressing Min A  Setup - with use of AE, as needed/appropriate   Bathing Mod A  SBA - with use of AE/DME, as needed/appropriate   Toileting CGA  Mod I    Bed Mobility  Supine-to-Sit: SBA  Sit-to-Supine: SBA      Functional Transfers Sit-to-Stand: CGA   from EOB  Independent   Functional Mobility Min A   (without device) within patient's room and bathroom. Patient demonstrated tendency to reach for objects to stabilize her balance during functional mobility. Mod I / Independent with functional mobility (with device, as needed/appropriate) in order to maximize independence with ADLs/IADLs and other functional tasks. Balance Sitting: Good  (at EOB)  Standing: Fair  (without device)  Good dynamic standing balance during completion of ADLs/IADLs and other functional tasks. Activity Tolerance Fair  Limited secondary to R LE pain. Patient will demonstrate Good understanding and consistent implementation of energy conservation techniques and work simplification techniques into ADL/IADL routines. Visual/  Perceptual WFL  Patient wears glasses. N/A     Long-Term Goal: Patient will increase functional independence to PLOF in order to allow patient to live in least restrictive environment. Strength: ROM: Additional Information:    R UE  WFL WFL    L UE WFL  WFL      Hearing: WFL  Sensation: No complaints of numbness/tingling in B UEs. Tone: WFL  Edema: No    Comments: RN approved patient's participation in [address] [place] [address] activities. Upon arrival, patient supine in bed with R LE elevated on pillows. At end of session, patient supine in bed with call light and phone within reach, R LE elevated on pillows, and all lines and tubes intact. Patient would benefit from continued skilled OT to increase safety and independence with completion of ADL/IADL tasks for functional independence and quality of life. Treatment: OT treatment provided this date included:    Instruction/training on safety and adapted techniques for completion of ADLs.   Instruction/training on safe functional mobility/transfer techniques.     Instruction/training on energy conservation/work simplification for completion of ADLs. Patient education provided regardin) potential benefits of extended tub bench to maximize independence/safety with showering in home environment, 2) potential benefits of sponge bathing, as needed, upon return home to ensure safety, 3) potential benefits of having assistance with ADLs/IADLs, as needed, to ensure safety upon return home. 4) potential benefits of BSC use, as needed, as a means of energy conservation upon return home, 5) techniques to maximize safety with use of walker, particularly during ADLs/IADLs. Patient verbalized understanding. Further skilled OT treatment indicated to increase patient's safety and independence with completion of ADL/IADL tasks in order to maximize patient's functional independence and quality of life.     Assessment of Current Deficits:   Functional Mobility [x]  ADLs [x] Strength [x]  Cognition []  Functional Transfers  [x] IADLs [x] Safety Awareness [x]  Endurance [x]  Fine Motor Coordination [] Balance [x] Vision/Perception [] Sensation []   Gross Motor Coordination [] ROM [] Delirium []                  Motor Control []    Plan of Care: 2-5 days/week for 1-2 weeks PRN  [x]ADL retraining/adaptive techniques and AE recommendations to increase functional independence within precautions  [x]Energy conservation techniques to improve tolerance for ADLs/IADLs  [x]Functional transfer/mobility training/DME recommendations for increased independence, safety and fall prevention  [x]Patient/family education to increase safety and functional independence  [x]Environmental modifications for safe mobility and completion of ADLs/IADLs  []Cognitive retraining exercises to improve problem solving skills & safe participation in ADLs/IADLs   []Sensory re-education techniques to improve extremity awareness, maintain skin integrity and improve hand function   []Visual/Perceptual retraining  to improve body awareness and safety during transfers and ADLs   []Splinting/positioning needs to maintain joint/skin integrity and prevent contractures   [x]Therapeutic activity to improve functional performance during ADLs/IADLs  [x]Therapeutic exercise to improve tolerance and functional strength for ADLs/IADLs  [x]Balance retraining/tolerance tasks for facilitation of postural control with dynamic challenges during ADLs   [x]Neuromuscular re-education: facilitate righting/equilibrium reactions, midline orientation, scapular stability/mobility, normalize muscle tone, and facilitate active functional movement/attention  []Delirium prevention/treatment   []Positioning to improve functional independence and prevent skin breakdown   []Other:     Rehab Potential: Good for established goals. Patient / Family Goal: Patient noted that she anticipates returning home later this date. Patient and/or family were instructed on functional diagnosis, prognosis/goals, and OT plan of care. Demonstrated Good understanding. Eval Complexity: Low    Time In: 1101  Time Out: 1117  Total Treatment Time: 8 minutes      Minutes Units   OT Eval Low 61782 8 1   OT Eval Medium 40161     OT Eval High 81736     OT Re-Eval [id number]     Therapeutic Ex 97020     Therapeutic Activities 01437     ADL/Self Care 45600 8 1   Orthotic Management 84595     Neuro Re-Ed 10011     Non-Billable Time N/A ---     Evaluation time includes thorough review of current medical information, gathering information on past medical history/social history and prior level of function, completion of standardized testing/informal observation of tasks, assessment of data, and education on plan of care and goals. Coleen Sanz, OTR/L  License Number: [id number]

## 2020-12-10 NOTE — PLAN OF CARE
Problem: Falls - Risk of:  Goal: Will remain free from falls  Description: Will remain free from falls  12/10/2020 0022 by Tangela Bolaños RN  Outcome: Met This Shift     Problem: Falls - Risk of:  Goal: Absence of physical injury  Description: Absence of physical injury  12/10/2020 0022 by Tangela Bolaños RN  Outcome: Met This Shift     Problem: Pain:  Goal: Control of acute pain  Description: Control of acute pain  12/10/2020 0022 by Tangela Bolaños RN  Outcome: Met This Shift

## 2020-12-10 NOTE — DISCHARGE SUMMARY
[facility] Physician Discharge Summary     Maxi Mcintosh [phone]    Activity level   As tolerated    Disposition   Home      Condition on discharge Stable    Patient ID   Ann Watts, 77 y. o.female /  1945  / MRN [id number]    Admit date   2020    Discharge date  12/10/2020  10:35 AM    Admission diagnoses Active Problems:    Acute deep vein thrombosis of right iliac vein (HCC)    Leg swelling  Resolved Problems:    * No resolved hospital problems. *    Discharge diagnoses Same    Consults   IP CONSULT TO VASCULAR SURGERY  IP CONSULT TO HEM/ONC  IP CONSULT TO PHARMACY  IP CONSULT TO HOSPICE  IP CONSULT TO IV TEAM    Procedures   See hospital course    Hospital Course  Marissa Dorsey is a 73F w PMH breast cancer, DM among others who presented to ED  w pain and swelling of RLE and found to have R iliac DVT. Vascular consulted and pt was started on heparin w plans to bridge to Coumadin. Pt did not want any interventions and lifelong anticoagulation is indicated. Pt was recommended hospice, had been on it in the past, though politely declined here. Oncology recommended Eliquis and with decision made pt stable for dc home.     Discharge Exam  BP (!) 117/56   Pulse 97   Temp 98.3 °F (36.8 °C) (Oral)   Resp 18   Ht 5' 2.5\" (1.588 m)   Wt 172 lb 4.8 oz (78.2 kg)   SpO2 96%   BMI 31.01 kg/m²   General Appearance: alert and oriented to person, place and time and in no acute distress  Skin: warm and dry  Head: normocephalic and atraumatic  Eyes: pupils equal, round, and reactive to light, extraocular eye movements intact, conjunctivae normal  Neck: neck supple and non tender without mass   Pulmonary/Chest: clear to auscultation bilaterally- no wheezes, rales or rhonchi, normal air movement, no respiratory distress  Cardiovascular: normal rate, normal S1 and S2 and no carotid bruits  Abdomen: soft, non-tender, non-distended, normal bowel sounds, no masses or organomegaly  Extremities: no cyanosis, no clubbing and no edema  Neurologic: no cranial nerve deficit and speech normal    I/O last 3 completed shifts: In: 600 [P.O.:600]  Out: -   No intake/output data recorded. Labs  Recent Labs     12/07/20  1418 12/07/20  2317 12/09/20  0520     --  132   K 3.9  --  5.2*   CL 91*  --  95*   CO2 29  --  26   BUN 26*  --  16   CREATININE 0.9  --  0.7   GLUCOSE 142*  --  167*   CALCIUM 10.3*  --  9.2   WBC 10.7 9.2 8.5   RBC 4.58 4.02 4.03   HGB 11.1* 10.1* 9.7*   HCT 36.6 31.5* 32.2*   MCV 79.9* 78.4* 79.9*   MCH 24.2* 25.1* 24.1*   MCHC 30.3* 32.1 30.1*   RDW 16.3* 16.8* 16.4*    260 282   MPV 8.6 9.2 8.8       Imaging  Xr Chest (2 Vw)    Result Date: 12/7/2020  Multiple cavitary lung lesions bilaterally which likely represent neoplasm. These do demonstrate some interval progression. Xr Hip Right (2-3 Views)    Result Date: 12/7/2020  No acute displaced fractures. Cta Pulmonary W Contrast    Result Date: 12/7/2020  1. There is no evidence of a pulmonary embolus. 2. Multiple bilateral solid pulmonary nodules most consistent with widespread metastatic pulmonary disease 3. Multifocal bilateral thick-walled cavitary lesions also favored to represent multifocal bilateral metastatic disease. Please note the cavitary lesions are larger in size when compared to prior study. The largest is seen within the left lower lobe and measures 2.0 x 2.0 cm. 4. Mediastinal and right hilar lymphadenopathy. Us Dup Lower Extremities Bilateral Venous    Result Date: 12/7/2020  1. Age-uncertain, partially-occlusive thrombus within the right lower extremity, from the visualized distal right iliac, through the distal right superficial femoral, veins. 2.  Age-uncertain, minimal, partially-occlusive thrombus is noted within a superficial vein at the level of the left calf. 3.  Left lower extremity negative for deep venous thrombosis.  4.  Minimal to moderate scattered soft tissue edema is most notable the level of the right popliteal region and calf. .    Patient Instructions     Medication List      START taking these medications    apixaban starter pack 5 MG Tbpk tablet  Commonly known as:  Eliquis DVT/PE Starter Pack  Take 1 tablet by mouth See Admin Instructions        CHANGE how you take these medications    OneTouch Delica Lancets 66N Misc  Use twice daily for fluctuating glucose levels. What changed:    · how to take this  · additional instructions        CONTINUE taking these medications    blood glucose test strips  Patient has fluctuating glucose with hyperglycemia. Test glucose level twice day.      CANNABIDIOL PO     Garlic 2415 MG Tbec     glimepiride 2 MG tablet  Commonly known as:  AMARYL     loratadine 10 MG tablet  Commonly known as:  CLARITIN     melatonin 3 MG Tabs tablet     montelukast 10 MG tablet  Commonly known as:  SINGULAIR     MULTI-DAY VITAMINS PO     NONFORMULARY     nystatin-triamcinolone 640168-5.1 UNIT/GM-% cream  Commonly known as:  MYCOLOG II     predniSONE 5 MG tablet  Commonly known as:  DELTASONE     Synthroid 112 MCG tablet  Generic drug:  levothyroxine  Take 1 tablet by mouth Daily     Vitamin B 12 250 MCG Lozg     vitamin D 1.25 MG (13860 UT) Caps capsule  Commonly known as:  ERGOCALCIFEROL        STOP taking these medications    bumetanide 1 MG tablet  Commonly known as:  BUMEX           Where to Get Your Medications      These medications were sent to [address] [place] [address], [address] [place] [address] [address] [id number]    Phone:  [phone]   · apixaban starter pack 5 MG Tbpk tablet  · OneTouch Delica Lancets 09Y Misc       Note that more than 30 minutes was spent in preparing discharge papers, discussing discharge with patient, medication review, etc.    Electronically signed by Mohit Jackson DO on 12/10/2020 at 10:35 AM

## 2020-12-10 NOTE — PROGRESS NOTES
Physical Therapy    Facility/Department: [address] INTERMEDIATE 1  Initial Assessment    NAME: Laxmi Preciado  : 1945  MRN: [id number]    Date of Service: 12/10/2020    Attending Provider:  Louann Monge DO    Evaluating PT:  Lawrence Su PJenniferTJennifer Room #:  6267/9149-F  Diagnosis:  Acute DVT R iliac vein  Precautions:  Falls, bed/chair alarms  Equipment Needs:  Wheeled walker    SUBJECTIVE:    Pt lives alone but states her daughter will live with her temporarily in a 2 story home with 2 stairs and 0 rails to enter. The bed and bath are on the 1st floor. Pt ambulated with no AD PTA. OBJECTIVE:   Initial Evaluation  Date: 12/10/20 Treatment Short Term/ Long Term   Goals   Was pt agreeable to Eval/treatment? yes     Does pt have pain? C/o pain in RLE with ambulation     Bed Mobility  Rolling: supervision  Supine to sit: supervision  Sit to supine: supervision  Scooting: supervision  Independent    Transfers Sit to stand: SBA  Stand to sit: SBA  Stand pivot: MIN A with no AD  Independent    Ambulation   15+60 feet with no AD MIN A  250+ feet with AAD Independent    Stair negotiation: ascended and descended NA  2 steps with 0 rail Independent    AM-PAC 6 Clicks 35/80       BLE ROM is WFL. BLE strength is grossly 4/5. Sensation:  Pt denies numbness and tingling to extremities  Edema:  RLE  Balance: sitting is independent and standing with no AD is SBA  Endurance: fair    ASSESSMENT:    Comments:  Pt was found lying supine in bed and was agreeable to PT evaluation. Pt requested to use the BR and ambulate with slow step too gait pattern and transferred on/off the commode. Pt performed hand hygiene at the sink with SBA. Pt ambulated back in room with same step too gait pattern and gait began to slow down and she took a standing break with UE support on wall for balance.  Pt ambulated in the haskins using the hand rail on the wall for balance and demonstrated mild unsteadiness and took two standing rest 7845    Comments: Returned to follow up with patient regarding need for ww. Pt was found up with nursing staff getting prepared for discharge. Pt ambulated without the ww with slow, unsteady step too gait requiring MIN A to correct. Pt was given a ww and was instructed in gait training using the ww. Pt ambulated with improved gait pattern and ambulated with reciprocal gait and supervision. In conclusion, pt would benefit from the use of ww at this time to allow for improved safety with gait. Time in  11:25  Time out  11:30    CPT codes:  [] Low Complexity PT evaluation 31205  [] Moderate Complexity PT evaluation 46896  [] High Complexity PT evaluation 99647  [] PT Re-evaluation 24928  [x] Gait training 37059 10 minutes  [] Manual therapy 51063 ** minutes  [] Therapeutic activities 95114 ** minutes  [] Therapeutic exercises 05737 ** minutes  [] Neuromuscular reeducation 60102 ** minutes     Kristin Young, [facility]    Chris Brown., P.T.   License Number: [id number]

## 2020-12-11 ENCOUNTER — CARE COORDINATION (OUTPATIENT)
Dept: CASE MANAGEMENT | Age: 75
End: 2020-12-11

## 2020-12-11 PROCEDURE — 1111F DSCHRG MED/CURRENT MED MERGE: CPT | Performed by: FAMILY MEDICINE

## 2020-12-11 RX ORDER — TRAMADOL HYDROCHLORIDE 50 MG/1
100 TABLET ORAL EVERY 6 HOURS PRN
COMMUNITY
End: 2020-12-16 | Stop reason: SDUPTHER

## 2020-12-11 NOTE — CARE COORDINATION
Shanelle [facility] Transitions Initial Follow Up Call    Call within 2 business days of discharge: Yes    Patient: Josie Schafer Patient : 1945   MRN: [id number]  Reason for Admission: acute DVT right leg   Discharge Date: 12/10/20 RARS: Readmission Risk Score: 19      Last Discharge [facility]       Complaint Diagnosis Description Type Department Provider    20 Shortness of Breath; Leg Swelling Acute deep vein thrombosis of right iliac vein ([address] . .. ED to Hosp-Admission (Discharged) (ADMITTED) KOBE Krishnamurthy DO; Tess Hebert. .. Spoke with:  [address]: [id number] [facility]    Non-face-to-face services provided:  Obtained and reviewed discharge summary and/or continuity of care documents    Care Transitions 24 Hour Call    Do you have any ongoing symptoms?:  Yes  Patient-reported symptoms:  Pain (Comment: RLE )  Do you have a copy of your discharge instructions?:  Yes  Do you have all of your prescriptions and are they filled?:  Yes  Have you been contacted by a [address]?:  No  Have you scheduled your follow up appointment?:  Yes  How are you going to get to your appointment?:  Other (Comment: vv)  Were you discharged with any Home Care or Post Acute Services:  No  Do you feel like you have everything you need to keep you well at home?:  Yes  Care Transitions Interventions  No Identified Needs     Spoke with Diane for initial care transition call post hospital discharge. She reports that she had a \"rough night\" due to pain in her right leg. She reports having Tramadol from a previous admission and took it and it did provide some pain relief. She reports that the coloring is improving in her leg, however, it remains red and swollen. She is wearing the ace wrap as instructed and removing every 12 hours. She denies any SOB or issues with her appetite. Med review completed, 1111F entered.  She stated the Eliquis will be ready at 3pm today and she will be able to pick it up. Dl Delatorre denies any needs, questions, or concerns at this time.      Follow Up  Future Appointments   Date Time Provider Stephanie Lee   12/16/2020  1:00 PM Alberto Aquino DO [facility]       Henrietta Real RN

## 2020-12-13 LAB
FOLATE: 14.1 NG/ML (ref 4.8–24.2)
VITAMIN B-12: >2000 PG/ML (ref 211–946)

## 2020-12-14 LAB — IGE: 18 KU/L

## 2020-12-16 ENCOUNTER — VIRTUAL VISIT (OUTPATIENT)
Dept: FAMILY MEDICINE CLINIC | Age: 75
End: 2020-12-16
Payer: MEDICARE

## 2020-12-16 PROCEDURE — 99214 OFFICE O/P EST MOD 30 MIN: CPT | Performed by: FAMILY MEDICINE

## 2020-12-16 PROCEDURE — 3051F HG A1C>EQUAL 7.0%<8.0%: CPT | Performed by: FAMILY MEDICINE

## 2020-12-16 RX ORDER — TRAMADOL HYDROCHLORIDE 50 MG/1
100 TABLET ORAL 2 TIMES DAILY
Qty: 60 TABLET | Refills: 1 | Status: SHIPPED
Start: 2020-12-16 | End: 2021-01-14 | Stop reason: SDUPTHER

## 2020-12-16 RX ORDER — GLIMEPIRIDE 2 MG/1
4 TABLET ORAL 2 TIMES DAILY
Qty: 120 TABLET | Refills: 5 | Status: SHIPPED
Start: 2020-12-16 | End: 2021-05-04 | Stop reason: SDUPTHER

## 2020-12-16 ASSESSMENT — ENCOUNTER SYMPTOMS
CHEST TIGHTNESS: 0
SORE THROAT: 0
TROUBLE SWALLOWING: 0
DIARRHEA: 0
EYE PAIN: 0
NAUSEA: 0
ABDOMINAL PAIN: 1
BACK PAIN: 0
WHEEZING: 0
COUGH: 0
CONSTIPATION: 0
SINUS PAIN: 0
SHORTNESS OF BREATH: 0
VOMITING: 0

## 2020-12-16 NOTE — PROGRESS NOTES
20    Name: Marianne Guevara  :1945   Sex:female   Age:75 y.o. Chief Complaint   Patient presents with    Follow-Up from Hospital     Patient presents from home for video visit. Patient was in ED for DVT in in [facility]. Patient is on Eliquis 10 mg BID on day 5, then she will decrease to 5 mg BID. Patient is wrapping her right leg from heel to upper thigh and elevating. She has a lot of pain in the this leg still. She has a lot of abdominal pain. Patient was given Tramadol 100 mg from hospital in short supply, she says taking this is the only way she can sleep. Patient says her glucose level is 150-190 at bedtime, she is taking 2 Glimepiride in the morning. She gets short of breath walking 30 feet. Patient's daughter is helping her with wrapping her leg. Her main concern is pain control right now. TeleMedicine Video Visit    This visit was performed as a virtual video visit using a synchronous, two-way, audio-video telehealth technology platform. Patient identification was verified at the start of the visit, including the patient's telephone number and physical location. I discussed with the patient the nature of our telehealth visits, that:     Due to the nature of an audio- video modality, the only components of a physical exam that could be done are the elements supported by direct observation. I would evaluate the patient and recommend diagnostics and treatments based on my assessment. If it was felt that the patient should be evaluated in clinic or an emergency room setting, then they would be directed there. Our sessions are not being recorded and that personal health information is protected. Our team would provide follow up care in person if/when the patient needs it. Patient does agree to proceed with telemedicine consultation.     Patient's location: home address in [facility]  Physician  location other address in [facility] other people involved in call were Santi Norris and my  See below for progress note    Time spent: Greater than Not billed by time    This visit was completed virtually using Doxy. me    patien there for follow up  On ED  Visit  She went with swollen leg and pain  Admitted with DVT up into right groin but no PE  Placed on heparin and eliquis  She is home with eliquis and was taking tramadol at night for pain was able to tolerate this and it really helped with the pain  Without she is not sleeping at night  She is wrapping the leg from the ankle to groin and this helps a little  Still some abdominal pain too, she can try the tramadol if nothing else helps    Sugars higher in the evenings agains  She is taking 2 glimepiride in the morning at 2mg each, will add one in the afternoons and see if thishelps    She feels SOB and tired but nothing more than usual  She is doing okay otherwise'              Review of Systems   Constitutional: Negative for appetite change, fatigue and fever. HENT: Negative for congestion, ear pain, sinus pain, sore throat and trouble swallowing. Eyes: Negative for pain. Respiratory: Negative for cough, chest tightness, shortness of breath and wheezing. Cardiovascular: Positive for leg swelling. Negative for chest pain and palpitations. Gastrointestinal: Positive for abdominal pain. Negative for constipation, diarrhea, nausea and vomiting. Endocrine: Negative for cold intolerance and heat intolerance. Genitourinary: Negative for difficulty urinating, dysuria, frequency, hematuria, pelvic pain and urgency. Musculoskeletal: Positive for arthralgias and myalgias. Negative for back pain, gait problem and joint swelling. Skin: Negative for rash and wound. Neurological: Negative for dizziness, syncope, light-headedness and headaches. Hematological: Negative for adenopathy. Psychiatric/Behavioral: Negative for confusion, dysphoric mood, self-injury, sleep disturbance and suicidal ideas. The patient is not nervous/anxious. Current Outpatient Medications:     apixaban (ELIQUIS) 5 MG TABS tablet, Take 1 tablet by mouth 2 times daily, Disp: 60 tablet, Rfl: 1    traMADol (ULTRAM) 50 MG tablet, Take 2 tablets by mouth 2 times daily for 30 days. , Disp: 60 tablet, Rfl: 1    glimepiride (AMARYL) 2 MG tablet, Take 2 tablets by mouth 2 times daily, Disp: 120 tablet, Rfl: 5    apixaban starter pack (ELIQUIS DVT/PE STARTER PACK) 5 MG TBPK tablet, Take 1 tablet by mouth See Admin Instructions, Disp: 60 each, Rfl: 0    OneTouch Delica Lancets 81D MISC, Use twice daily for fluctuating glucose levels. , Disp: 100 each, Rfl: 5    predniSONE (DELTASONE) 5 MG tablet, Take 5 mg by mouth 2 times daily, Disp: , Rfl:     SYNTHROID 112 MCG tablet, Take 1 tablet by mouth Daily, Disp: 30 tablet, Rfl: 3    blood glucose monitor strips, Patient has fluctuating glucose with hyperglycemia.  Test glucose level twice day., Disp: 200 strip, Rfl: 11    Multiple Vitamin (MULTI-DAY VITAMINS PO), Take 2 tablets by mouth 2 times daily , Disp: , Rfl:     NONFORMULARY, Take 1 capsule by mouth 2 times daily Pancreatic enzymes daily , Disp: , Rfl:     Cyanocobalamin (VITAMIN B 12) 250 MCG LOZG, Take 1 lozenge by mouth 2 times daily qam and lunch, Disp: , Rfl:     Garlic 5197 MG TBEC, Take 2,000 mg by mouth daily , Disp: , Rfl:     vitamin D (ERGOCALCIFEROL) 1.25 MG (95386 UT) CAPS capsule, Take 50,000 Units by mouth once a week Saturday, Disp: , Rfl:     nystatin-triamcinolone (MYCOLOG II) 164949-3.1 UNIT/GM-% cream, APPLY SPARINGLY TO AFFECTED AREA(S) TWICE DAILY, Disp: , Rfl:     glimepiride (AMARYL) 2 MG tablet, Take 4 mg by mouth every morning (before breakfast) , Disp: , Rfl:     melatonin 3 MG TABS tablet, Take 3 mg by mouth nightly as needed , Disp: , Rfl:     loratadine (CLARITIN) 10 MG tablet, Take 10 mg by mouth daily as needed , Disp: , Rfl:     montelukast (SINGULAIR) 10 MG tablet, Take by mouth nightly as needed Last took 2019, Disp: , Rfl:   CANNABIDIOL PO, Inhale 3 puffs into the lungs nightly as needed Medical marijuana, Disp: , Rfl:   Allergies   Allergen Reactions    Omeprazole Hives and Itching    Percocet [Oxycodone-Acetaminophen] Other (See Comments)     Prolong use, GI issues    Januvia [Sitagliptin]     Asa [Aspirin] Hives    Erythromycin Rash    Famotidine Nausea Only, Anxiety and Palpitations    Keflet [Cephalexin] Rash    Levaquin [Levofloxacin In D5w] Rash    Pcn [Penicillins] Rash    Polyethylene Glycol Other (See Comments)     Slowed peristalsis    Protonix [Pantoprazole Sodium] Other (See Comments)     Other reaction(s):  Other: See Comments  DEPRESSION    Tetracycline Rash    Tetracyclines & Related Rash      Past Medical History:   Diagnosis Date    Cancer ([facility]     endometrial cancer, breast-     Diabetes mellitus ([address]     Diverticulitis     GERD (gastroesophageal reflux disease)     Gout     Hiatal hernia     Leg swelling 12/9/2020    Macular degeneration     Metastatic disease ([address]     Osteoarthritis     Osteopenia      Patient Active Problem List    Diagnosis Date Noted    Leg swelling 12/09/2020    Acute deep vein thrombosis of right iliac vein (HCC) 12/07/2020    Abnormal CT scan     Controlled type 2 diabetes mellitus without complication (HCC)     Elevated LFTs     Chest pain 09/16/2020    Inflammatory arthritis     Diabetes mellitus ([address] 01/24/2020    Chronic pain due to neoplasm 10/09/2019    Acquired hypothyroidism 07/23/2019    GERD (gastroesophageal reflux disease) 06/24/2019    Ovarian cancer ([address] 11/07/2017    Metastatic adenocarcinoma ([address] 11/07/2017    Intestinal nodule 09/21/2017    Malignant neoplasm of upper-outer quadrant of left 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 ([address] 10/31/2016      Past Surgical History:   Procedure Laterality Date    BREAST LUMPECTOMY Left 12/14/2016    left breast sentinelnode dissection, blue dye injection    BREAST SURGERY Left 10/2016    cancer   SECTION      x 3    CHG UNLISTED DX RADIOGRAPHIC PROCEDURE N/A 2018    BRONCHOSCOPY ELECTROMAGNETIC performed by Kalpana Cain MD at [facility] area    ENDOSCOPY, COLON, DIAGNOSTIC      EYE SURGERY Bilateral 2016    cataracts    HYSTERECTOMY  2016    total with BSO    HYSTERECTOMY, TOTAL ABDOMINAL  2016    PORT SURGERY N/A 2020    MEDIPORT REMOVAL performed by Katharine Rhodes MD at [address] BRUSHING/PROTECTED BRUSHINGS  2018    BRONCHOSCOPY BRUSHINGS performed by Kalpana Cain MD at [address] [place] [address] [facility]  2018    BRONCHOSCOPY ALVEOLAR LAVAGE performed by Kalpana Cain MD at [address] [place] [address] [address] EBUS DX/TX INTERVENTION [address] LES N/A 2018    BRONCHOSCOPY ULTRASOUND performed by Kalpana Cain MD at [address] [facility] W/TRANSBRONCHIAL LUNG BX 1 LOBE  2018    BRONCHOSCOPY/TRANSBRONCHIAL NEEDLE BIOPSY performed by Kalpana Cain MD at [address] [facility] W/TRANSBRONCHIAL LUNG BX EACH LOBE  2018    BRONCHOSCOPY/TRANSBRONCHIAL NEEDLE BIOPSY ADDL LOBE performed by Kalpana Cain MD at [address] TUNNELED CTR VAD W/SUBQ PORT AGE 5 YR/> N/A 2018    MEDIPORT INSERTION performed by Katharine Rohdes MD at [address] [facility] History     Tobacco History     Smoking Status  Former Smoker Smoking Frequency  0.25 packs/day for 20 years (5 pk yrs) Smoking Tobacco Type  Cigarettes    Smokeless Tobacco Use  Never Used          Alcohol History     Alcohol Use Status  Yes Comment  rarely          Drug Use     Drug Use Status  Yes Types  Marijuana Comment  medical          Sexual Activity     Sexually Active  Not Asked            There were no vitals taken for this visit. EXAM:   Physical Exam  Vitals signs and nursing note reviewed. Constitutional:       Appearance: Normal appearance. HENT:      Head: Normocephalic and atraumatic. Eyes:      Conjunctiva/sclera: Conjunctivae normal.      Pupils: Pupils are equal, round, and reactive to light. Neurological:      General: No focal deficit present. Mental Status: She is alert and oriented to person, place, and time. Psychiatric:         Mood and Affect: Mood normal.         Thought Content: Thought content normal.          Diane was seen today for follow-up from hospital.    Diagnoses and all orders for this visit:    Acute deep vein thrombosis (DVT) of iliac vein of right lower extremity (HCC)  Comments:  tramadol in the en=venings  continue with wrapping and elevation  Orders:  -     traMADol (ULTRAM) 50 MG tablet; Take 2 tablets by mouth 2 times daily for 30 days. Metastatic adenocarcinoma ([address]  Comments:  likeluy causing abdominal pain  she is uesing heat and tylenol  she can try tramadol  recheck in 1 month    Chronic pain due to neoplasm  Comments:  still using medical marijuana    Controlled type 2 diabetes mellitus without complication, unspecified whether long term insulin use (HCC)  Comments:  increase glimepiride to 3  2mg tablets daily    Other orders  -     apixaban (ELIQUIS) 5 MG TABS tablet; Take 1 tablet by mouth 2 times daily  -     glimepiride (AMARYL) 2 MG tablet; Take 2 tablets by mouth 2 times daily        I independently reviewed and updated the chief complaint, HPI, past medical and surgical history, medications, allergies and ROS as entered by the LPN. Seen by:   Shanna Marion DO

## 2020-12-18 ENCOUNTER — CARE COORDINATION (OUTPATIENT)
Dept: CASE MANAGEMENT | Age: 75
End: 2020-12-18

## 2021-01-04 ENCOUNTER — TELEPHONE (OUTPATIENT)
Dept: FAMILY MEDICINE CLINIC | Age: 76
End: 2021-01-04

## 2021-01-04 NOTE — TELEPHONE ENCOUNTER
No that is actually normal odalys in her case  She just needs to keep wrapping it  It can takes weeks for that swelling to get better    she just needs to keep taking the eliquis  She can add a baby asa 81mg to that daily  That might help

## 2021-01-04 NOTE — TELEPHONE ENCOUNTER
Diane calling about the Right Leg swelling. She had been wrapping it from foot to groin every day for a couple weeks. For a couple days last week she left it unwrapped for a few hours during the day. After 3 days she noticed that it ballooned back up and scared her. Now she is back to wrapping it every morning. She has an appointment with you on the 14th and waned you to be aware of this in case it is something to be worried about.

## 2021-01-04 NOTE — TELEPHONE ENCOUNTER
I thought she was elevation her legs so yes that will help and she can try compression knee highs if she can get them on now

## 2021-01-04 NOTE — TELEPHONE ENCOUNTER
Diane notified. She is ax to asa. Do you want her to continue to elevate her leg? Do you recommend compression stockings?

## 2021-01-14 ENCOUNTER — VIRTUAL VISIT (OUTPATIENT)
Dept: FAMILY MEDICINE CLINIC | Age: 76
End: 2021-01-14
Payer: MEDICARE

## 2021-01-14 DIAGNOSIS — E11.65 CONTROLLED TYPE 2 DIABETES MELLITUS WITH HYPERGLYCEMIA, WITHOUT LONG-TERM CURRENT USE OF INSULIN (HCC): ICD-10-CM

## 2021-01-14 DIAGNOSIS — M19.90 INFLAMMATORY ARTHRITIS: ICD-10-CM

## 2021-01-14 DIAGNOSIS — I20.9 ANGINA PECTORIS, UNSPECIFIED (HCC): ICD-10-CM

## 2021-01-14 DIAGNOSIS — I82.421 ACUTE DEEP VEIN THROMBOSIS (DVT) OF ILIAC VEIN OF RIGHT LOWER EXTREMITY (HCC): Primary | ICD-10-CM

## 2021-01-14 DIAGNOSIS — M79.89 LEG SWELLING: ICD-10-CM

## 2021-01-14 DIAGNOSIS — C79.9 METASTATIC ADENOCARCINOMA (HCC): ICD-10-CM

## 2021-01-14 PROCEDURE — 99214 OFFICE O/P EST MOD 30 MIN: CPT | Performed by: FAMILY MEDICINE

## 2021-01-14 RX ORDER — TRAMADOL HYDROCHLORIDE 50 MG/1
100 TABLET ORAL 2 TIMES DAILY
Qty: 60 TABLET | Refills: 1 | Status: SHIPPED
Start: 2021-01-14 | End: 2021-02-23 | Stop reason: SDUPTHER

## 2021-01-14 ASSESSMENT — ENCOUNTER SYMPTOMS
WHEEZING: 0
CONSTIPATION: 0
COUGH: 0
SINUS PAIN: 0
EYE PAIN: 0
CHEST TIGHTNESS: 0
BACK PAIN: 1
ABDOMINAL PAIN: 0
TROUBLE SWALLOWING: 0
VOMITING: 0
NAUSEA: 0
SHORTNESS OF BREATH: 0
DIARRHEA: 0
SORE THROAT: 0

## 2021-01-14 ASSESSMENT — PATIENT HEALTH QUESTIONNAIRE - PHQ9
SUM OF ALL RESPONSES TO PHQ9 QUESTIONS 1 & 2: 2
1. LITTLE INTEREST OR PLEASURE IN DOING THINGS: 1

## 2021-01-14 NOTE — PROGRESS NOTES
Wing Mejia (:  1945) is a 77 y.o. female,Established patient, here for evaluation of the following chief complaint(s): Hyperglycemia, Back Pain, and Leg Swelling  Patient has lost about 15 pounds in a month. She has been restricting her calories because her glucose levels have been high. She currently takes 2 glimepiride in the am and 1 in the afternoon. Patient is having worsening back pain. She has pain in groin when lifting her right leg. She still has swelling in the right thigh. Patient has started wrapping her right leg from toes to knee instead of all of the leg thinking this will help the swelling. She asks if she should get the Covid-19 vaccine. ASSESSMENT/PLAN:  1. Acute deep vein thrombosis (DVT) of iliac vein of right lower extremity (HCC)  Comments:  tramadol in the en=venings  continue with wrapping and elevation  she feel sit is improving  Orders:  -     traMADol (ULTRAM) 50 MG tablet; Take 2 tablets by mouth 2 times daily for 30 days. , Disp-60 tablet, R-1Normal  2. Controlled type 2 diabetes mellitus with hyperglycemia, without long-term current use of insulin ([facility])  Comments:  just taking glimepiride 2mg up to 4 pills a day  continue to take based off sugars  it is elevated due to prednosine for inflammatory arthritis  3. Metastatic adenocarcinoma ([facility]  Comments:  no longer getting treatments  on tramadol for her cancer related pain  eliquis for the dvt from her cancer    4. Angina pectoris, unspecified ([address]  Comments:  resolved  5. Leg swelling  Comments:  slowly getting better  has been doning better with elevated  6. Inflammatory arthritis  Comments:  on prednisone  has been stable  from her chemotherpay      Return in about 2 months (around 3/14/2021) for recheck dvt and sugar.     SUBJECTIVE/OBJECTIVE:  Patient here for video visit for followu p on diabetes and right leg DVT    Diabetes  Sugars running high In December I had told her to increase glimepiride to 4mg twice a day but she did not remember that  hher new bottle shouldhave said that  So she ha been cutting back on her intake and has lost weight but able to control suggars a little better'  She will adjust her glimepiride so that she can restart her protein shakes    Edema in lower extremities  Getting better  She has been elevating them and this helps  With right leg she has been wrapping it and that has helped    DVT left leg  Still on eliquis and still wrapping it and elevating it  There is some residual swelling in this but it is getting better'    Still some low back pain and right lower quadrant and right groin pain from her cancer  Taking the tramadol and that really seems to help  She was taking it bid for awhile but now down to daily and tolerating this okay          Review of Systems   Constitutional: Negative for appetite change, fatigue and fever. HENT: Negative for congestion, ear pain, sinus pain, sore throat and trouble swallowing. Eyes: Negative for pain. Respiratory: Negative for cough, chest tightness, shortness of breath and wheezing. Cardiovascular: Positive for leg swelling. Negative for chest pain and palpitations. Gastrointestinal: Negative for abdominal pain, constipation, diarrhea, nausea and vomiting. Endocrine: Negative for cold intolerance and heat intolerance. Genitourinary: Negative for difficulty urinating, dysuria, frequency, hematuria, pelvic pain and urgency. Musculoskeletal: Positive for back pain and myalgias. Negative for arthralgias, gait problem and joint swelling. Skin: Negative for rash and wound. Neurological: Negative for dizziness, syncope, light-headedness and headaches. Hematological: Negative for adenopathy. Psychiatric/Behavioral: Negative for confusion, dysphoric mood, self-injury, sleep disturbance and suicidal ideas. The patient is not nervous/anxious. No flowsheet data found. Physical Exam  Vitals signs and nursing note reviewed. Constitutional:       Appearance: Normal appearance. HENT:      Head: Normocephalic and atraumatic. Eyes:      Conjunctiva/sclera: Conjunctivae normal.      Pupils: Pupils are equal, round, and reactive to light. Pulmonary:      Effort: Pulmonary effort is normal. No respiratory distress. Musculoskeletal:      Comments: Was sitting for video visit but she ambulated around her home with and with out a walker   Neurological:      General: No focal deficit present. Mental Status: She is alert and oriented to person, place, and time. Psychiatric:         Mood and Affect: Mood normal.         Thought Content: Thought content normal.                     Chrissy Haley is a 77 y.o. female being evaluated by a Virtual Visit (video visit) encounter to address concerns as mentioned above. A caregiver was present when appropriate. Due to this being a TeleHealth encounter (During [facility]- public health emergency), evaluation of the following organ systems was limited: Vitals/Constitutional/EENT/Resp/CV/GI//MS/Neuro/Skin/Heme-Lymph-Imm. Pursuant to the emergency declaration under the [address] [place] [address], [address] [place] [address] authority and the [employer] Act, this Virtual Visit was conducted with patient's (and/or legal guardian's) consent, to reduce the patient's risk of exposure to COVID-19 and provide necessary medical care. The patient (and/or legal guardian) has also been advised to contact this office for worsening conditions or problems, and seek emergency medical treatment and/or call 911 if deemed necessary.      Patient identification was verified at the start of the visit: Yes Services were provided through a video synchronous discussion virtually to substitute for in-person clinic visit. Patient was located at home and provider was located in office or at home. An electronic signature was used to authenticate this note.     --Emily Mock, DO

## 2021-01-22 ENCOUNTER — TELEPHONE (OUTPATIENT)
Dept: FAMILY MEDICINE CLINIC | Age: 76
End: 2021-01-22

## 2021-01-22 NOTE — TELEPHONE ENCOUNTER
Diane calling in she started eliquis on dec 11th. She feels this is causing side effects. She is very fatigued, thirsty, has hip pain and an increase in headaches.  Please advise

## 2021-01-23 NOTE — TELEPHONE ENCOUNTER
She can stop it but she may develop more clots and this may lead to her death if the clots go to her lungs  She will have chest pain and shortness of breath

## 2021-02-23 DIAGNOSIS — I82.421 ACUTE DEEP VEIN THROMBOSIS (DVT) OF ILIAC VEIN OF RIGHT LOWER EXTREMITY (HCC): ICD-10-CM

## 2021-02-23 RX ORDER — TRAMADOL HYDROCHLORIDE 50 MG/1
100 TABLET ORAL 2 TIMES DAILY
Qty: 120 TABLET | Refills: 1 | Status: SHIPPED
Start: 2021-02-23 | End: 2021-04-05 | Stop reason: SDUPTHER

## 2021-02-23 NOTE — TELEPHONE ENCOUNTER
Audrey calling for a new script for Tramadol sent to [facility]. She is too much pain to be without it.

## 2021-03-01 ENCOUNTER — TELEPHONE (OUTPATIENT)
Dept: ADMINISTRATIVE | Age: 76
End: 2021-03-01

## 2021-03-04 ENCOUNTER — OFFICE VISIT (OUTPATIENT)
Dept: FAMILY MEDICINE CLINIC | Age: 76
End: 2021-03-04
Payer: MEDICARE

## 2021-03-04 VITALS
BODY MASS INDEX: 23.7 KG/M2 | TEMPERATURE: 98.2 F | SYSTOLIC BLOOD PRESSURE: 140 MMHG | HEIGHT: 64 IN | WEIGHT: 138.8 LBS | DIASTOLIC BLOOD PRESSURE: 80 MMHG | HEART RATE: 120 BPM | OXYGEN SATURATION: 98 %

## 2021-03-04 DIAGNOSIS — C79.9 METASTATIC ADENOCARCINOMA (HCC): ICD-10-CM

## 2021-03-04 DIAGNOSIS — E11.65 CONTROLLED TYPE 2 DIABETES MELLITUS WITH HYPERGLYCEMIA, WITHOUT LONG-TERM CURRENT USE OF INSULIN (HCC): ICD-10-CM

## 2021-03-04 DIAGNOSIS — G89.29 CHRONIC BILATERAL LOW BACK PAIN WITHOUT SCIATICA: Primary | ICD-10-CM

## 2021-03-04 DIAGNOSIS — M54.50 CHRONIC BILATERAL LOW BACK PAIN WITHOUT SCIATICA: Primary | ICD-10-CM

## 2021-03-04 DIAGNOSIS — G89.29 CHRONIC UPPER BACK PAIN: ICD-10-CM

## 2021-03-04 DIAGNOSIS — E03.9 ACQUIRED HYPOTHYROIDISM: ICD-10-CM

## 2021-03-04 DIAGNOSIS — M54.9 CHRONIC UPPER BACK PAIN: ICD-10-CM

## 2021-03-04 PROCEDURE — 99215 OFFICE O/P EST HI 40 MIN: CPT | Performed by: FAMILY MEDICINE

## 2021-03-04 RX ORDER — CYCLOBENZAPRINE HCL 10 MG
10 TABLET ORAL NIGHTLY PRN
Qty: 30 TABLET | Refills: 2 | Status: SHIPPED | OUTPATIENT
Start: 2021-03-04 | End: 2021-03-14

## 2021-03-04 RX ORDER — PIOGLITAZONEHYDROCHLORIDE 15 MG/1
15 TABLET ORAL DAILY
Qty: 30 TABLET | Refills: 3 | Status: SHIPPED
Start: 2021-03-04 | End: 2021-05-04 | Stop reason: SDUPTHER

## 2021-03-04 RX ORDER — LEVOTHYROXINE SODIUM 112 MCG
112 TABLET ORAL DAILY
Qty: 30 TABLET | Refills: 3 | Status: SHIPPED
Start: 2021-03-04 | End: 2021-05-04 | Stop reason: SDUPTHER

## 2021-03-04 ASSESSMENT — ENCOUNTER SYMPTOMS
COUGH: 0
SHORTNESS OF BREATH: 1
ABDOMINAL PAIN: 1
SINUS PAIN: 0
VOMITING: 0
BACK PAIN: 1
EYE PAIN: 0
CHEST TIGHTNESS: 0
WHEEZING: 0
DIARRHEA: 0
NAUSEA: 0
CONSTIPATION: 0
TROUBLE SWALLOWING: 0
SORE THROAT: 0

## 2021-03-04 NOTE — PROGRESS NOTES
3/4/21    Name: Jaime Cade  :1945   Sex:female   Age:75 y.o. Chief Complaint   Patient presents with    Back Pain    Abdominal Pain     Patient presents to office for visit. Patient is weak and very short of breath so she did need a wheel chair to make it to exam room. Patient says with any type of movement she gets short of breath, she is able to catch her breath quickly. Patient has low back pain across her back below her hips. She has abdominal pain as well that goes up to mid abdomen and wraps around. Patient says the Tramadol helps usually to keep pain tolerable. There are some days that the pain is severe and the Tramadol doesn't work. She is nauseous and very fatigued. She is having vertigo and dizziness. Patient's blood sugar is all over the place. Patient does have some constipation while taking the Tramadol, but this is easily resolved with a laxative. Patient has been getting headaches with flashing light glare in her vision. She does not eat three meals a day usually, sometimes not even two. Patient lives alone. Her daughter comes down once a week to check on her and her other kids and friends call often. She drives herself to the grocery store if she has to. Patient is on the downstairs level in her home, she no longer goes upstairs for anything. Patient is no longer using medicinal marijuana, she says they were not good at managing the dose. Patient says her oncologist has told her there is nothing else they can do for her. Patient here for continued low back and mid back pain  She really just wants to be sure there is nothing else going on like a new compression fracture  She recalls having an old one at T[facility].   She bhandari have metastatic endometrial cancer with multiple lung lesions  Has a right psoas mass and mets in the pelvis as well  She has stopped all chemo and radiation treatments months ago Was on hospice for a while but then discotinued it b/c she started to feel a little stronger and was having more better days  She is currently taking tramadol for the pain  Most days she has to take at least 2 of them twice a day but there are some days where she has to take 2 pills three times a day  No new injuries or falls  No fevers  No vomiting  Some times she is nauseated but that usually passes  She had been using medical marijuana but gave up on it b/c it was not helping her pain    She has been having some other s/s as well  More dizziness and lightheadedness  Trying to stay hydrated    Her sugars have been elevated as well  She is taking the glimepiride 2 pills twice a day  Still elevated sugars  We discussed this at length and she agreed to try actos 15mg daily and see if this helps with her diabetes    She has been having a good deal of constipation from the tramadol but manages that with stool softeners and laxatives          Review of Systems   Constitutional: Positive for fatigue. Negative for appetite change and fever. HENT: Negative for congestion, ear pain, sinus pain, sore throat and trouble swallowing. Eyes: Negative for pain. Respiratory: Positive for shortness of breath. Negative for cough, chest tightness and wheezing. Cardiovascular: Negative for chest pain, palpitations and leg swelling. Gastrointestinal: Positive for abdominal pain. Negative for constipation, diarrhea, nausea and vomiting. Endocrine: Negative for cold intolerance and heat intolerance. Genitourinary: Negative for difficulty urinating, dysuria, frequency, hematuria and pelvic pain. Musculoskeletal: Positive for arthralgias, back pain, gait problem and myalgias. Negative for joint swelling. Skin: Negative for rash and wound. Neurological: Positive for dizziness, weakness and headaches. Negative for syncope and light-headedness. Hematological: Negative for adenopathy. Psychiatric/Behavioral: Positive for dysphoric mood. Negative for confusion, self-injury, sleep disturbance and suicidal ideas. The patient is not nervous/anxious. Current Outpatient Medications:     pioglitazone (ACTOS) 15 MG tablet, Take 1 tablet by mouth daily, Disp: 30 tablet, Rfl: 3    SYNTHROID 112 MCG tablet, Take 1 tablet by mouth Daily, Disp: 30 tablet, Rfl: 3    cyclobenzaprine (FLEXERIL) 10 MG tablet, Take 1 tablet by mouth nightly as needed for Muscle spasms, Disp: 30 tablet, Rfl: 2    apixaban (ELIQUIS) 2.5 MG TABS tablet, Take 1 tablet by mouth 2 times daily Do not fill 5mg, Disp: 60 tablet, Rfl: 2    traMADol (ULTRAM) 50 MG tablet, Take 2 tablets by mouth 2 times daily for 30 days. , Disp: 120 tablet, Rfl: 1    glimepiride (AMARYL) 2 MG tablet, Take 2 tablets by mouth 2 times daily, Disp: 120 tablet, Rfl: 5    OneTouch Delica Lancets 35P MISC, Use twice daily for fluctuating glucose levels. , Disp: 100 each, Rfl: 5    predniSONE (DELTASONE) 5 MG tablet, Take 5 mg by mouth 2 times daily, Disp: , Rfl:     blood glucose monitor strips, Patient has fluctuating glucose with hyperglycemia.  Test glucose level twice day., Disp: 200 strip, Rfl: 11    Multiple Vitamin (MULTI-DAY VITAMINS PO), Take 2 tablets by mouth 2 times daily , Disp: , Rfl:     NONFORMULARY, Take 1 capsule by mouth 2 times daily Pancreatic enzymes daily , Disp: , Rfl:     Cyanocobalamin (VITAMIN B 12) 250 MCG LOZG, Take 1 lozenge by mouth 2 times daily qam and lunch, Disp: , Rfl:     Garlic 6335 MG TBEC, Take 2,000 mg by mouth daily , Disp: , Rfl:     vitamin D (ERGOCALCIFEROL) 1.25 MG (23988 UT) CAPS capsule, Take 50,000 Units by mouth once a week Saturday, Disp: , Rfl:     nystatin-triamcinolone (MYCOLOG II) 203215-9.1 UNIT/GM-% cream, APPLY SPARINGLY TO AFFECTED AREA(S) TWICE DAILY, Disp: , Rfl:     melatonin 3 MG TABS tablet, Take 3 mg by mouth nightly as needed , Disp: , Rfl:   loratadine (CLARITIN) 10 MG tablet, Take 10 mg by mouth daily as needed , Disp: , Rfl:     montelukast (SINGULAIR) 10 MG tablet, Take by mouth nightly as needed Last took 2019, Disp: , Rfl:   Allergies   Allergen Reactions    Omeprazole Hives and Itching    Percocet [Oxycodone-Acetaminophen] Other (See Comments)     Prolong use, GI issues    Januvia [Sitagliptin]     Asa [Aspirin] Hives    Erythromycin Rash    Famotidine Nausea Only, Anxiety and Palpitations    Keflet [Cephalexin] Rash    Levaquin [Levofloxacin In D5w] Rash    Pcn [Penicillins] Rash    Polyethylene Glycol Other (See Comments)     Slowed peristalsis    Protonix [Pantoprazole Sodium] Other (See Comments)     Other reaction(s):  Other: See Comments  DEPRESSION    Tetracycline Rash    Tetracyclines & Related Rash      Past Medical History:   Diagnosis Date    [facility]     endometrial cancer, breast-     Diabetes mellitus ([facility]     Diverticulitis     GERD (gastroesophageal reflux disease)     Gout     Hiatal hernia     Leg swelling 12/9/2020    Macular degeneration     [facility]     Osteoarthritis     Osteopenia      Patient Active Problem List    Diagnosis Date Noted    Leg swelling 12/09/2020    Acute deep vein thrombosis of right iliac vein ([facility] 12/07/2020    Abnormal CT scan     Controlled type 2 diabetes mellitus with hyperglycemia, without long-term current use of insulin (HCC)     Elevated LFTs     Inflammatory arthritis     Diabetes mellitus ([facility] 01/24/2020    Chronic pain due to neoplasm 10/09/2019    Acquired hypothyroidism 07/23/2019    GERD (gastroesophageal reflux disease) 06/24/2019    Ovarian cancer ([facility] 11/07/2017    Metastatic adenocarcinoma ([facility] 11/07/2017    Intestinal nodule 09/21/2017    Malignant neoplasm of upper-outer quadrant of left 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 ([facility] 10/31/2016      Past Surgical History:   Procedure Laterality Date    BREAST LUMPECTOMY Left 12/14/2016 left breast sentinelnode dissection, blue dye injection    BREAST SURGERY Left 10/2016    cancer     SECTION      x 3    CHG UNLISTED DX RADIOGRAPHIC PROCEDURE N/A 2018    BRONCHOSCOPY ELECTROMAGNETIC performed by [facility] Joseph [facility] at [facility]    ENDOSCOPY, COLON, DIAGNOSTIC      EYE SURGERY Bilateral 2016    cataracts    HYSTERECTOMY  2016    total with BSO    HYSTERECTOMY, TOTAL ABDOMINAL  2016    PORT SURGERY N/A 2020    MEDIPORT REMOVAL performed by Radha Flanagan MD at [address] BRUSHING/PROTECTED BRUSHINGS  2018    BRONCHOSCOPY BRUSHINGS performed by Rey Joseph MD at [address] [place] [address] [facility]  2018    BRONCHOSCOPY ALVEOLAR LAVAGE performed by Rey Joseph MD at [address] [place] [address] [address] EBUS DX/TX INTERVENTION [address] LES N/A 2018    BRONCHOSCOPY ULTRASOUND performed by Rey Joseph MD at [address] [facility] W/TRANSBRONCHIAL LUNG BX 1 LOBE  2018    BRONCHOSCOPY/TRANSBRONCHIAL NEEDLE BIOPSY performed by Rey Joseph MD at [address] [facility] W/TRANSBRONCHIAL LUNG BX EACH LOBE  2018    BRONCHOSCOPY/TRANSBRONCHIAL NEEDLE BIOPSY ADDL LOBE performed by Rey Joseph MD at [address] TUNNELED CTR VAD W/SUBQ PORT AGE 6 YR/> N/A 2018    MEDIPORT INSERTION performed by Radha Flanagan MD at [address] [facility] History     Tobacco History     Smoking Status  Former Smoker Smoking Frequency  0.25 packs/day for 20 years (5 pk yrs) Smoking Tobacco Type  Cigarettes    Smokeless Tobacco Use  Never Used          Alcohol History     Alcohol Use Status  Yes Comment  rarely          Drug Use     Drug Use Status  Yes Types  Marijuana Comment  medical          Sexual Activity     Sexually Active  Not Asked BP (!) 140/80   Pulse 120   Temp 98.2 °F (36.8 °C)   Ht 5' 3.5\" (1.613 m)   Wt 138 lb 12.8 oz (63 kg)   SpO2 98%   BMI 24.20 kg/m²     EXAM:   Physical Exam  Vitals signs and nursing note reviewed. Constitutional:       General: She is not in acute distress. Appearance: She is well-developed. She is ill-appearing. Comments: Chromically ill appearing   HENT:      Head: Normocephalic and atraumatic. Right Ear: Tympanic membrane normal.      Left Ear: Tympanic membrane normal.      Nose: Nose normal.      Mouth/Throat:      Mouth: Mucous membranes are moist.   Eyes:      Pupils: Pupils are equal, round, and reactive to light. Neck:      Musculoskeletal: Normal range of motion. Cardiovascular:      Rate and Rhythm: Normal rate and regular rhythm. Pulmonary:      Effort: Pulmonary effort is normal.      Breath sounds: Normal breath sounds. Abdominal:      General: Bowel sounds are normal.      Palpations: Abdomen is soft. Tenderness: There is abdominal tenderness. Comments: Mild diffuse tenderness through out   Musculoskeletal:      Comments: In wheel chair due to weakness and fatigue   Skin:     General: Skin is warm and dry. Neurological:      Mental Status: She is alert and oriented to person, place, and time. Psychiatric:         Mood and Affect: Mood normal.         Thought Content: Thought content normal.          Diane was seen today for back pain and abdominal pain. Diagnoses and all orders for this visit:    Chronic bilateral low back pain without sciatica  Comments:  old compression fractue T12 but no new bone issues  pain is likely from metastatic endometrial ca  continue tramadol 100mg 2 to 3 times a day  Orders:  -     XR THORACIC SPINE (3 VIEWS); Future  -     XR LUMBAR SPINE (2-3 VIEWS);  Future    Chronic upper back pain  Comments:  likely from the cancer  continue tramadol  if tramadol starts to become not effective we discussed hospice  Orders: -     XR THORACIC SPINE (3 VIEWS); Future  -     XR LUMBAR SPINE (2-3 VIEWS); Future    Acquired hypothyroidism  Comments:  stable  Orders:  -     SYNTHROID 112 MCG tablet; Take 1 tablet by mouth Daily    Controlled type 2 diabetes mellitus with hyperglycemia, without long-term current use of insulin (HCC)  Comments:  add actos 15mg daily and continue the glimepiride    Metastatic adenocarcinoma ([address]  Comments:  likely causing her pain  continue tramadol  she continues to refuse other treamtents  may need hospice soon    Other orders  -     pioglitazone (ACTOS) 15 MG tablet; Take 1 tablet by mouth daily  -     Discontinue: apixaban (ELIQUIS) 5 MG TABS tablet; Take 1 tablet by mouth 2 times daily  -     cyclobenzaprine (FLEXERIL) 10 MG tablet; Take 1 tablet by mouth nightly as needed for Muscle spasms  -     apixaban (ELIQUIS) 2.5 MG TABS tablet; Take 1 tablet by mouth 2 times daily Do not fill 5mg    4 week recheck vv to reevaluate pain and sugars  45 minutes spent with the patient reviewed pain, sugars, xrays and old records  Also reviewed the idea of possible hospice  She is receptive if her pain gets worse  Reevaluate her situation in 4 weeks    I independently reviewed and updated the chief complaint, HPI, past medical and surgical history, medications, allergies and ROS as entered by the LPN. Seen by:   Tracy Bridges DO

## 2021-03-11 ENCOUNTER — TELEPHONE (OUTPATIENT)
Dept: FAMILY MEDICINE CLINIC | Age: 76
End: 2021-03-11

## 2021-03-11 NOTE — TELEPHONE ENCOUNTER
Diane calling her right foot into her knee began to swell since she saw you last week. No weight gain redness pain or warmth. She is keeping it elevated now. States you wanted her to call you if this happened.  She has had a blood clot there in the past

## 2021-03-11 NOTE — TELEPHONE ENCOUNTER
Elevating it off and on will help but just to where she is comfortable  It does not have to be elevated all the time

## 2021-04-05 ENCOUNTER — VIRTUAL VISIT (OUTPATIENT)
Dept: FAMILY MEDICINE CLINIC | Age: 76
End: 2021-04-05
Payer: MEDICARE

## 2021-04-05 DIAGNOSIS — I82.421 ACUTE DEEP VEIN THROMBOSIS (DVT) OF ILIAC VEIN OF RIGHT LOWER EXTREMITY (HCC): ICD-10-CM

## 2021-04-05 DIAGNOSIS — E03.9 ACQUIRED HYPOTHYROIDISM: ICD-10-CM

## 2021-04-05 DIAGNOSIS — E11.65 TYPE 2 DIABETES MELLITUS WITH HYPERGLYCEMIA, WITHOUT LONG-TERM CURRENT USE OF INSULIN (HCC): ICD-10-CM

## 2021-04-05 DIAGNOSIS — C54.1 MALIGNANT NEOPLASM OF ENDOMETRIUM METASTATIC TO INTRA-ABDOMINAL LYMPH NODE (HCC): Primary | ICD-10-CM

## 2021-04-05 DIAGNOSIS — C77.2 MALIGNANT NEOPLASM OF ENDOMETRIUM METASTATIC TO INTRA-ABDOMINAL LYMPH NODE (HCC): Primary | ICD-10-CM

## 2021-04-05 PROCEDURE — 99214 OFFICE O/P EST MOD 30 MIN: CPT | Performed by: FAMILY MEDICINE

## 2021-04-05 RX ORDER — TRAMADOL HYDROCHLORIDE 50 MG/1
100 TABLET ORAL EVERY 8 HOURS PRN
Qty: 150 TABLET | Refills: 1 | Status: SHIPPED
Start: 2021-04-05 | End: 2021-04-26 | Stop reason: SDUPTHER

## 2021-04-05 ASSESSMENT — ENCOUNTER SYMPTOMS
CONSTIPATION: 0
SHORTNESS OF BREATH: 0
EYE PAIN: 0
VOMITING: 0
COUGH: 0
ABDOMINAL PAIN: 1
DIARRHEA: 0
BACK PAIN: 1
NAUSEA: 0
SORE THROAT: 0
WHEEZING: 0
SINUS PAIN: 0
CHEST TIGHTNESS: 0
TROUBLE SWALLOWING: 0

## 2021-04-05 NOTE — PROGRESS NOTES
Randall Nugent (:  1945) is a 77 y.o. female,Established patient, here for evaluation of the following chief complaint(s): Back Pain and Diabetes  Patient has been taking the Actos and says this has helped to better control her glucose levels. She did have the flu last week, she had stomach pain, nausea, body ache, and headache. Patient says she was given an anti viral and her symptoms resolved on Saturday. She is still taking Tramadol, typically twice a day, but sometimes three times a day. She says she is not ready for hospice yet. Patient would like a CT scan of her abdomen and chest so she knows clearly how bad or good it is. ASSESSMENT/PLAN:  1. Malignant neoplasm of endometrium metastatic to intra-abdominal lymph node [facility])  Comments:  trying to decide about restarting hospice  Dr Rox Neil for now signed off, not getting chemo  will get CT to see if it has advanced  Orders:  -     CT ABDOMEN PELVIS W IV CONTRAST Additional Contrast? None; Future  -     CBC Auto Differential; Future  -     Comprehensive Metabolic Panel; Future  -     T4, Free; Future  -     TSH without Reflex; Future  -     MAGNESIUM; Future  2. Acute deep vein thrombosis (DVT) of iliac vein of right lower extremity (HCC)  Comments:  tramadol in the en=venings  continue with wrapping and elevation  she feel sit is improving  Orders:  -     traMADol (ULTRAM) 50 MG tablet; Take 2 tablets by mouth every 8 hours as needed for Pain for up to 30 days. , Disp-150 tablet, R-1Normal  3. Type 2 diabetes mellitus with hyperglycemia, without long-term current use of insulin (HCC)  Comments:  has been fluctuating but the actos has helped  Orders:  -     CBC Auto Differential; Future  -     Comprehensive Metabolic Panel; Future  -     T4, Free; Future  -     TSH without Reflex; Future  -     MAGNESIUM; Future  4. Acquired hypothyroidism  Comments:  labs this week  Orders:  -     T4, Free; Future  -     TSH without Reflex;  Future      Return in about 4 weeks (around 5/3/2021) for ct ordered, labs this week. SUBJECTIVE/OBJECTIVE:  Patient here for check up    Metastatic cancer, endometrial  Still has right psoas mass and lung mets  Dr Myriam Navarro has stopped seeing patient at this point, she is no longer doing doing any chemotherapy  She has been considering going back on hospice but she is unsure    Also no longer seeing DR Tin Perez, they would like us to take over the prednisone  She has inflammatory arthritis from a previous chemotherapy she was on  She is maintained right now on prednisone 5mg bid    She is not sure if she should go back on hospice or not  She would like a ct abdomen and pelvis  To see if cancer is progressed  Her pain is still there daily and she takes the tramadol at least daily but sometimes has to take it 3 times a day  Refills sent oarrs reviewed    Sugars fluctuating  She had some virus last week with stomach ache, cough and body aches, was given an antiviral and has been feelng better  Nausea is back this am though and she is not sure why  But a few days her sugars were elevated b/c she was not taking any meds'  But she is back no her meds at this point and sugars are coming back down                  Review of Systems   Constitutional: Positive for fatigue. Negative for appetite change and fever. HENT: Negative for congestion, ear pain, sinus pain, sore throat and trouble swallowing. Eyes: Negative for pain. Respiratory: Negative for cough, chest tightness, shortness of breath and wheezing. Cardiovascular: Negative for chest pain, palpitations and leg swelling. Gastrointestinal: Positive for abdominal pain. Negative for constipation, diarrhea, nausea and vomiting. Endocrine: Negative for cold intolerance and heat intolerance. Genitourinary: Negative for difficulty urinating, hematuria and pelvic pain. Musculoskeletal: Positive for arthralgias, back pain and myalgias. Negative for gait problem and joint swelling. Skin: Negative for rash and wound. Neurological: Negative for dizziness, syncope, light-headedness and headaches. Hematological: Negative for adenopathy. Psychiatric/Behavioral: Negative for confusion, dysphoric mood, self-injury, sleep disturbance and suicidal ideas. The patient is not nervous/anxious. No flowsheet data found. Physical Exam  Vitals signs and nursing note reviewed. Constitutional:       General: She is not in acute distress. Appearance: She is ill-appearing. HENT:      Head: Normocephalic and atraumatic. Eyes:      Conjunctiva/sclera: Conjunctivae normal.      Pupils: Pupils are equal, round, and reactive to light. Musculoskeletal:      Comments: Gait normal but limited due to fatigue   Neurological:      Mental Status: She is alert and oriented to person, place, and time. Mental status is at baseline. Psychiatric:         Mood and Affect: Mood normal.         Thought Content: Thought content normal.                   Carlota Bosch was evaluated through a synchronous (real-time) audio-video encounter. The patient (or guardian if applicable) is aware that this is a billable service. Verbal consent to proceed has been obtained within the past 12 months. The visit was conducted pursuant to the emergency declaration under the [address] [place] [address] authority and the [employer] General Act. Patient identification was verified, and a caregiver was present when appropriate. The patient was located in a state where the provider was credentialed to provide care. An electronic signature was used to authenticate this note.     --Catherine Richardson DO

## 2021-04-06 ENCOUNTER — TELEPHONE (OUTPATIENT)
Dept: FAMILY MEDICINE CLINIC | Age: 76
End: 2021-04-06

## 2021-04-06 DIAGNOSIS — C78.00 MALIGNANT NEOPLASM OF ENDOMETRIUM METASTATIC TO LUNG (HCC): Primary | ICD-10-CM

## 2021-04-06 DIAGNOSIS — C54.1 MALIGNANT NEOPLASM OF ENDOMETRIUM METASTATIC TO LUNG (HCC): Primary | ICD-10-CM

## 2021-04-09 DIAGNOSIS — C77.2 MALIGNANT NEOPLASM OF ENDOMETRIUM METASTATIC TO INTRA-ABDOMINAL LYMPH NODE (HCC): ICD-10-CM

## 2021-04-09 DIAGNOSIS — E11.65 TYPE 2 DIABETES MELLITUS WITH HYPERGLYCEMIA, WITHOUT LONG-TERM CURRENT USE OF INSULIN (HCC): ICD-10-CM

## 2021-04-09 DIAGNOSIS — C54.1 MALIGNANT NEOPLASM OF ENDOMETRIUM METASTATIC TO INTRA-ABDOMINAL LYMPH NODE (HCC): ICD-10-CM

## 2021-04-09 DIAGNOSIS — E03.9 ACQUIRED HYPOTHYROIDISM: ICD-10-CM

## 2021-04-09 LAB
ALBUMIN SERPL-MCNC: 3.1 G/DL (ref 3.5–5.2)
ALP BLD-CCNC: 142 U/L (ref 35–104)
ALT SERPL-CCNC: 27 U/L (ref 0–32)
ANION GAP SERPL CALCULATED.3IONS-SCNC: 15 MMOL/L (ref 7–16)
AST SERPL-CCNC: 39 U/L (ref 0–31)
BASOPHILS ABSOLUTE: 0.03 E9/L (ref 0–0.2)
BASOPHILS RELATIVE PERCENT: 0.3 % (ref 0–2)
BILIRUB SERPL-MCNC: 0.3 MG/DL (ref 0–1.2)
BUN BLDV-MCNC: 17 MG/DL (ref 8–23)
CALCIUM SERPL-MCNC: 9.5 MG/DL (ref 8.6–10.2)
CHLORIDE BLD-SCNC: 93 MMOL/L (ref 98–107)
CO2: 25 MMOL/L (ref 22–29)
CREAT SERPL-MCNC: 0.9 MG/DL (ref 0.5–1)
EOSINOPHILS ABSOLUTE: 0 E9/L (ref 0.05–0.5)
EOSINOPHILS RELATIVE PERCENT: 0 % (ref 0–6)
GFR AFRICAN AMERICAN: >60
GFR NON-AFRICAN AMERICAN: >60 ML/MIN/1.73
GLUCOSE BLD-MCNC: 214 MG/DL (ref 74–99)
HCT VFR BLD CALC: 31.6 % (ref 34–48)
HEMOGLOBIN: 8.7 G/DL (ref 11.5–15.5)
IMMATURE GRANULOCYTES #: 0.22 E9/L
IMMATURE GRANULOCYTES %: 2 % (ref 0–5)
LYMPHOCYTES ABSOLUTE: 0.65 E9/L (ref 1.5–4)
LYMPHOCYTES RELATIVE PERCENT: 5.8 % (ref 20–42)
MAGNESIUM: 1.7 MG/DL (ref 1.6–2.6)
MCH RBC QN AUTO: 20.4 PG (ref 26–35)
MCHC RBC AUTO-ENTMCNC: 27.5 % (ref 32–34.5)
MCV RBC AUTO: 74 FL (ref 80–99.9)
MONOCYTES ABSOLUTE: 0.9 E9/L (ref 0.1–0.95)
MONOCYTES RELATIVE PERCENT: 8.1 % (ref 2–12)
NEUTROPHILS ABSOLUTE: 9.35 E9/L (ref 1.8–7.3)
NEUTROPHILS RELATIVE PERCENT: 83.8 % (ref 43–80)
PDW BLD-RTO: 19.1 FL (ref 11.5–15)
PLATELET # BLD: 332 E9/L (ref 130–450)
PMV BLD AUTO: 8.5 FL (ref 7–12)
POTASSIUM SERPL-SCNC: 4.3 MMOL/L (ref 3.5–5)
RBC # BLD: 4.27 E12/L (ref 3.5–5.5)
SODIUM BLD-SCNC: 133 MMOL/L (ref 132–146)
T4 FREE: 1.61 NG/DL (ref 0.93–1.7)
TOTAL PROTEIN: 6.7 G/DL (ref 6.4–8.3)
TSH SERPL DL<=0.05 MIU/L-ACNC: 2.25 UIU/ML (ref 0.27–4.2)
WBC # BLD: 11.2 E9/L (ref 4.5–11.5)

## 2021-04-13 ENCOUNTER — TELEPHONE (OUTPATIENT)
Dept: FAMILY MEDICINE CLINIC | Age: 76
End: 2021-04-13

## 2021-04-13 NOTE — TELEPHONE ENCOUNTER
Pt called in today to say that she would like a blood transfusion. I did read her the lab note and having labs done in a month. She states she was to call in if she was having symptoms. Pt reports having more SOB, weakness, headache and not thinking clearly. She would like to have the transfusion done at [facility].

## 2021-04-13 NOTE — TELEPHONE ENCOUNTER
At this time I cannot order a transfusion b/c her hemoglobin was 8.7  It has to be below 8.0  So if her symptoms are getting worse  And she wants to see if needs a transfusion she would need to go through the ED right now

## 2021-04-25 ENCOUNTER — HOSPITAL ENCOUNTER (OUTPATIENT)
Dept: CT IMAGING | Age: 76
Discharge: HOME | End: 2021-04-27
Payer: MEDICARE

## 2021-04-25 DIAGNOSIS — C77.2 MALIGNANT NEOPLASM OF ENDOMETRIUM METASTATIC TO INTRA-ABDOMINAL LYMPH NODE (HCC): ICD-10-CM

## 2021-04-25 DIAGNOSIS — C54.1 MALIGNANT NEOPLASM OF ENDOMETRIUM METASTATIC TO INTRA-ABDOMINAL LYMPH NODE (HCC): ICD-10-CM

## 2021-04-25 DIAGNOSIS — C78.00 MALIGNANT NEOPLASM OF ENDOMETRIUM METASTATIC TO LUNG (HCC): ICD-10-CM

## 2021-04-25 DIAGNOSIS — C54.1 MALIGNANT NEOPLASM OF ENDOMETRIUM METASTATIC TO LUNG (HCC): ICD-10-CM

## 2021-04-25 PROCEDURE — 74177 CT ABD & PELVIS W/CONTRAST: CPT

## 2021-04-25 PROCEDURE — 6360000004 HC RX CONTRAST MEDICATION: Performed by: RADIOLOGY

## 2021-04-25 PROCEDURE — 71260 CT THORAX DX C+: CPT

## 2021-04-25 RX ADMIN — IOPAMIDOL 100 ML: 755 INJECTION, SOLUTION INTRAVENOUS at 14:42

## 2021-04-26 DIAGNOSIS — I82.421 ACUTE DEEP VEIN THROMBOSIS (DVT) OF ILIAC VEIN OF RIGHT LOWER EXTREMITY (HCC): ICD-10-CM

## 2021-04-26 RX ORDER — TRAMADOL HYDROCHLORIDE 50 MG/1
100 TABLET ORAL EVERY 6 HOURS PRN
Qty: 200 TABLET | Refills: 1 | Status: SHIPPED | OUTPATIENT
Start: 2021-04-26 | End: 2021-05-26

## 2021-05-04 ENCOUNTER — OFFICE VISIT (OUTPATIENT)
Dept: FAMILY MEDICINE CLINIC | Age: 76
End: 2021-05-04
Payer: MEDICARE

## 2021-05-04 ENCOUNTER — TELEPHONE (OUTPATIENT)
Dept: FAMILY MEDICINE CLINIC | Age: 76
End: 2021-05-04

## 2021-05-04 VITALS
TEMPERATURE: 98.2 F | HEART RATE: 110 BPM | WEIGHT: 138 LBS | DIASTOLIC BLOOD PRESSURE: 60 MMHG | SYSTOLIC BLOOD PRESSURE: 110 MMHG | BODY MASS INDEX: 23.56 KG/M2 | HEIGHT: 64 IN | OXYGEN SATURATION: 98 %

## 2021-05-04 DIAGNOSIS — E11.65 CONTROLLED TYPE 2 DIABETES MELLITUS WITH HYPERGLYCEMIA, WITHOUT LONG-TERM CURRENT USE OF INSULIN (HCC): ICD-10-CM

## 2021-05-04 DIAGNOSIS — C79.9 METASTATIC ADENOCARCINOMA (HCC): Primary | ICD-10-CM

## 2021-05-04 DIAGNOSIS — D63.8 ANEMIA IN OTHER CHRONIC DISEASES CLASSIFIED ELSEWHERE: ICD-10-CM

## 2021-05-04 DIAGNOSIS — E03.9 ACQUIRED HYPOTHYROIDISM: ICD-10-CM

## 2021-05-04 DIAGNOSIS — C79.9 METASTATIC ADENOCARCINOMA (HCC): ICD-10-CM

## 2021-05-04 DIAGNOSIS — M19.90 INFLAMMATORY ARTHRITIS: ICD-10-CM

## 2021-05-04 LAB
ALBUMIN SERPL-MCNC: 2.9 G/DL (ref 3.5–5.2)
ALP BLD-CCNC: 171 U/L (ref 35–104)
ALT SERPL-CCNC: 21 U/L (ref 0–32)
ANION GAP SERPL CALCULATED.3IONS-SCNC: 15 MMOL/L (ref 7–16)
ANISOCYTOSIS: ABNORMAL
AST SERPL-CCNC: 30 U/L (ref 0–31)
BASOPHILS ABSOLUTE: 0.02 E9/L (ref 0–0.2)
BASOPHILS RELATIVE PERCENT: 0.2 % (ref 0–2)
BILIRUB SERPL-MCNC: 0.5 MG/DL (ref 0–1.2)
BUN BLDV-MCNC: 15 MG/DL (ref 6–23)
BURR CELLS: ABNORMAL
CALCIUM SERPL-MCNC: 10.2 MG/DL (ref 8.6–10.2)
CHLORIDE BLD-SCNC: 88 MMOL/L (ref 98–107)
CO2: 27 MMOL/L (ref 22–29)
CREAT SERPL-MCNC: 0.9 MG/DL (ref 0.5–1)
EOSINOPHILS ABSOLUTE: 0.01 E9/L (ref 0.05–0.5)
EOSINOPHILS RELATIVE PERCENT: 0.1 % (ref 0–6)
GFR AFRICAN AMERICAN: >60
GFR NON-AFRICAN AMERICAN: >60 ML/MIN/1.73
GLUCOSE BLD-MCNC: 178 MG/DL (ref 74–99)
HCT VFR BLD CALC: 29.3 % (ref 34–48)
HEMOGLOBIN: 8.2 G/DL (ref 11.5–15.5)
IMMATURE GRANULOCYTES #: 0.1 E9/L
IMMATURE GRANULOCYTES %: 1.1 % (ref 0–5)
LYMPHOCYTES ABSOLUTE: 0.65 E9/L (ref 1.5–4)
LYMPHOCYTES RELATIVE PERCENT: 7.2 % (ref 20–42)
MCH RBC QN AUTO: 19.9 PG (ref 26–35)
MCHC RBC AUTO-ENTMCNC: 28 % (ref 32–34.5)
MCV RBC AUTO: 71.1 FL (ref 80–99.9)
MONOCYTES ABSOLUTE: 0.67 E9/L (ref 0.1–0.95)
MONOCYTES RELATIVE PERCENT: 7.4 % (ref 2–12)
NEUTROPHILS ABSOLUTE: 7.57 E9/L (ref 1.8–7.3)
NEUTROPHILS RELATIVE PERCENT: 84 % (ref 43–80)
OVALOCYTES: ABNORMAL
PDW BLD-RTO: 19.1 FL (ref 11.5–15)
PLATELET # BLD: 258 E9/L (ref 130–450)
PMV BLD AUTO: 8.7 FL (ref 7–12)
POIKILOCYTES: ABNORMAL
POLYCHROMASIA: ABNORMAL
POTASSIUM SERPL-SCNC: 4.1 MMOL/L (ref 3.5–5)
RBC # BLD: 4.12 E12/L (ref 3.5–5.5)
SODIUM BLD-SCNC: 130 MMOL/L (ref 132–146)
TARGET CELLS: ABNORMAL
TOTAL PROTEIN: 6.8 G/DL (ref 6.4–8.3)
WBC # BLD: 9 E9/L (ref 4.5–11.5)

## 2021-05-04 PROCEDURE — 99214 OFFICE O/P EST MOD 30 MIN: CPT | Performed by: FAMILY MEDICINE

## 2021-05-04 RX ORDER — LEVOTHYROXINE SODIUM 112 MCG
112 TABLET ORAL DAILY
Qty: 30 TABLET | Refills: 3 | Status: SHIPPED | OUTPATIENT
Start: 2021-05-04

## 2021-05-04 RX ORDER — GLIMEPIRIDE 2 MG/1
4 TABLET ORAL 2 TIMES DAILY
Qty: 120 TABLET | Refills: 3 | Status: SHIPPED | OUTPATIENT
Start: 2021-05-04

## 2021-05-04 RX ORDER — PIOGLITAZONEHYDROCHLORIDE 15 MG/1
15 TABLET ORAL DAILY
Qty: 30 TABLET | Refills: 3 | Status: SHIPPED | OUTPATIENT
Start: 2021-05-04

## 2021-05-04 ASSESSMENT — ENCOUNTER SYMPTOMS
CONSTIPATION: 0
SINUS PAIN: 0
BACK PAIN: 0
DIARRHEA: 0
NAUSEA: 0
WHEEZING: 0
TROUBLE SWALLOWING: 0
CHEST TIGHTNESS: 0
ABDOMINAL PAIN: 0
EYE PAIN: 0
VOMITING: 0
SHORTNESS OF BREATH: 0
SORE THROAT: 0
COUGH: 0

## 2021-05-04 NOTE — PROGRESS NOTES
21    Name: Amy Meléndez  :1945   Sex:female   Age:75 y.o. Chief Complaint   Patient presents with    Diabetes    Back Pain     Patient presents to office for visit with her daughter Maddie Keen. Patient is extremely pale and shaky. She is in a wheelchair. Patient says she gets dizzy all of the time. Yesterday she turned too quick and fell and hit her back on the stairs and scraped her back. Patient says since falling, her low back pain has resolved. She is still taking all of her prescriptions as prescribed even though she has a terrible appetite and has not really been eating. Patent here for check up    Doc Gins yesterday and hurt mid back but the areas in her lower back that were bothering her due to cancer and better for some reason  Her upper back is slightly swollen and puffy  Her friend put some kind of zara on it yesterday morning  She iced it once yesterday  This spot is still sore to touch    havng mor fatigue, shortness of breath, gets tired very easily  Her last hemaglobin was 8.7. Will check again today  I suspect since her s/s have gotten worse it may be lower  She agreed to a transfusion odalys if it will help her s/s right now    She is not ready to go on hospice at this point  She knows she is terminal  But with her pain actually being a little better right now she is going to wait out the week and see how she feels    Diabetes  She is checking sugars  Also if she does not eat she does not take the glimepiride  She is trying to graze through out the day so she dos not eat too much at once          Review of Systems   Constitutional: Positive for fatigue. Negative for appetite change and fever. HENT: Negative for congestion, ear pain, sinus pain, sore throat and trouble swallowing. Eyes: Negative for pain. Respiratory: Negative for cough, chest tightness, shortness of breath and wheezing. Cardiovascular: Negative for chest pain, palpitations and leg swelling. Gastrointestinal: Negative for abdominal pain, constipation, diarrhea, nausea and vomiting. Endocrine: Negative for cold intolerance and heat intolerance. Genitourinary: Negative for difficulty urinating, dysuria, frequency, hematuria, pelvic pain and urgency. Musculoskeletal: Positive for arthralgias, gait problem and myalgias. Negative for back pain and joint swelling. Skin: Positive for pallor. Negative for rash and wound. Neurological: Positive for dizziness, weakness and light-headedness. Negative for syncope and headaches. Hematological: Negative for adenopathy. Psychiatric/Behavioral: Negative for confusion, dysphoric mood, self-injury, sleep disturbance and suicidal ideas. The patient is not nervous/anxious. Current Outpatient Medications:     apixaban (ELIQUIS) 2.5 MG TABS tablet, Take 1 tablet by mouth 2 times daily Do not fill 5mg, Disp: 60 tablet, Rfl: 3    glimepiride (AMARYL) 2 MG tablet, Take 2 tablets by mouth 2 times daily, Disp: 120 tablet, Rfl: 3    pioglitazone (ACTOS) 15 MG tablet, Take 1 tablet by mouth daily, Disp: 30 tablet, Rfl: 3    SYNTHROID 112 MCG tablet, Take 1 tablet by mouth Daily, Disp: 30 tablet, Rfl: 3    traMADol (ULTRAM) 50 MG tablet, Take 2 tablets by mouth every 6 hours as needed for Pain for up to 30 days. , Disp: 200 tablet, Rfl: 1    OneTouch Delica Lancets 21O MISC, Use twice daily for fluctuating glucose levels. , Disp: 100 each, Rfl: 5    predniSONE (DELTASONE) 5 MG tablet, Take 5 mg by mouth 2 times daily, Disp: , Rfl:     blood glucose monitor strips, Patient has fluctuating glucose with hyperglycemia.  Test glucose level twice day., Disp: 200 strip, Rfl: 11    Multiple Vitamin (MULTI-DAY VITAMINS PO), Take 2 tablets by mouth 2 times daily , Disp: , Rfl:     NONFORMULARY, Take 1 capsule by mouth 2 times daily Pancreatic enzymes daily , Disp: , Rfl:     Cyanocobalamin (VITAMIN B 12) 250 MCG LOZG, Take 1 lozenge by mouth 2 times daily  Chronic pain due to neoplasm 10/09/2019    Acquired hypothyroidism 2019    GERD (gastroesophageal reflux disease) 2019    Ovarian cancer ([facility] 2017    Metastatic adenocarcinoma ([facility] 2017    Intestinal nodule 2017    Malignant neoplasm of upper-outer quadrant of left 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 ([facility] 10/31/2016      Past Surgical History:   Procedure Laterality Date    BREAST LUMPECTOMY Left 2016    left breast sentinelnode dissection, blue dye injection    BREAST SURGERY Left 10/2016    cancer     SECTION      x 3    CHG UNLISTED DX RADIOGRAPHIC PROCEDURE N/A 2018    BRONCHOSCOPY ELECTROMAGNETIC performed by Enrique Moseley MD at [facility] area    ENDOSCOPY, COLON, DIAGNOSTIC      EYE SURGERY Bilateral     cataracts    HYSTERECTOMY  2016    total with BSO    HYSTERECTOMY, TOTAL ABDOMINAL  2016    PORT SURGERY N/A 2020    MEDIPORT REMOVAL performed by Leandra Ward MD at [address] BRUSHING/PROTECTED BRUSHINGS  2018    BRONCHOSCOPY BRUSHINGS performed by Enrique Moseley MD at [address] [place] [address] [facility]  2018    BRONCHOSCOPY ALVEOLAR LAVAGE performed by nErique Moseley MD at [address] [place] [address] [address] DX/TX INTERVENTION [address] LES N/A 2018    BRONCHOSCOPY ULTRASOUND performed by Enrique Moseley MD at [address] [facility] W/TRANSBRONCHIAL LUNG BX 1 LOBE  2018    BRONCHOSCOPY/TRANSBRONCHIAL NEEDLE BIOPSY performed by Enrique Moseley MD at [address] [facility] W/TRANSBRONCHIAL LUNG BX EACH LOBE  2018    BRONCHOSCOPY/TRANSBRONCHIAL NEEDLE BIOPSY ADDL LOBE performed by Enrique Moseley MD at [address] TUNNELED CTR VAD W/SUBQ PORT AGE 5 YR/> N/A 2018    MEDIPORT INSERTION performed by Leandra Ward MD at [address] [facility] History     Tobacco History Smoking Status  Former Smoker Smoking Frequency  0.25 packs/day for 20 years (5 pk yrs) Smoking Tobacco Type  Cigarettes    Smokeless Tobacco Use  Never Used          Alcohol History     Alcohol Use Status  Yes Comment  rarely          Drug Use     Drug Use Status  Yes Types  Marijuana Comment  medical          Sexual Activity     Sexually Active  Not Asked            /60   Pulse 110   Temp 98.2 °F (36.8 °C)   Ht 5' 3.5\" (1.613 m)   Wt 138 lb (62.6 kg)   SpO2 98%   BMI 24.06 kg/m²     EXAM:   Physical Exam  Vitals signs and nursing note reviewed. Constitutional:       General: She is not in acute distress. Appearance: She is well-developed. She is ill-appearing. Comments: Chronically ill appearing, pretty pale today   HENT:      Head: Normocephalic and atraumatic. Nose: Nose normal.      Mouth/Throat:      Mouth: Mucous membranes are moist.   Eyes:      Pupils: Pupils are equal, round, and reactive to light. Neck:      Musculoskeletal: Normal range of motion. Cardiovascular:      Rate and Rhythm: Normal rate and regular rhythm. Pulmonary:      Effort: Pulmonary effort is normal.      Breath sounds: Normal breath sounds. Abdominal:      General: Bowel sounds are normal.      Palpations: Abdomen is soft. Musculoskeletal:      Comments: In wheel chair due to weakness   Skin:     General: Skin is warm and dry. Neurological:      Mental Status: She is alert and oriented to person, place, and time. Mental status is at baseline. Psychiatric:         Mood and Affect: Mood normal.         Thought Content: Thought content normal.          Diane was seen today for diabetes and back pain. Diagnoses and all orders for this visit:    Metastatic adenocarcinoma ([facility] [address] [facility]  Comments:  she is not ready to go back on hospice yet    Orders:  -     CBC Auto Differential; Future  -     Comprehensive Metabolic Panel;  Future    Acquired hypothyroidism  Comments:  stable  Orders:  -     SYNTHROID 112 MCG tablet; Take 1 tablet by mouth Daily    Anemia in other chronic diseases classified elsewhere  Comments:  as a result of neoplasm  probably below 8.0  will check labs and order transfusion tomorrow  Orders:  -     CBC Auto Differential; Future  -     Comprehensive Metabolic Panel; Future    Inflammatory arthritis  Comments:  on prednisone  continue to watch for flares  due to previous chemotherapy    Controlled type 2 diabetes mellitus with hyperglycemia, without long-term current use of insulin (HCC)  Comments:  addd actos, she only takes glimepiride if she eats  she has been checking her sugars and is being careful about her diabetes    Other orders  -     apixaban (ELIQUIS) 2.5 MG TABS tablet; Take 1 tablet by mouth 2 times daily Do not fill 5mg  -     glimepiride (AMARYL) 2 MG tablet; Take 2 tablets by mouth 2 times daily  -     pioglitazone (ACTOS) 15 MG tablet; Take 1 tablet by mouth daily    appt in 1 month  Will likely need a transfusion tomorrow      I independently reviewed and updated the chief complaint, HPI, past medical and surgical history, medications, allergies and ROS as entered by the LPN. Seen by:   Brady Chen DO

## 2021-05-04 NOTE — TELEPHONE ENCOUNTER
Fany Sahu has a Virtual visit with you today and asking if she should come in instead? She fell yesterday and hit her spine about the area of the waist.  She is quite a bit fo pain. Can she change that to a physical visit?

## 2021-05-05 DIAGNOSIS — C79.9 METASTATIC ADENOCARCINOMA (HCC): Primary | ICD-10-CM

## 2021-05-05 DIAGNOSIS — D63.8 ANEMIA IN OTHER CHRONIC DISEASES CLASSIFIED ELSEWHERE: ICD-10-CM

## 2021-05-13 ENCOUNTER — TELEPHONE (OUTPATIENT)
Dept: FAMILY MEDICINE CLINIC | Age: 76
End: 2021-05-13

## 2021-05-13 RX ORDER — ONDANSETRON 4 MG/1
4-8 TABLET, FILM COATED ORAL EVERY 8 HOURS PRN
Qty: 60 TABLET | Refills: 2 | Status: SHIPPED | OUTPATIENT
Start: 2021-05-13

## 2021-05-13 NOTE — TELEPHONE ENCOUNTER
Diane calling in will you order zofran to [facility] for nausea? She is out of her prn bottle at home. States it does not have a strength on it, and you were not the original prescriber.

## 2021-05-18 ENCOUNTER — TELEPHONE (OUTPATIENT)
Dept: FAMILY MEDICINE CLINIC | Age: 76
End: 2021-05-18

## 2021-05-18 ENCOUNTER — APPOINTMENT (OUTPATIENT)
Dept: ULTRASOUND IMAGING | Age: 76
DRG: 175 | End: 2021-05-18
Payer: MEDICARE

## 2021-05-18 ENCOUNTER — HOSPITAL ENCOUNTER (INPATIENT)
Age: 76
LOS: 2 days | Discharge: HOME HEALTH | DRG: 175 | End: 2021-05-20
Attending: EMERGENCY MEDICINE | Admitting: FAMILY MEDICINE
Payer: MEDICARE

## 2021-05-18 ENCOUNTER — APPOINTMENT (OUTPATIENT)
Dept: CT IMAGING | Age: 76
DRG: 175 | End: 2021-05-18
Payer: MEDICARE

## 2021-05-18 DIAGNOSIS — I82.412 ACUTE DEEP VEIN THROMBOSIS (DVT) OF FEMORAL VEIN OF LEFT LOWER EXTREMITY (HCC): ICD-10-CM

## 2021-05-18 DIAGNOSIS — C79.9 METASTATIC MALIGNANT NEOPLASM, UNSPECIFIED SITE (HCC): ICD-10-CM

## 2021-05-18 DIAGNOSIS — N13.30 HYDRONEPHROSIS, UNSPECIFIED HYDRONEPHROSIS TYPE: ICD-10-CM

## 2021-05-18 DIAGNOSIS — M54.42 ACUTE BILATERAL LOW BACK PAIN WITH BILATERAL SCIATICA: ICD-10-CM

## 2021-05-18 DIAGNOSIS — M48.50XA PATHOLOGICAL COMPRESSION FRACTURE OF VERTEBRA, INITIAL ENCOUNTER (HCC): ICD-10-CM

## 2021-05-18 DIAGNOSIS — M54.41 ACUTE BILATERAL LOW BACK PAIN WITH BILATERAL SCIATICA: ICD-10-CM

## 2021-05-18 DIAGNOSIS — I26.99 MULTIPLE PULMONARY EMBOLI (HCC): Primary | ICD-10-CM

## 2021-05-18 DIAGNOSIS — R00.0 TACHYCARDIA: ICD-10-CM

## 2021-05-18 LAB
ALBUMIN SERPL-MCNC: 3.1 G/DL (ref 3.5–5.2)
ALP BLD-CCNC: 176 U/L (ref 35–104)
ALT SERPL-CCNC: 22 U/L (ref 0–32)
ANION GAP SERPL CALCULATED.3IONS-SCNC: 11 MMOL/L (ref 7–16)
ANISOCYTOSIS: ABNORMAL
APTT: 29.7 SEC (ref 24.5–35.1)
APTT: 33.9 SEC (ref 24.5–35.1)
AST SERPL-CCNC: 24 U/L (ref 0–31)
BASOPHILS ABSOLUTE: 0.11 E9/L (ref 0–0.2)
BASOPHILS RELATIVE PERCENT: 0.9 % (ref 0–2)
BILIRUB SERPL-MCNC: 0.6 MG/DL (ref 0–1.2)
BUN BLDV-MCNC: 12 MG/DL (ref 6–23)
CALCIUM SERPL-MCNC: 10.8 MG/DL (ref 8.6–10.2)
CHLORIDE BLD-SCNC: 88 MMOL/L (ref 98–107)
CO2: 31 MMOL/L (ref 22–29)
CREAT SERPL-MCNC: 0.9 MG/DL (ref 0.5–1)
EKG ATRIAL RATE: 117 BPM
EKG P AXIS: 46 DEGREES
EKG P-R INTERVAL: 126 MS
EKG Q-T INTERVAL: 294 MS
EKG QRS DURATION: 84 MS
EKG QTC CALCULATION (BAZETT): 410 MS
EKG R AXIS: -4 DEGREES
EKG T AXIS: 21 DEGREES
EKG VENTRICULAR RATE: 117 BPM
EOSINOPHILS ABSOLUTE: 0 E9/L (ref 0.05–0.5)
EOSINOPHILS RELATIVE PERCENT: 0 % (ref 0–6)
GFR AFRICAN AMERICAN: >60
GFR NON-AFRICAN AMERICAN: >60 ML/MIN/1.73
GLUCOSE BLD-MCNC: 74 MG/DL (ref 74–99)
HCT VFR BLD CALC: 25.2 % (ref 34–48)
HCT VFR BLD CALC: 27.6 % (ref 34–48)
HEMOGLOBIN: 7.3 G/DL (ref 11.5–15.5)
HEMOGLOBIN: 7.8 G/DL (ref 11.5–15.5)
HYPOCHROMIA: ABNORMAL
INR BLD: 1.4
LACTIC ACID: 1 MMOL/L (ref 0.5–2.2)
LIPASE: 8 U/L (ref 13–60)
LYMPHOCYTES ABSOLUTE: 0.5 E9/L (ref 1.5–4)
LYMPHOCYTES RELATIVE PERCENT: 4.3 % (ref 20–42)
MCH RBC QN AUTO: 19.7 PG (ref 26–35)
MCH RBC QN AUTO: 20.2 PG (ref 26–35)
MCHC RBC AUTO-ENTMCNC: 28.3 % (ref 32–34.5)
MCHC RBC AUTO-ENTMCNC: 29 % (ref 32–34.5)
MCV RBC AUTO: 69.6 FL (ref 80–99.9)
MCV RBC AUTO: 69.9 FL (ref 80–99.9)
METER GLUCOSE: 192 MG/DL (ref 74–99)
METER GLUCOSE: 64 MG/DL (ref 74–99)
METER GLUCOSE: 91 MG/DL (ref 74–99)
MONOCYTES ABSOLUTE: 1.12 E9/L (ref 0.1–0.95)
MONOCYTES RELATIVE PERCENT: 8.7 % (ref 2–12)
NEUTROPHILS ABSOLUTE: 10.75 E9/L (ref 1.8–7.3)
NEUTROPHILS RELATIVE PERCENT: 86.1 % (ref 43–80)
NUCLEATED RED BLOOD CELLS: 0 /100 WBC
OVALOCYTES: ABNORMAL
PDW BLD-RTO: 19.4 FL (ref 11.5–15)
PDW BLD-RTO: 19.5 FL (ref 11.5–15)
PLATELET # BLD: 189 E9/L (ref 130–450)
PLATELET # BLD: 219 E9/L (ref 130–450)
PMV BLD AUTO: 8.3 FL (ref 7–12)
PMV BLD AUTO: 8.5 FL (ref 7–12)
POIKILOCYTES: ABNORMAL
POLYCHROMASIA: ABNORMAL
POTASSIUM REFLEX MAGNESIUM: 4.1 MMOL/L (ref 3.5–5)
PRO-BNP: 589 PG/ML (ref 0–450)
PROTHROMBIN TIME: 14.9 SEC (ref 9.3–12.4)
RBC # BLD: 3.62 E12/L (ref 3.5–5.5)
RBC # BLD: 3.95 E12/L (ref 3.5–5.5)
ROULEAUX: ABNORMAL
SARS-COV-2, NAAT: NOT DETECTED
SODIUM BLD-SCNC: 130 MMOL/L (ref 132–146)
STOMATOCYTES: ABNORMAL
T4 FREE: 1.27 NG/DL (ref 0.93–1.7)
TOTAL PROTEIN: 7 G/DL (ref 6.4–8.3)
TROPONIN: <0.01 NG/ML (ref 0–0.03)
TSH SERPL DL<=0.05 MIU/L-ACNC: 7.01 UIU/ML (ref 0.27–4.2)
WBC # BLD: 11 E9/L (ref 4.5–11.5)
WBC # BLD: 12.5 E9/L (ref 4.5–11.5)

## 2021-05-18 PROCEDURE — 2580000003 HC RX 258: Performed by: EMERGENCY MEDICINE

## 2021-05-18 PROCEDURE — 84439 ASSAY OF FREE THYROXINE: CPT

## 2021-05-18 PROCEDURE — 80053 COMPREHEN METABOLIC PANEL: CPT

## 2021-05-18 PROCEDURE — 96375 TX/PRO/DX INJ NEW DRUG ADDON: CPT

## 2021-05-18 PROCEDURE — 85027 COMPLETE CBC AUTOMATED: CPT

## 2021-05-18 PROCEDURE — 83605 ASSAY OF LACTIC ACID: CPT

## 2021-05-18 PROCEDURE — 84484 ASSAY OF TROPONIN QUANT: CPT

## 2021-05-18 PROCEDURE — 96376 TX/PRO/DX INJ SAME DRUG ADON: CPT

## 2021-05-18 PROCEDURE — 2060000000 HC ICU INTERMEDIATE R&B

## 2021-05-18 PROCEDURE — 6360000002 HC RX W HCPCS: Performed by: EMERGENCY MEDICINE

## 2021-05-18 PROCEDURE — 93010 ELECTROCARDIOGRAM REPORT: CPT | Performed by: INTERNAL MEDICINE

## 2021-05-18 PROCEDURE — 6370000000 HC RX 637 (ALT 250 FOR IP): Performed by: NURSE PRACTITIONER

## 2021-05-18 PROCEDURE — G0378 HOSPITAL OBSERVATION PER HR: HCPCS

## 2021-05-18 PROCEDURE — 83880 ASSAY OF NATRIURETIC PEPTIDE: CPT

## 2021-05-18 PROCEDURE — 6360000004 HC RX CONTRAST MEDICATION: Performed by: RADIOLOGY

## 2021-05-18 PROCEDURE — 93971 EXTREMITY STUDY: CPT

## 2021-05-18 PROCEDURE — 87635 SARS-COV-2 COVID-19 AMP PRB: CPT

## 2021-05-18 PROCEDURE — 99285 EMERGENCY DEPT VISIT HI MDM: CPT

## 2021-05-18 PROCEDURE — 6370000000 HC RX 637 (ALT 250 FOR IP): Performed by: INTERNAL MEDICINE

## 2021-05-18 PROCEDURE — 96374 THER/PROPH/DIAG INJ IV PUSH: CPT

## 2021-05-18 PROCEDURE — 85730 THROMBOPLASTIN TIME PARTIAL: CPT

## 2021-05-18 PROCEDURE — 93005 ELECTROCARDIOGRAM TRACING: CPT | Performed by: EMERGENCY MEDICINE

## 2021-05-18 PROCEDURE — 72131 CT LUMBAR SPINE W/O DYE: CPT

## 2021-05-18 PROCEDURE — 85025 COMPLETE CBC W/AUTO DIFF WBC: CPT

## 2021-05-18 PROCEDURE — 36415 COLL VENOUS BLD VENIPUNCTURE: CPT

## 2021-05-18 PROCEDURE — 74177 CT ABD & PELVIS W/CONTRAST: CPT

## 2021-05-18 PROCEDURE — 99223 1ST HOSP IP/OBS HIGH 75: CPT | Performed by: INTERNAL MEDICINE

## 2021-05-18 PROCEDURE — 83690 ASSAY OF LIPASE: CPT

## 2021-05-18 PROCEDURE — 96361 HYDRATE IV INFUSION ADD-ON: CPT

## 2021-05-18 PROCEDURE — 84443 ASSAY THYROID STIM HORMONE: CPT

## 2021-05-18 PROCEDURE — 71275 CT ANGIOGRAPHY CHEST: CPT

## 2021-05-18 PROCEDURE — 85610 PROTHROMBIN TIME: CPT

## 2021-05-18 PROCEDURE — 82962 GLUCOSE BLOOD TEST: CPT

## 2021-05-18 RX ORDER — ONDANSETRON 2 MG/ML
4 INJECTION INTRAMUSCULAR; INTRAVENOUS EVERY 6 HOURS PRN
Status: DISCONTINUED | OUTPATIENT
Start: 2021-05-18 | End: 2021-05-20 | Stop reason: HOSPADM

## 2021-05-18 RX ORDER — NICOTINE POLACRILEX 4 MG
15 LOZENGE BUCCAL PRN
Status: DISCONTINUED | OUTPATIENT
Start: 2021-05-18 | End: 2021-05-20 | Stop reason: HOSPADM

## 2021-05-18 RX ORDER — DEXTROSE MONOHYDRATE 25 G/50ML
12.5 INJECTION, SOLUTION INTRAVENOUS PRN
Status: DISCONTINUED | OUTPATIENT
Start: 2021-05-18 | End: 2021-05-20 | Stop reason: HOSPADM

## 2021-05-18 RX ORDER — ACETAMINOPHEN 325 MG/1
650 TABLET ORAL EVERY 6 HOURS PRN
Status: DISCONTINUED | OUTPATIENT
Start: 2021-05-18 | End: 2021-05-20 | Stop reason: HOSPADM

## 2021-05-18 RX ORDER — HEPARIN SODIUM 1000 [USP'U]/ML
40 INJECTION, SOLUTION INTRAVENOUS; SUBCUTANEOUS PRN
Status: DISCONTINUED | OUTPATIENT
Start: 2021-05-18 | End: 2021-05-19

## 2021-05-18 RX ORDER — TRAMADOL HYDROCHLORIDE 50 MG/1
50 TABLET ORAL 3 TIMES DAILY
Status: DISCONTINUED | OUTPATIENT
Start: 2021-05-18 | End: 2021-05-20 | Stop reason: HOSPADM

## 2021-05-18 RX ORDER — LANOLIN ALCOHOL/MO/W.PET/CERES
3 CREAM (GRAM) TOPICAL NIGHTLY PRN
Status: DISCONTINUED | OUTPATIENT
Start: 2021-05-18 | End: 2021-05-20 | Stop reason: HOSPADM

## 2021-05-18 RX ORDER — HEPARIN SODIUM 1000 [USP'U]/ML
80 INJECTION, SOLUTION INTRAVENOUS; SUBCUTANEOUS PRN
Status: DISCONTINUED | OUTPATIENT
Start: 2021-05-18 | End: 2021-05-19

## 2021-05-18 RX ORDER — HEPARIN SODIUM 1000 [USP'U]/ML
80 INJECTION, SOLUTION INTRAVENOUS; SUBCUTANEOUS ONCE
Status: COMPLETED | OUTPATIENT
Start: 2021-05-18 | End: 2021-05-18

## 2021-05-18 RX ORDER — ACETAMINOPHEN 650 MG/1
650 SUPPOSITORY RECTAL EVERY 6 HOURS PRN
Status: DISCONTINUED | OUTPATIENT
Start: 2021-05-18 | End: 2021-05-20 | Stop reason: HOSPADM

## 2021-05-18 RX ORDER — LEVOTHYROXINE SODIUM 112 UG/1
112 TABLET ORAL DAILY
Status: DISCONTINUED | OUTPATIENT
Start: 2021-05-18 | End: 2021-05-20 | Stop reason: HOSPADM

## 2021-05-18 RX ORDER — PREDNISONE 1 MG/1
5 TABLET ORAL 2 TIMES DAILY WITH MEALS
Status: DISCONTINUED | OUTPATIENT
Start: 2021-05-18 | End: 2021-05-20 | Stop reason: HOSPADM

## 2021-05-18 RX ORDER — FENTANYL CITRATE 50 UG/ML
25 INJECTION, SOLUTION INTRAMUSCULAR; INTRAVENOUS ONCE
Status: COMPLETED | OUTPATIENT
Start: 2021-05-18 | End: 2021-05-18

## 2021-05-18 RX ORDER — DEXTROSE MONOHYDRATE 50 MG/ML
100 INJECTION, SOLUTION INTRAVENOUS PRN
Status: DISCONTINUED | OUTPATIENT
Start: 2021-05-18 | End: 2021-05-20 | Stop reason: HOSPADM

## 2021-05-18 RX ORDER — MULTIVITAMIN WITH IRON
1 TABLET ORAL DAILY
Status: DISCONTINUED | OUTPATIENT
Start: 2021-05-18 | End: 2021-05-20 | Stop reason: HOSPADM

## 2021-05-18 RX ORDER — SODIUM CHLORIDE 0.9 % (FLUSH) 0.9 %
5-40 SYRINGE (ML) INJECTION PRN
Status: DISCONTINUED | OUTPATIENT
Start: 2021-05-18 | End: 2021-05-20 | Stop reason: HOSPADM

## 2021-05-18 RX ORDER — TRAMADOL HYDROCHLORIDE 50 MG/1
100 TABLET ORAL EVERY 6 HOURS PRN
Status: DISCONTINUED | OUTPATIENT
Start: 2021-05-18 | End: 2021-05-20 | Stop reason: HOSPADM

## 2021-05-18 RX ORDER — POLYETHYLENE GLYCOL 3350 17 G/17G
17 POWDER, FOR SOLUTION ORAL DAILY PRN
Status: DISCONTINUED | OUTPATIENT
Start: 2021-05-18 | End: 2021-05-20 | Stop reason: HOSPADM

## 2021-05-18 RX ORDER — PROMETHAZINE HYDROCHLORIDE 25 MG/1
12.5 TABLET ORAL EVERY 6 HOURS PRN
Status: DISCONTINUED | OUTPATIENT
Start: 2021-05-18 | End: 2021-05-20 | Stop reason: HOSPADM

## 2021-05-18 RX ORDER — 0.9 % SODIUM CHLORIDE 0.9 %
500 INTRAVENOUS SOLUTION INTRAVENOUS ONCE
Status: COMPLETED | OUTPATIENT
Start: 2021-05-18 | End: 2021-05-18

## 2021-05-18 RX ORDER — SODIUM CHLORIDE 9 MG/ML
25 INJECTION, SOLUTION INTRAVENOUS PRN
Status: DISCONTINUED | OUTPATIENT
Start: 2021-05-18 | End: 2021-05-20 | Stop reason: HOSPADM

## 2021-05-18 RX ORDER — HEPARIN SODIUM 10000 [USP'U]/100ML
5-30 INJECTION, SOLUTION INTRAVENOUS CONTINUOUS
Status: DISCONTINUED | OUTPATIENT
Start: 2021-05-18 | End: 2021-05-19

## 2021-05-18 RX ORDER — SODIUM CHLORIDE 0.9 % (FLUSH) 0.9 %
5-40 SYRINGE (ML) INJECTION EVERY 12 HOURS SCHEDULED
Status: DISCONTINUED | OUTPATIENT
Start: 2021-05-18 | End: 2021-05-20 | Stop reason: HOSPADM

## 2021-05-18 RX ADMIN — TRAMADOL HYDROCHLORIDE 100 MG: 50 TABLET ORAL at 18:33

## 2021-05-18 RX ADMIN — TRAMADOL HYDROCHLORIDE 50 MG: 50 TABLET ORAL at 20:31

## 2021-05-18 RX ADMIN — THERA TABS 1 TABLET: TAB at 18:34

## 2021-05-18 RX ADMIN — SODIUM CHLORIDE 500 ML: 9 INJECTION, SOLUTION INTRAVENOUS at 12:44

## 2021-05-18 RX ADMIN — FENTANYL CITRATE 25 MCG: 50 INJECTION, SOLUTION INTRAMUSCULAR; INTRAVENOUS at 15:42

## 2021-05-18 RX ADMIN — HEPARIN SODIUM 22 UNITS/KG/HR: 10000 INJECTION, SOLUTION INTRAVENOUS at 23:15

## 2021-05-18 RX ADMIN — HEPARIN SODIUM 4820 UNITS: 1000 INJECTION INTRAVENOUS; SUBCUTANEOUS at 16:34

## 2021-05-18 RX ADMIN — INSULIN LISPRO 1 UNITS: 100 INJECTION, SOLUTION INTRAVENOUS; SUBCUTANEOUS at 20:32

## 2021-05-18 RX ADMIN — HEPARIN SODIUM 18 UNITS/KG/HR: 10000 INJECTION, SOLUTION INTRAVENOUS at 17:08

## 2021-05-18 RX ADMIN — FENTANYL CITRATE 25 MCG: 50 INJECTION, SOLUTION INTRAMUSCULAR; INTRAVENOUS at 12:45

## 2021-05-18 RX ADMIN — IOPAMIDOL 75 ML: 755 INJECTION, SOLUTION INTRAVENOUS at 14:03

## 2021-05-18 RX ADMIN — PREDNISONE 5 MG: 5 TABLET ORAL at 18:33

## 2021-05-18 ASSESSMENT — PAIN SCALES - GENERAL
PAINLEVEL_OUTOF10: 10
PAINLEVEL_OUTOF10: 10
PAINLEVEL_OUTOF10: 9
PAINLEVEL_OUTOF10: 9
PAINLEVEL_OUTOF10: 10
PAINLEVEL_OUTOF10: 9
PAINLEVEL_OUTOF10: 5

## 2021-05-18 ASSESSMENT — PAIN DESCRIPTION - ORIENTATION
ORIENTATION: LEFT;RIGHT
ORIENTATION: RIGHT;LEFT

## 2021-05-18 ASSESSMENT — PAIN DESCRIPTION - PAIN TYPE
TYPE: ACUTE PAIN

## 2021-05-18 ASSESSMENT — PAIN DESCRIPTION - DESCRIPTORS
DESCRIPTORS: ACHING

## 2021-05-18 ASSESSMENT — ENCOUNTER SYMPTOMS
SINUS PAIN: 0
ABDOMINAL PAIN: 1
COUGH: 1
PHOTOPHOBIA: 0
CONSTIPATION: 1
COLOR CHANGE: 0
BACK PAIN: 1

## 2021-05-18 ASSESSMENT — PAIN DESCRIPTION - LOCATION: LOCATION: HIP;LEG

## 2021-05-18 NOTE — ED NOTES
Bed: 20  Expected date:   Expected time:   Means of arrival:   Comments:  DARRIUS Snider RN  05/18/21 1122

## 2021-05-18 NOTE — PROGRESS NOTES
Database complete. Medications reconciled by med tech. Care plans and education initiated. Hx of left lumpectomy with restrictions to LUE. Uses cane. Recent fall 2 weeks ago. Pulmonologist is Dr. Johana Mora.

## 2021-05-18 NOTE — H&P
[facility] History and Physical      CHIEF COMPLAINT:  Low back pain, abdominal pain, bilateral leg pain left > right     History of Present Illness:     Patient is a 76 yo female patient who follows with PCP Dr. Avery Kothari with a past medical history of endometrial cancer, breast cancer, DM, GERD, gout and osteoarthritis. Patient presented to the ER for concerns for low back pain, abdominal pain, bilateral leg pain left greater than right. Reporting she always has pain at baseline. She takes ultram. However this morning the pain was more than usual and ultram was not touching the pain. Symptoms moderate, ongoing and progressive. She also reported that the last few days she has noticed increased swelling of her left leg. Reporting numbness in tingling in thighs. Pt reporting 2-3 days ago she was opening the door of her house spun too quick, got dizzy, and hit her back on the wall. She was not sure if the pain was from that. She has a history of right leg DVT and is on 2.5mg eliquis bid. She may have missed 1 dose this week. Reporting she follows with PCP and they have been managing her pain. She has never had a PRBC transfusion in the past. She reported she was told she has been anemic and that she may need a PRBC this week by PCP. She has a significant past medical history of metastatic breast cancer and endometrial cancer. Received radiation therapy and stopped chemotherapy as she was not having a response and did not tolerate. Reporting she was on Hospice in the past- however improved and was then taken off. Reporting she feels that she can no longer care for herself as she lives alone. Reporting he plan was to \" improve a little bit and then to go back on hospice. \" Pt reporting she also has plans to move in with her daughters in [place]. Patient awake, alert, resting in bed in no acute distress.  Reporting she is always cold, Denies fevers, vomiting, dysuria, hematuria, hematochezia, Reporting always nauseated, poor appetite and possible weight loss. Reporting cough at baseline.      Informant(s) for H&P:patient, chart review     REVIEW OF SYSTEMS:  A comprehensive review of systems was negative except for: what is in the HPI      PMH:  Past Medical History:   Diagnosis Date    Cancer [facility])     endometrial cancer, breast-     [facility] [address] [facility]     Diverticulitis     GERD (gastroesophageal reflux disease)     Gout     Hiatal hernia     Leg swelling 2020    Macular degeneration     Metastatic disease [facility]     Osteoarthritis     Osteopenia        Surgical History:  Past Surgical History:   Procedure Laterality Date    BREAST LUMPECTOMY Left 2016    left breast sentinelnode dissection, blue dye injection    BREAST SURGERY Left 10/2016    cancer     SECTION      x 3    CHG UNLISTED DX RADIOGRAPHIC PROCEDURE N/A 2018    BRONCHOSCOPY ELECTROMAGNETIC performed by Jose F George MD at [facility] area    ENDOSCOPY, COLON, DIAGNOSTIC      EYE SURGERY Bilateral     cataracts    HYSTERECTOMY  2016    total with BSO    HYSTERECTOMY, TOTAL ABDOMINAL  2016    PORT SURGERY N/A 2020    MEDIPORT REMOVAL performed by Dallas Lamas MD at [facility] [address] BRUSHING/PROTECTED BRUSHINGS  2018    BRONCHOSCOPY BRUSHINGS performed by Jose F George MD at [address] [place] [address]  2018    BRONCHOSCOPY ALVEOLAR LAVAGE performed by Jose F George MD at [address] [place], [place] [address]u [address]. EBUS DX/TX INTERVENTION [address] LES N/A 2018    BRONCHOSCOPY ULTRASOUND performed by Jose F George MD at [address] [facility] W/TRANSBRONCHIAL LUNG BX 1 LOBE  2018    BRONCHOSCOPY/TRANSBRONCHIAL NEEDLE BIOPSY performed by Jose F George MD at [place] [address]  2018    BRONCHOSCOPY/TRANSBRONCHIAL NEEDLE BIOPSY ADDL LOBE performed by Nilsa Grajeda MD at [address] TUNNELED CTR VAD W/SUBQ PORT AGE 5 YR/> N/A 12/4/2018    MEDIPORT INSERTION performed by Leon Lares MD at [facility]         Medications Prior to Admission:    Prior to Admission medications    Medication Sig Start Date End Date Taking? Authorizing Provider   ondansetron (ZOFRAN) 4 MG tablet Take 1-2 tablets by mouth every 8 hours as needed for Nausea or Vomiting 5/13/21   Kavya Cueto DO   apixaban (ELIQUIS) 2.5 MG TABS tablet Take 1 tablet by mouth 2 times daily Do not fill 5mg 5/4/21   Kavya Cueto, DO   glimepiride (AMARYL) 2 MG tablet Take 2 tablets by mouth 2 times daily 5/4/21   Kavya Cueto, DO   pioglitazone (ACTOS) 15 MG tablet Take 1 tablet by mouth daily 5/4/21   Kavya Cueto, DO   SYNTHROID 112 MCG tablet Take 1 tablet by mouth Daily 5/4/21   Kavya Cueto, DO   traMADol (ULTRAM) 50 MG tablet Take 2 tablets by mouth every 6 hours as needed for Pain for up to 30 days. 4/26/21 5/26/21  Kavya Cueto DO   OneTouch Delica Lancets 48W MISC Use twice daily for fluctuating glucose levels. 12/8/20   Kavya Cueto DO   predniSONE (DELTASONE) 5 MG tablet Take 5 mg by mouth 2 times daily    Historical Provider, MD   blood glucose monitor strips Patient has fluctuating glucose with hyperglycemia. Test glucose level twice day.  11/23/20   Kavya Cueto DO   Multiple Vitamin (MULTI-DAY VITAMINS PO) Take 2 tablets by mouth 2 times daily     Historical Provider, MD   NONFORMULARY Take 1 capsule by mouth 2 times daily Pancreatic enzymes daily     Historical Provider, MD   Cyanocobalamin (VITAMIN B 12) 250 MCG LOZG Take 1 lozenge by mouth 2 times daily qam and lunch    Historical Provider, MD   Garlic 8247 MG TBEC Take 2,000 mg by mouth daily     Historical Provider, MD   vitamin D (ERGOCALCIFEROL) 1.25 MG (67068 UT) CAPS capsule Take 50,000 Units by mouth once a week Saturday Historical Provider, MD   nystatin-triamcinolone (MYCOLOG II) 230658-3.7 UNIT/GM-% cream APPLY SPARINGLY TO AFFECTED AREA(S) TWICE DAILY 6/27/20   Historical Provider, MD   melatonin 3 MG TABS tablet Take 3 mg by mouth nightly as needed     Historical Provider, MD   loratadine (CLARITIN) 10 MG tablet Take 10 mg by mouth daily as needed     Historical Provider, MD   montelukast (SINGULAIR) 10 MG tablet Take by mouth nightly as needed Last took 2019 6/24/19   Historical Provider, MD       Allergies:    Omeprazole, Percocet [oxycodone-acetaminophen], Januvia [sitagliptin], Asa [aspirin], Erythromycin, Famotidine, Keflet [cephalexin], Levaquin [levofloxacin in d5w], Pcn [penicillins], Polyethylene glycol, Protonix [pantoprazole sodium], Tetracycline, and Tetracyclines & related    Social History:    reports that she quit smoking about 42 years ago. Her smoking use included cigarettes. She started smoking about 53 years ago. She has a 2.50 pack-year smoking history. She has never used smokeless tobacco. She reports current alcohol use. She reports previous drug use. Drug: Marijuana. Family History:   family history includes Arthritis in her father; Cancer in her maternal aunt; Diabetes in her mother; Heart Disease in her father and mother; High Blood Pressure in her mother.        PHYSICAL EXAM:  Vitals:  /70   Pulse 113   Temp 97.5 °F (36.4 °C)   Resp 16   Wt 133 lb (60.3 kg)   SpO2 98%   BMI 23.19 kg/m²     General Appearance: alert and oriented to person, place and time and in no acute distress- pale   Skin: warm and dry  Head: normocephalic and atraumatic  Neck: neck supple and non tender without mass   Pulmonary/Chest: clear to auscultation bilaterally-  Cardiovascular: normal rate, normal S1 and S2 and no carotid bruits  Abdomen: soft, non-tender, non-distended, normal bowel sounds, no masses or organomegaly  Extremities: no cyanosis, no clubbing and left leg edema 2+  Neurologic: speech normal LABS:  Recent Labs     05/18/21  1224   *   K 4.1   CL 88*   CO2 31*   BUN 12   CREATININE 0.9   GLUCOSE 74   CALCIUM 10.8*       Recent Labs     05/18/21  1224   WBC 12.5*   RBC 3.95   HGB 7.8*   HCT 27.6*   MCV 69.9*   MCH 19.7*   MCHC 28.3*   RDW 19.5*      MPV 8.5       No results for input(s): POCGLU in the last 72 hours. Radiology:   CTA PULMONARY W CONTRAST   Final Result   Small filling defects in the distal subsegmental and peripheral branches of   pulmonary arteries bilaterally, 3 concerning for subsegmental pulmonary   embolism. There is no large central pulmonary embolism. Extensive solid and cavitary masses in the lungs bilaterally with the   mediastinal and hilar adenopathy consistent with the widespread metastatic   malignancy without significant change. CT ABDOMEN AND PELVIS. The previously noted small hypodense lesion in the left hepatic lobe is   noted. There is focal hypoattenuation surrounding the falciform ligament   likely fatty infiltration. Gallbladder is distended with small amount of   stones. The previously noted splenic infarct is noted. ring like   calcifications are identified in the splenic hilum concerning for splenic   artery aneurysm. The previously noted large hiatal hernia is present. Pancreas, the adrenals and the left kidney are normal.  There is severe   hydronephrosis and hydroureter on the right side. Retroperitoneal lymph   nodes are identified some of which are necrotic with largest 1 in the   aortocaval region measuring 4 x 4.2 cm and smaller 1 measuring up to 2 cm in   the left para-aortic region without change. A large necrotic mass near the   aortic bifurcation with the erosion and destruction of L5 is noted which   currently measures 7.1 x 9.6 cm which is involving the right ureter causing   hydronephrosis in the right kidney this mass is increased. Pelvis.   The bladder is normal.  There is scattered diverticulosis of the   colon without diverticulitis. Appendix cannot be identified. Impression      Progressive metastatic malignancy with a large mass in the aortic bifurcation   extending to the right hemipelvis with destruction of L5 with additional   masses in the retroperitoneum concerning for metastatic malignancy. The mass   involves the right distal ureter causing severe hydronephrosis in the right   kidney. There may be developing hepatic metastasis. Splenic infarct. CT ABDOMEN PELVIS W IV CONTRAST Additional Contrast? None   Final Result   Small filling defects in the distal subsegmental and peripheral branches of   pulmonary arteries bilaterally, 3 concerning for subsegmental pulmonary   embolism. There is no large central pulmonary embolism. Extensive solid and cavitary masses in the lungs bilaterally with the   mediastinal and hilar adenopathy consistent with the widespread metastatic   malignancy without significant change. CT ABDOMEN AND PELVIS. The previously noted small hypodense lesion in the left hepatic lobe is   noted. There is focal hypoattenuation surrounding the falciform ligament   likely fatty infiltration. Gallbladder is distended with small amount of   stones. The previously noted splenic infarct is noted. ring like   calcifications are identified in the splenic hilum concerning for splenic   artery aneurysm. The previously noted large hiatal hernia is present. Pancreas, the adrenals and the left kidney are normal.  There is severe   hydronephrosis and hydroureter on the right side. Retroperitoneal lymph   nodes are identified some of which are necrotic with largest 1 in the   aortocaval region measuring 4 x 4.2 cm and smaller 1 measuring up to 2 cm in   the left para-aortic region without change.   A large necrotic mass near the   aortic bifurcation with the erosion and destruction of L5 is noted which   currently measures 7.1 x 9.6 cm which is involving the right ureter causing   hydronephrosis in the right kidney this mass is increased. Pelvis. The bladder is normal.  There is scattered diverticulosis of the   colon without diverticulitis. Appendix cannot be identified. Impression      Progressive metastatic malignancy with a large mass in the aortic bifurcation   extending to the right hemipelvis with destruction of L5 with additional   masses in the retroperitoneum concerning for metastatic malignancy. The mass   involves the right distal ureter causing severe hydronephrosis in the right   kidney. There may be developing hepatic metastasis. Splenic infarct. CT LUMBAR SPINE WO CONTRAST   Final Result   1. Redemonstration of right paraspinal mass measuring up to 10 cm. Mass has   increased in size compared to prior CT from April 25, 2021 concerning for   neoplasm. 2.  Lytic lesion associated with right aspect of L5 vertebral body appearing   similar compared to prior with pathologic fracture of L5.      3.  Right hydronephrosis with right hydroureter. US DUP LOWER EXTREMITY LEFT ODELL   Final Result   Extensive long segment occlusive deep vein thrombosis extending from left   iliac vein distally into left calf veins. Deep vein thrombosis has increased   throughout left lower extremity compared to prior from December 7, 2020. ASSESSMENT:      Active Problems:    Multiple pulmonary emboli (HCC)  Resolved Problems:    * No resolved hospital problems. *      PLAN:    1. Acute PE:  Pt presented to the ER with c/o low back pain, abdominal pain, bilateral leg pain left greater than right. CTA noted to have progressive metastatic malignancy with small filling defects in the distal subsegmental and peripheral branches of the pulmonary arteries bilaterally 3 concerning for subsegmental PE. Pt has a h/o right leg DVT on eliquis. She had a hospitalization Dec 2020 and placed on eliquis. Denies sob. No hypoxia noted. Reporting cough at baseline. Consult vascular. Continue heparin gtt. She is on eliquis 2.5 bid at baseline. 2. Acute DVT left leg: consult vascular. Continue heparin gtt. 3. Tachycardia:monitor. 4. Metastatic breast cancer/ endometrial cancer:has had radiation/ chemo in the past.She had progression of disease with chemo and did not tolerate. She was old by Hem/onc nothing further to offer. She was on hospice at one point, but improved and came off. Receptive to talking to palliative care/ hospice. 5. DM: hga1c 7.5. reporting appetite has been poor. Hold po meds. Insulin ss and monitor blood sugars. 6. Thyroid disease: synthroid    7. Arthritis: reporting follows with rheumatology    8. Microcytic anemia: hg 7.3. likely chronic anemia. However downtrend noted. Can check occult stool. Reporting she has never had PRBCs. She was told by PCP that she would need transfusion soon. Likely will need transfusion soon. 9. Hypercalcemia: likely from malignancy: monitor    Social work issues: reporting she is no longer able to care for herself. She wants to move in with her daughters in [place]. She is also interested in talking with hospice/ palliative care. Code Status: limited code  DVT prophylaxis: heparin gtt       NOTE: This report was transcribed using voice recognition software. Every effort was made to ensure accuracy; however, inadvertent computerized transcription errors may be present.   Electronically signed by EDITH Calle on 5/18/2021 at 4:48 PM

## 2021-05-18 NOTE — ED PROVIDER NOTES
Lidia Somers is a 77 y.o. female presenting to the ED for low back pain, abdominal pain, b/l leg pain L>R, beginning this am ago. The complaint has been intermittent, moderate in severity, and worsened by nothing. 76 yo f who has history of metastatic breast and endometrial CA, f/w dr Ryan Fuentes who pt states she hasnt seen for awhile and is not a chemo or rads candidate anymore. Pt is on tramadol for chronic pain which she did take this am. Pt ntoes she hasnt moved her bowel for 3-4 days which is not normal for her, she notes she usually goes every day but with increasing tramadol use it has made her constipated she stawtes. Pt ntoes normal urination. Pt notes new urinary incontinence ofver the past month or two. Pt has numbness on the right outside area of her right leg which she states is old from an old fall. Denies perineal or groin numbness or tingling. pts left leg is newly swollen. Pt notes she gets sob w minimal exertion. Pt denies any cp Pt did not get the covid vaccine. Pt notes slight cough no fever but she does get intermittent chills. Pt states she does have spinal lytic lesions. Pt did note 2-3 weeks ago she struck oher back when falling. PCP dr Brooklynn Metcalf. Pt is on eliquis 2.5mg bid and missed 1 dose last night, but notes is rare for her to miss a dose. Pt is a dnr and limited code wants no surgical intervention does not want intubated for any reason, agreeable to be made comfortable and also agreeable to medical therapy ie antibitoics and medications;. Review of Systems:   Review of Systems   Constitutional: Positive for chills and fatigue. HENT: Positive for congestion. Negative for postnasal drip and sinus pain. Eyes: Negative for photophobia and visual disturbance. Respiratory: Positive for cough. Cardiovascular: Positive for leg swelling. Negative for chest pain and palpitations. Gastrointestinal: Positive for abdominal pain and constipation.    Endocrine: Negative for polyphagia and polyuria. Genitourinary: Negative for difficulty urinating and dysuria. Musculoskeletal: Positive for back pain. Skin: Negative for color change and pallor. Allergic/Immunologic: Negative for food allergies and immunocompromised state. Neurological: Negative for dizziness, light-headedness and headaches. Hematological: Negative for adenopathy. Does not bruise/bleed easily. Psychiatric/Behavioral: Negative for agitation. The patient is not nervous/anxious.                  --------------------------------------------- PAST HISTORY ---------------------------------------------  Past Medical History:  has a past medical history of Cancer ([facility] [address] Diabetes mellitus ([address] Diverticulitis, GERD (gastroesophageal reflux disease), Gout, Hiatal hernia, Leg swelling, Macular degeneration, Metastatic disease ([address] Osteoarthritis, and Osteopenia. Past Surgical History:  has a past surgical history that includes  section; Tonsillectomy; cyst removal; Hysterectomy (2016); Breast surgery (Left, 10/2016); eye surgery (Bilateral, ); Colonoscopy; Endoscopy, colon, diagnostic; Breast lumpectomy (Left, 2016); chg unlisted dx radiographic procedure (N/A, 2018); [facility]); pr [facility] brushing/protected brushings (2018); pr bronchoscopy w/transbronchial lung bx 1 lobe (2018); pr bronchoscopy w/transbronchial lung bx each lobe (2018); pr insj tunneled ctr vad w/subq port age 12 yr/> (N/A, 2018); Hysterectomy, total abdominal (2016); and Port Surgery (N/A, 2020). Social History:  reports that she has quit smoking. Her smoking use included cigarettes. She has a 5.00 pack-year smoking history. She has never used smokeless tobacco. She reports current alcohol use. She reports current drug use. Drug: Marijuana.     Family History: family history includes mmol/L    Potassium reflex Magnesium 4.1 3.5 - 5.0 mmol/L    Chloride 88 (L) 98 - 107 mmol/L    CO2 31 (H) 22 - 29 mmol/L    Anion Gap 11 7 - 16 mmol/L    Glucose 74 74 - 99 mg/dL    BUN 12 6 - 23 mg/dL    CREATININE 0.9 0.5 - 1.0 mg/dL    GFR Non-African American >60 >=60 mL/min/1.73    GFR African American >60     Calcium 10.8 (H) 8.6 - 10.2 mg/dL    Total Protein 7.0 6.4 - 8.3 g/dL    Albumin 3.1 (L) 3.5 - 5.2 g/dL    Total Bilirubin 0.6 0.0 - 1.2 mg/dL    Alkaline Phosphatase 176 (H) 35 - 104 U/L    ALT 22 0 - 32 U/L    AST 24 0 - 31 U/L   Lipase   Result Value Ref Range    Lipase 8 (L) 13 - 60 U/L   Lactic Acid, Plasma   Result Value Ref Range    Lactic Acid 1.0 0.5 - 2.2 mmol/L   Troponin   Result Value Ref Range    Troponin <0.01 0.00 - 0.03 ng/mL   Brain Natriuretic Peptide   Result Value Ref Range    Pro- (H) 0 - 450 pg/mL   Protime-INR   Result Value Ref Range    Protime 14.9 (H) 9.3 - 12.4 sec    INR 1.4    TSH without Reflex   Result Value Ref Range    TSH 7.010 (H) 0.270 - 4.200 uIU/mL   EKG 12 Lead   Result Value Ref Range    Ventricular Rate 117 BPM    Atrial Rate 117 BPM    P-R Interval 126 ms    QRS Duration 84 ms    Q-T Interval 294 ms    QTc Calculation (Bazett) 410 ms    P Axis 46 degrees    R Axis -4 degrees    T Axis 21 degrees       RADIOLOGY:  Interpreted by Radiologist.  CTA PULMONARY W CONTRAST   Final Result   Small filling defects in the distal subsegmental and peripheral branches of   pulmonary arteries bilaterally, 3 concerning for subsegmental pulmonary   embolism. There is no large central pulmonary embolism. Extensive solid and cavitary masses in the lungs bilaterally with the   mediastinal and hilar adenopathy consistent with the widespread metastatic   malignancy without significant change. CT ABDOMEN AND PELVIS. The previously noted small hypodense lesion in the left hepatic lobe is   noted.   There is focal hypoattenuation surrounding the falciform ligament fracture of L5.      3.  Right hydronephrosis with right hydroureter. US DUP LOWER EXTREMITY LEFT ODELL   Final Result   Extensive long segment occlusive deep vein thrombosis extending from left   iliac vein distally into left calf veins. Deep vein thrombosis has increased   throughout left lower extremity compared to prior from December 7, 2020.             ------------------------- NURSING NOTES AND VITALS REVIEWED ---------------------------   The nursing notes within the ED encounter and vital signs as below have been reviewed. /70   Pulse 113   Temp 97.5 °F (36.4 °C)   Resp 16   Wt 133 lb (60.3 kg)   SpO2 98%   BMI 23.19 kg/m²   Oxygen Saturation Interpretation: Normal      ---------------------------------------------------PHYSICAL EXAM--------------------------------------    Physical Exam  Vitals reviewed. Constitutional:       General: She is not in acute distress. Appearance: Normal appearance. She is not toxic-appearing. HENT:      Head: Normocephalic and atraumatic. Right Ear: External ear normal.      Left Ear: External ear normal.      Nose: Nose normal. No congestion. Mouth/Throat:      Mouth: Mucous membranes are moist.      Pharynx: Oropharynx is clear. No posterior oropharyngeal erythema. Eyes:      Extraocular Movements: Extraocular movements intact. Pupils: Pupils are equal, round, and reactive to light. Cardiovascular:      Rate and Rhythm: Regular rhythm. Tachycardia present. Pulses: Normal pulses. Heart sounds: No murmur heard. Pulmonary:      Effort: Pulmonary effort is normal.      Breath sounds: No wheezing or rhonchi. Chest:      Chest wall: No tenderness. Abdominal:      General: Bowel sounds are normal.      Tenderness: There is no abdominal tenderness. There is no right CVA tenderness, left CVA tenderness or guarding. Musculoskeletal:         General: Tenderness present. No swelling or deformity.       Cervical back: Normal range of motion and neck supple. No muscular tenderness. Left lower leg: Edema present. Comments: + doppler b/l le pulses    Significant Left lower extremity edema   Skin:     General: Skin is warm and dry. Capillary Refill: Capillary refill takes less than 2 seconds. Neurological:      General: No focal deficit present. Mental Status: She is alert and oriented to person, place, and time. Cranial Nerves: No cranial nerve deficit. Sensory: Sensation is intact. Motor: Motor function is intact. No weakness. Coordination: Coordination normal.      Comments: B/l le weakness, no focal wekaness or numbness  + straight leg test   Psychiatric:         Mood and Affect: Mood normal.                 ------------------------------ ED COURSE/MEDICAL DECISION MAKING----------------------  Medications   heparin (porcine) injection 4,820 Units (has no administration in time range)   heparin (porcine) injection 4,820 Units (has no administration in time range)   heparin (porcine) injection 2,410 Units (has no administration in time range)   heparin 25,000 units in dextrose 5% 250 mL (premix) infusion (has no administration in time range)   fentaNYL (SUBLIMAZE) injection 25 mcg (25 mcg Intravenous Given 5/18/21 1245)   0.9 % sodium chloride bolus (0 mLs Intravenous Stopped 5/18/21 1434)   iopamidol (ISOVUE-370) 76 % injection 75 mL (75 mLs Intravenous Given 5/18/21 1403)   fentaNYL (SUBLIMAZE) injection 25 mcg (25 mcg Intravenous Given 5/18/21 1542)     EKG: This EKG is signed and interpreted by me. [id number]  Rate: 117  Rhythm: Sinus  Interpretation: no acute changes and pacs  Comparison: stable as compared to patient's most recent EKG      ED COURSE:       Medical Decision Making:    Patient presents tachycardic complaining of back pain and left lower extremity swelling. She is on Eliquis 2.5 twice daily she has history of right lower extremity DVT.   She has history of metastatic cancer all throughout her body from breast and endometrial.  CODE STATUS was discussed with the patient. She request to be a limited code she does not want any surgical intervention for anything. She does want to be treated medically. She does not want CPR does not want intubated does not want any resuscitative medications. She would be willing to get a blood transfusion if needed. Hospice was discussed with the patient she is not ready to do that at this time. Patient was found to have extensive DVT multiple PEs. She is tachycardic her pulse ox is stable. She was placed on a heparin drip in case her hemoglobin drops lower it can be turned off. She was given fentanyl for her pain from her metastatic cancer. Heme-onc will be consulted for further recommendations. Patient is end-stage at this time and I recommended to the hospitalist palliative care consult. Case was reviewed with [facility] hospitalist who will admit. Heme-onc is consulted. Counseling: The emergency provider has spoken with the patient and discussed todays results, in addition to providing specific details for the plan of care and counseling regarding the diagnosis and prognosis. Questions are answered at this time and they are agreeable with the plan.      --------------------------------- IMPRESSION AND DISPOSITION ---------------------------------    IMPRESSION  1. Multiple pulmonary emboli (HCC)    2. Tachycardia    3. Acute deep vein thrombosis (DVT) of femoral vein of left lower extremity (HCC)    4. Metastatic malignant neoplasm, unspecified site ([facility] [address]    5. Acute bilateral low back pain with bilateral sciatica    6. Pathological compression fracture of vertebra, initial encounter ([facility] [address]    7. Hydronephrosis, unspecified hydronephrosis type        DISPOSITION  Disposition: Admit to telemetry  Patient condition is fair      NOTE: This report was transcribed using voice recognition software.  Every effort was made to ensure accuracy; however, inadvertent computerized transcription errors may be present       Virginie Decker DO  05/18/21 7916

## 2021-05-18 NOTE — ED NOTES
Patient returned from [address] [place] [address],[address] at this time, patient complained of hip/leg pain 9/10. Notified Naomi Villagran.      Sarthak Coker, LISA  [id number]

## 2021-05-18 NOTE — PROGRESS NOTES
Notified Dr. Prashant Bermeo (On call for Dr. Maria M Martinez) of new patient vascular surgery consult.

## 2021-05-18 NOTE — PROGRESS NOTES
Spoke w/ Dr. Garcia Or regarding neurosurgery consult and that there isn't neurosurgery coverage. Stated to cancel neurosurgery consult.

## 2021-05-18 NOTE — CONSULTS
Department of [facility] Oncology  Attending Consult Note      Reason for Visit:  Back pain in known metastatic cancer      Referring Physician:  Christie Records DO     PCP:  Candice Purcell DO    History of Present Illness:  77year old female  presented to ER 5/18/2021 for lower back, abdominal and bilateral leg pain. She is on tramadol for chronic pain. She reports no bowel movements for 3-4 days, denies hematochezia or melana. Reports she has not been able to go to the bathroom all day. Prior to this morning denies hematuria. Reportss severe lower leg pain left worse then right that feels like burning. Denies Chest pain. Reports slight dyspnea on exertion and cough. Denies hemoptysis Denies fever. Patient is on Eliquis 2.5mg for history of DVT   Labs remarkable for hyponatremia: 130 and hypercalcemia of 10.8   She has leukocytosis of 12.5 hgb: 7.8 hct 27.6       CTA pulmonary 5/18/2021:   -small filling defects in the distal subsegmental and peripheral branches of pulmonary arteries bilaterally, concerning for pulmonary embolism.  -Solid and cavitary masses in the lungs bilaterally with mediastinal and hilar adenopathy-consistant with widespread metastatic malignancy. CT abdomen pelvis:   -Hypodense lesion in left hepatic lobe. Gallbladder is distended with stones.  Ring like calcifications in the splenic hilum concerning for splenic artery aneurysm.   -retroperitoneal lymph nodes are necrotic, largest in the aortocaval region measuring 4 x 4.2 cm and smaller up to 2 cm in the left para-aortic region.   -Necrotic mass near aortic bifurcation with erosion and destruction of L5 measuring 7.1 x 9.6 cm causing hydronephrosis in the right kidney    CT lumbar spine :  -Right paraspinal mass measuring 10cm increased since April 25, 2021  -Lytic lesion with right aspect of L5 vertebral body with pathologic fracture of L5      US lower extremity:  Extensive long segment occlusive deep vein thrombosis extending from left   iliac vein distally into left calf veins. Deep vein thrombosis has increased   throughout left lower extremity compared to prior from December 7, 2020. 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 HDR VAGINAL BRACHYTHERAPY-9/29/16, 10/6/16, 10/13/16    Restaging scans 6/15/2018 with . 2 x 1.3 cm soft tissue nodule along the superior aspect of the urinary bladder suspicious for metastases, decreased in size since. Multiple bilateral lung nodules suspicious for metastases, increased in size and number   She was declined recommended systemic chemotherapy of Taxol/Carbo/Herceptin. Patient declined palliative care/Hospice care and wanted to proceed with immunotherapy Karin Jon) received 20 cycles (last one on 11/14/2019) . Patient decided to [facility] d/t weakness, significant fatigue poor appetite and severe arthralgias in bilateral knees and proceeded with Hospice. Went to the ER Sept 2020 for ENRIQUEZ and scans showed continued progression:  CTA chest 09/16/2020   Innumerable pulmonary parenchymal nodules  bilaterally, mediastinal and hilar lymphadenopathy are in fact new as compared to the prior exam.     CT abdomen/pelvis 09/18/2020:  Right psoas muscle mass lesion appears to have moderately regressed  Multiple new cannonball lung metastases  New malignant-appearing retroperitoneal adenopathy    Presented to ER December 2020 with SOB   CTA chest with bilateral solid plenary nodules .   Bilateral LE US with partially-occlusive thrombus within the right lower extremity, from the visualized distal right iliac, through the distal right superficial femoral veins. Patient was D/C with Hospice 12/10/0220. She was with Hospice for some time and now       Review of Systems; Review of Systems   Constitutional: Denies fever or chills. Reports fatigue and pain   Respiratory: Reports dyspnea on exertion, denies cough or sputum production   Cardiac: Denies CP or palpitation   Abdomen: Reports lower abdominal pain, denies nausea or vomiting. GI/: Reports constipation x4 days. Reports some difficulty urinating due to \"mass pushing on ureter\" Denies nausea or vomiting, diarrhea or hematochezia/hematuria. MSK: reports lower back pain   Extremities: reports pain to lower legs, left worse then right.      Remainder:  ROS NEGATIVE    Past Medical History:      Diagnosis Date    Cancer [facility])     endometrial cancer, breast-     Diabetes mellitus ([facility] [address]     Diverticulitis     GERD (gastroesophageal reflux disease)     Gout     Hiatal hernia     Leg swelling 2020    Macular degeneration     Metastatic disease (HCC)     Osteoarthritis     Osteopenia        Past Surgical History:      Procedure Laterality Date    BREAST LUMPECTOMY Left 2016    left breast sentinelnode dissection, blue dye injection    BREAST SURGERY Left 10/2016    cancer     SECTION      x 3    CHG UNLISTED DX RADIOGRAPHIC PROCEDURE N/A 2018    BRONCHOSCOPY ELECTROMAGNETIC performed by Barbara Pabon MD at [facility] area    ENDOSCOPY, COLON, DIAGNOSTIC      EYE SURGERY Bilateral 2016    cataracts    HYSTERECTOMY  2016    total with BSO    HYSTERECTOMY, TOTAL ABDOMINAL  2016    PORT SURGERY N/A 2020    MEDIPORT REMOVAL performed by Swapna Davis MD at [address] BRUSHING/PROTECTED BRUSHINGS  2018    BRONCHOSCOPY BRUSHINGS performed by Barbara Pabon MD at [address] [place] [address] [address] W/BRNCL ALVEOLAR LAVAGE  8/17/2018    BRONCHOSCOPY ALVEOLAR LAVAGE performed by Leydi Page MD at [address] EBUS DX/TX INTERVENTION [address] LES N/A 8/17/2018    BRONCHOSCOPY ULTRASOUND performed by Leydi Page MD at [address] W/TRANSBRONCHIAL LUNG BX 1 LOBE  8/17/2018    BRONCHOSCOPY/TRANSBRONCHIAL NEEDLE BIOPSY performed by Leydi Page MD at [address] W/TRANSBRONCHIAL LUNG BX EACH LOBE  8/17/2018    BRONCHOSCOPY/TRANSBRONCHIAL NEEDLE BIOPSY ADDL LOBE performed by Leydi Page MD at [address] TUNNELED CTR VAD W/SUBQ PORT AGE 5 YR/> N/A 12/4/2018    MEDIPORT INSERTION performed by To Nur MD at [address] TONSILLECTOMY         Family History:  Family History   Problem Relation Age of Onset    High Blood Pressure Mother     Heart Disease Mother     Diabetes Mother     Arthritis Father         rheumatoid    Heart Disease Father     Cancer Maternal Aunt         ovarian carcinoma       Medications:  Reviewed and reconciled. Social History:  Social History     Socioeconomic History    Marital status:      Spouse name: Not on file    Number of children: Not on file    Years of education: Not on file    Highest education level: Not on file   Occupational History    Not on file   Tobacco Use    Smoking status: Former Smoker     Packs/day: 0.25     Years: 20.00     Pack years: 5.00     Types: Cigarettes    Smokeless tobacco: Never Used   Vaping Use    Vaping Use: Never used   Substance and Sexual Activity    Alcohol use: Yes     Comment: rarely    Drug use: Yes     Types: Marijuana     Comment: medical    Sexual activity: Not on file   Other Topics Concern    Not on file   Social History Narrative    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      Social Determinants of Health     Financial Resource Strain:     Difficulty of Paying Living Expenses:    Food Insecurity:  Worried About [facility] in the Last Year:    [address] in the Last Year:    Transportation Needs:     Lack of Transportation (Medical):  Lack of Transportation (Non-Medical):    Physical Activity:     Days of Exercise per Week:     Minutes of Exercise per Session:    Stress:     Feeling of Stress :    Social Connections:     Frequency of Communication with Friends and Family:     Frequency of Social Gatherings with Friends and Family:     Attends Confucianism Services:     Active Member of Clubs or Organizations:     Attends Club or Organization Meetings:     Marital Status:    Intimate Partner Violence:     Fear of Current or Ex-Partner:     Emotionally Abused:     Physically Abused:     Sexually Abused: Allergies: Allergies   Allergen Reactions    Omeprazole Hives and Itching    Percocet [Oxycodone-Acetaminophen] Other (See Comments)     Prolong use, GI issues    Januvia [Sitagliptin]     Asa [Aspirin] Hives    Erythromycin Rash    Famotidine Nausea Only, Anxiety and Palpitations    Keflet [Cephalexin] Rash    Levaquin [Levofloxacin In D5w] Rash    Pcn [Penicillins] Rash    Polyethylene Glycol Other (See Comments)     Slowed peristalsis    Protonix [Pantoprazole Sodium] Other (See Comments)     Other reaction(s): Other: See Comments  DEPRESSION    Tetracycline Rash    Tetracyclines & Related Rash       Physical Exam:  /62   Pulse 105   Temp 97.6 °F (36.4 °C) (Oral)   Resp 18   Wt 133 lb (60.3 kg)   SpO2 98%   BMI 23.19 kg/m²   Physical Exam   General: Alert and oriented in no acute distress  Respiratory: Clear to auscultation, on RA. No stridor, wheeze or rhonchi   Cardiac:Tachycardic, normal rhythm. Pulses intact. Edema to bilateral lower extremities left worse then right. Abdomen: soft, tender to palpation, active bowel sounds. Extremities: Edema to bilateral lower extremities, greater on the left, pulse intact.    Neuro: No focal deficits Skin: NO rash or lesion. ECOG PS 2    Impression/Plan:    77year old female  presented to ER 5/18/2021 for lower back, abdominal and bilateral leg pain. She is on tramadol for chronic pain. She reports no bowel movements for 3-4 days, denies hematochezia or melana. Reports she has not been able to go to the bathroom all day. Prior to this morning denies hematuria. Reportss severe lower leg pain left worse then right that feels like burning. Denies Chest pain. Reports slight dyspnea on exertion and cough. Denies hemoptysis Denies fever. Patient is on Eliquis 2.5mg for history of DVT   Labs remarkable for hyponatremia: 130 and hypercalcemia of 10.8   She has leukocytosis of 12.5 hgb: 7.8 hct 27.6     Scans on arrival:   CTA pulmonary:   -small filling defects in the distal subsegmental and peripheral branches of pulmonary arteries bilaterally, concerning for pulmonary embolism.  -Solid and cavitary masses in the lungs bilaterally with mediastinal and hilar adenopathy-consistant with widespread metastatic malignancy. CT abdomen pelvis:   -Hypodense lesion in left hepatic lobe. Gallbladder is distended with stones. Ring like calcifications in the splenic hilum concerning for splenic artery aneurysm.   -retroperitoneal lymph nodes are necrotic, largest in the aortocaval region measuring 4 x 4.2 cm and smaller up to 2 cm in the left para-aortic region.   -Necrotic mass near aortic bifurcation with erosion and destruction of L5 measuring 7.1 x 9.6 cm causing hydronephrosis in the right kidney    CT lumbar spine :  -Right paraspinal mass measuring 10cm increased since April 25, 2021  -Lytic lesion with right aspect of L5 vertebral body with pathologic fracture of L5      US lower extremity:  Extensive long segment occlusive deep vein thrombosis extending from left   iliac vein distally into left calf veins.   Deep vein thrombosis has increased   throughout left lower extremity compared to prior from December 7, 2020. 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 HDR VAGINAL BRACHYTHERAPY-9/29/16, 10/6/16, 10/13/16    Restaging scans 6/15/2018 with . 2 x 1.3 cm soft tissue nodule along the superior aspect of the urinary bladder suspicious for metastases, decreased in size since. Multiple bilateral lung nodules suspicious for metastases, increased in size and number   She was declined recommended systemic chemotherapy of Taxol/Carbo/Herceptin. Patient declined palliative care/Hospice care and wanted to proceed with immunotherapy Yokovj Jean) received 20 cycles (last one on 11/14/2019) . Patient decided to [facility] d/t weakness, significant fatigue poor appetite and severe arthralgias in bilateral knees and proceeded with Hospice. Went to the ER Sept 2020 for ENRIQUEZ and scans showed continued progression:  CTA chest 09/16/2020   Innumerable pulmonary parenchymal nodules  bilaterally, mediastinal and hilar lymphadenopathy are in fact new as compared to the prior exam.     CT abdomen/pelvis 09/18/2020:  Right psoas muscle mass lesion appears to have moderately regressed  Multiple new cannonball lung metastases  New malignant-appearing retroperitoneal adenopathy    Presented to ER December 2020 with SOB   CTA chest with bilateral solid plenary nodules . Bilateral LE US with partially-occlusive thrombus within the right lower extremity, from the visualized distal right iliac, through the distal right superficial femoral veins. Patient was D/C with Hospice 12/10/0220. -DVT while compliant on Eliquis-D/C Eliquis start on Heparin drip-transition to lovenox when discharged to [address] [place] [address] referral; Patient returning to Hospice care-her daughters live in [place] and would like to be able to be with them. -Monitor CBCD, transfuse to keep hgb >7 plt> 10  -Vascular surgery consult.  -Palliative Medicine for symptoms management .  -Neurosurgery consult for pathologic fracture  -Microcytic anemia, check iron studies, FOBT pending. Thank you for allowing us to participate in the care of [address]   [phone]    The patient was seen and examined, I agree with [facility]'s  H&P, assessment and plan as outlined.       Fany Ruiz MD   HEMATOLOGY/MEDICAL ONCOLOGY  [address] [place] [address] ONCOLOGY  [address] [facility] [address] [facility] [address]  Dept: [phone]

## 2021-05-18 NOTE — ED NOTES
Report faxed. Monty Whitten at ext [phone] confirmed receipt.  Patient updated and prepared to be transported via cart and monitor     Jennie Kaye RN  05/18/21 1456

## 2021-05-18 NOTE — TELEPHONE ENCOUNTER
Audrey calling to tell you that she is going to [employer] in [place]. Her Left leg is swollen and very painful. She is afraid that she may have a clot.

## 2021-05-19 PROBLEM — I82.423: Status: ACTIVE | Noted: 2021-05-19

## 2021-05-19 PROBLEM — I82.220 IVC THROMBOSIS (HCC): Status: ACTIVE | Noted: 2021-05-19

## 2021-05-19 PROBLEM — I82.4Y2 ACUTE DEEP VEIN THROMBOSIS OF PROXIMAL LEG, LEFT (HCC): Status: ACTIVE | Noted: 2021-05-19

## 2021-05-19 LAB
ABO/RH: NORMAL
ANION GAP SERPL CALCULATED.3IONS-SCNC: 14 MMOL/L (ref 7–16)
ANTIBODY SCREEN: NORMAL
APTT: 36.7 SEC (ref 24.5–35.1)
APTT: 37 SEC (ref 24.5–35.1)
BUN BLDV-MCNC: 13 MG/DL (ref 6–23)
CALCIUM SERPL-MCNC: 10.4 MG/DL (ref 8.6–10.2)
CHLORIDE BLD-SCNC: 92 MMOL/L (ref 98–107)
CO2: 27 MMOL/L (ref 22–29)
CREAT SERPL-MCNC: 1.1 MG/DL (ref 0.5–1)
FERRITIN: 898 NG/ML
GFR AFRICAN AMERICAN: 59
GFR NON-AFRICAN AMERICAN: 48 ML/MIN/1.73
GLUCOSE BLD-MCNC: 201 MG/DL (ref 74–99)
HCT VFR BLD CALC: 26.6 % (ref 34–48)
HEMOGLOBIN: 7.5 G/DL (ref 11.5–15.5)
IRON SATURATION: 8 % (ref 15–50)
IRON: 15 MCG/DL (ref 37–145)
MCH RBC QN AUTO: 19.5 PG (ref 26–35)
MCHC RBC AUTO-ENTMCNC: 28.2 % (ref 32–34.5)
MCV RBC AUTO: 69.3 FL (ref 80–99.9)
METER GLUCOSE: 205 MG/DL (ref 74–99)
METER GLUCOSE: 248 MG/DL (ref 74–99)
METER GLUCOSE: 290 MG/DL (ref 74–99)
METER GLUCOSE: 310 MG/DL (ref 74–99)
PDW BLD-RTO: 19.6 FL (ref 11.5–15)
PLATELET # BLD: 224 E9/L (ref 130–450)
PMV BLD AUTO: 8.6 FL (ref 7–12)
POTASSIUM REFLEX MAGNESIUM: 4.6 MMOL/L (ref 3.5–5)
RBC # BLD: 3.84 E12/L (ref 3.5–5.5)
SODIUM BLD-SCNC: 133 MMOL/L (ref 132–146)
TOTAL IRON BINDING CAPACITY: 183 MCG/DL (ref 250–450)
WBC # BLD: 13.8 E9/L (ref 4.5–11.5)

## 2021-05-19 PROCEDURE — 86901 BLOOD TYPING SEROLOGIC RH(D): CPT

## 2021-05-19 PROCEDURE — 83540 ASSAY OF IRON: CPT

## 2021-05-19 PROCEDURE — 86850 RBC ANTIBODY SCREEN: CPT

## 2021-05-19 PROCEDURE — 6360000002 HC RX W HCPCS: Performed by: NURSE PRACTITIONER

## 2021-05-19 PROCEDURE — 96365 THER/PROPH/DIAG IV INF INIT: CPT

## 2021-05-19 PROCEDURE — G0378 HOSPITAL OBSERVATION PER HR: HCPCS

## 2021-05-19 PROCEDURE — APPSS30 APP SPLIT SHARED TIME 16-30 MINUTES: Performed by: NURSE PRACTITIONER

## 2021-05-19 PROCEDURE — 86900 BLOOD TYPING SEROLOGIC ABO: CPT

## 2021-05-19 PROCEDURE — 99222 1ST HOSP IP/OBS MODERATE 55: CPT | Performed by: SURGERY

## 2021-05-19 PROCEDURE — 96372 THER/PROPH/DIAG INJ SC/IM: CPT

## 2021-05-19 PROCEDURE — 96376 TX/PRO/DX INJ SAME DRUG ADON: CPT

## 2021-05-19 PROCEDURE — 83550 IRON BINDING TEST: CPT

## 2021-05-19 PROCEDURE — 2580000003 HC RX 258: Performed by: NURSE PRACTITIONER

## 2021-05-19 PROCEDURE — 99232 SBSQ HOSP IP/OBS MODERATE 35: CPT | Performed by: INTERNAL MEDICINE

## 2021-05-19 PROCEDURE — 36415 COLL VENOUS BLD VENIPUNCTURE: CPT

## 2021-05-19 PROCEDURE — 85730 THROMBOPLASTIN TIME PARTIAL: CPT

## 2021-05-19 PROCEDURE — 2060000000 HC ICU INTERMEDIATE R&B

## 2021-05-19 PROCEDURE — 6370000000 HC RX 637 (ALT 250 FOR IP): Performed by: INTERNAL MEDICINE

## 2021-05-19 PROCEDURE — 96367 TX/PROPH/DG ADDL SEQ IV INF: CPT

## 2021-05-19 PROCEDURE — 97161 PT EVAL LOW COMPLEX 20 MIN: CPT

## 2021-05-19 PROCEDURE — 6370000000 HC RX 637 (ALT 250 FOR IP): Performed by: NURSE PRACTITIONER

## 2021-05-19 PROCEDURE — 99233 SBSQ HOSP IP/OBS HIGH 50: CPT | Performed by: INTERNAL MEDICINE

## 2021-05-19 PROCEDURE — 80048 BASIC METABOLIC PNL TOTAL CA: CPT

## 2021-05-19 PROCEDURE — 85027 COMPLETE CBC AUTOMATED: CPT

## 2021-05-19 PROCEDURE — 96366 THER/PROPH/DIAG IV INF ADDON: CPT

## 2021-05-19 PROCEDURE — 82962 GLUCOSE BLOOD TEST: CPT

## 2021-05-19 PROCEDURE — 97165 OT EVAL LOW COMPLEX 30 MIN: CPT

## 2021-05-19 PROCEDURE — 82728 ASSAY OF FERRITIN: CPT

## 2021-05-19 PROCEDURE — 6360000002 HC RX W HCPCS: Performed by: EMERGENCY MEDICINE

## 2021-05-19 RX ADMIN — ENOXAPARIN SODIUM 60 MG: 60 INJECTION SUBCUTANEOUS at 21:25

## 2021-05-19 RX ADMIN — PREDNISONE 5 MG: 5 TABLET ORAL at 16:20

## 2021-05-19 RX ADMIN — INSULIN LISPRO 2 UNITS: 100 INJECTION, SOLUTION INTRAVENOUS; SUBCUTANEOUS at 06:33

## 2021-05-19 RX ADMIN — HEPARIN SODIUM 2410 UNITS: 1000 INJECTION INTRAVENOUS; SUBCUTANEOUS at 11:21

## 2021-05-19 RX ADMIN — SODIUM CHLORIDE 25 MG: 9 INJECTION, SOLUTION INTRAVENOUS at 16:21

## 2021-05-19 RX ADMIN — INSULIN LISPRO 2 UNITS: 100 INJECTION, SOLUTION INTRAVENOUS; SUBCUTANEOUS at 16:25

## 2021-05-19 RX ADMIN — THERA TABS 1 TABLET: TAB at 09:48

## 2021-05-19 RX ADMIN — HEPARIN SODIUM 4820 UNITS: 1000 INJECTION INTRAVENOUS; SUBCUTANEOUS at 04:45

## 2021-05-19 RX ADMIN — SODIUM CHLORIDE 100 MG: 9 INJECTION, SOLUTION INTRAVENOUS at 21:45

## 2021-05-19 RX ADMIN — ENOXAPARIN SODIUM 60 MG: 60 INJECTION SUBCUTANEOUS at 16:24

## 2021-05-19 RX ADMIN — LEVOTHYROXINE SODIUM 112 MCG: 112 TABLET ORAL at 06:16

## 2021-05-19 RX ADMIN — HEPARIN SODIUM 26 UNITS/KG/HR: 10000 INJECTION, SOLUTION INTRAVENOUS at 04:39

## 2021-05-19 RX ADMIN — SODIUM CHLORIDE, PRESERVATIVE FREE 10 ML: 5 INJECTION INTRAVENOUS at 21:26

## 2021-05-19 RX ADMIN — TRAMADOL HYDROCHLORIDE 50 MG: 50 TABLET ORAL at 16:42

## 2021-05-19 RX ADMIN — SODIUM CHLORIDE, PRESERVATIVE FREE 10 ML: 5 INJECTION INTRAVENOUS at 14:42

## 2021-05-19 RX ADMIN — TRAMADOL HYDROCHLORIDE 50 MG: 50 TABLET ORAL at 09:48

## 2021-05-19 RX ADMIN — INSULIN LISPRO 2 UNITS: 100 INJECTION, SOLUTION INTRAVENOUS; SUBCUTANEOUS at 21:46

## 2021-05-19 RX ADMIN — ENOXAPARIN SODIUM 60 MG: 60 INJECTION SUBCUTANEOUS at 16:20

## 2021-05-19 RX ADMIN — PREDNISONE 5 MG: 5 TABLET ORAL at 09:48

## 2021-05-19 RX ADMIN — TRAMADOL HYDROCHLORIDE 50 MG: 50 TABLET ORAL at 21:24

## 2021-05-19 RX ADMIN — TRAMADOL HYDROCHLORIDE 100 MG: 50 TABLET ORAL at 03:13

## 2021-05-19 RX ADMIN — INSULIN LISPRO 3 UNITS: 100 INJECTION, SOLUTION INTRAVENOUS; SUBCUTANEOUS at 11:26

## 2021-05-19 RX ADMIN — PREDNISONE 5 MG: 5 TABLET ORAL at 16:24

## 2021-05-19 ASSESSMENT — PAIN DESCRIPTION - DESCRIPTORS
DESCRIPTORS: ACHING
DESCRIPTORS: ACHING

## 2021-05-19 ASSESSMENT — PAIN SCALES - GENERAL
PAINLEVEL_OUTOF10: 4
PAINLEVEL_OUTOF10: 0
PAINLEVEL_OUTOF10: 6
PAINLEVEL_OUTOF10: 7
PAINLEVEL_OUTOF10: 7

## 2021-05-19 ASSESSMENT — PAIN DESCRIPTION - PAIN TYPE: TYPE: ACUTE PAIN

## 2021-05-19 NOTE — CARE COORDINATION
Social Work/Discharge Planning:  Met with patient and her daughter Laisha Dia and confirmed patient plan to discharge to her daughter's house. Laisha Dia lives at [address] [place], [place]. Notified patient and her daughter that [facility] can not accept with lovenox or eliquis treatment. Patient states she wants to continue lovenox treatment for her DVT. Patient pharmacy will be [facility] [address] in [place], [place]. Informed patient and her daughter that will have [phone] confirm cost for lovenox. Plan is home with daughter at discharge and her daughter will contact [facility] if/when patient wants hospice. Patient daughter states she can assist her mother with the lovenox injections. Called Bhumika Pearson with [facility] and she will confirm if lovenox needs pre-cert. Will continue to follow. Electronically signed by ISIDORO Jauregui on 5/19/2021 at 2:46 PM    Addendum:  Bhumika Pearson with [facility] states they do not have lovenox available and can not run for cost due to no script. Called Bhumika Pearson with [facility] in [place] (ph: [phone]) and confirmed they will need a script to run cost for lovenox. Bhumika Pearson also states they have a limited supply and will need to order the lovenox. Notified charge nurse. Will continue to follow. Electronically signed by ISIDORO Jauregui on 5/19/2021 at 2:59 PM    Addendum:  Faxed lovenox script to [facility] in [place] to confirm cost.  Will continue to follow.  Electronically signed by ISIDORO Jauregui on 5/19/2021 at 3:22 PM

## 2021-05-19 NOTE — PROGRESS NOTES
[facility] Progress Note    Admitting Date and Time: 5/18/2021 11:21 AM  Admit Dx: Multiple pulmonary emboli ([facility] [address] [I26.99]    Subjective:  Patient is being followed for Multiple pulmonary emboli ([facility] [address] [I26.99]     Pt resting in bed in no acute distress  Reporting pain is controlled  Discussed pathologic fracture of L5- pt reporting she wants no intervention  Reporting she is not sure that she would want IVF filter. She is not sure regarding lovenox injections  Social work at bedside         ROS: denies fever, chills, cp, sob, n/v, HA unless stated above.       enoxaparin  1 mg/kg Subcutaneous BID    multivitamin  1 tablet Oral Daily    levothyroxine  112 mcg Oral Daily    predniSONE  5 mg Oral BID WC    sodium chloride flush  5-40 mL Intravenous 2 times per day    insulin lispro  0-6 Units Subcutaneous TID WC    insulin lispro  0-3 Units Subcutaneous Nightly    traMADol  50 mg Oral TID     melatonin, 3 mg, Nightly PRN  traMADol, 100 mg, Q6H PRN  sodium chloride flush, 5-40 mL, PRN  sodium chloride, 25 mL, PRN  promethazine, 12.5 mg, Q6H PRN   Or  ondansetron, 4 mg, Q6H PRN  polyethylene glycol, 17 g, Daily PRN  acetaminophen, 650 mg, Q6H PRN   Or  acetaminophen, 650 mg, Q6H PRN  glucose, 15 g, PRN  dextrose, 12.5 g, PRN  glucagon (rDNA), 1 mg, PRN  dextrose, 100 mL/hr, PRN         Objective:    BP (!) 98/59   Pulse 149   Temp 97.9 °F (36.6 °C) (Oral)   Resp 20   Wt 136 lb 4 oz (61.8 kg)   SpO2 95%   BMI 23.76 kg/m²   General Appearance: alert and oriented to person, place and time and in no acute distress- pale   Skin: warm and dry  Head: normocephalic and atraumatic  Neck: neck supple and non tender without mass   Pulmonary/Chest: clear to auscultation bilaterally-  Cardiovascular: normal rate, normal S1 and S2 and no carotid bruits  Abdomen: soft, non-tender, non-distended, normal bowel sounds, no masses or organomegaly  Extremities: no cyanosis, no clubbing and left leg edema 2+  Neurologic: speech normal     Recent Labs     05/18/21  1224 05/19/21  0347   * 133   K 4.1 4.6   CL 88* 92*   CO2 31* 27   BUN 12 13   CREATININE 0.9 1.1*   GLUCOSE 74 201*   CALCIUM 10.8* 10.4*       Recent Labs     05/18/21  1224 05/18/21  1633 05/19/21  0347   WBC 12.5* 11.0 13.8*   RBC 3.95 3.62 3.84   HGB 7.8* 7.3* 7.5*   HCT 27.6* 25.2* 26.6*   MCV 69.9* 69.6* 69.3*   MCH 19.7* 20.2* 19.5*   MCHC 28.3* 29.0* 28.2*   RDW 19.5* 19.4* 19.6*    189 224   MPV 8.5 8.3 8.6           Assessment:    Active Problems:    Multiple pulmonary emboli (HCC)    IVC thrombosis (HCC)    Iliac vein thrombosis, bilateral (HCC)    Acute deep vein thrombosis of proximal leg, left (HCC)    Acute deep vein thrombosis (DVT) of femoral vein of left lower extremity (HCC)  Resolved Problems:    * No resolved hospital problems. *      Plan:  1. Acute PE:  Pt presented to the ER with c/o low back pain, abdominal pain, bilateral leg pain left greater than right. CTA noted to have progressive metastatic malignancy with small filling defects in the distal subsegmental and peripheral branches of the pulmonary arteries bilaterally 3 concerning for subsegmental PE. Pt has a h/o right leg DVT on eliquis. She had a hospitalization Dec 2020 and placed on eliquis. Denies sob. No hypoxia noted. Reporting cough at baseline. Consult vascular appreciate input. Recommending transition to lovenox. Stop heparin gtt. Per vascular if she does not like injections could consider transitioning to eliquis 5mg po BID after a few weeks. IVC filter deferred. Consider IVF filter as last resort. Could consider IVC palliative if she does not tolerate/ like injections. 2. Acute DVT left leg: consult vascular. Transition heparin gtt to lovenox.      3. Tachycardia:monitor.  149 noted this am. Current  on monitor.      4. Metastatic breast cancer/ endometrial cancer:has had radiation/ chemo in the past.She had progression of disease with chemo and did not tolerate. She was old by Hem/onc nothing further to offer. She was on hospice at one point, but improved and came off. Receptive to talking to palliative care/ hospice.      5. DM: hga1c 7.5. reporting appetite has been poor. Hold po meds. Insulin ss and monitor blood sugars.      6. Thyroid disease: synthroid     7. Arthritis: reporting follows with rheumatology     8. Microcytic anemia: hg 7.3 on admission. likely chronic anemia. However downtrend noted. Can check occult stool. Reporting she has never had PRBCs. She was told by PCP that she would need transfusion soon. Likely will need transfusion soon.     9. Hypercalcemia: likely from malignancy: monitor           Social work issues: reporting she is no longer able to care for herself. She wants to move in with her daughters in [place]. She is also interested in talking with hospice/ palliative care.            Code Status: limited code  DVT prophylaxis: heparin gtt          NOTE: This report was transcribed using voice recognition software. Every effort was made to ensure accuracy; however, inadvertent computerized transcription errors may be present.   Electronically signed by EDITH Diop on 5/19/2021 at 12:35 PM

## 2021-05-19 NOTE — CARE COORDINATION
Social Work/Discharge Planning:  Social Work and Hospice consult noted. Met with patient and completed initial assessment. Explained Social Work role and discussed transition of care/discharge planning. COVID negative 5/18. Patient lives alone in a two story house. She states she plans to stay with her daughter Winston Wolff in [place], [place]. She would like to meet with [facility] since she has a history with them. Called Brooklynn Wisdom with [facility] and confirmed they do not service [place]. Notified patient and provided her with hospice choice list.  Patient is agreeable to referral to [facility]. Referral made to Zen Valero with [facility] (ph: [phone], fax: [phone]) and she requested information to be faxed for review. Zen Valero states they will need to confirm if they accept patient insurance and will need to know discharge date. Provided Zen Valero with patient daughter Laura's contact information. Called patient daughter Winston Wolff (ph: [phone]) and left a message to confirm her home address. Will continue to follow and assist with discharge planning.   Electronically signed by ISIDORO Haque on 5/19/2021 at 11:58 AM

## 2021-05-19 NOTE — CONSULTS
Chief Complaint: Patient seen for evaluation of acute DVT left leg and left leg swelling      HPI: This patient, unfortunately has terminal noncurable metastatic CA breast, and response to chemotherapy, in fact chemotherapy has been stopped several months ago, patient currently contemplating switching to hospice again, but in [place], would like to go home and live with her daughter Casey Nelson in [place]    Patient was hospitalized, with low back pain and abdominal discomfort and bilateral leg swelling left more than the right, venous ultrasound studies confirm that patient has persistent DVT of right leg, acute DVT of left leg, new finding compared to last ultrasound study, also the CT scan abdomen pelvis revealed large mass in the lower abdomen and pelvis, encasing the main vena cava and the aorta, completely compressing the vena cava with a vena cava thrombosis and bilateral iliac vein thrombosis in the CT scan suggestive of small PE, and patient was on Eliquis 2.5 minutes p.o. twice daily, changed to IV heparin at the present time    Vascular service is consulted for further evaluation    When I came to see patient, patient was resting comfortably with some pain discomfort in the lower abdomen and right leg due to past injury    Patient denies any active bleeding, does look pale, probably anemia multifactorial including bone marrow involvement, cancer, iron deficiency      Patient denies any focal lateralizing neurological symptoms like loss of speech, vision or loss of function of extremity    Patient can walk short distances only, and denies any symptoms of rest pain    Allergies   Allergen Reactions    Omeprazole Hives and Itching    Percocet [Oxycodone-Acetaminophen] Other (See Comments)     Prolong use, GI issues    Januvia [Sitagliptin]     Asa [Aspirin] Hives    Erythromycin Rash    Famotidine Nausea Only, Anxiety and Palpitations    Keflet [Cephalexin] Rash    Levaquin [Levofloxacin In D5w] Rash    Pcn [Penicillins] Rash    Polyethylene Glycol Other (See Comments)     Slowed peristalsis    Protonix [Pantoprazole Sodium] Other (See Comments)     Other reaction(s):  Other: See Comments  DEPRESSION    Tetracycline Rash    Tetracyclines & Related Rash       Current Facility-Administered Medications   Medication Dose Route Frequency Provider Last Rate Last Admin    heparin (porcine) injection 4,820 Units  80 Units/kg Intravenous PRN Wendall Mettle, DO   4,820 Units at 05/19/21 0445    heparin (porcine) injection 2,410 Units  40 Units/kg Intravenous PRN Wendall Mettle, DO   2,410 Units at 05/19/21 1121    heparin 25,000 units in dextrose 5% 250 mL (premix) infusion  5-30 Units/kg/hr Intravenous Continuous Prieto De Santiago, DO 16.9 mL/hr at 05/19/21 1124 28 Units/kg/hr at 05/19/21 1124    melatonin tablet 3 mg  3 mg Oral Nightly PRN Reginaldo [place], APN        multivitamin 1 tablet  1 tablet Oral Daily Reginaldo [place], APN   1 tablet at 05/19/21 0948    levothyroxine (SYNTHROID) tablet 112 mcg  112 mcg Oral Daily Reginaldo [place], APN   112 mcg at 05/19/21 0616    traMADol (ULTRAM) tablet 100 mg  100 mg Oral Q6H PRN Reginaldo [place], APN   100 mg at 05/19/21 0313    predniSONE (DELTASONE) tablet 5 mg  5 mg Oral BID WC Reginaldo [place], APN   5 mg at 05/19/21 0948    sodium chloride flush 0.9 % injection 5-40 mL  5-40 mL Intravenous 2 times per day Reginaldo [place], APN        sodium chloride flush 0.9 % injection 5-40 mL  5-40 mL Intravenous PRN Reginaldo [place], APN        0.9 % sodium chloride infusion  25 mL Intravenous PRN Reginaldo [place], APN        promethazine (PHENERGAN) tablet 12.5 mg  12.5 mg Oral Q6H PRN Reginaldo [place], APN        Or    ondansetron (ZOFRAN) injection 4 mg  4 mg Intravenous Q6H PRN Reginaldo [place], APN        polyethylene glycol (GLYCOLAX) packet 17 g  17 g Oral Daily PRN Reginaldo [place], APN        acetaminophen (TYLENOL) tablet 650 mg  650 mg Oral Q6H PRN Aloma Cogdell, APN        Or    acetaminophen (TYLENOL) suppository 650 mg  650 mg Rectal Q6H PRN Aloma Cogdell, APN        glucose (GLUTOSE) 40 % oral gel 15 g  15 g Oral PRN Aloma Ocean, APN        dextrose 50 % IV solution  12.5 g Intravenous PRN Aloma Ocean, APN        glucagon (rDNA) injection 1 mg  1 mg Intramuscular PRN Aloma Ocean, APN        dextrose 5 % solution  100 mL/hr Intravenous PRN Aloma Ocean, APN        insulin lispro (HUMALOG) injection vial 0-6 Units  0-6 Units Subcutaneous TID WC Aloma Cogdell, APN   3 Units at 21 1126    insulin lispro (HUMALOG) injection vial 0-3 Units  0-3 Units Subcutaneous Nightly Aloma Cogdell, APN   1 Units at 21    traMADol (ULTRAM) tablet 50 mg  50 mg Oral TID Easton Richardson MD   50 mg at 21 0948       Past Medical History:   Diagnosis Date    Cancer [facility])     endometrial cancer, breast-     Diabetes [facility] [address]     Diverticulitis     GERD (gastroesophageal reflux disease)     Gout     Hiatal hernia     Leg swelling 2020    Macular degeneration     Metastatic disease ([facility] [address]     Osteoarthritis     Osteopenia        Past Surgical History:   Procedure Laterality Date    BREAST LUMPECTOMY Left 2016    left breast sentinelnode dissection, blue dye injection    BREAST SURGERY Left 10/2016    cancer     SECTION      x 3    CHG UNLISTED DX RADIOGRAPHIC PROCEDURE N/A 2018    BRONCHOSCOPY ELECTROMAGNETIC performed by Bakari Kinney MD at [facility] area    ENDOSCOPY, COLON, DIAGNOSTIC      EYE SURGERY Bilateral 2016    cataracts    HYSTERECTOMY  2016    total with BSO    HYSTERECTOMY, TOTAL ABDOMINAL  2016    PORT SURGERY N/A 2020    MEDIPORT REMOVAL performed by Trish Rowley MD at [facility] [address] BRUSHING/PROTECTED BRUSHINGS  2018 BRONCHOSCOPY BRUSHINGS performed by Annette Bay MD at [address] [place] [address] [facility]  2018    BRONCHOSCOPY ALVEOLAR LAVAGE performed by Annette Bay MD at [address] [place] [address] [address] EBUS DX/TX INTERVENTION PERPH LES N/A 2018    BRONCHOSCOPY ULTRASOUND performed by Annette Bay MD at [address] [place] [address] W/TRANSBRONCHIAL LUNG BX 1 LOBE  2018    BRONCHOSCOPY/TRANSBRONCHIAL NEEDLE BIOPSY performed by Annette Bay MD at [address] [place] [address] W/TRANSBRONCHIAL LUNG BX EACH LOBE  2018    BRONCHOSCOPY/TRANSBRONCHIAL NEEDLE BIOPSY ADDL LOBE performed by Annette Bay MD at [address] TUNNELED CTR VAD W/SUBQ PORT AGE 5 YR/> N/A 2018    MEDIPORT INSERTION performed by Felipe Pemberton MD at [place] [address]         Family History   Problem Relation Age of Onset    High Blood Pressure Mother     Heart Disease Mother     Diabetes Mother     Arthritis Father         rheumatoid    Heart Disease Father     Cancer Maternal Aunt         ovarian carcinoma       Social History     Socioeconomic History    Marital status:      Spouse name: Not on file    Number of children: Not on file    Years of education: Not on file    Highest education level: Not on file   Occupational History    Not on file   Tobacco Use    Smoking status: Former Smoker     Packs/day: 0.25     Years: 10.00     Pack years: 2.50     Types: Cigarettes     Start date: 1968     Quit date: 1979     Years since quittin.0    Smokeless tobacco: Never Used   Vaping Use    Vaping Use: Never used   Substance and Sexual Activity    Alcohol use: Yes     Comment: rarely    Drug use: Not Currently     Types: Marijuana     Comment: medical in the past    Sexual activity: Never     Partners: Male   Other Topics Concern    Not on file   Social History Narrative    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 Social Determinants of Health     Financial Resource Strain:     Difficulty of Paying Living Expenses:    Food Insecurity:     Worried About Running Out of Food in the Last Year:     [address] in the Last Year:    Transportation Needs:     Lack of Transportation (Medical):  Lack of Transportation (Non-Medical):    Physical Activity:     Days of Exercise per Week:     Minutes of Exercise per Session:    Stress:     Feeling of Stress :    Social Connections:     Frequency of Communication with Friends and Family:     Frequency of Social Gatherings with Friends and Family:     Attends Shinto Services:     Active Member of Clubs or Organizations:     Attends Club or Organization Meetings:     Marital Status:    Intimate Partner Violence:     Fear of Current or Ex-Partner:     Emotionally Abused:     Physically Abused:     Sexually Abused:        Review of Systems:  Skin:  No abnormal pigmentation or rash. Eyes:  No blurring, diplopia or vision loss. Ears/Nose/Throat:  No hearing loss or vertigo. Respiratory:  No cough, pleuritic chest pain, dyspnea, or wheezing. Cardiovascular: No angina, palpitations . Gastrointestinal:  No nausea, history of. GERD, and some lower abdominal and pelvic discomfort    Musculoskeletal:  No arthritis or weakness. Low back discomfort and discomfort of the lateral asp right leg with numbness in the thigh    Neurologic:  No paralysis, paresis, seizures or headaches. Hematologic/Lymphatic/Immunologic:  No anemia, abnormal bleeding/bruising. Patient has metastatic carcinoma of the breast, diffuse, noncurable, lack of response to chemotherapy has a large mass in the pelvis and abdomen, encasing the very inferior vena cava and aorta with past history of DVT of right leg, acute DVT of the left leg    Endocrine:  No heat or cold intolerance. No polyphagia, polydipsia or polyuria.   Diabetes mellitus and hypothyroidism      Physical Exam:  BP (!) 98/59   Pulse 149   Temp 97.9 °F (36.6 °C) (Oral)   Resp 20   Wt 136 lb 4 oz (61.8 kg)   SpO2 95%   BMI 23.76 kg/m²   General appearance:  Alert, awake, oriented x 3. No distress. Skin:  Warm and dry. Head:  Normocephalic. No masses, lesions or tenderness. Eyes:  Conjunctivae appear normal; PERRL. Ears:  External ears normal.  Nose/Sinuses:  Septum midline, mucosa normal; no drainage. Oropharynx:  Clear, no exudate noted. Neck:  No jugular venous distention, lymphadenopathy or thyromegaly. No evidence of carotid bruit      Lungs:  Clear to ausculation bilaterally. No rhonchi, crackles, wheezes. Heart:  Regular rate and rhythm. No rub or murmur. .    Abdomen:  Soft, non-tender. No masses, organomegaly. Musculoskeletal: No joint effusions, tenderness swelling or warmth. Neuro: Speech is intact. Moving all extremities. No focal motor or sensory deficits. Extremities:  Both feet are warm to touch. The color of both feet is normal.    Patient is swelling of both legs, especially left leg extending the thigh to the foot, due to occlusion of the inferior vena cava bilateral iliac veins, DVT left leg more than the right leg, no evidence of compartment syndrome      Pulses Right  Left    Brachial 3 3    Radial    3=normal   Femoral 2 2  2=diminished   Popliteal    1=barely palpable   Dorsalis pedis 1 1  0=absent   Posterior tibial    4=aneurysmal           Other pertinent information:1. The past medical records were reviewed.     2.    Lab Results   Component Value Date    WBC 13.8 (H) 05/19/2021    HGB 7.5 (L) 05/19/2021    HCT 26.6 (L) 05/19/2021    MCV 69.3 (L) 05/19/2021     05/19/2021      Lab Results   Component Value Date     05/19/2021    K 4.6 05/19/2021    CL 92 (L) 05/19/2021    CO2 27 05/19/2021    BUN 13 05/19/2021    CREATININE 1.1 (H) 05/19/2021    GLUCOSE 201 (H) 05/19/2021    CALCIUM 10.4 (H) 05/19/2021    PROT 7.0 05/18/2021    LABALBU 3.1 (L) 05/18/2021 ultrasound acute associates persistent chronic DVT of right leg, with CT scan evidence of mass in abdomen pelvis completely encasing the inferior vena cava with inferior vena cava, bilateral iliac vein thrombosis    2.   Terminal nonresponsive metastatic breast carcinoma        Plan:         I had a long and detailed discussion the patient, options, risks benefits and alternatives were explained to the patient    Informed her the status of inferior vena cava, bilateral iliac vein thrombosis, extensive DVT of the legs    Informed her, what she needs is better anticoagulation, full dose, therapeutic dose rather than Eliquis 2.5 mg p.o. twice daily with understanding, because of her anemia, any further bleeding and anticoagulation could cause significant issues    Currently patient has no clinical evidence of bleeding, it is felt anemia is multifactorial including metastatic carcinoma, and iron deficiency etc.    Informed her that I have reviewed the hematologist Dr. Melvin Nieto notes, recommend transition to Lovenox which is a better anticoagulating patients with have metastatic carcinoma    In case patient does not like the Lovenox injections, after a few weeks, once the left leg swelling improves, could transition to Eliquis 5 mg p.o. twice daily    Placement of  inferior vena cava filter was deferred at present time, would consider that only as a last resort, the patient's active bleeding,  and cannot tolerate anticoagulation    However it is felt, that should be last resort, as patient is currently moving to [place] to be on hospice with her daughter    All her questions were answered    I have also discussed with the nursing staff    I have also discussed with the hospitalist Dr. Kinsey Lund    Thank you for letting us participate in the care of your patient    Vascular service will sign of the case and see the patient as needed          Electronically signed by Renetta Hill MD on 5/19/2021 at 12:03

## 2021-05-19 NOTE — CARE COORDINATION
Social Work/Discharge Planning:  Dianelys Ramirez with [facility] discussed hospice with patient daughter Melida Sahni. She states Terra to follow up with her mother regarding hospice and will contact [facility] if patient agreeable to hospice with agency. Dianelys Ramirez states they can follow up with patient and her daughter at home and no need to hold up discharge. Will continue to follow.   Electronically signed by ISIDORO Burns on 5/19/2021 at 2:15 PM

## 2021-05-19 NOTE — PROGRESS NOTES
Physical Therapy    Facility/Department: [address] INTERMEDIATE 1  Initial Assessment    NAME: Hill Moses  : 1945  MRN: [id number]    Date of Service: 2021       REQUIRES PT FOLLOW UP: Yes       Patient Diagnosis(es): The primary encounter diagnosis was Multiple pulmonary emboli ([address] Diagnoses of Tachycardia, Acute deep vein thrombosis (DVT) of femoral vein of left lower extremity ([address] Metastatic malignant neoplasm, unspecified site [facility]), Acute bilateral low back pain with bilateral sciatica, Pathological compression fracture of vertebra, initial encounter ([address] and Hydronephrosis, unspecified hydronephrosis type were also pertinent to this visit. has a past medical history of Acute deep vein thrombosis of proximal leg, left ([address] Cancer ([address] Diabetes mellitus ([address] Diverticulitis, GERD (gastroesophageal reflux disease), Gout, Hiatal hernia, Iliac vein thrombosis, bilateral ([address] IVC thrombosis ([address] Leg swelling, Macular degeneration, Metastatic disease ([address] Osteoarthritis, and Osteopenia. has a past surgical history that includes  section; Tonsillectomy; cyst removal; Hysterectomy (2016); Breast surgery (Left, 10/2016); eye surgery (Bilateral, ); Colonoscopy; Endoscopy, colon, diagnostic; Breast lumpectomy (Left, 2016); chg unlisted dx radiographic procedure (N/A, 2018); pr brnschsc tndsc ebus dx/tx intervention perph les (N/A, 2018); pr brncc w/brncl alveolar lavage (2018); pr brncc brushing/protected brushings (2018); pr bronchoscopy w/transbronchial lung bx 1 lobe (2018); pr bronchoscopy w/transbronchial lung bx each lobe (2018); pr insj tunneled ctr vad w/subq port age 12 yr/> (N/A, 2018); Hysterectomy, total abdominal (2016); and Port Surgery (N/A, 2020).     Evaluating Therapist: Shi Lux PT     Referring Provider:   Dr. Hermilo Rahman #:  [id number]   DIAGNOSIS:  Multiple pulm emboli  Additional Pertinent History:metastatic breast CA   PRECAUTIONS:  Falls     Social:  Pt lives alone , but plans to stay with her daughter  in a  1  floor plan. Steps to enter, pt unsure of number   Prior to admission pt walked with cane . Has ww      Initial Evaluation  Date:  5/19/2021  Treatment      Short Term/ Long Term   Goals   Was pt agreeable to Eval/treatment? yes      Does pt have pain? L LE , pt has been medicated      Bed Mobility  Rolling:  NT   Supine to sit:  Min assist   Sit to supine:  NT   Scooting: min assist    SBA    Transfers Sit to stand:  Min assist   Stand to sit: min assist   Stand pivot:  NT    SBA    Ambulation     10  feet with  ww  with  CGA    feet with  ww  with  SBA        Stair negotiation: ascended and descended NT   4  steps with  1  rail with  SBA /CGA    LE ROM  R LE WFL, decreased throughout L LE      LE strength  R LE 4/ 5  L LE 2- to 3-/ 5   L LE 3/ 4    AM- PAC RAW score  15/ 24            Pt is alert and Oriented x  3      Balance:  CGA, no LOB with short distance gait     Edema: L LE   Endurance: limited by L LE pain   Bed/Chair alarm: Yes      ASSESSMENT  Pt displays functional ability as noted in the objective portion of this evaluation. Treatment/Education:    Mobility as above. Needs assist with L LE movement due to edema and pain. Limited mobility due to pain. Cues for hand and foot placement with transfers, ww safety     Pt educated on fall risk, safe and proper technique with mobility        Patient response to education:   Pt verbalized understanding Pt demonstrated skill Pt requires further education in this area   x  with cues   x       Comments:  Pt left  In chair with LEs elevated after session, with call light in reach. Rehab potential is Good for reaching above PT goals. Pts/ family goals   1. Decreased pain     Patient and or family understand(s) diagnosis, prognosis, and plan of care.  -  Yes     PLAN  PT care will be provided in accordance with the objectives noted above. Whenever appropriate, clear delegation orders will be provided for nursing staff. Exercises and functional mobility practice will be used as well as appropriate assistive devices or modalities to obtain goals. Patient and family education will also be administered as needed. PLAN OF CARE:    Current Treatment Recommendations     [x] Strengthening     [x] ROM   [x] Balance Training   [x] Endurance Training    Transfer Training   [x][x] Gait Training   [x] Stair Training   [x] Positioning   [x] Safety and Education Training   [x] Patient/Caregiver Education   [] HEP  [] Other       Frequency of treatments will be 2-5x/week x  7 -14 days. Time in: 1032   Time out: 1044      Evaluation Time includes thorough review of current medical information, gathering information on past medical history/social history and prior level of function, completion of standardized testing/informal observation of tasks, assessment of data and education on plan of care and goals.     CPT codes:  [x] Low Complexity PT evaluation 91238  [] Moderate Complexity PT evaluation 87143  [] High Complexity PT evaluation 75831  [] PT Re-evaluation 26000  [] Gait training 41544  minutes  [] Therapeutic activities 57702  minutes  [] Therapeutic exercises 86389  minutes  [] Neuromuscular reeducation 18416  minutes       [address] number:  PT 7583

## 2021-05-19 NOTE — PROGRESS NOTES
Benjie Romberg 1945    SUBJECTIVE:    Visiting with daughter at bedside  Denies cp, SOB or abdominal pain. Reports pain controlled currently     OBJECTIVE  Physical Exam:  VITALS:  BP (!) 100/55   Pulse 101   Temp 98.1 °F (36.7 °C) (Oral)   Resp 18   Wt 136 lb 4 oz (61.8 kg)   SpO2 95%   BMI 23.76 kg/m²   General: Alert and oriented in no acute distress, sitting up eating dinner   Respiratory: Clear to auscultation, on RA. No stridor, wheeze or rhonchi   Cardiac:slightly tachycardic, normal rhythm. Pulses intact. Edema to bilateral lower extremities left worse then right. Abdomen: soft, tender to palpation, active bowel sounds. Extremities: Edema to bilateral lower extremities, greater on the left, pulse intact. Neuro: No focal deficits   Skin: NO rash or lesion.      DIAGNOSTIC DATA  Labs:    Lab Results   Component Value Date    WBC 13.8 (H) 05/19/2021    HGB 7.5 (L) 05/19/2021    HCT 26.6 (L) 05/19/2021    MCV 69.3 (L) 05/19/2021     05/19/2021     No results found for: CEA  Recent Labs     05/18/21  1224 05/19/21  0347   * 133   CO2 31* 27   BUN 12 13   CREATININE 0.9 1.1*     Lab Results   Component Value Date    FERRITIN 898 05/19/2021     Lab Results   Component Value Date    ALT 22 05/18/2021    AST 24 05/18/2021    ALKPHOS 176 (H) 05/18/2021    BILITOT 0.6 05/18/2021     Lab Results   Component Value Date    LABALBU 3.1 (L) 05/18/2021         Current Facility-Administered Medications   Medication Dose Route Frequency Provider Last Rate Last Admin    enoxaparin (LOVENOX) injection 60 mg  1 mg/kg Subcutaneous BID EDITH Orlando        ferric gluconate (FERRLECIT) 25 mg in sodium chloride 0.9 % 50 mL (test dose) IVPB  25 mg Intravenous Once KAYLA Grove CNP        Followed by    ferric gluconate (FERRLECIT) 100 mg in sodium chloride 0.9 % 100 mL IVPB  100 mg Intravenous Once KAYLA Sewell CNP        Followed by    Derl Peabody ON 5/20/2021] ferric gluconate (FERRLECIT) 125 mg in sodium chloride 0.9 % 100 mL IVPB  125 mg Intravenous Once Markel Baires, APRN - CNP        melatonin tablet 3 mg  3 mg Oral Nightly PRN Media Amanda, APN        multivitamin 1 tablet  1 tablet Oral Daily Media Amanda, APN   1 tablet at 05/19/21 0948    levothyroxine (SYNTHROID) tablet 112 mcg  112 mcg Oral Daily Media Amanda, APN   112 mcg at 05/19/21 0616    traMADol (ULTRAM) tablet 100 mg  100 mg Oral Q6H PRN Media Amanda, APN   100 mg at 05/19/21 0313    predniSONE (DELTASONE) tablet 5 mg  5 mg Oral BID WC Media Amanda, APN   5 mg at 05/19/21 0948    sodium chloride flush 0.9 % injection 5-40 mL  5-40 mL Intravenous 2 times per day Media Amanda, APN        sodium chloride flush 0.9 % injection 5-40 mL  5-40 mL Intravenous PRN Media Amanda, APN   10 mL at 05/19/21 1442    0.9 % sodium chloride infusion  25 mL Intravenous PRN Media Amanda, APN        promethazine (PHENERGAN) tablet 12.5 mg  12.5 mg Oral Q6H PRN Media Amanda, APN        Or    ondansetron (ZOFRAN) injection 4 mg  4 mg Intravenous Q6H PRN Media Amanda, APN        polyethylene glycol (GLYCOLAX) packet 17 g  17 g Oral Daily PRN Media Amanda, APN        acetaminophen (TYLENOL) tablet 650 mg  650 mg Oral Q6H PRN Media Amanda, APN        Or    acetaminophen (TYLENOL) suppository 650 mg  650 mg Rectal Q6H PRN Media Amanda, APN        glucose (GLUTOSE) 40 % oral gel 15 g  15 g Oral PRN Media Amanda, APN        dextrose 50 % IV solution  12.5 g Intravenous PRN Media Amanda, APN        glucagon (rDNA) injection 1 mg  1 mg Intramuscular PRN Media Amanda, APN        dextrose 5 % solution  100 mL/hr Intravenous PRN Media Amanda, APN        insulin lispro (HUMALOG) injection vial 0-6 Units  0-6 Units Subcutaneous TID  Media Amanda, APN   3 Units at 05/19/21 1126    insulin lispro (HUMALOG) injection vial 0-3 Units  0-3 Units HEMATOLOGY/MEDICAL ONCOLOGY  [facility]  [facility] [address]  Dept: [phone]

## 2021-05-19 NOTE — CARE COORDINATION
Spoke to Breonna Sainz at [facility] regarding eliquis and or lovenox. Breonna Sainz stated that they can not accept patient on eliquis or lovenox due to high cost and patient being a fall risk due to anticipated ativan and morphine usage that hospice will be administering. Breonna Sainz stated that they would advise patient to be placed on Aspirin. Notified by SW that patient wants to continue to lovenox treatment. Spoke to Cait at [facility]- she confirmed that they will not approve Lovenonx or eliquis while under their care, noting that their medical director will not approve those medications. Sofie Terrazas NP aware that Hospice will not accept on Lovenox or eliquis.

## 2021-05-19 NOTE — CONSULTS
Palliative medicine    Chart reviewed  Discussed with bedside RN/case management notes reviewed  Discharge plan established  Patient will be electing hospice services and going to her daughter's home. Patient has scheduled ultram and PRN with 1 additional dose daily. Due to established plan, palliative medicine will not complete the consult. If there are additional palliative medicine needs that arise, please reconsult our service.     Thank you   Malka Martines PA-C

## 2021-05-19 NOTE — PROGRESS NOTES
Occupational Therapy  OCCUPATIONAL THERAPY INITIAL EVALUATION      Date:2021  Patient Name: Lidia Somers  MRN: [id number]  : 1945  Room: [address] [place] [address] Daily Activity Raw Score:     Referring Provider: EDITH Villagomez  Specific Provider Orders: eval and treat    Diagnosis: multiple pulmonary emboli. Pt presents to ED from home with low back pain, abdominal pain and B LE pain L worse than R. Pertinent Medical History: DM, OA, macular degeneration, osteopenia     Precautions:  fall risk    Assessment of current deficits   [x] Functional mobility  [x]ADLs  [x] Strength               [x]Cognition   [x] Functional transfers   [x] IADLs         [] Safety Awareness   [x]Endurance   [] Fine Coordination              [x] Balance      [] Vision/perception   [x]Sensation    []Gross Motor Coordination  [] ROM  [] Delirium                   [] Motor Control     OT PLAN OF CARE   Frequency/Duration 2-5 days/wk for 10-14 days PRN   Specific OT Treatment to include:    Instruction/training on adapted ADL techniques and AE recommendations to increase functional independence within precautions  Training on energy conservation strategies/techniques to improve independence/tolerance for self-care routine  Functional transfer/mobility training/DME recommendations for increased independence, safety, and fall prevention  Patient/Family education to increase follow through with safety techniques and functional independence  Therapeutic exercise to improve motor endurance, ROM, and functional strength for ADLs/functional transfers  Therapeutic activities to facilitate/challenge dynamic balance, stand tolerance, fine motor dexterity/in-hand manipulation for increased independence with ADLs  Positioning to improve functional independence    Evaluating OT: Goran Adler OTR/L [id number]    Recommended Adaptive Equipment: TBD     Home Living: Pt lives alone, but reports she will be discharging to her Rehab Potential: Good for established goals     Patient/Family Goal: To get home. Patient and/or family were instructed on functional diagnosis, prognosis/goals and OT plan of care. Pt verbalized understanding. Upon arrival, patient supine in bed. At end of session, patient seated in reclining chair with call light and phone within reach, all lines and tubes intact. Pt would benefit from continued skilled OT to increase safety and independence with completion of ADL/IADL tasks for functional independence and quality of life.  Bed/chair alarm: ON    Low Evaluation [id number]  Time In: 1030   Time Out: 1043      Evaluation time includes thorough review of current medical information, gathering information on past medical history/social history and prior level of function, completion of standardized testing/informal observation of tasks, assessment of data, and development of POC/Goals    Bryan Nevarez OTR/L  [id number]

## 2021-05-19 NOTE — PLAN OF CARE
Problem: Falls - Risk of:  Goal: Will remain free from falls  Description: Will remain free from falls  5/19/2021 0005 by Norbert Chairez RN  Outcome: Met This Shift     Problem: Falls - Risk of:  Goal: Absence of physical injury  Description: Absence of physical injury  5/19/2021 0005 by Norbert Chairez RN  Outcome: Met This Shift     Problem: Falls - Risk of:  Goal: Will remain free from falls  Description: Will remain free from falls  5/19/2021 0005 by Norbert Chairez RN  Outcome: Met This Shift     Problem: Falls - Risk of:  Goal: Absence of physical injury  Description: Absence of physical injury  5/19/2021 0005 by Norbert Chairez RN  Outcome: Met This Shift     Problem: Pain:  Goal: Pain level will decrease  Description: Pain level will decrease  5/19/2021 0005 by Norbert Chairez RN  Outcome: Met This Shift     Problem: Pain:  Goal: Control of acute pain  Description: Control of acute pain  5/19/2021 0005 by Norbert Chairez RN  Outcome: Met This Shift     Problem: Pain:  Goal: Control of chronic pain  Description: Control of chronic pain  5/19/2021 0005 by Norbert Chairez RN  Outcome: Met This Shift     Problem: FLUID AND ELECTROLYTE IMBALANCE  Goal: Fluid and electrolyte balance are achieved/maintained  5/19/2021 0005 by Norbert Chairez RN  Outcome: Met This Shift     Problem: HH FLUID RETENTION-CHF  Goal: Absence of fluid overload signs and symptoms  5/19/2021 0005 by Norbert Chairez RN  Outcome: Met This Shift     Problem: HEMODYNAMIC STATUS  Goal: Hemoglobin within specified parameters  5/19/2021 0005 by Norbert Chairez RN  Outcome: Met This Shift     Problem: Gas Exchange - Impaired  Goal: Able to breathe comfortably  Description: Able to breathe comfortably  5/19/2021 0005 by Norbert Chairez RN  Outcome: Met This Shift

## 2021-05-20 VITALS
DIASTOLIC BLOOD PRESSURE: 60 MMHG | BODY MASS INDEX: 23.37 KG/M2 | HEIGHT: 64 IN | SYSTOLIC BLOOD PRESSURE: 122 MMHG | TEMPERATURE: 98.6 F | WEIGHT: 136.91 LBS | RESPIRATION RATE: 20 BRPM | OXYGEN SATURATION: 94 % | HEART RATE: 98 BPM

## 2021-05-20 LAB
ANION GAP SERPL CALCULATED.3IONS-SCNC: 11 MMOL/L (ref 7–16)
BUN BLDV-MCNC: 16 MG/DL (ref 6–23)
CALCIUM SERPL-MCNC: 10.3 MG/DL (ref 8.6–10.2)
CHLORIDE BLD-SCNC: 89 MMOL/L (ref 98–107)
CO2: 29 MMOL/L (ref 22–29)
CREAT SERPL-MCNC: 1.1 MG/DL (ref 0.5–1)
GFR AFRICAN AMERICAN: 59
GFR NON-AFRICAN AMERICAN: 48 ML/MIN/1.73
GLUCOSE BLD-MCNC: 177 MG/DL (ref 74–99)
HCT VFR BLD CALC: 26.2 % (ref 34–48)
HEMOGLOBIN: 7.2 G/DL (ref 11.5–15.5)
MCH RBC QN AUTO: 20 PG (ref 26–35)
MCHC RBC AUTO-ENTMCNC: 27.5 % (ref 32–34.5)
MCV RBC AUTO: 72.8 FL (ref 80–99.9)
METER GLUCOSE: 182 MG/DL (ref 74–99)
METER GLUCOSE: 200 MG/DL (ref 74–99)
METER GLUCOSE: 200 MG/DL (ref 74–99)
PDW BLD-RTO: 19.5 FL (ref 11.5–15)
PLATELET # BLD: 287 E9/L (ref 130–450)
PMV BLD AUTO: 9.1 FL (ref 7–12)
POTASSIUM SERPL-SCNC: 4.7 MMOL/L (ref 3.5–5)
RBC # BLD: 3.6 E12/L (ref 3.5–5.5)
SODIUM BLD-SCNC: 129 MMOL/L (ref 132–146)
WBC # BLD: 9.8 E9/L (ref 4.5–11.5)

## 2021-05-20 PROCEDURE — 2580000003 HC RX 258: Performed by: NURSE PRACTITIONER

## 2021-05-20 PROCEDURE — 99239 HOSP IP/OBS DSCHRG MGMT >30: CPT | Performed by: INTERNAL MEDICINE

## 2021-05-20 PROCEDURE — 96372 THER/PROPH/DIAG INJ SC/IM: CPT

## 2021-05-20 PROCEDURE — 6360000002 HC RX W HCPCS: Performed by: NURSE PRACTITIONER

## 2021-05-20 PROCEDURE — 85027 COMPLETE CBC AUTOMATED: CPT

## 2021-05-20 PROCEDURE — 82962 GLUCOSE BLOOD TEST: CPT

## 2021-05-20 PROCEDURE — 6370000000 HC RX 637 (ALT 250 FOR IP): Performed by: NURSE PRACTITIONER

## 2021-05-20 PROCEDURE — 80048 BASIC METABOLIC PNL TOTAL CA: CPT

## 2021-05-20 PROCEDURE — 6370000000 HC RX 637 (ALT 250 FOR IP): Performed by: INTERNAL MEDICINE

## 2021-05-20 PROCEDURE — G0378 HOSPITAL OBSERVATION PER HR: HCPCS

## 2021-05-20 PROCEDURE — 36415 COLL VENOUS BLD VENIPUNCTURE: CPT

## 2021-05-20 RX ORDER — SENNA PLUS 8.6 MG/1
1 TABLET ORAL ONCE
Status: COMPLETED | OUTPATIENT
Start: 2021-05-20 | End: 2021-05-20

## 2021-05-20 RX ADMIN — THERA TABS 1 TABLET: TAB at 08:08

## 2021-05-20 RX ADMIN — INSULIN LISPRO 2 UNITS: 100 INJECTION, SOLUTION INTRAVENOUS; SUBCUTANEOUS at 17:53

## 2021-05-20 RX ADMIN — TRAMADOL HYDROCHLORIDE 100 MG: 50 TABLET ORAL at 17:53

## 2021-05-20 RX ADMIN — LEVOTHYROXINE SODIUM 112 MCG: 112 TABLET ORAL at 06:19

## 2021-05-20 RX ADMIN — TRAMADOL HYDROCHLORIDE 50 MG: 50 TABLET ORAL at 08:08

## 2021-05-20 RX ADMIN — PREDNISONE 5 MG: 5 TABLET ORAL at 17:53

## 2021-05-20 RX ADMIN — INSULIN LISPRO 1 UNITS: 100 INJECTION, SOLUTION INTRAVENOUS; SUBCUTANEOUS at 06:26

## 2021-05-20 RX ADMIN — PREDNISONE 5 MG: 5 TABLET ORAL at 08:08

## 2021-05-20 RX ADMIN — SODIUM CHLORIDE, PRESERVATIVE FREE 10 ML: 5 INJECTION INTRAVENOUS at 08:12

## 2021-05-20 RX ADMIN — TRAMADOL HYDROCHLORIDE 100 MG: 50 TABLET ORAL at 04:04

## 2021-05-20 RX ADMIN — TRAMADOL HYDROCHLORIDE 50 MG: 50 TABLET ORAL at 14:05

## 2021-05-20 RX ADMIN — INSULIN LISPRO 2 UNITS: 100 INJECTION, SOLUTION INTRAVENOUS; SUBCUTANEOUS at 11:18

## 2021-05-20 RX ADMIN — STANDARDIZED SENNA CONCENTRATE 8.6 MG: 8.6 TABLET ORAL at 11:16

## 2021-05-20 RX ADMIN — ENOXAPARIN SODIUM 60 MG: 60 INJECTION SUBCUTANEOUS at 08:04

## 2021-05-20 ASSESSMENT — PAIN SCALES - GENERAL
PAINLEVEL_OUTOF10: 8
PAINLEVEL_OUTOF10: 7

## 2021-05-20 ASSESSMENT — PAIN DESCRIPTION - PAIN TYPE: TYPE: CHRONIC PAIN

## 2021-05-20 ASSESSMENT — PAIN DESCRIPTION - LOCATION: LOCATION: BACK;HIP

## 2021-05-20 NOTE — CARE COORDINATION
Social Work/Discharge Planning:  Called Jeanne Sandy with [address] (ph: [phone]) and confirmed Lovenox will need prior authorization. Jeanne Sandy states they will obtain prior authorization within a day and [facility] will contact patient once received. Notified patient. Discussed AMPA indicating need for rehab. Patient is aware she is weak but is not interested in snf. She is agreeable to home health care and will use [facility]. Referral made to [facility] with [facility] (ph: [phone], fax: [phone]) and faxed information for review. Patient will need a home health care order. Will continue to follow.   Electronically signed by ISIDORO Arroyo on 5/20/2021 at 10:45 AM

## 2021-05-20 NOTE — CARE COORDINATION
Social Work/Discharge Planning:  Received call from Oliva Eisenberg with [facility] called stating they can not accept patient. Notified patient and discussed other hhc options. Patient is agreeable to referral to [facility]. Patient states she did not receive a call yet from [address] [place] [address] regarding cost of Lovenox. Referral made to Umm with [facility] and she will review patient information. Provided update to RN. Will continue to follow. Electronically signed by ISIDORO Flaherty on 5/20/2021 at 1:54 PM    Addendum:  Umm with [facility] states they can accept patient but will not be able to start services until [place] May 23rd. Umm states if okay with Physician, then need to add to hhc order. Notified charge nurse. Called Elena with [address] [place] [address] Road and confirmed they are still waiting for auth regarding Lovenox. Will continue to follow. Electronically signed by ISIDORO Flaherty on 5/20/2021 at 2:24 PM    Addendum:  Faxed updated hhc order indicating okay to start hhc services on Sunday.   Electronically signed by ISIDORO Flaherty on 5/20/2021 at 2:47 PM

## 2021-05-20 NOTE — PROGRESS NOTES
Comprehensive Nutrition Assessment    Type and Reason for Visit:  Initial, Positive Nutrition Screen    Nutrition Recommendations/Plan: Continue current diet as tolerated. Pt refuses ONS, but plans to consume Boost High Protein ONS at home. Nutrition Assessment:  Pt admitted for lower back, abd, and B/L right leg pain. Recent Dx of Metastatic Cancer. Pt stated decreased PO intake of >25% at home d/t inability to cook for herself. Resulting in sig wt loss of 13% over 6m. Malnutrition Assessment:  Malnutrition Status:  Severe malnutrition    Context:  Chronic Illness     Findings of the 6 clinical characteristics of malnutrition:  Energy Intake:  7 - 75% or less estimated energy requirements for 1 month or longer  Weight Loss:  7 - Greater than 10% over 6 months     Body Fat Loss:  1 - Mild body fat loss Orbital   Muscle Mass Loss:  7 - Severe muscle mass loss Temples (temporalis), Hand (interosseous)  Fluid Accumulation:  No significant fluid accumulation Extremities (Multiple Factors)   Strength:  Not Performed    Estimated Daily Nutrient Needs:  Energy (kcal):  0284-7772 kcal/day; Weight Used for Energy Requirements:  Current     Protein (g):  84-97 g/day; Weight Used for Protein Requirements:  Current (1.3-1.5 g/kg CBW)        Fluid (ml/day):  4918-6393 mL/day; Method Used for Fluid Requirements:  1 ml/kcal      Nutrition Related Findings:  A&O x4, +2 Non-Pitting Edema, +BS, Soft Abd, I/Os -0.9L, Nausea, Constipation      Wounds:  None       Current Nutrition Therapies:    DIET GENERAL; Anthropometric Measures:  · Height: 5' 3.5\" (161.3 cm) (Per PCP EMR)  · Current Body Weight: 136 lb (61.7 kg) (5/20 Bed Scale)   · Admission Body Weight: 136 lb 4 oz (61.8 kg) (5/19 Bed Scale)    · Usual Body Weight: 157 lb (71.2 kg) (157# 6 mo ago.  5/20 Stated 186# 2 years ago)     · Ideal Body Weight: 118 lbs; % Ideal Body Weight 115.3 %   · BMI: 23.7  · Adjusted Body Weight:  ; No Adjustment   · Adjusted BMI:

## 2021-05-20 NOTE — DISCHARGE SUMMARY
[facility] EMERGENCY SERVICE Physician Discharge Summary       Brady Chen, [address] [address]  [address] [address] [phone]                Hematology - Oncology           Vascular surgery      Activity level: As tolerated    Diet: DIET GENERAL;    Dispo:Home with Ernesto Arteaga    Condition on Discharge - stable     Patient ID:  Meliza Hathaway  [id number]  77 y.o.  1945    Admit date: 5/18/2021    Discharge date and time:  5/20/2021  2:21 PM    Admission Diagnoses: Active Problems:    Multiple pulmonary emboli (HCC)    IVC thrombosis (HCC)    Iliac vein thrombosis, bilateral (HCC)    Acute deep vein thrombosis of proximal leg, left (HCC)    Acute deep vein thrombosis (DVT) of femoral vein of left lower extremity ([address]  Resolved Problems:    * No resolved hospital problems. *      Discharge Diagnoses: Active Problems:    Multiple pulmonary emboli (HCC)    IVC thrombosis (HCC)    Iliac vein thrombosis, bilateral (HCC)    Acute deep vein thrombosis of proximal leg, left (HCC)    Acute deep vein thrombosis (DVT) of femoral vein of left lower extremity ([address]  Resolved Problems:    * No resolved hospital problems. *      Consults:  IP CONSULT TO HEM/ONC  IP CONSULT TO PALLIATIVE CARE  IP CONSULT TO HOSPICE  IP CONSULT TO SOCIAL WORK  IP CONSULT TO VASCULAR SURGERY  IP CONSULT TO [address] Course:   She is a 23-year-old female with past medical history of endometrial cancer, breast cancer, has received radiation therapy in the past, stopped chemotherapy as she could not tolerate and was not having response, diabetes, GERD, gout and degenerative arthritis came to ER with low back pain, abdominal pain and bilateral leg pain left greater than right. She had history of recent  DVT and was on Eliquis 2.5 mg twice daily-she said she might have missed  1 dose .   She was on hospice care in the past however improved so she was taken off. She is planning to move to  [place] to live with her daughter. She was admitted with new left-sided DVT with PE while on Eliquis. CTA on admission with progressive metastatic malignancy with small filling defects in distal subsegmental and peripheral branches of pulmonary arteries bilaterally 3 concerning of subsegmental PE, ultrasound suggestive of acute left lower extremity DVT. She was started on IV heparin and vascular surgery and heme-onc were consulted. IV heparin was switched to subcu Lovenox as per heme-onc recommendation. As per vascular consider IVC filter is possible but not recommended & would consider  as a last resort. She was interested in  getting  admitted under hospice. But hospice would not cover Eliquis or Lovenox. For this reason now she is going to go home with her daughter and home health care. Lovenox on discharge is arranged as per . She was evaluated by heme-onc for metastatic breast cancer and endometrial cancer and nothing further to offer. Other comorbid conditions were managed by continuing home medications. Her daughter is going to arrange PCP, heme-onc, vascular surgeon on [place] side.        Discharge Exam:  Vitals:    05/20/21 0525 05/20/21 0615 05/20/21 0746 05/20/21 0943   BP:  112/60 (!) 98/48    Pulse:  98 104    Resp:  16 20    Temp:  98.6 °F (37 °C) 98.3 °F (36.8 °C)    TempSrc:  Oral Oral    SpO2:  95% 95%    Weight: 136 lb 14.5 oz (62.1 kg)  136 lb 14.5 oz (62.1 kg)    Height:   5' 3.5\" (1.613 m) 5' 3.5\" (1.613 m)       General Appearance: alert and oriented to person, place and time, in no acute distress  Skin: warm and dry, no rash or erythema  Head: normocephalic and atraumatic  Neck: supple and non-tender without mass  Pulmonary/Chest: clear to auscultation bilaterally  Cardiovascular: normal rate, regular rhythm, normal S1 and S2  Abdomen: soft, non-tender, non-distended, normal bowel sounds, no masses or organomegaly  Extremities: no cyanosis, clubbing , + 2 bipedal edema  Musculoskeletal: normal range of motion  Neurologic: no cranial nerve deficit, speech normal        LABS:  Recent Labs     05/18/21  1224 05/19/21  0347 05/20/21  0257   * 133 129*   K 4.1 4.6 4.7   CL 88* 92* 89*   CO2 31* 27 29   BUN 12 13 16   CREATININE 0.9 1.1* 1.1*   GLUCOSE 74 201* 177*   CALCIUM 10.8* 10.4* 10.3*       Recent Labs     05/18/21  1633 05/19/21  0347 05/20/21  0257   WBC 11.0 13.8* 9.8   RBC 3.62 3.84 3.60   HGB 7.3* 7.5* 7.2*   HCT 25.2* 26.6* 26.2*   MCV 69.6* 69.3* 72.8*   MCH 20.2* 19.5* 20.0*   MCHC 29.0* 28.2* 27.5*   RDW 19.4* 19.6* 19.5*    224 287   MPV 8.3 8.6 9.1       No results for input(s): POCGLU in the last 72 hours. Imaging:   CTA PULMONARY W CONTRAST   Final Result   Small filling defects in the distal subsegmental and peripheral branches of   pulmonary arteries bilaterally, 3 concerning for subsegmental pulmonary   embolism. There is no large central pulmonary embolism. Extensive solid and cavitary masses in the lungs bilaterally with the   mediastinal and hilar adenopathy consistent with the widespread metastatic   malignancy without significant change. CT ABDOMEN AND PELVIS. The previously noted small hypodense lesion in the left hepatic lobe is   noted. There is focal hypoattenuation surrounding the falciform ligament   likely fatty infiltration. Gallbladder is distended with small amount of   stones. The previously noted splenic infarct is noted. ring like   calcifications are identified in the splenic hilum concerning for splenic   artery aneurysm. The previously noted large hiatal hernia is present. Pancreas, the adrenals and the left kidney are normal.  There is severe   hydronephrosis and hydroureter on the right side.   Retroperitoneal lymph   nodes are identified some of which are necrotic with largest 1 in the   aortocaval region measuring 4 x 4.2 cm and smaller 1 measuring up to 2 cm in   the left para-aortic region without change. A large necrotic mass near the   aortic bifurcation with the erosion and destruction of L5 is noted which   currently measures 7.1 x 9.6 cm which is involving the right ureter causing   hydronephrosis in the right kidney this mass is increased. Pelvis. The bladder is normal.  There is scattered diverticulosis of the   colon without diverticulitis. Appendix cannot be identified. Impression      Progressive metastatic malignancy with a large mass in the aortic bifurcation   extending to the right hemipelvis with destruction of L5 with additional   masses in the retroperitoneum concerning for metastatic malignancy. The mass   involves the right distal ureter causing severe hydronephrosis in the right   kidney. There may be developing hepatic metastasis. Splenic infarct. CT ABDOMEN PELVIS W IV CONTRAST Additional Contrast? None   Final Result   Small filling defects in the distal subsegmental and peripheral branches of   pulmonary arteries bilaterally, 3 concerning for subsegmental pulmonary   embolism. There is no large central pulmonary embolism. Extensive solid and cavitary masses in the lungs bilaterally with the   mediastinal and hilar adenopathy consistent with the widespread metastatic   malignancy without significant change. CT ABDOMEN AND PELVIS. The previously noted small hypodense lesion in the left hepatic lobe is   noted. There is focal hypoattenuation surrounding the falciform ligament   likely fatty infiltration. Gallbladder is distended with small amount of   stones. The previously noted splenic infarct is noted. ring like   calcifications are identified in the splenic hilum concerning for splenic   artery aneurysm. The previously noted large hiatal hernia is present.    Pancreas, the adrenals and the left kidney are normal.  There is severe hydronephrosis and hydroureter on the right side. Retroperitoneal lymph   nodes are identified some of which are necrotic with largest 1 in the   aortocaval region measuring 4 x 4.2 cm and smaller 1 measuring up to 2 cm in   the left para-aortic region without change. A large necrotic mass near the   aortic bifurcation with the erosion and destruction of L5 is noted which   currently measures 7.1 x 9.6 cm which is involving the right ureter causing   hydronephrosis in the right kidney this mass is increased. Pelvis. The bladder is normal.  There is scattered diverticulosis of the   colon without diverticulitis. Appendix cannot be identified. Impression      Progressive metastatic malignancy with a large mass in the aortic bifurcation   extending to the right hemipelvis with destruction of L5 with additional   masses in the retroperitoneum concerning for metastatic malignancy. The mass   involves the right distal ureter causing severe hydronephrosis in the right   kidney. There may be developing hepatic metastasis. Splenic infarct. CT LUMBAR SPINE WO CONTRAST   Final Result   1. Redemonstration of right paraspinal mass measuring up to 10 cm. Mass has   increased in size compared to prior CT from April 25, 2021 concerning for   neoplasm. 2.  Lytic lesion associated with right aspect of L5 vertebral body appearing   similar compared to prior with pathologic fracture of L5.      3.  Right hydronephrosis with right hydroureter. US DUP LOWER EXTREMITY LEFT ODELL   Final Result   Extensive long segment occlusive deep vein thrombosis extending from left   iliac vein distally into left calf veins. Deep vein thrombosis has increased   throughout left lower extremity compared to prior from December 7, 2020.              Patient Instructions:      Medication List      START taking these medications    enoxaparin 60 MG/0.6ML injection  Commonly known as: LOVENOX  Inject 0.6 mLs into present.

## 2021-05-21 ENCOUNTER — CARE COORDINATION (OUTPATIENT)
Dept: CASE MANAGEMENT | Age: 76
End: 2021-05-21

## 2021-05-21 NOTE — CARE COORDINATION
[facility] Transitions Initial Follow Up Call    Call within 2 business days of discharge: Yes    Patient: Hill Moses Patient : 1945   MRN: [id number]  Reason for Admission: multiple PE, left leg DVT   Discharge Date: 21 RARS: Readmission Risk Score: 22      Last Discharge [facility]       Complaint Diagnosis Description Type Department Provider    21 Hip Pain; Leg Pain Multiple pulmonary emboli ([address] . .. ED to Hosp-Admission (Discharged) (ADMITTED) [facility] 4S Elenora Schwab, MD; Danish Zarate. .. Facility: [facility]      First attempt to reach the patient for Care Transition call post hospital discharge. Message left with CTN's contact information requesting return phone call.       Follow Up  Future Appointments   Date Time Provider Stephanie Lee   6/3/2021  1:30 PM DO YULIANA Good [facility]       Savage Alvarez RN

## 2021-05-24 ENCOUNTER — TELEPHONE (OUTPATIENT)
Dept: FAMILY MEDICINE CLINIC | Age: 76
End: 2021-05-24

## 2021-05-24 ENCOUNTER — CARE COORDINATION (OUTPATIENT)
Dept: CASE MANAGEMENT | Age: 76
End: 2021-05-24

## 2021-05-24 DIAGNOSIS — I82.4Y2 ACUTE DEEP VEIN THROMBOSIS OF PROXIMAL LEG, LEFT (HCC): Primary | ICD-10-CM

## 2021-05-24 PROCEDURE — 1111F DSCHRG MED/CURRENT MED MERGE: CPT | Performed by: FAMILY MEDICINE

## 2021-05-24 NOTE — TELEPHONE ENCOUNTER
Daughter Reyes Lor called to say that pt was in the hospital last week and wanted to know if you'd like to see her sooner than her scheduled appointment with you on 6/3/21?

## 2021-05-27 ENCOUNTER — CARE COORDINATION (OUTPATIENT)
Dept: CASE MANAGEMENT | Age: 76
End: 2021-05-27

## 2021-05-27 NOTE — CARE COORDINATION
[facility] Transitions Follow Up Call    2021    Patient: Feroz Dale  Patient : 1945   MRN: <[id number]>  Reason for Admission: Multiple pulmonary emboli   Discharge Date: 21 RARS: Readmission Risk Score: 22         Spoke with: Diane who states she is having a lot of back pain today. PT is scheduled today. Leg swelling is down. Denies SOB, fever, chills, does have dry cough. Patient is eating and drinking well, normal bowel and bladder elimination with some constipation. Using Senna. [place]abella [place] for ambulation. Denies concerns at this time. Care Transitions Follow Up Call    Needs to be reviewed by the provider   Additional needs identified to be addressed with provider No  none             Method of communication with provider : none      Care Transition Nurse (CTN) contacted the patient by telephone to follow up after admission on 21. Verified name and  with patient as identifiers. Addressed changes since last contact: patient haivng back pain today ambulation is difficult PT i scheduled to come today may help  Discussed follow-up appointments. If no appointment was previously scheduled, appointment scheduling offered: Yes   Is follow up appointment scheduled within 7 days of discharge? No    Advance Care Planning:   Does patient have an Advance Directive:  reviewed and current. CTN reviewed discharge instructions, medical action plan and red flags with patient and discussed any barriers to care and/or understanding of plan of care after discharge. Discussed appropriate site of care based on symptoms and resources available to patient including: PCP, Specialist, Home health and When to call 911. The patient agrees to contact the PCP office for questions related to their healthcare.      Patients top risk factors for readmission: functional physical ability  Interventions to address risk factors: Obtained and reviewed discharge summary and/or continuity of care documents      Non-[facility] follow up appointment(s): na    CTN provided contact information for future needs. Plan for follow-up call in 7-10 days based on severity of symptoms and risk factors. Plan for next call: symptom management-back pain improvement LLE            Care Transitions Subsequent and Final Call    Subsequent and Final Calls  Do you have any ongoing symptoms?: Yes  Onset of Patient-reported symptoms: Today  Patient-reported symptoms: Pain, Cough, Weakness  Interventions for patient-reported symptoms: Notified Home Care  Have your medications changed?: No  Do you have any questions related to your medications?: No  Do you currently have any active services?: Yes  Are you currently active with any services?: Home Health  Do you have any needs or concerns that I can assist you with?: No  Identified Barriers: Lack of Education  Care Transitions Interventions  Other Interventions:            Follow Up  Future Appointments   Date Time Provider Stephanie Lee   6/3/2021 10:00 AM Chiara Joshi MD MED ONC [facility]   6/3/2021 10:00 AM SEMART MED ONC FAST TRACK 1 SEYZ Med Onc [facility]   6/3/2021  1:30 PM [address] [place] [address],  [address]       Shayy Benitez LPN

## 2021-06-02 DIAGNOSIS — C78.00 MALIGNANT NEOPLASM METASTATIC TO LUNG, UNSPECIFIED LATERALITY (HCC): Primary | ICD-10-CM

## 2021-06-03 ENCOUNTER — HOSPITAL ENCOUNTER (OUTPATIENT)
Dept: INFUSION THERAPY | Age: 76
Discharge: HOME | End: 2021-06-03
Payer: MEDICARE

## 2021-06-03 ENCOUNTER — OFFICE VISIT (OUTPATIENT)
Dept: ONCOLOGY | Age: 76
End: 2021-06-03
Payer: MEDICARE

## 2021-06-03 VITALS
RESPIRATION RATE: 14 BRPM | WEIGHT: 106 LBS | HEIGHT: 62 IN | SYSTOLIC BLOOD PRESSURE: 129 MMHG | OXYGEN SATURATION: 97 % | TEMPERATURE: 97 F | HEART RATE: 103 BPM | BODY MASS INDEX: 19.51 KG/M2 | DIASTOLIC BLOOD PRESSURE: 66 MMHG

## 2021-06-03 DIAGNOSIS — C78.00 MALIGNANT NEOPLASM METASTATIC TO LUNG, UNSPECIFIED LATERALITY (HCC): ICD-10-CM

## 2021-06-03 DIAGNOSIS — C78.00 MALIGNANT NEOPLASM METASTATIC TO LUNG, UNSPECIFIED LATERALITY (HCC): Primary | ICD-10-CM

## 2021-06-03 PROCEDURE — 99212 OFFICE O/P EST SF 10 MIN: CPT

## 2021-06-03 PROCEDURE — 99214 OFFICE O/P EST MOD 30 MIN: CPT | Performed by: INTERNAL MEDICINE

## 2021-06-03 NOTE — PROGRESS NOTES
Spoke with Chip Garcia, hospice care from [facility] regarding patients wishes to proceed with Hospice. She was provided with information and required clinical information was faxed over. She is contacting patient's daughter today to set up time to meet.

## 2021-06-03 NOTE — PROGRESS NOTES
Medical Oncology Progress Note    Reason for Visit: Endometrial cancer    PCP:  Grabiel Harris DO    History of Present Illness:  77 y.o. 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  -Adjuvant radiation therapy completed March 28, 2017.  -Endocrine therapy: Arimidex started March 30, 2017. She did not tolerate due to severe depression;  -Arimidex discontinued.   -Started on Femara after approximately 2 weeks, but did not tolerate Femara.  Chose observation. Bilateral screening mammogram on 10/24/2019 negative for malignancy  Clinically, there is no evidence of recurrence.      IA papillary serous endometrial adenocarcinoma, FIGO grade 3, - LVSI, tumor size 4.5 cm;    BRCA/Myrisk testing Negative  7/20/16-Exam under anesthesia, diagnostic laparoscopy, total laparoscopic hysterectomy, bilateral salpingo-oophorectomy, pelvic and periaortic lymphadenectomy, omentectomy.     HDR VAGINAL BRACHYTHERAPY-9/29/16, 10/6/16, 10/13/16     08/19/2017 PET/CT scan: Hypermetabolic bilateral lung nodules suspicious for metastasis.   Hypermetabolic nodule in the right lower quadrant anterior to the psoas muscle suspicious for metastasis.     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     She completed 2 cycles of chemotherapy with Taxol Carboplatin and Avastin on 10/17 and 11/17.  She had poor tolerance to chemotherapy, therefore she declined additional chemotherapy and opted for Natural remedies instead      on 06/12/2018: 15 (<39UmL)       Re-staging scans on 06/15/2018:  CT of the abdomen and pelvis: 2.2 x 1.3 cm soft tissue nodule along the superior aspect of the urinary bladder suspicious for metastases, decreased in size since 1/23/18; no evidence of new abnormalities since prior visit; diverticulosis without evidence of diverticulitis. CT Chest:  Multiple bilateral lung nodules suspicious for metastases, increased in size and number since 1/23/2018.     Bronchoscopy/EBUS was performed on 08/17/2018  Respiratory Gram Stain/Cx: Negative for organisms. Aspergillus Galactomannan Antigen Negative  BAL RUL Negative for malignant cells. Bronchial smears shake RUL (predominance of blood); Negative for malignant cells. FNA Martínez TBNA RML (predominance of blood) Negative for malignant cells. FNA Martínez TBNA RUL (predominance of blood)  Negative for malignant cells. EBUS FNA R10 (scant lymph node sampling) Negative for malignant cells. EBUS FNA Station 7: Negative for malignant cells.     Case discussed with Dr. Kellee Roberson at [facility]. She continued to decline systemic chemotherapy (Taxol/Carbo +/- Herceptin). She did not want hormonal therapy as she did not tolerate this well with her breast cancer. Patient declined palliative care/Hospice care and wanted to proceed with immunotherapy Talib Clemons). Side effects of Keytruda reviewed with patient. She agreed to proceed. Cycle # 1 Eugune Baston was on 08/29/2018. Cycle # 2 Keytruda was on 09/19/2018. Cycle # 3 Keytruda was on 10/10/2018. CT chest 10/23/2018 noted Significant improvement in the previously noted multiple bilateral pulmonary nodules concerning for  improving pulmonary metastasis. CT abdomen/pelvis 10/23/2018 stable examination. Continue Keytruda and repeat scans in 3 months. Cycle # 4 Keytruda was on 10/31/2018. Cycle # 5 Keytruda was on 11/21/2018. Cycle # 6 Keytruda was on 12/19/2018. Cycle # 7 Keytruda was on 01/09/2019. Cycle # 8 Keytruda was on 01/30/2019. CT chest 02/13/2019 stable LLL nodule measuring 5 mm. Stable RUL nodule measuring 2 mm. No evidence of mediastinal, hilar or axillary LN.   CT Abdomen/pelvis 02/13/2019 stable exam.  Continue Keytruda and repeat scans in 3 months. Cycle # 9 Keytruda was on 02/28/2019. Cycle # 10 Keytruda was on 03/20/2019. Cycle # 11 [facility] was on 04/11/2019. Cycle # 12 [facility] was on 05/02/2019. CT chest 05/17/2019 noted no metastatic disease. CT abdomen/pelvis 05/17/2019 stable exam.  Continue Keytruda and repeat scans in 3 months. Cycle # 13 Keytruda was on 05/23/2019. Cycle # 14 Keytruda was on 06/13/2019. Cycle # 15 Keytruda was on 07/11/2019. Cycle # 16 [facility] was on 08/01/2019. CT chest 08/18/2019 stable to slightly more prominent LLL nodule 4.2 mm  CT abdomen/pelvis 08/18/2019 enhancing right psoas mass increased partially compressing the distal right ureter causing hydronephrosis in the right kidney. Urology team on board. Cycle # 17 Keytruda was on 08/22/2019. MRI right hip 09/03/2019:Trochanteric bursitis. Heterogenous joint effusion may be infected or hemorrhagic. Increased T2 signal superior acetabulum is most likely degenerative such as subchondral cyst formation. Osteoarthritis. No evidence for avascular necrosis or transient osteoporosis. Patient received injection to right hip of 4cc lidocaine/1 cc kenalog on 09/24/2019 with orthopedics. Evaluated by Dr. Karmen Eisenmenger at [facility] on 09/19/2019 who recommended to continue [facility] and referred her to Dr. Joseph Kwon for consideration of SBRT to treat the psoas lesion. Recommended revisiting ureteral catheter with urology after assessment with Rad Onc   Cycle # 18 [facility] was on 09/26/2019. Cycle # 19 [facility] was on 10/24/2019. Appointment with Dr. Joseph Kwon on 10/28/2019 who recommended SBRT to the oligoprogressive lesion adjacent to/investing the right psoas muscle with the goal of allowing her to continue her systemic therapy with Keytruda. Cycle # 20 [facility] was on 11/14/2019.     SBRT was started on 11/21/2019 and completed 12/12/2019.  The R Psoas metastasis PTV received a total dose of 3750 cGy in 6 fractions at 625 cGy/fraction prescribed to Max Dose at 78.0% IDL using 10 MV photons with VMAT Coplanar technique.      Due to weakness, significant fatigue poor appetite and severe arthralgias in bilateral knees. She declined Keytruda at that time and wanted to proceed with Hospice care. CTA chest which showed innumerable bilateral pulmonary parenchymal nodules some of which are or cavitary. Mediastinal and bilateral hiatal lymphadenopathy. No pulmonary embolism or dissection was noted. Overall disease progression    Recent admission for DVT  She is planning to move to [place] to live with her daughter. She was admitted with new left-sided DVT with PE while on Eliquis. CTA on admission with progressive metastatic malignancy with small filling defects in distal subsegmental and peripheral branches of pulmonary arteries bilaterally 3 concerning of subsegmental PE, ultrasound suggestive of acute left lower extremity DVT. She was started on IV heparin and vascular surgery consulted. IV heparin was switched to subcu Lovenox.   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    Leg swelling 2020    Macular degeneration     Metastatic disease ([address]     Osteoarthritis     Osteopenia        Past Surgical History:      Procedure Laterality Date    BREAST LUMPECTOMY Left 2016    left breast sentinelnode dissection, blue dye injection    BREAST SURGERY Left 10/2016    cancer     SECTION      x 3    CHG UNLISTED DX RADIOGRAPHIC PROCEDURE N/A 2018    BRONCHOSCOPY ELECTROMAGNETIC performed by Barbara [facility]abon [facility] at [facility]    ENDOSCOPY, COLON, DIAGNOSTIC      EYE SURGERY Bilateral 2016    cataracts    HYSTERECTOMY  07/2016    total with BSO    HYSTERECTOMY, TOTAL ABDOMINAL  07/2016    PORT SURGERY N/A 9/28/2020    MEDIPORT REMOVAL performed by Swapna Davis MD at [address] BRUSHING/PROTECTED BRUSHINGS  8/17/2018    BRONCHOSCOPY BRUSHINGS performed by Barbara Pabon MD at [address] [place] [address] [facility]  8/17/2018    BRONCHOSCOPY ALVEOLAR LAVAGE performed by Barbara Pabon MD at [address] [place] [address] [address] EBUS DX/TX INTERVENTION [address] LES N/A 8/17/2018    BRONCHOSCOPY ULTRASOUND performed by Barbara Pabon MD at [address] [facility] W/TRANSBRONCHIAL LUNG BX 1 LOBE  8/17/2018    BRONCHOSCOPY/TRANSBRONCHIAL NEEDLE BIOPSY performed by Barbara Pabon MD at [address] [facility] W/TRANSBRONCHIAL LUNG BX EACH LOBE  8/17/2018    BRONCHOSCOPY/TRANSBRONCHIAL NEEDLE BIOPSY ADDL LOBE performed by Barbara Pabon MD at [address] TUNNELED CTR VAD W/SUBQ PORT AGE 5 YR/> N/A 12/4/2018    MEDIPORT INSERTION performed by Swapna Davis MD at [address] TONSILLECTOMY       Family History:  Family History   Problem Relation Age of Onset    High Blood Pressure Mother     Heart Disease Mother     Diabetes Mother     Arthritis Father         rheumatoid    Heart Disease Father     Cancer Maternal Aunt         ovarian carcinoma     Medications:  Reviewed and reconciled.     Social History:  Social History     Socioeconomic History    Marital status:      Spouse name: Not on file    Number of children: Not on file    Years of education: Not on file    Highest education level: Not on file   Occupational History    Not on file   Tobacco Use    Smoking status: Former Smoker     Packs/day: 0.25     Years: 10.00     Pack years: 2.50 Types: Cigarettes     Start date: 1968     Quit date: 1979     Years since quittin.0    Smokeless tobacco: Never Used   Vaping Use    Vaping Use: Never used   Substance and Sexual Activity    Alcohol use: Yes     Comment: rarely    Drug use: Not Currently     Types: Marijuana     Comment: medical in the past    Sexual activity: Never     Partners: Male   Other Topics Concern    Not on file   Social History Narrative    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 Social Determinants of Health     Financial Resource Strain:     Difficulty of Paying Living Expenses:    Food Insecurity:     Worried About Running Out of Food in the Last Year:     [address] in the Last Year:    Transportation Needs:     Lack of Transportation (Medical):  Lack of Transportation (Non-Medical):    Physical Activity:     Days of Exercise per Week:     Minutes of Exercise per Session:    Stress:     Feeling of Stress :    Social Connections:     Frequency of Communication with Friends and Family:     Frequency of Social Gatherings with Friends and Family:     Attends Rastafarian Services:     Active Member of Clubs or Organizations:     Attends Club or Organization Meetings:     Marital Status:    Intimate Partner Violence:     Fear of Current or Ex-Partner:     Emotionally Abused:     Physically Abused:     Sexually Abused: Allergies: Allergies   Allergen Reactions    Omeprazole Hives and Itching    Percocet [Oxycodone-Acetaminophen] Other (See Comments)     Prolong use, GI issues    Januvia [Sitagliptin]     Asa [Aspirin] Hives    Erythromycin Rash    Famotidine Nausea Only, Anxiety and Palpitations    Keflet [Cephalexin] Rash    Levaquin [Levofloxacin In D5w] Rash    Pcn [Penicillins] Rash    Polyethylene Glycol Other (See Comments)     Slowed peristalsis    Protonix [Pantoprazole Sodium] Other (See Comments)     Other reaction(s):  Other: See Comments  DEPRESSION  Tetracycline Rash    Tetracyclines & Related Rash     Physical Exam:  /66   Pulse 103   Temp 97 °F (36.1 °C)   Resp 14   Ht 5' 2\" (1.575 m)   Wt 106 lb (48.1 kg)   SpO2 97%   BMI 19.39 kg/m²   GENERAL: Alert, oriented x 3, tired  HEENT: PERRLA; EOMI. Oropharynx clear. NECK: Supple. Without lymphadenopathy. LUNGS: Good air entry bilaterally. No wheezing, crackles or ronchi. CARDIOVASCULAR: Regular rate. No murmurs, rubs or gallops. ABDOMEN: Soft. Non-tender, non-distended. Positive bowel sounds. EXTREMITIES: Without clubbing, cyanosis, or edema. NEUROLOGIC: No focal deficits. ECOG PS 2    Diagnostics:  Lab Results   Component Value Date    WBC 9.8 05/20/2021    HGB 7.2 (L) 05/20/2021    HCT 26.2 (L) 05/20/2021    MCV 72.8 (L) 05/20/2021     05/20/2021     Lab Results   Component Value Date     (L) 05/20/2021    K 4.7 05/20/2021    CL 89 (L) 05/20/2021    CO2 29 05/20/2021    BUN 16 05/20/2021    CREATININE 1.1 (H) 05/20/2021    GLUCOSE 177 (H) 05/20/2021    CALCIUM 10.3 (H) 05/20/2021    PROT 7.0 05/18/2021    LABALBU 3.1 (L) 05/18/2021    BILITOT 0.6 05/18/2021    ALKPHOS 176 (H) 05/18/2021    AST 24 05/18/2021    ALT 22 05/18/2021    LABGLOM 48 05/20/2021    GFRAA 59 05/20/2021     Impression/Plan:  pT2 pN0  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  -Adjuvant radiation therapy completed March 28, 2017.  -Endocrine therapy: Arimidex started March 30, 2017. She did not tolerate due to severe depression;  -Arimidex discontinued.   -Started on Femara after approximately 2 weeks, but did not tolerate Femara.  Chose observation.   Bilateral screening mammogram on 10/24/2019 negative for malignancy     IA papillary serous endometrial adenocarcinoma, FIGO grade 3, - LVSI, tumor size 4.5 cm;    BRCA/Myrisk testing Negative  7/20/16-Exam under anesthesia, diagnostic laparoscopy, total laparoscopic hysterectomy, bilateral salpingo-oophorectomy, pelvic and periaortic lymphadenectomy, omentectomy.     HDR VAGINAL BRACHYTHERAPY-9/29/16, 10/6/16, 10/13/16     08/19/2017 PET/CT scan: Hypermetabolic bilateral lung nodules suspicious for metastasis. Hypermetabolic nodule in the right lower quadrant anterior to the psoas muscle suspicious for metastasis.     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     She completed 2 cycles of chemotherapy with Taxol Carboplatin and Avastin on 10/17 and 11/17.  She had poor tolerance to chemotherapy, therefore she declined additional chemotherapy and opted for Natural remedies instead      on 06/12/2018: 15 (<39UmL)       Re-staging scans on 06/15/2018:  CT of the abdomen and pelvis: 2.2 x 1.3 cm soft tissue nodule along the superior aspect of the urinary bladder suspicious for metastases, decreased in size since 1/23/18; no evidence of new abnormalities since prior visit; diverticulosis without evidence of diverticulitis. CT Chest:  Multiple bilateral lung nodules suspicious for metastases, increased in size and number since 1/23/2018.     Bronchoscopy/EBUS was performed on 08/17/2018  Respiratory Gram Stain/Cx: Negative for organisms. Aspergillus Galactomannan Antigen Negative  BAL RUL Negative for malignant cells. Bronchial smears shake RUL (predominance of blood); Negative for malignant cells. FNA Martínez TBNA RML (predominance of blood) Negative for malignant cells. FNA Martínez TBNA RUL (predominance of blood)  Negative for malignant cells. EBUS FNA R10 (scant lymph node sampling) Negative for malignant cells. EBUS FNA Station 7: Negative for malignant cells.     Case discussed with Dr. Ian Laura at [facility].   She continued to decline systemic chemotherapy (Taxol/Carbo +/- Herceptin). She did not want hormonal therapy as she did not tolerate this well with her breast cancer. Patient declined palliative care/Hospice care and wanted to proceed with immunotherapy Arabella Adames). Side effects of Keytruda reviewed with patient. She agreed to proceed. Cycle # 1 [facility] was on 08/29/2018. Cycle # 2 Keytruda was on 09/19/2018. Cycle # 3 Keytruda was on 10/10/2018. CT chest 10/23/2018 noted Significant improvement in the previously noted multiple bilateral pulmonary nodules concerning for  improving pulmonary metastasis. CT abdomen/pelvis 10/23/2018 stable examination. Continue Keytruda and repeat scans in 3 months. Cycle # 4 Keytruda was on 10/31/2018. Cycle # 5 Keytruda was on 11/21/2018. Cycle # 6 Keytruda was on 12/19/2018. Cycle # 7 Keytruda was on 01/09/2019. Cycle # 8 Keytruda was on 01/30/2019. CT chest 02/13/2019 stable LLL nodule measuring 5 mm. Stable RUL nodule measuring 2 mm. No evidence of mediastinal, hilar or axillary LN. CT Abdomen/pelvis 02/13/2019 stable exam.  Continue Keytruda and repeat scans in 3 months. Cycle # 9 Keytruda was on 02/28/2019. Cycle # 10 Keytruda was on 03/20/2019. Cycle # 11 [facility] was on 04/11/2019. Cycle # 12 [facility] was on 05/02/2019. CT chest 05/17/2019 noted no metastatic disease. CT abdomen/pelvis 05/17/2019 stable exam.  Continue Keytruda and repeat scans in 3 months. Cycle # 13 Keytruda was on 05/23/2019. Cycle # 14 Keytruda was on 06/13/2019. Cycle # 15 Keytruda was on 07/11/2019. Cycle # 16 [facility] was on 08/01/2019. CT chest 08/18/2019 stable to slightly more prominent LLL nodule 4.2 mm  CT abdomen/pelvis 08/18/2019 enhancing right psoas mass increased partially compressing the distal right ureter causing hydronephrosis in the right kidney. Urology team on board. Cycle # 17 Keytruda was on 08/22/2019. MRI right hip 09/03/2019:Trochanteric bursitis.   Heterogenous joint effusion daughter. She was admitted with new left-sided DVT with PE while on Eliquis. CTA on admission with progressive metastatic malignancy with small filling defects in distal subsegmental and peripheral branches of pulmonary arteries bilaterally 3 concerning of subsegmental PE, ultrasound suggestive of acute left lower extremity DVT. She was started on IV heparin and vascular surgery consulted. IV heparin was switched to subcu Lovenox.   She was interested in getting  admitted under hospice  F/U with Hospice team  RTC PRN    Georgianna Goodell, MD   7/1/2792  Board Certified Medical Oncologist

## 2021-06-04 ENCOUNTER — CARE COORDINATION (OUTPATIENT)
Dept: CASE MANAGEMENT | Age: 76
End: 2021-06-04

## 2021-06-04 NOTE — CARE COORDINATION
[facility] Transitions Follow Up Call    2021    Patient: Meliza Hathaway  Patient : 1945   MRN: <[id number]>  Reason for Admission: Multiple pulmonary emboli   Discharge Date: 21 RARS: Readmission Risk Score: 22         Spoke with:Diane she states she is not doing good,she has horrible back pain and leg swelling. She has stopped PT home services and moved to [place] to live with her daughter she states she is going on Hospice this week, patient cannot tell me name of hospice. Care Transitions Follow Up Call    Needs to be reviewed by the provider   Additional needs identified to be addressed with provider: No  none             Method of communication with provider : none      Care Transition Nurse (CTN) contacted the patient by telephone to follow up after admission . Verified name and  with patient as identifiers. Addressed changes since last contact: none  Discussed follow-up appointments. If no appointment was previously scheduled, appointment scheduling offered: Yes. Is follow up appointment scheduled within 7 days of discharge? Yes. Advance Care Planning:   Does patient have an Advance Directive:  reviewed and current. CTN reviewed discharge instructions, medical action plan and red flags with patient and discussed any barriers to care and/or understanding of plan of care after discharge. Discussed appropriate site of care based on symptoms and resources available to patient including: PCP, Home health and When to call 911. The patient agrees to contact the PCP office for questions related to their healthcare. Patients top risk factors for readmission: lack of knowledge about disease  Interventions to address risk factors: Obtained and reviewed discharge summary and/or continuity of care documents    Non-[facility] follow up appointment(s):     CTN provided contact information for future needs.  Plan for follow-up call in 7-10 days based on severity of symptoms and risk

## 2021-06-11 ENCOUNTER — CARE COORDINATION (OUTPATIENT)
Dept: CASE MANAGEMENT | Age: 76
End: 2021-06-11

## 2021-06-11 NOTE — CARE COORDINATION
[facility] Transitions Follow Up Call    2021    Patient: Mona Hill  Patient : 1945   MRN: [id number]  Reason for Admission: Multiple pulmonary emboli [facility])  Discharge Date: 21 RARS: Readmission Risk Score: 22         Spoke with: imer states  \"i'm pretty good\" pt states she has moved in with her daughter in the [facility]. She now has Hospice care from [facility]. Pt. States she is using oxgen as needed and her symptoms of pain are better managed now with Hospice. Final call. Care Transitions Follow Up Call    Needs to be reviewed by the provider   Additional needs identified to be addressed with provider: No  none             Method of communication with provider : none      Care Transition Nurse (CTN) contacted the patient by telephone to follow up after admission on 21  Verified name and  with patient as identifiers. Addressed changes since last contact: none  Discussed follow-up appointments. If no appointment was previously scheduled, appointment scheduling offered: No.   Is follow up appointment scheduled within 7 days of discharge? No.    Advance Care Planning:   Does patient have an Advance Directive:  reviewed and current. CTN reviewed discharge instructions, medical action plan and red flags with patient and discussed any barriers to care and/or understanding of plan of care after discharge. Discussed appropriate site of care based on symptoms and resources available to patient including: pt now in Hospice care. The n/a agrees to contact the PCP office for questions related to their healthcare. Patients top risk factors for readmission: functional physical ability and medical condition-lung CA  Interventions to address risk factors: Obtained and reviewed discharge summary and/or continuity of care documents    Non-[facility] follow up appointment(s): n/a    CTN provided contact information for future needs.  No further follow-up call identified based on severity of symptoms and risk factors. Plan for next call: pts now on Hospice care  Cullen Grimes LPN Care Transitions Nurse   [phone]        Care Transitions Subsequent and Final Call    Subsequent and Final Calls  Do you have any ongoing symptoms?: Yes  Patient-reported symptoms: Shortness of Breath, Pain  Have your medications changed?: No  Do you have any questions related to your medications?: No  Do you currently have any active services?: Yes  Are you currently active with any services?: Home Health  Do you have any needs or concerns that I can assist you with?: No  Identified Barriers: None  Care Transitions Interventions  No Identified Needs  Other Interventions: Follow Up  No future appointments.     LISA Escoto LPN Care Transitions Nurse   [phone]

## 2023-04-21 NOTE — PROGRESS NOTES
Have you had a sleep study done before? Yes [place]    Are you using PAP? no    Do you live in an assisted living facility, group home, or nursing home? If yes, which one?no  Are you independent with daily living activities? yes    Do you need assistance using the restroom? no    Do you have a history of bowel or urinary incontinence? If yes, do you need a bedside commode or bed pan? no    Do you walk with assisted devices, such as walker, cane, wheelchair or scooter? If yes, which one? Cane at times  Will bring in case needed     Will you need any assistance walking into the Sleep Lab? no    Have you had any recent falls?If yes, when and details?yes 4/19 tripped on dog toy    Are you able to move independently in and out of bed? yes    Are you able to sleep in a traditional bed? Adjustable bed     Are you on supplemental oxygen? no    Do you have or have a history of dementia, psychosis, Alzheimer's or schizophrenia?  If yes, which one?no    Do you need an ?  If yes, do you need in person or is video  is adequate?no    Do you have any medical conditions that we should be aware of?no      Is there any other information we should know that would help us provide you with the best care?claustophibic with mask    Bed Time : 930pm  Wake Time:   5am  Will bring has sleep aid      Any medical changes that would require you to need assistance with walking or daily actives that happen between now and your schedule appointment, please call and let us know.    Advise pt to avoid napping the day of sleep study appointment, also mention to avoid caffeine after the noon hour on the day of the sleep study.  Pt should bring something comfortable to sleep in.      Skin: Negative for rash and wound. Neurological: Negative for dizziness, syncope, light-headedness and headaches. Hematological: Negative for adenopathy. Psychiatric/Behavioral: Negative for confusion, dysphoric mood, self-injury, sleep disturbance and suicidal ideas. The patient is not nervous/anxious. Current Outpatient Medications:     predniSONE (DELTASONE) 10 MG tablet, Take 1 tablet by mouth 2 times daily, Disp: , Rfl:     Ez Smart Blood Glucose Lancets MISC, 1 each by Subdermal route daily, Disp: 100 each, Rfl: 5    SYNTHROID 112 MCG tablet, Take 1 tablet by mouth Daily, Disp: 30 tablet, Rfl: 3    blood glucose monitor strips, Test one time a day & as needed for symptoms of irregular blood glucose. E11.9, Disp: 100 strip, Rfl: 11    glimepiride (AMARYL) 2 MG tablet, Take 2 mg by mouth 2 times daily, Disp: , Rfl:     melatonin 3 MG TABS tablet, Take 3 mg by mouth daily, Disp: , Rfl:     Licorice-Glycine (RHIZINATE 3X) 400-50 MG CHEW, Take by mouth, Disp: , Rfl:     loratadine (CLARITIN) 10 MG tablet, Take 10 mg by mouth daily, Disp: , Rfl:     montelukast (SINGULAIR) 10 MG tablet, Take by mouth, Disp: , Rfl:     CANNABIDIOL PO, , Disp: , Rfl:   Allergies   Allergen Reactions    Famotidine Anxiety, Nausea Only and Palpitations     Other reaction(s): Dizziness    Percocet [Oxycodone-Acetaminophen] Other (See Comments)     Prolong use, GI issues    Asa [Aspirin] Hives    Levothyroxine      Other reaction(s): Free Text  Other reaction(s): severe sideeffects/depression,    Omeprazole Hives     Other reaction(s): Hives, Urticaria    Protonix [Pantoprazole Sodium] Other (See Comments)     Other reaction(s):  Other: See Comments  DEPRESSION    Erythromycin Rash    Keflet [Cephalexin] Rash    Levaquin [Levofloxacin In D5w] Rash    Pcn [Penicillins] Rash    Polyethylene Glycol Rash     Bloating,gas    Tetracycline Rash    Tetracyclines & Related Rash      Past Medical History: Diagnosis Date    Cancer [facility])     endometrial cancer, breast    Diabetes mellitus ([address]     diet controlled    Diverticulitis     GERD (gastroesophageal reflux disease)     Gout     Hiatal hernia     Macular degeneration     Osteoarthritis     Osteopenia      Patient Active Problem List    Diagnosis Date Noted    Osteoarthritis     Metastatic adenocarcinoma to intra-abdominal site ([address] 2020    Diabetes mellitus ([address] 2020    Chronic pain due to neoplasm 10/09/2019    Stress and adjustment reaction 2019    Acquired hypothyroidism 2019    Mild episode of recurrent major depressive disorder ([address] 2019    Endometrial cancer ([address] 2019    GERD (gastroesophageal reflux disease) 2019    Ovarian cancer ([address] 2017    Adenocarcinoma of endometrium ([address] 2017    Intestinal nodule 2017    Malignant neoplasm of upper-outer quadrant of left 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 ([address] 10/31/2016      Past Surgical History:   Procedure Laterality Date    BREAST LUMPECTOMY Left 2016    left breast sentinelnode dissection, blue dye injection    BREAST SURGERY Left 10/2016    cancer     SECTION      x 3    CHG UNLISTED DX RADIOGRAPHIC PROCEDURE N/A 2018    BRONCHOSCOPY ELECTROMAGNETIC performed by Erling Schilder, [facility] at [facility]    ENDOSCOPY, COLON, DIAGNOSTIC      EYE SURGERY Bilateral 2016    cataracts    HYSTERECTOMY  2016    total with BSO    HYSTERECTOMY, TOTAL ABDOMINAL  2016    [place] [address] BRUSHING/PROTECTED BRUSHINGS  2018    BRONCHOSCOPY BRUSHINGS performed by Erling Schilder, MD at [address] [place] [address]  2018    BRONCHOSCOPY ALVEOLAR LAVAGE performed by Erling Schilder, MD at [address] [place] [address] [address] EBUS DX/TX INTERVENTION [address] LES N/A 2018    BRONCHOSCOPY ULTRASOUND performed by Erling Schilder, MD at [address] [place] [address]

## (undated) DEVICE — BASIC SINGLE BASIN 1-LF: Brand: MEDLINE INDUSTRIES, INC.

## (undated) DEVICE — ENDOBRONCHIAL ULTRASOUND TRANSBRONCHIAL ASPIRATION NEEDLE: Brand: EXPECT PULMONARY

## (undated) DEVICE — SPONGE,LAP,4"X18",XR,ST,5/PK,40PK/CS: Brand: MEDLINE INDUSTRIES, INC.

## (undated) DEVICE — PACK PROCEDURE SURG GEN CUST

## (undated) DEVICE — 4-PORT MANIFOLD: Brand: NEPTUNE 2

## (undated) DEVICE — CHLORAPREP 26ML ORANGE

## (undated) DEVICE — BRUSH CYTO FLX DISP SUPERDIMENSION

## (undated) DEVICE — BRUSH CYTO L120MM DIA1.7MM SHARPENED NDL TIP FWD FACING

## (undated) DEVICE — GOWN,SIRUS,FABRNF,XL,20/CS: Brand: MEDLINE

## (undated) DEVICE — Z DUP USE 2381766 KIT PROC W/ 190DEG FIRM TIP EXT WRK CHN LOCATABLE GUID FOR [SDK4190FT] [MEDTRONIC USA INC]

## (undated) DEVICE — TOWEL,OR,DSP,ST,BLUE,STD,6/PK,12PK/CS: Brand: MEDLINE

## (undated) DEVICE — GLOVE SURG SZ 7 L12IN FNGR THK94MIL TRNSLUC YEL LTX HYDRGEL

## (undated) DEVICE — COVER HNDL LT DISP

## (undated) DEVICE — CONMED DISPOSABLE MICROBIOLOGY BRUSH, Ø1 MM, 1.8 MM WORKING DIAMETER, 110 CM LENGTH: Brand: CONMED

## (undated) DEVICE — DRAPE C ARM W41XL74IN UNIV MOB W RUBBERBAND CLP

## (undated) DEVICE — INTENDED FOR TISSUE SEPARATION, AND OTHER PROCEDURES THAT REQUIRE A SHARP SURGICAL BLADE TO PUNCTURE OR CUT.: Brand: BARD-PARKER ® STAINLESS STEEL BLADES

## (undated) DEVICE — STANDARD HYPODERMIC NEEDLE,ALUMINUM HUB: Brand: MONOJECT

## (undated) DEVICE — SUTURE MCRYL SZ 4-0 L27IN ABSRB UD L19MM PS-2 1/2 CIR PRIM Y426H

## (undated) DEVICE — CLAMP KELLY CURVED

## (undated) DEVICE — CONTROL SYRINGE LUER-LOCK TIP: Brand: MONOJECT

## (undated) DEVICE — SOLUTION IV 500ML 0.9% SOD CHL PH 5 INJ USP VIAFLX PLAS

## (undated) DEVICE — NEEDLE CYTO 21GA L137MM DIA1.9MM PULM BRAID TAPR SHTH OPT 5PK

## (undated) DEVICE — PATIENT RETURN ELECTRODE, SINGLE-USE, CONTACT QUALITY MONITORING, ADULT, WITH 9FT CORD, FOR PATIENTS WEIGING OVER 33LBS. (15KG): Brand: MEGADYNE

## (undated) DEVICE — CLAMP TONSIL

## (undated) DEVICE — DOUBLE BASIN SET: Brand: MEDLINE INDUSTRIES, INC.

## (undated) DEVICE — SKIN AFFIX SURG ADHESIVE 72/CS 0.55ML: Brand: MEDLINE

## (undated) DEVICE — DRAPE,CHEST,FENES,15X10,STERIL: Brand: MEDLINE

## (undated) DEVICE — SHEET, T, LAPAROTOMY, STERILE: Brand: MEDLINE

## (undated) DEVICE — GLASS MEDICINE ST

## (undated) DEVICE — GOWN,SIRUS,FABRNF,L,20/CS: Brand: MEDLINE

## (undated) DEVICE — YANKAUER,OPEN TIP,W/O VENT,STERILE: Brand: MEDLINE INDUSTRIES, INC.

## (undated) DEVICE — MARKER,SKIN,WI/RULER AND LABELS: Brand: MEDLINE

## (undated) DEVICE — NEEDLE HYPO 25GA L1.5IN BLU POLYPR HUB S STL REG BVL STR

## (undated) DEVICE — Device: Brand: BALLOON

## (undated) DEVICE — ELECTRODE PT RET AD L9FT HI MOIST COND ADH HYDRGEL CORDED

## (undated) DEVICE — ADHESIVE SKIN CLSR 0.7ML TOP DERMBND ADV